# Patient Record
Sex: MALE | Race: WHITE | Employment: OTHER | URBAN - METROPOLITAN AREA
[De-identification: names, ages, dates, MRNs, and addresses within clinical notes are randomized per-mention and may not be internally consistent; named-entity substitution may affect disease eponyms.]

---

## 2020-10-04 ENCOUNTER — HOSPITAL ENCOUNTER (EMERGENCY)
Age: 75
Discharge: HOME | End: 2020-10-04
Attending: EMERGENCY MEDICINE
Payer: MEDICARE

## 2020-10-04 ENCOUNTER — APPOINTMENT (OUTPATIENT)
Dept: GENERAL RADIOLOGY | Age: 75
End: 2020-10-04
Attending: EMERGENCY MEDICINE
Payer: MEDICARE

## 2020-10-04 VITALS
WEIGHT: 238 LBS | RESPIRATION RATE: 16 BRPM | OXYGEN SATURATION: 98 % | HEART RATE: 115 BPM | HEIGHT: 68 IN | SYSTOLIC BLOOD PRESSURE: 102 MMHG | DIASTOLIC BLOOD PRESSURE: 56 MMHG | TEMPERATURE: 98.8 F | BODY MASS INDEX: 36.07 KG/M2

## 2020-10-04 DIAGNOSIS — I95.1 ORTHOSTATIC HYPOTENSION: ICD-10-CM

## 2020-10-04 DIAGNOSIS — J90 PLEURAL EFFUSION: Primary | ICD-10-CM

## 2020-10-04 PROBLEM — F10.11 ALCOHOL ABUSE, IN REMISSION: Status: ACTIVE | Noted: 2020-10-04

## 2020-10-04 PROBLEM — F17.201 TOBACCO USE DISORDER, MILD, IN SUSTAINED REMISSION: Status: ACTIVE | Noted: 2020-10-04

## 2020-10-04 PROBLEM — E86.0 DEHYDRATION: Status: ACTIVE | Noted: 2020-10-04

## 2020-10-04 PROBLEM — E10.29 DM (DIABETES MELLITUS) TYPE I CONTROLLED WITH RENAL MANIFESTATION (HCC): Status: ACTIVE | Noted: 2020-10-04

## 2020-10-04 LAB
ALBUMIN SERPL-MCNC: 3.1 G/DL (ref 3.2–4.6)
ALBUMIN/GLOB SERPL: 0.6 {RATIO} (ref 1.2–3.5)
ALP SERPL-CCNC: 158 U/L (ref 50–136)
ALT SERPL-CCNC: 18 U/L (ref 12–65)
ANION GAP SERPL CALC-SCNC: 7 MMOL/L (ref 7–16)
AST SERPL-CCNC: 14 U/L (ref 15–37)
ATRIAL RATE: 120 BPM
BASOPHILS # BLD: 0 K/UL (ref 0–0.2)
BASOPHILS NFR BLD: 1 % (ref 0–2)
BILIRUB SERPL-MCNC: 0.5 MG/DL (ref 0.2–1.1)
BNP SERPL-MCNC: 28 PG/ML (ref 5–125)
BUN SERPL-MCNC: 24 MG/DL (ref 8–23)
CALCIUM SERPL-MCNC: 8.9 MG/DL (ref 8.3–10.4)
CALCULATED P AXIS, ECG09: 62 DEGREES
CALCULATED R AXIS, ECG10: 78 DEGREES
CALCULATED T AXIS, ECG11: 82 DEGREES
CHLORIDE SERPL-SCNC: 101 MMOL/L (ref 98–107)
CO2 SERPL-SCNC: 29 MMOL/L (ref 21–32)
CREAT SERPL-MCNC: 1.97 MG/DL (ref 0.8–1.5)
DIAGNOSIS, 93000: NORMAL
DIFFERENTIAL METHOD BLD: ABNORMAL
EOSINOPHIL # BLD: 0.1 K/UL (ref 0–0.8)
EOSINOPHIL NFR BLD: 2 % (ref 0.5–7.8)
ERYTHROCYTE [DISTWIDTH] IN BLOOD BY AUTOMATED COUNT: 14.2 % (ref 11.9–14.6)
GLOBULIN SER CALC-MCNC: 5 G/DL (ref 2.3–3.5)
GLUCOSE SERPL-MCNC: 130 MG/DL (ref 65–100)
HCT VFR BLD AUTO: 37.6 % (ref 41.1–50.3)
HGB BLD-MCNC: 11.8 G/DL (ref 13.6–17.2)
IMM GRANULOCYTES # BLD AUTO: 0 K/UL (ref 0–0.5)
IMM GRANULOCYTES NFR BLD AUTO: 0 % (ref 0–5)
LYMPHOCYTES # BLD: 0.8 K/UL (ref 0.5–4.6)
LYMPHOCYTES NFR BLD: 12 % (ref 13–44)
MCH RBC QN AUTO: 26.9 PG (ref 26.1–32.9)
MCHC RBC AUTO-ENTMCNC: 31.4 G/DL (ref 31.4–35)
MCV RBC AUTO: 85.6 FL (ref 79.6–97.8)
MONOCYTES # BLD: 0.6 K/UL (ref 0.1–1.3)
MONOCYTES NFR BLD: 9 % (ref 4–12)
NEUTS SEG # BLD: 4.8 K/UL (ref 1.7–8.2)
NEUTS SEG NFR BLD: 77 % (ref 43–78)
NRBC # BLD: 0 K/UL (ref 0–0.2)
P-R INTERVAL, ECG05: 192 MS
PLATELET # BLD AUTO: 272 K/UL (ref 150–450)
PMV BLD AUTO: 10.2 FL (ref 9.4–12.3)
POTASSIUM SERPL-SCNC: 3 MMOL/L (ref 3.5–5.1)
PROT SERPL-MCNC: 8.1 G/DL (ref 6.3–8.2)
Q-T INTERVAL, ECG07: 296 MS
QRS DURATION, ECG06: 62 MS
QTC CALCULATION (BEZET), ECG08: 418 MS
RBC # BLD AUTO: 4.39 M/UL (ref 4.23–5.6)
SODIUM SERPL-SCNC: 137 MMOL/L (ref 136–145)
VENTRICULAR RATE, ECG03: 120 BPM
WBC # BLD AUTO: 6.2 K/UL (ref 4.3–11.1)

## 2020-10-04 PROCEDURE — 96368 THER/DIAG CONCURRENT INF: CPT

## 2020-10-04 PROCEDURE — 71045 X-RAY EXAM CHEST 1 VIEW: CPT

## 2020-10-04 PROCEDURE — 96365 THER/PROPH/DIAG IV INF INIT: CPT

## 2020-10-04 PROCEDURE — 74011250636 HC RX REV CODE- 250/636: Performed by: EMERGENCY MEDICINE

## 2020-10-04 PROCEDURE — 80053 COMPREHEN METABOLIC PANEL: CPT

## 2020-10-04 PROCEDURE — 83880 ASSAY OF NATRIURETIC PEPTIDE: CPT

## 2020-10-04 PROCEDURE — 87040 BLOOD CULTURE FOR BACTERIA: CPT

## 2020-10-04 PROCEDURE — 99285 EMERGENCY DEPT VISIT HI MDM: CPT

## 2020-10-04 PROCEDURE — 74011000258 HC RX REV CODE- 258: Performed by: EMERGENCY MEDICINE

## 2020-10-04 PROCEDURE — 93005 ELECTROCARDIOGRAM TRACING: CPT | Performed by: EMERGENCY MEDICINE

## 2020-10-04 PROCEDURE — 85025 COMPLETE CBC W/AUTO DIFF WBC: CPT

## 2020-10-04 PROCEDURE — 96366 THER/PROPH/DIAG IV INF ADDON: CPT

## 2020-10-04 RX ADMIN — AZITHROMYCIN MONOHYDRATE 500 MG: 500 INJECTION, POWDER, LYOPHILIZED, FOR SOLUTION INTRAVENOUS at 16:01

## 2020-10-04 RX ADMIN — CEFTRIAXONE SODIUM 1 G: 1 INJECTION, POWDER, FOR SOLUTION INTRAMUSCULAR; INTRAVENOUS at 14:44

## 2020-10-04 NOTE — H&P (VIEW-ONLY)
Called by hospitalist to try and set up time for thoracentesis for patient. Presented to ER with orthostatic hypotension. Found R sided effusion on CXR. Told that he is ok for discharge today and outpatient procedure tomorrow. Not on any blood thinners. Will set up for 10am thoracentesis and new patient evaluation tomorrow.   
 
Dinh Reyes MD

## 2020-10-04 NOTE — ED NOTES
I have reviewed discharge instructions with the patient and spouse. The patient and spouse verbalized understanding. Patient left ED via Discharge Method: ambulatory to Home with wife. Opportunity for questions and clarification provided. Patient given 0 scripts. No e-sign. To continue your aftercare when you leave the hospital, you may receive an automated call from our care team to check in on how you are doing. This is a free service and part of our promise to provide the best care and service to meet your aftercare needs.  If you have questions, or wish to unsubscribe from this service please call [phone]. Thank you for Choosing our [facility] Emergency Department.

## 2020-10-04 NOTE — ED PROVIDER NOTES
Patient is complaining of generalized weakness and fatigue. He states he has had a cough with congestion for the past 3 weeks. He saw his primary doctor on Friday, had a chest x-ray done showing a right-sided pleural effusion. His doctor placed him on Lasix for this. The patient states he felt fine so his doctor decided not to send him to the hospital at that time. He has been taking his Lasix. He states that he has been getting dizzy when he stands. Today he stood up to go to the restroom and fell because of his dizziness and weakness. When EMS arrived, they found him orthostatic. He was given some IV fluids and transported here. He denies any shortness of breath, states his cough has been a nonissue lately. He denies any vomiting or diarrhea, did get some nausea when he stood up and fell today. No past medical history on file. No past surgical history on file. No family history on file.     Social History     Socioeconomic History    Marital status:      Spouse name: Not on file    Number of children: Not on file    Years of education: Not on file    Highest education level: Not on file   Occupational History    Not on file   Social Needs    Financial resource strain: Not on file    Food insecurity     Worry: Not on file     Inability: Not on file    Transportation needs     Medical: Not on file     Non-medical: Not on file   Tobacco Use    Smoking status: Not on file   Substance and Sexual Activity    Alcohol use: Not on file    Drug use: Not on file    Sexual activity: Not on file   Lifestyle    Physical activity     Days per week: Not on file     Minutes per session: Not on file    Stress: Not on file   Relationships    Social connections     Talks on phone: Not on file     Gets together: Not on file     Attends Church service: Not on file     Active member of club or organization: Not on file     Attends meetings of clubs or organizations: Not on file Relationship status: Not on file    Intimate partner violence     Fear of current or ex partner: Not on file     Emotionally abused: Not on file     Physically abused: Not on file     Forced sexual activity: Not on file   Other Topics Concern    Not on file   Social History Narrative    Not on file         ALLERGIES: Patient has no known allergies. Review of Systems   Constitutional: Positive for fatigue. Negative for chills and fever. Gastrointestinal: Positive for nausea. Negative for vomiting. All other systems reviewed and are negative. Vitals:    10/04/20 1201   BP: 121/76   Resp: 18   Temp: 98.7 °F (37.1 °C)   SpO2: 96%   Weight: 108 kg (238 lb)   Height: 5' 8\" (1.727 m)            Physical Exam  Vitals signs and nursing note reviewed. Constitutional:       Appearance: Normal appearance. He is well-developed. HENT:      Head: Normocephalic and atraumatic. Eyes:      Conjunctiva/sclera: Conjunctivae normal.      Pupils: Pupils are equal, round, and reactive to light. Neck:      Musculoskeletal: Normal range of motion and neck supple. Cardiovascular:      Rate and Rhythm: Regular rhythm. Tachycardia present. Heart sounds: Normal heart sounds. Pulmonary:      Effort: Pulmonary effort is normal. No respiratory distress. Breath sounds: No wheezing, rhonchi or rales. Comments: Decreased breath sounds right lung, normal breath sounds left lung posteriorly  Abdominal:      General: Bowel sounds are normal.      Palpations: Abdomen is soft. Musculoskeletal: Normal range of motion. Right lower leg: No edema. Left lower leg: No edema. Skin:     General: Skin is warm and dry. Neurological:      Mental Status: He is alert and oriented to person, place, and time.           MDM  Number of Diagnoses or Management Options  Orthostatic hypotension: new and requires workup  Pleural effusion: new and requires workup  Diagnosis management comments: 3:05 PM discussed results with patient and wife, need for admission. He is agreeable. I spoke with the hospitalist, to see patient for admission.        Amount and/or Complexity of Data Reviewed  Clinical lab tests: reviewed and ordered  Tests in the radiology section of CPT®: ordered and reviewed  Tests in the medicine section of CPT®: reviewed and ordered  Discuss the patient with other providers: yes    Risk of Complications, Morbidity, and/or Mortality  Presenting problems: moderate  Diagnostic procedures: moderate  Management options: moderate    Patient Progress  Patient progress: stable         Procedures

## 2020-10-04 NOTE — PROGRESS NOTES
Called by hospitalist to try and set up time for thoracentesis for patient. Presented to ER with orthostatic hypotension. Found R sided effusion on CXR. Told that he is ok for discharge today and outpatient procedure tomorrow. Not on any blood thinners. Will set up for 10am thoracentesis and new patient evaluation tomorrow.      Marbella Melendez MD

## 2020-10-04 NOTE — CONSULTS
HOSPITALIST CONSULT  NAME:  Nico Aleman   Age:  70 y.o.  :   1945   MRN:   [id number]  PCP: Leatha Lloyd DO  Consulting MD:  Treatment Team: Attending Provider: Consuelo Vasquez MD; Primary Nurse: Ho Montero RN  HPI:   Patient is a 81yo M with hx DM1 on insulin pump, low back pain on morphine, and CKD who presents with weakness and near syncope. He was seen by PCP on Friday for weakness and shortness of breath, found to have pleural effusion. He was started on lasix with plan close follow-up and thoracentesis. The patient has a history of orthostatic hypotension which has been worse for the past two days since starting lasix. Feeling very weak and dizzy this morning so called EMS. Found very orthostatic. Received fluids in ER with improvement in dizziness and weakness. Cough and sob are unchanged from Friday. Nonproductive. No fevers. No chills. No unwell contacts. pBNP wnl. Has a remote history of tobacco and alcohol abuse, quit for 30 years. Complete ROS done and is as stated in HPI or otherwise negative  No past medical history on file. DM type 1, orthostatic hypotension, bph, hypothyroid  No past surgical history on file. back stimulator - no longer in use  None     No Known Allergies   Social History     Tobacco Use    Smoking status: Not on file   Substance Use Topics    Alcohol use: Not on file      No family history on file. noncontributory   Objective:     Visit Vitals  /76 (BP 1 Location: Left arm, BP Patient Position: At rest)   Temp 98.7 °F (37.1 °C)   Resp 18   Ht 5' 8\" (1.727 m)   Wt 108 kg (238 lb)   SpO2 96%   BMI 36.19 kg/m²      Temp (24hrs), Av.7 °F (37.1 °C), Min:98.7 °F (37.1 °C), Max:98.7 °F (37.1 °C)    Oxygen Therapy  O2 Sat (%): 96 % (10/04/20 1201)  Pulse via Oximetry: 123 beats per minute (10/04/20 1201)  O2 Device: Room air (10/04/20 1201)  Physical Exam:  General:    Alert, cooperative, no distress, appears stated age.      Head: Normocephalic, without obvious abnormality, atraumatic. Nose:  Nares normal. No drainage or sinus tenderness. Lungs:   Diminished right,  Clear left, no wheeze/crackle  Heart:   Regular rate and rhythm,  no murmur, rub or gallop. Abdomen:   Soft, non-tender. Not distended. Bowel sounds normal.   Extremities: No cyanosis. No edema. No clubbing  Skin:     Texture, turgor normal. No rashes or lesions. Not Jaundiced  Neurologic: Alert and oriented x 3, no focal deficits   Data Review:   Recent Results (from the past 24 hour(s))   CBC WITH AUTOMATED DIFF    Collection Time: 10/04/20 12:21 PM   Result Value Ref Range    WBC 6.2 4.3 - 11.1 K/uL    RBC 4.39 4.23 - 5.6 M/uL    HGB 11.8 (L) 13.6 - 17.2 g/dL    HCT 37.6 (L) 41.1 - 50.3 %    MCV 85.6 79.6 - 97.8 FL    MCH 26.9 26.1 - 32.9 PG    MCHC 31.4 31.4 - 35.0 g/dL    RDW 14.2 11.9 - 14.6 %    PLATELET 436 991 - 376 K/uL    MPV 10.2 9.4 - 12.3 FL    ABSOLUTE NRBC 0.00 0.0 - 0.2 K/uL    DF AUTOMATED      NEUTROPHILS 77 43 - 78 %    LYMPHOCYTES 12 (L) 13 - 44 %    MONOCYTES 9 4.0 - 12.0 %    EOSINOPHILS 2 0.5 - 7.8 %    BASOPHILS 1 0.0 - 2.0 %    IMMATURE GRANULOCYTES 0 0.0 - 5.0 %    ABS. NEUTROPHILS 4.8 1.7 - 8.2 K/UL    ABS. LYMPHOCYTES 0.8 0.5 - 4.6 K/UL    ABS. MONOCYTES 0.6 0.1 - 1.3 K/UL    ABS. EOSINOPHILS 0.1 0.0 - 0.8 K/UL    ABS. BASOPHILS 0.0 0.0 - 0.2 K/UL    ABS. IMM.  GRANS. 0.0 0.0 - 0.5 K/UL   METABOLIC PANEL, COMPREHENSIVE    Collection Time: 10/04/20 12:21 PM   Result Value Ref Range    Sodium 137 136 - 145 mmol/L    Potassium 3.0 (L) 3.5 - 5.1 mmol/L    Chloride 101 98 - 107 mmol/L    CO2 29 21 - 32 mmol/L    Anion gap 7 7 - 16 mmol/L    Glucose 130 (H) 65 - 100 mg/dL    BUN 24 (H) 8 - 23 MG/DL    Creatinine 1.97 (H) 0.8 - 1.5 MG/DL    GFR est AA 43 (L) >60 ml/min/1.73m2    GFR est non-AA 36 (L) >60 ml/min/1.73m2    Calcium 8.9 8.3 - 10.4 MG/DL    Bilirubin, total 0.5 0.2 - 1.1 MG/DL    ALT (SGPT) 18 12 - 65 U/L    AST (SGOT) 14 (L) 15 - 37 U/L Alk. phosphatase 158 (H) 50 - 136 U/L    Protein, total 8.1 6.3 - 8.2 g/dL    Albumin 3.1 (L) 3.2 - 4.6 g/dL    Globulin 5.0 (H) 2.3 - 3.5 g/dL    A-G Ratio 0.6 (L) 1.2 - 3.5     NT-PRO BNP    Collection Time: 10/04/20 12:21 PM   Result Value Ref Range    NT pro-BNP 28 5 - 125 PG/ML   EKG, 12 LEAD, INITIAL    Collection Time: 10/04/20 12:22 PM   Result Value Ref Range    Ventricular Rate 120 BPM    Atrial Rate 120 BPM    P-R Interval 192 ms    QRS Duration 62 ms    Q-T Interval 296 ms    QTC Calculation (Bezet) 418 ms    Calculated P Axis 62 degrees    Calculated R Axis 78 degrees    Calculated T Axis 82 degrees    Diagnosis       !! AGE AND GENDER SPECIFIC ECG ANALYSIS !! Sinus tachycardia  Nonspecific ST abnormality  Abnormal ECG  No previous ECGs available       Imaging /Procedures /Studies   XR CHEST PORT   Final Result   Impression: Moderate right pleural effusion and right basilar   atelectasis/infiltrate. Assessment and Plan: Active Hospital Problems    Diagnosis Date Noted    DM (diabetes mellitus) type I controlled with renal manifestation ([facility] [address] 10/04/2020    Pleural effusion 10/04/2020    Tobacco use disorder, mild, in sustained remission 10/04/2020    Alcohol abuse, in remission 10/04/2020    Orthostatic hypotension 10/04/2020    Dehydration 10/04/2020       PLAN:  Pleural effusion still needs theraputic and diagnostic tap. Received rocephin/azithro but no infectious signs or symptoms - will hold further abx at this time but blood cultures pending. [facility]. After eval of pleural fluid, may need further cardiac or malignancy workup per primary care. Since the patient is no longer dizzy or weak after hydration, he is asking to be discharged home to have tap as an outpatient. No hypoxia or clear infectious signs so there is no absolute contraindication.      Dispo: ER discharge with planned outpatient fawad in AM and follow-up with PCP    Signed By: Deonna Lorenzana MD     October 4, 2020

## 2020-10-04 NOTE — ED TRIAGE NOTES
Patient arrived via EMS from private home. EMS was called for generalized weakness. Has had a persistent cough with congestion x3 weeks. Patient seen PCP Friday 9/2/2020 and was told he had diminished LS. Xray completed at PCP which noted right pleural effusion per EMS report. PCP appt tomorrow for a follow up. Today patient stood up from toilet and became light headed and dizzy. Wife called [phone] for health check. EMS completed orthos. Laying /96, sitting /60, standing BP 80/40. EMS gave NS 400ml. EMS placed IV 20g left hand. HR 120bpm, , temp 99.2, o2 sat 98% on RA. A+O x4 at baseline.

## 2020-10-05 ENCOUNTER — HOSPITAL ENCOUNTER (OUTPATIENT)
Age: 75
Setting detail: OUTPATIENT SURGERY
Discharge: HOME | End: 2020-10-05
Attending: INTERNAL MEDICINE | Admitting: INTERNAL MEDICINE
Payer: MEDICARE

## 2020-10-05 VITALS
OXYGEN SATURATION: 98 % | BODY MASS INDEX: 34.86 KG/M2 | TEMPERATURE: 98.4 F | RESPIRATION RATE: 20 BRPM | HEIGHT: 68 IN | SYSTOLIC BLOOD PRESSURE: 100 MMHG | WEIGHT: 230 LBS | DIASTOLIC BLOOD PRESSURE: 50 MMHG | HEART RATE: 111 BPM

## 2020-10-05 DIAGNOSIS — J90 PLEURAL EFFUSION: ICD-10-CM

## 2020-10-05 PROBLEM — Z87.891 HISTORY OF TOBACCO ABUSE: Status: ACTIVE | Noted: 2020-10-04

## 2020-10-05 LAB
HCT VFR BLD AUTO: 35.8 % (ref 41.1–50.3)
HGB BLD-MCNC: 11.4 G/DL (ref 13.6–17.2)

## 2020-10-05 PROCEDURE — 88305 TISSUE EXAM BY PATHOLOGIST: CPT

## 2020-10-05 PROCEDURE — 83615 LACTATE (LD) (LDH) ENZYME: CPT

## 2020-10-05 PROCEDURE — 85013 SPUN MICROHEMATOCRIT: CPT

## 2020-10-05 PROCEDURE — 87116 MYCOBACTERIA CULTURE: CPT

## 2020-10-05 PROCEDURE — 36415 COLL VENOUS BLD VENIPUNCTURE: CPT

## 2020-10-05 PROCEDURE — 89050 BODY FLUID CELL COUNT: CPT

## 2020-10-05 PROCEDURE — 82945 GLUCOSE OTHER FLUID: CPT

## 2020-10-05 PROCEDURE — 85018 HEMOGLOBIN: CPT

## 2020-10-05 PROCEDURE — 77030014147 HC TY THORCENT PARA TELE -B: Performed by: INTERNAL MEDICINE

## 2020-10-05 PROCEDURE — 87205 SMEAR GRAM STAIN: CPT

## 2020-10-05 PROCEDURE — 76040000019: Performed by: INTERNAL MEDICINE

## 2020-10-05 PROCEDURE — 87102 FUNGUS ISOLATION CULTURE: CPT

## 2020-10-05 PROCEDURE — 2709999900 HC NON-CHARGEABLE SUPPLY: Performed by: INTERNAL MEDICINE

## 2020-10-05 PROCEDURE — 84157 ASSAY OF PROTEIN OTHER: CPT

## 2020-10-05 PROCEDURE — 88112 CYTOPATH CELL ENHANCE TECH: CPT

## 2020-10-05 PROCEDURE — 32555 ASPIRATE PLEURA W/ IMAGING: CPT | Performed by: INTERNAL MEDICINE

## 2020-10-05 RX ORDER — FINASTERIDE 5 MG/1
5 TABLET, FILM COATED ORAL DAILY
COMMUNITY

## 2020-10-05 RX ORDER — FLUTICASONE PROPIONATE 0.5 MG/G
1 CREAM TOPICAL 2 TIMES DAILY
COMMUNITY

## 2020-10-05 RX ORDER — AMITRIPTYLINE HYDROCHLORIDE 10 MG/1
25 TABLET, FILM COATED ORAL
COMMUNITY
End: 2021-08-22 | Stop reason: CLARIF

## 2020-10-05 RX ORDER — PREGABALIN 75 MG/1
75 CAPSULE ORAL 2 TIMES DAILY
COMMUNITY
End: 2021-07-08

## 2020-10-05 RX ORDER — GLUCOSAMINE SULFATE 1500 MG
1000 POWDER IN PACKET (EA) ORAL DAILY
COMMUNITY

## 2020-10-05 RX ORDER — MORPHINE SULFATE 15 MG/1
15 TABLET ORAL 2 TIMES DAILY
COMMUNITY
End: 2021-07-08

## 2020-10-05 RX ORDER — ALFUZOSIN HYDROCHLORIDE 10 MG/1
10 TABLET, EXTENDED RELEASE ORAL
COMMUNITY
End: 2021-11-30 | Stop reason: SINTOL

## 2020-10-05 RX ORDER — HYDROCORTISONE VALERATE 2 MG/G
OINTMENT TOPICAL
COMMUNITY
End: 2021-08-22 | Stop reason: CLARIF

## 2020-10-05 RX ORDER — ROSUVASTATIN CALCIUM 10 MG/1
10 TABLET, COATED ORAL
COMMUNITY
End: 2021-07-10

## 2020-10-05 RX ORDER — DONEPEZIL HYDROCHLORIDE 5 MG/1
5 TABLET, FILM COATED ORAL DAILY
COMMUNITY
End: 2022-04-14

## 2020-10-05 RX ORDER — RABEPRAZOLE SODIUM 20 MG/1
20 TABLET, DELAYED RELEASE ORAL 2 TIMES DAILY
COMMUNITY
End: 2021-08-22

## 2020-10-05 RX ORDER — ONDANSETRON 4 MG/1
4 TABLET, FILM COATED ORAL
COMMUNITY
End: 2021-11-10

## 2020-10-05 RX ORDER — GUAIFENESIN 100 MG/5ML
81 LIQUID (ML) ORAL
COMMUNITY
End: 2021-07-28

## 2020-10-05 RX ORDER — SODIUM BICARBONATE 650 MG/1
650 TABLET ORAL 2 TIMES DAILY
COMMUNITY

## 2020-10-05 RX ORDER — BUPROPION HYDROCHLORIDE 75 MG/1
75 TABLET ORAL 2 TIMES DAILY
COMMUNITY
End: 2021-11-17

## 2020-10-05 RX ORDER — LEVOTHYROXINE SODIUM 75 UG/1
75 TABLET ORAL
COMMUNITY
End: 2022-02-22 | Stop reason: SDUPTHER

## 2020-10-05 NOTE — CONSULTS
CONSULT NOTE    Toy Spikes    10/5/2020    Date of Admission:  10/5/2020    The patient's chart is reviewed and the patient is discussed with the staff. Subjective:     Patient is a 70 y.o.  male seen and evaluated at the request of Dr. Primo Segundo. Pt has a history of prior tobacco and ETOH abuse, orthostatic hypotension, DM2, and anxiety. Pt developed a cough a few weeks ago and was seen by PCP on 10/2/2020 and had a CXR with findings of R pleural effusion. He was given lasix 80mg BID and told to follow up this week. On 10/4, pt woke up feeling weak and dizzy. His wife called EMS and he was found to be orthostatic. He was given IVF and presented to the ER at [facility]. The hospitalist were consulted and he was not hypoxic and did not meet criteria for admission. We were called and pt was set up for outpatient thoracentesis. Pt presented to the [address] [place] [address] lab today for the procedure. Pt's wife at bedside and she contributes to his history. Pt reports that he was a smoker smoking 1-2 ppd x 36 years. He quit smoking in 1996. He is not on any inhalers or O2 at home. He is not aware of any asthma or COPD issues. He does not normally cough. He denies shortness of breath. He denies fever, chills, or sweats. He has stopped taking lasix secondary to dehydration and hypokalemia.        Review of Systems  Constitutional: positive for fatigue  Eyes: positive for contacts/glasses  Ears, nose, mouth, throat, and face: positive for hearing loss  Respiratory: positive for cough  Cardiovascular: negative  Gastrointestinal: negative  Genitourinary:positive for frequency and nocturia  Hematologic/lymphatic: negative  Musculoskeletal:positive for arthralgias and stiff joints  Neurological: positive for dizziness  Behavioral/Psych: positive for anxiety  Endocrine: negative  Allergic/Immunologic: negative    Patient Active Problem List   Diagnosis Code    DM (diabetes mellitus) type I controlled with renal manifestation (HCC) E10.29    Pleural effusion J90    History of tobacco abuse Z87.891    Alcohol abuse, in remission F10.11    Orthostatic hypotension I95.1    Dehydration E86.0       Home DME company none. Prior to Admission Medications   Prescriptions Last Dose Informant Patient Reported? Taking? IP [facility] INSULIN LISPRO, HUMALOG, FOR PUMP 10/5/2020 at Unknown time  Yes Yes   RABEprazole (Aciphex) 20 mg tablet 10/5/2020 at Unknown time  Yes Yes   Sig: Take 20 mg by mouth two (2) times a day. alfuzosin SR (UROXATRAL) 10 mg SR tablet 10/4/2020 at Unknown time  Yes Yes   Sig: Take 10 mg by mouth daily. amitriptyline (ELAVIL) 10 mg tablet 10/4/2020 at Unknown time  Yes Yes   Sig: Take 20 mg by mouth nightly. aspirin 81 mg chewable tablet 10/5/2020 at Unknown time  Yes Yes   Sig: Take 81 mg by mouth daily. buPROPion (WELLBUTRIN) 75 mg tablet 10/5/2020 at Unknown time  Yes Yes   Sig: Take 75 mg by mouth two (2) times a day. cholecalciferol (Vitamin D3) 25 mcg (1,000 unit) cap 10/5/2020 at Unknown time  Yes Yes   Sig: Take 1,000 Units by mouth daily. donepeziL (ARICEPT) 5 mg tablet 10/5/2020 at Unknown time  Yes Yes   Sig: Take 5 mg by mouth nightly. finasteride (PROSCAR) 5 mg tablet 10/5/2020 at Unknown time  Yes Yes   Sig: Take 5 mg by mouth daily. fluticasone propionate (CUTIVATE) 0.05 % topical cream Unknown at Unknown time  Yes No   Sig: Apply  to affected area two (2) times a day. hydrocortisone valerate (WEST-DORI) 0.2 % ointment 10/4/2020 at Unknown time  Yes Yes   Sig: Apply  to affected area two (2) times daily as needed for Skin Irritation. use thin layer   levothyroxine (SYNTHROID) 75 mcg tablet 10/5/2020 at Unknown time  Yes Yes   Sig: Take 75 mcg by mouth Daily (before breakfast). morphine IR (MS IR) 15 mg tablet 10/5/2020 at Unknown time  Yes Yes   Sig: Take 15 mg by mouth two (2) times a day.    ondansetron hcl (Zofran) 4 mg tablet Unknown at Unknown time  Yes No   Sig: Take 4 mg by mouth every eight (8) hours as needed for Nausea or Vomiting. pregabalin (Lyrica) 75 mg capsule 10/5/2020 at Unknown time  Yes Yes   Sig: Take 75 mg by mouth two (2) times a day. rosuvastatin (Crestor) 10 mg tablet 10/4/2020 at Unknown time  Yes Yes   Sig: Take 10 mg by mouth nightly.   sodium bicarbonate 650 mg tablet 10/5/2020 at Unknown time  Yes Yes   Sig: Take 650 mg by mouth two (2) times a day.       Facility-Administered Medications: None       Past Medical History:   Diagnosis Date    Acquired hypothyroidism     Aphonia     Back pain, chronic     Benign prostatic hyperplasia with incomplete bladder emptying     Broken bones     Candida laryngitis     Charcot foot due to diabetes mellitus ([facility] [address]     Chronic kidney disease     Chronic left-sided low back pain with left-sided sciatica     Diabetes ([facility] [facility]     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 ([facility] [address] [facility]     Diabetic nephropathy ([facility]     Diabetic peripheral neuropathy (HCC)     Diabetic retinopathy (HCC)     Dysphonia     GERD (gastroesophageal reflux disease)     HLD (hyperlipidemia)     Insulin pump in place     Left hip pain     Lumbar radiculopathy     MCI (mild cognitive impairment) with memory loss     Mild cognitive impairment     Moderate episode of recurrent major depressive disorder (HCC)     Orthostatic hypotension     LISA (obstructive sleep apnea)     Paraspinal muscle spasm     Polypharmacy     S/P lumbar fusion     Seborrheic keratosis     Spinal cord stimulator status     Spondylolisthesis     Tachycardia     Thyroid disease     Trochanteric bursitis of left hip     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)     Type 1 diabetes mellitus with hyperglycemia (HCC)     Type 1 diabetes mellitus with hypoglycemia (HCC)     Vitreous hemorrhage due to type 1 diabetes mellitus (HCC)     Vocal cord atrophy     Weakness      Past Surgical History:   Procedure Laterality Date    HX AMPUTATION TOE      distal portion of the 2nd toe of R, foot, distal part of 3rd toe of r foot    HX BUNIONECTOMY      HX LUMBAR FUSION      L4-5    HX OTHER SURGICAL      spinal neurostimulator    HX SHOULDER ARTHROSCOPY Left     HX TONSIL AND ADENOIDECTOMY      at age 8 per Pt     Social History     Socioeconomic History    Marital status:      Spouse name: Not on file    Number of children: Not on file    Years of education: Not on file    Highest education level: Not on file   Occupational History    Not on file   Social Needs    Financial resource strain: Not on file    Food insecurity     Worry: Not on file     Inability: Not on file    Transportation needs     Medical: Not on file     Non-medical: Not on file   Tobacco Use    Smoking status: Former Smoker     Last attempt to quit: 1996     Years since quittin.1    Smokeless tobacco: Former User   Substance and Sexual Activity    Alcohol use: Not Currently    Drug use: Never    Sexual activity: Not on file   Lifestyle    Physical activity     Days per week: Not on file     Minutes per session: Not on file    Stress: Not on file   Relationships    Social connections     Talks on phone: Not on file     Gets together: Not on file     Attends Hinduism service: Not on file     Active member of club or organization: Not on file     Attends meetings of clubs or organizations: Not on file     Relationship status: Not on file    Intimate partner violence     Fear of current or ex partner: Not on file     Emotionally abused: Not on file     Physically abused: Not on file     Forced sexual activity: Not on file   Other Topics Concern    Not on file   Social History Narrative    Not on file     History reviewed. No pertinent family history. Allergies   Allergen Reactions    Drixomed [Dexbrompheniramine-Pseudoephed] Palpitations       No current facility-administered medications for this encounter.           Objective: Vitals:    10/05/20 1002   BP: 119/72   Pulse: (!) 112   Resp: 20   Temp: 98.4 °F (36.9 °C)   SpO2: 97%   Weight: 230 lb (104.3 kg)   Height: 5' 8\" (1.727 m)       PHYSICAL EXAM     Constitutional:  the patient is well developed and in no acute distress, on RA  EENMT:  Sclera clear, pupils equal, oral mucosa moist  Respiratory: diminished R base   Cardiovascular:  RRR without M,G,R  Gastrointestinal: soft and non-tender; with positive bowel sounds. Musculoskeletal: warm without cyanosis. There is no lower extremity edema. Skin:  no jaundice or rashes   Neurologic: no gross neuro deficits     Psychiatric:  alert and oriented x 4    CXR:        Recent Labs     10/04/20  1221   WBC 6.2   HGB 11.8*   HCT 37.6*        Recent Labs     10/04/20  1221      K 3.0*      *   CO2 29   BUN 24*   CREA 1.97*   CA 8.9   ALB 3.1*   TBILI 0.5   ALT 18     No results for input(s): PH, PCO2, PO2, HCO3, PHI, PCO2I, PO2I, HCO3I in the last 72 hours. No results for input(s): LCAD, LAC in the last 72 hours. Assessment:  (Medical Decision Making)     Hospital Problems  Date Reviewed: 10/5/2020          Codes Class Noted POA    Pleural effusion ICD-10-CM: J90  ICD-9-CM: 511.9  10/4/2020 Yes    For R thoracentesis today     History of tobacco abuse ICD-10-CM: Z87.891  ICD-9-CM: V15.82  10/4/2020 Yes    Smoked x 36 years. Will need to follow up with spirometry and repeat imaging in our office           Plan:  (Medical Decision Making)     --for R thoracentesis today with cytology, cultures  --will need outpt follow up with spirometry and reimaging   --pt's wife advised to call our office later this week for results from thoracentesis    More than 50% of the time documented was spent in face-to-face contact with the patient and in the care of the patient on the floor/unit where the patient is located.     Thank you very much for this referral.  We appreciate the opportunity to participate in this patient's care.  Will follow along with above stated plan.     IVAN Lr

## 2020-10-05 NOTE — PROCEDURES
PROCEDURE:    DIAGNOSTIC/THERAPEUTIC. PRE-OP DIAGNOSIS:    R PLEURAL EFFUSION    POST-OP DIAGNOSIS:    R HEMOTHORAX    ASSISTANT:    None    ANESTHESIA:    LOCAL ANESTHESIA WITH 1% LIDOCAINE 10 CC TOTAL. CHEST ULTRASOUND FINDINGS:    A Turbo-M, Sonosite ultrasound with a 5-16 mHz probe was used to image the chest and localize the pleural effusion on the Right chest.      A moderatel, complex pleural space was identified on ultrasound, there were multiple loculations and septetions present, with the pleural fluid having a mixed echogenic character. DESCRIPTION OF PROCEDURE:    After obtaining informed consent and localizing the safest location for thoracentesis, the  10th intercostal space was marked with a blunt, plastic needle cap in the mid scapular line. An [employer] AK-0100 Pleral-Seal thoracentesis kit was used to perform the procedure. The skin was  cleansed with the supplied  chlorhexididne swab and then draped in the usual fasion. Using the previously marked location as a giude, a 22 G 1.5 inch needle was used to inject 10 cc of 1% lidocaine into the skin and subcutaneous tissue, as well as onto the underlying rib and inter-costal muscles, pleural fluid was aspirated to assure proper location, prior to removing the anesthesia needle. A 3mm  incision was then made, with the supplied scalpel in the usual fashion to facilitate the insertiopn of the thoracentesis needle. The needle with an 8French thoracentesis catheter was then introduced into the chest through the previously made incision in the usual fashion, the rib localized with the needle, and the catheter then marched over the rib into the pleural space. After aspirating fluid, the thoracentesis catheter was then placed into the chest using the needle itself as a trocar. The needle was then removed and the catheter was attached to the supplied tubing without complication.     12 cc of Kem Blood, was aspirated and sent for analysis. Following this a total of 200 cc was removed from the Right chest, without complication, with the patient experiencing no coughing during the procedure. Procedure was terminated once no further drainage was possible. Fluid was sent for the following tests:    Cell count with differential  LDH  Glucose  Total protein  Cytology  Hematocrit  ADA  AFB  Fungus  Routine culture and Gram stain    Post procedure US confirmed incomplete drainage of the effusion.     EBL:     1 drop      COMPLICATIONS:    none      Jose Enrique Flores MD.

## 2020-10-05 NOTE — PROGRESS NOTES
Pt sat up on side of bed for thoracentesis. Consent obtained. Time out performed. Pts vitals monitored throughout procedure. Rightt ultrasound done and pic taken of pleural fluid.  ~200 ml old bloody pleural fluid from right. Pt tolerated procedure well with no adverse rxn. Specimens sent to the lab x 3 and labeled appropriately. Site dressed appropriately. Lung sliding done and ultrasound findings reviewed by MD.  STAT H&H ordered and drawn before discharge. Discharge instructions reviewed with pt and spouse then pt discharged into wife's care ambulating out of department using his walker.

## 2020-10-05 NOTE — INTERVAL H&P NOTE
Update History & Physical    Date of Surgery Update:  Vanita Etienne was seen and examined. History and physical has been reviewed. The patient has been examined.  There have been no significant clinical changes since the completion of the originally dated History and Physical.    Signed By: Staci Fuller MD     October 5, 2020 1:46 PM               Electronically signed by Staci Fuller MD on 10/5/2020 at 1:46 PM

## 2020-10-05 NOTE — DISCHARGE INSTRUCTIONS
Patient Education        Thoracentesis: What to Expect at Home  Your Recovery  Thoracentesis (say \"[id number]-[id number]-CRUZITO-sis\") is a procedure to remove fluid from the space between the lungs and the chest wall (pleural space). This procedure may also be called a \"chest tap. \" It's normal to have a small amount of fluid in the pleural space. But too much fluid can build up because of problems such as infection, heart failure, or lung cancer. The procedure may have been done to help with shortness of breath and pain caused by the fluid buildup. Or you may have had this procedure so the doctor could test the fluid to find the cause of the buildup. Your chest may be sore where the doctor put the needle or catheter into your skin (the puncture site). This usually gets better after a day or two. You can go back to work or your normal activities as soon as you feel up to it. If a large amount of pleural fluid was removed during the procedure, you will probably be able to breathe more easily. If more pleural fluid collects and needs to be removed, another thoracentesis may be done later. If the doctor sent the fluid to a lab for testing, it may take several days to get the results. The doctor or nurse will discuss the results with you. This care sheet gives you a general idea about how long it will take for you to recover. But each person recovers at a different pace. Follow the steps below to feel better as quickly as possible. How can you care for yourself at home? Activity    · Rest when you feel tired. Getting enough sleep will help you recover.     · Avoid strenuous activities, such as bicycle riding, jogging, weight lifting, or aerobic exercise, until your doctor says it is okay.     · You may shower. Do not take a bath until the puncture site has healed, or until your doctor tells you it is okay.     · Ask your doctor when you can drive again.     · You may need to take 1 or 2 days off from work.  It depends on the type of work you do and how you feel. Diet    · You can eat your normal diet.     · Drink plenty of fluids (unless your doctor tells you not to). Medicines    · Your doctor will tell you if and when you can restart your medicines. He or she will also give you instructions about taking any new medicines.     · If you take aspirin or some other blood thinner, ask your doctor if and when to start taking it again. Make sure that you understand exactly what your doctor wants you to do.     · Be safe with medicines. Take pain medicines exactly as directed. ? If the doctor gave you a prescription medicine for pain, take it as prescribed. ? If you are not taking a prescription pain medicine, ask your doctor if you can take an over-the-counter medicine. ? Do not take two or more pain medicines at the same time unless the doctor told you to. Many pain medicines have acetaminophen, which is Tylenol. Too much acetaminophen (Tylenol) can be harmful.     · If you think your pain medicine is making you sick to your stomach:  ? Take your medicine after meals (unless your doctor has told you not to). ? Ask your doctor for a different pain medicine.     · If your doctor prescribed antibiotics, take them as directed. Do not stop taking them just because you feel better. You need to take the full course of antibiotics. Care of the puncture site    · Wash the area daily with warm, soapy water, and pat it dry. Don't use hydrogen peroxide or alcohol, which may delay healing. You may cover the area with a gauze bandage if it weeps or rubs against clothing. Change the bandage every day.     · Keep the area clean and dry. Follow-up care is a key part of your treatment and safety. Be sure to make and go to all appointments, and call your doctor if you are having problems. It's also a good idea to know your test results and keep a list of the medicines you take.     Please keep bandage dry for next 24 hours and keep all follow-up appointments. Please call  [facility] @ [phone] for any questions or concerns. When should you call for help? Call 911 anytime you think you may need emergency care. For example, call if:    · You passed out (lost consciousness).     · You have severe trouble breathing.     · You have sudden chest pain and shortness of breath, or you cough up blood. Call your doctor now or seek immediate medical care if:    · You have shortness of breath that is new or getting worse.     · You have new or worse pain in your chest, especially when you take a deep breath.     · You are sick to your stomach or cannot keep fluids down.     · You have a fever over 100°F.     · Bright red blood has soaked through the bandage over your puncture site.     · You have signs of infection, such as:  ? Increased pain, swelling, warmth, or redness. ? Red streaks leading from the puncture site. ? Pus draining from the puncture site. ? Swollen lymph nodes in your neck, armpits, or groin. ? A fever.     · You cough up a lot more mucus than normal, or your mucus changes color. Watch closely for changes in your health, and be sure to contact your doctor if you have any problems. Where can you learn more? Go to http://www.gray.com/  Enter Q755 in the search box to learn more about \"Thoracentesis: What to Expect at Home. \"  Current as of: February 24, 2020               Content Version: 12.6  © 0064-0880 [employer]. Care instructions adapted under license by [employer] (which disclaims liability or warranty for this information). If you have questions about a medical condition or this instruction, always ask your healthcare professional. [place] [address] any warranty or liability for your use of this information.

## 2020-10-05 NOTE — H&P (VIEW-ONLY)
CONSULT NOTE Carl Rutland 
 
10/5/2020 Date of Admission:  10/5/2020 The patient's chart is reviewed and the patient is discussed with the staff. Subjective:  
 
Patient is a 70 y.o.  male seen and evaluated at the request of Dr. Faith De. Pt has a history of prior tobacco and ETOH abuse, orthostatic hypotension, DM2, and anxiety. Pt developed a cough a few weeks ago and was seen by PCP on 10/2/2020 and had a CXR with findings of R pleural effusion. He was given lasix 80mg BID and told to follow up this week. On 10/4, pt woke up feeling weak and dizzy. His wife called EMS and he was found to be orthostatic. He was given IVF and presented to the ER at [facility]. The hospitalist were consulted and he was not hypoxic and did not meet criteria for admission. We were called and pt was set up for outpatient thoracentesis. Pt presented to the [address] [place] [address] lab today for the procedure. Pt's wife at bedside and she contributes to his history. Pt reports that he was a smoker smoking 1-2 ppd x 36 years. He quit smoking in 1996. He is not on any inhalers or O2 at home. He is not aware of any asthma or COPD issues. He does not normally cough. He denies shortness of breath. He denies fever, chills, or sweats. He has stopped taking lasix secondary to dehydration and hypokalemia. Review of Systems Constitutional: positive for fatigue Eyes: positive for contacts/glasses Ears, nose, mouth, throat, and face: positive for hearing loss Respiratory: positive for cough Cardiovascular: negative Gastrointestinal: negative Genitourinary:positive for frequency and nocturia Hematologic/lymphatic: negative Musculoskeletal:positive for arthralgias and stiff joints Neurological: positive for dizziness Behavioral/Psych: positive for anxiety Endocrine: negative Allergic/Immunologic: negative Patient Active Problem List  
Diagnosis Code  DM (diabetes mellitus) type I controlled with renal manifestation (HCC) E10.29  Pleural effusion J90  
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  
 Orthostatic hypotension I95.1  Dehydration E86.0 Home DME company none. Prior to Admission Medications Prescriptions Last Dose Informant Patient Reported? Taking? IP [facility] INSULIN LISPRO, HUMALOG, FOR PUMP 10/5/2020 at Unknown time  Yes Yes RABEprazole (Aciphex) 20 mg tablet 10/5/2020 at Unknown time  Yes Yes Sig: Take 20 mg by mouth two (2) times a day. alfuzosin SR (UROXATRAL) 10 mg SR tablet 10/4/2020 at Unknown time  Yes Yes Sig: Take 10 mg by mouth daily. amitriptyline (ELAVIL) 10 mg tablet 10/4/2020 at Unknown time  Yes Yes Sig: Take 20 mg by mouth nightly. aspirin 81 mg chewable tablet 10/5/2020 at Unknown time  Yes Yes Sig: Take 81 mg by mouth daily. buPROPion (WELLBUTRIN) 75 mg tablet 10/5/2020 at Unknown time  Yes Yes Sig: Take 75 mg by mouth two (2) times a day. cholecalciferol (Vitamin D3) 25 mcg (1,000 unit) cap 10/5/2020 at Unknown time  Yes Yes Sig: Take 1,000 Units by mouth daily. donepeziL (ARICEPT) 5 mg tablet 10/5/2020 at Unknown time  Yes Yes Sig: Take 5 mg by mouth nightly. finasteride (PROSCAR) 5 mg tablet 10/5/2020 at Unknown time  Yes Yes Sig: Take 5 mg by mouth daily. fluticasone propionate (CUTIVATE) 0.05 % topical cream Unknown at Unknown time  Yes No  
Sig: Apply  to affected area two (2) times a day. hydrocortisone valerate (WEST-DORI) 0.2 % ointment 10/4/2020 at Unknown time  Yes Yes Sig: Apply  to affected area two (2) times daily as needed for Skin Irritation. use thin layer  
levothyroxine (SYNTHROID) 75 mcg tablet 10/5/2020 at Unknown time  Yes Yes Sig: Take 75 mcg by mouth Daily (before breakfast). morphine IR (MS IR) 15 mg tablet 10/5/2020 at Unknown time  Yes Yes Sig: Take 15 mg by mouth two (2) times a day. ondansetron hcl (Zofran) 4 mg tablet Unknown at Unknown time  Yes No  
Sig: Take 4 mg by mouth every eight (8) hours as needed for Nausea or Vomiting. pregabalin (Lyrica) 75 mg capsule 10/5/2020 at Unknown time  Yes Yes Sig: Take 75 mg by mouth two (2) times a day. rosuvastatin (Crestor) 10 mg tablet 10/4/2020 at Unknown time  Yes Yes Sig: Take 10 mg by mouth nightly.  
sodium bicarbonate 650 mg tablet 10/5/2020 at Unknown time  Yes Yes Sig: Take 650 mg by mouth two (2) times a day. Facility-Administered Medications: None Past Medical History:  
Diagnosis Date  Acquired hypothyroidism  Aphonia  Back pain, chronic  Benign prostatic hyperplasia with incomplete bladder emptying  Broken bones  Candida laryngitis  Charcot foot due to diabetes mellitus ([address]  Chronic kidney disease  Chronic left-sided low back pain with left-sided sciatica  Diabetes ([address]  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 ([address]  Diabetic nephropathy ([address]  Diabetic peripheral neuropathy ([address]  Diabetic retinopathy ([address]  Dysphonia  GERD (gastroesophageal reflux disease)  HLD (hyperlipidemia)  Insulin pump in place  Left hip pain  Lumbar radiculopathy  MCI (mild cognitive impairment) with memory loss  Mild cognitive impairment  Moderate episode of recurrent major depressive disorder ([address]  Orthostatic hypotension  LISA (obstructive sleep apnea)  Paraspinal muscle spasm  Polypharmacy  S/P lumbar fusion  Seborrheic keratosis  Spinal cord stimulator status  Spondylolisthesis  Tachycardia  Thyroid disease  Trochanteric bursitis of left hip  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 ([address]  Type 1 diabetes mellitus with hyperglycemia ([address]  Type 1 diabetes mellitus with hypoglycemia ([address]  Vitreous hemorrhage due to type 1 diabetes mellitus ([address]  Vocal cord atrophy  Weakness Past Surgical History:  
Procedure Laterality Date  HX AMPUTATION TOE    
 distal portion of the 2nd toe of R, foot, distal part of 3rd toe of r foot  HX BUNIONECTOMY  HX LUMBAR FUSION    
 L4-5  
 HX OTHER SURGICAL    
 spinal neurostimulator  HX SHOULDER ARTHROSCOPY Left  HX TONSIL AND ADENOIDECTOMY    
 at age 8 per Pt Social History Socioeconomic History  Marital status:  Spouse name: Not on file  Number of children: Not on file  Years of education: Not on file  Highest education level: Not on file Occupational History  Not on file Social Needs  Financial resource strain: Not on file  Food insecurity Worry: Not on file Inability: Not on file  Transportation needs Medical: Not on file Non-medical: Not on file Tobacco Use  Smoking status: Former Smoker Last attempt to quit: 1996 Years since quittin.1  Smokeless tobacco: Former User Substance and Sexual Activity  Alcohol use: Not Currently  Drug use: Never  Sexual activity: Not on file Lifestyle  Physical activity Days per week: Not on file Minutes per session: Not on file  Stress: Not on file Relationships  Social connections Talks on phone: Not on file Gets together: Not on file Attends Jainism service: Not on file Active member of club or organization: Not on file Attends meetings of clubs or organizations: Not on file Relationship status: Not on file  Intimate partner violence Fear of current or ex partner: Not on file Emotionally abused: Not on file Physically abused: Not on file Forced sexual activity: Not on file Other Topics Concern  Not on file Social History Narrative  Not on file History reviewed. No pertinent family history. Allergies Allergen Reactions  Drixomed [Dexbrompheniramine-Pseudoephed] Palpitations No current facility-administered medications for this encounter. Objective:  
 
Vitals:  
 10/05/20 1002 BP: 119/72 Pulse: (!) 112 Resp: 20 Temp: 98.4 °F (36.9 °C) SpO2: 97% Weight: 230 lb (104.3 kg) Height: 5' 8\" (1.727 m) PHYSICAL EXAM  
 
Constitutional:  the patient is well developed and in no acute distress, on RA 
EENMT:  Sclera clear, pupils equal, oral mucosa moist 
Respiratory: diminished R base Cardiovascular:  RRR without M,G,R 
Gastrointestinal: soft and non-tender; with positive bowel sounds. Musculoskeletal: warm without cyanosis. There is no lower extremity edema. Skin:  no jaundice or rashes Neurologic: no gross neuro deficits Psychiatric:  alert and oriented x 4 CXR:   
 
 
Recent Labs 10/04/20 
1221 WBC 6.2 HGB 11.8* HCT 37.6*  
 Recent Labs 10/04/20 
1221   
K 3.0*  
 * CO2 29 BUN 24* CREA 1.97* CA 8.9 ALB 3.1* TBILI 0.5 ALT 18 No results for input(s): PH, PCO2, PO2, HCO3, PHI, PCO2I, PO2I, HCO3I in the last 72 hours. No results for input(s): LCAD, LAC in the last 72 hours. Assessment:  (Medical Decision Making) Hospital Problems  Date Reviewed: 10/5/2020 Codes Class Noted POA Pleural effusion ICD-10-CM: J90 ICD-9-CM: 511.9  10/4/2020 Yes For R thoracentesis today History of tobacco abuse ICD-10-CM: Z87.891 ICD-9-CM: V15.82  10/4/2020 Yes Smoked x 36 years. Will need to follow up with spirometry and repeat imaging in our office Plan:  (Medical Decision Making) --for R thoracentesis today with cytology, cultures 
--will need outpt follow up with spirometry and reimaging  
--pt's wife advised to call our office later this week for results from thoracentesis More than 50% of the time documented was spent in face-to-face contact with the patient and in the care of the patient on the floor/unit where the patient is located. Thank you very much for this referral.  We appreciate the opportunity to participate in this patient's care. Will follow along with above stated plan.  
 
IVAN Casey

## 2020-10-06 LAB
APPEARANCE FLD: NORMAL
BODY FLD TYPE: NORMAL
COLOR FLD: NORMAL
EOSINOPHIL NFR BRONCH MANUAL: 1 %
GLUCOSE FLD-MCNC: 64 MG/DL
HCT VFR FLD CALC: 16.7 %
LDH FLD L TO P-CCNC: 527 U/L
LYMPHOCYTES NFR BRONCH MANUAL: 83 %
MACROPHAGES NFR BRONCH MANUAL: 4 %
NEUTROPHILS NFR BRONCH MANUAL: 12 %
NUC CELL # FLD: 2574 /CU MM
PROT FLD-MCNC: 5.5 G/DL
RBC # FLD: NORMAL /CU MM
SPECIMEN SOURCE FLD: NORMAL

## 2020-10-08 LAB
BACTERIA SPEC CULT: NORMAL
GRAM STN SPEC: NORMAL
GRAM STN SPEC: NORMAL
SERVICE CMNT-IMP: NORMAL

## 2020-10-09 LAB
BACTERIA SPEC CULT: NORMAL
BACTERIA SPEC CULT: NORMAL
SERVICE CMNT-IMP: NORMAL
SERVICE CMNT-IMP: NORMAL

## 2020-10-14 ENCOUNTER — HOSPITAL ENCOUNTER (OUTPATIENT)
Age: 75
Setting detail: OUTPATIENT SURGERY
Discharge: HOME | End: 2020-10-14
Attending: INTERNAL MEDICINE | Admitting: INTERNAL MEDICINE
Payer: MEDICARE

## 2020-10-14 ENCOUNTER — HOSPITAL ENCOUNTER (OUTPATIENT)
Dept: CT IMAGING | Age: 75
Discharge: HOME | End: 2020-10-14
Attending: INTERNAL MEDICINE
Payer: MEDICARE

## 2020-10-14 VITALS
WEIGHT: 234 LBS | RESPIRATION RATE: 18 BRPM | DIASTOLIC BLOOD PRESSURE: 71 MMHG | HEART RATE: 109 BPM | TEMPERATURE: 98.3 F | HEIGHT: 68 IN | BODY MASS INDEX: 35.46 KG/M2 | OXYGEN SATURATION: 98 % | SYSTOLIC BLOOD PRESSURE: 124 MMHG

## 2020-10-14 DIAGNOSIS — J90 PLEURAL EFFUSION: ICD-10-CM

## 2020-10-14 DIAGNOSIS — J98.19 TRAPPED LUNG: ICD-10-CM

## 2020-10-14 DIAGNOSIS — J94.2 HEMOTHORAX ON RIGHT: ICD-10-CM

## 2020-10-14 PROCEDURE — 32551 INSERTION OF CHEST TUBE: CPT | Performed by: INTERNAL MEDICINE

## 2020-10-14 PROCEDURE — 32555 ASPIRATE PLEURA W/ IMAGING: CPT | Performed by: INTERNAL MEDICINE

## 2020-10-14 PROCEDURE — 77030014147 HC TY THORCENT PARA TELE -B: Performed by: INTERNAL MEDICINE

## 2020-10-14 PROCEDURE — 76040000027: Performed by: INTERNAL MEDICINE

## 2020-10-14 PROCEDURE — 2709999900 HC NON-CHARGEABLE SUPPLY: Performed by: INTERNAL MEDICINE

## 2020-10-14 PROCEDURE — 77030012390 HC DRN CHST BTL GTNG -B: Performed by: INTERNAL MEDICINE

## 2020-10-14 PROCEDURE — 71250 CT THORAX DX C-: CPT

## 2020-10-14 PROCEDURE — C1729 CATH, DRAINAGE: HCPCS | Performed by: INTERNAL MEDICINE

## 2020-10-14 NOTE — PROCEDURES
PROCEDURE:    DIAGNOSTIC/THERAPEUTIC THORACENTESIS/PLEURAL MANNOMETRY. PRE-OP DIAGNOSIS:    R PLEURAL EFFUSION    POST-OP DIAGNOSIS:    R HEMOTHORAX    ASSISTANT:    none    ANESTHESIA:    LOCAL ANESTHESIA WITH 1% LIDOCAINE 10 CC TOTAL. CHEST ULTRASOUND FINDINGS:    A Turbo-M, Sonosite ultrasound with a 5-16 mHz probe was used to image the chest and localize the pleural effusion on the Left/and/Right chest.        A large,  complex pleural space was identified on ultrasound, there were multiple loculations and septetions present, with the pleural fluid having a mixed echogenic character. DESCRIPTION OF PROCEDURE:    After obtaining informed consent and localizing the safest location for thoracentesis, the  9th intercostal space was marked with a blunt, plastic needle cap in the mid scapular line. An [employer] AK-0100 Pleral-Seal thoracentesis kit was used to perform the procedure. The skin was  cleansed with the supplied  chlorhexididne swab and then draped in the usual fasion. Using the previously marked location as a giude, a 22 G 1.5 inch needle was used to inject 10 cc of 1% lidocaine into the skin and subcutaneous tissue, as well as onto the underlying rib and inter-costal muscles, pleural fluid was aspirated to assure proper location, prior to removing the anesthesia needle. A 3mm  incision was then made, with the supplied scalpel in the usual fashion to facilitate the insertiopn of the thoracentesis needle. The needle with an 8French thoracentesis catheter was then introduced into the chest through the previously made incision in the usual fashion, the rib localized with the needle, and the catheter then marched over the rib into the pleural space. After aspirating fluid, the thoracentesis catheter was then placed into the chest using the needle itself as a trocar.   The needle was then removed and the catheter was attached to the supplied tubing without complication. Following this a total of 100 cc of lucia blood  was removed from the Right chest, without difficulty due to blood clots,       Fluid was not sent for  tests:    Previous thoracentesis on 10/5  c/w hemothorax PF hematocrit 16. .blood hematocrit 35  Post procedure US confirmed incomplete drainage of the effusion. Given difficulty in draining, the thoracentesis catheter was removed and a 14F cavity drainage catheter was inserted removing a total of 550 cc of lucia old blood and blood clots. Catheter was removed once no further drainage was possible. Chest CT with a loculated pneumo-hydrothorax and loculated effusion surrounding the entire R lung along with pleural thickening of the RLL(unexpandable lung). IMPRESSION:     Right-sided hydropneumothorax, complex, with probable blood products status post  thoracentesis. There is only a small amount of pleural air present. Superimposed  infection cannot be excluded. EBL:      1 drop      COMPLICATIONS:    None    Recommendations:     The patient will need a VATS decortication due to symptomatic lung entrapment and risk of future fibrothorax   Refral to Dr Bhavna Cui or Isi Cisneros will be made      Boris Fregoso MD.

## 2020-10-14 NOTE — PROGRESS NOTES
Pt sat up on side of bed for thoracentesis. Consent obtained. Time out performed. Pts vitals monitored throughout procedure. right ultrasound done and pic taken of pleural fluid. ~550 ml bloody pleural fluid from right. 14 Vietnamese chest tube placed and hooked up to pluera vac (see Sandra's note). Pt tolerated procedure well with no adverse rxn. No specimen sehe lab. .  Site dressed appropriately. Lung sliding done and ultrasound findings reviewed by MD. CT to follow. . Discharge instructions reviewed with pt and spouse. Pt taken to CT for Cat Scan per order via wheel chair.

## 2020-10-14 NOTE — DISCHARGE INSTRUCTIONS
Patient Education        Thoracentesis: What to Expect at Home  Your Recovery  Thoracentesis (say \"penw-sh-mtz-CRUZITO-sis\") is a procedure to remove fluid from the space between the lungs and the chest wall (pleural space). This procedure may also be called a \"chest tap. \" It's normal to have a small amount of fluid in the pleural space. But too much fluid can build up because of problems such as infection, heart failure, or lung cancer. The procedure may have been done to help with shortness of breath and pain caused by the fluid buildup. Or you may have had this procedure so the doctor could test the fluid to find the cause of the buildup. Your chest may be sore where the doctor put the needle or catheter into your skin (the puncture site). This usually gets better after a day or two. You can go back to work or your normal activities as soon as you feel up to it. If a large amount of pleural fluid was removed during the procedure, you will probably be able to breathe more easily. If more pleural fluid collects and needs to be removed, another thoracentesis may be done later. If the doctor sent the fluid to a lab for testing, it may take several days to get the results. The doctor or nurse will discuss the results with you. This care sheet gives you a general idea about how long it will take for you to recover. But each person recovers at a different pace. Follow the steps below to feel better as quickly as possible. How can you care for yourself at home? Activity    · Rest when you feel tired. Getting enough sleep will help you recover.     · Avoid strenuous activities, such as bicycle riding, jogging, weight lifting, or aerobic exercise, until your doctor says it is okay.     · You may shower. Do not take a bath until the puncture site has healed, or until your doctor tells you it is okay.     · Ask your doctor when you can drive again.     · You may need to take 1 or 2 days off from work.  It depends on the type of work you do and how you feel. Diet    · You can eat your normal diet.     · Drink plenty of fluids (unless your doctor tells you not to). Medicines    · Your doctor will tell you if and when you can restart your medicines. He or she will also give you instructions about taking any new medicines.     · If you take aspirin or some other blood thinner, ask your doctor if and when to start taking it again. Make sure that you understand exactly what your doctor wants you to do.     · Be safe with medicines. Take pain medicines exactly as directed. ? If the doctor gave you a prescription medicine for pain, take it as prescribed. ? If you are not taking a prescription pain medicine, ask your doctor if you can take an over-the-counter medicine. ? Do not take two or more pain medicines at the same time unless the doctor told you to. Many pain medicines have acetaminophen, which is Tylenol. Too much acetaminophen (Tylenol) can be harmful.     · If you think your pain medicine is making you sick to your stomach:  ? Take your medicine after meals (unless your doctor has told you not to). ? Ask your doctor for a different pain medicine.     · If your doctor prescribed antibiotics, take them as directed. Do not stop taking them just because you feel better. You need to take the full course of antibiotics. Care of the puncture site    · Wash the area daily with warm, soapy water, and pat it dry. Don't use hydrogen peroxide or alcohol, which may delay healing. You may cover the area with a gauze bandage if it weeps or rubs against clothing. Change the bandage every day.     · Keep the area clean and dry. Follow-up care is a key part of your treatment and safety. Be sure to make and go to all appointments, and call your doctor if you are having problems. It's also a good idea to know your test results and keep a list of the medicines you take. When should you call for help?    Call 911 anytime you think you may need emergency care. For example, call if:    · You passed out (lost consciousness).     · You have severe trouble breathing.     · You have sudden chest pain and shortness of breath, or you cough up blood. Call your doctor now or seek immediate medical care if:    · You have shortness of breath that is new or getting worse.     · You have new or worse pain in your chest, especially when you take a deep breath.     · You are sick to your stomach or cannot keep fluids down.     · You have a fever over 100°F.     · Bright red blood has soaked through the bandage over your puncture site.     · You have signs of infection, such as:  ? Increased pain, swelling, warmth, or redness. ? Red streaks leading from the puncture site. ? Pus draining from the puncture site. ? Swollen lymph nodes in your neck, armpits, or groin. ? A fever.     · You cough up a lot more mucus than normal, or your mucus changes color. Watch closely for changes in your health, and be sure to contact your doctor if you have any problems. Where can you learn more? Go to http://www.gray.com/  Enter Q755 in the search box to learn more about \"Thoracentesis: What to Expect at Home. \"  Current as of: February 24, 2020               Content Version: 12.6  © 4647-0788 [employer]. Care instructions adapted under license by [employer] (which disclaims liability or warranty for this information). If you have questions about a medical condition or this instruction, always ask your healthcare professional. [place] [address] any warranty or liability for your use of this information.

## 2020-10-14 NOTE — INTERVAL H&P NOTE
Update History & Physical 
 
Date of Surgery Update: Chelsea Love was seen and examined. History and physical has been reviewed. The patient has been examined.  There have been no significant clinical changes since the completion of the originally dated History and Physical. 
 
Signed By: Derl Bernheim, MD   
 October 14, 2020 1:41 PM   
  
 
 
 
Electronically signed by Derl Bernheim, MD on 10/14/2020 at 1:41 PM

## 2020-10-22 PROBLEM — E66.01 SEVERE OBESITY (HCC): Status: ACTIVE | Noted: 2020-10-22

## 2020-10-29 RX ORDER — SODIUM CHLORIDE 0.9 % (FLUSH) 0.9 %
5-40 SYRINGE (ML) INJECTION AS NEEDED
Status: CANCELLED | OUTPATIENT
Start: 2020-10-29

## 2020-10-29 RX ORDER — SODIUM CHLORIDE 0.9 % (FLUSH) 0.9 %
5-40 SYRINGE (ML) INJECTION EVERY 8 HOURS
Status: CANCELLED | OUTPATIENT
Start: 2020-10-29

## 2020-11-05 ENCOUNTER — HOSPITAL ENCOUNTER (OUTPATIENT)
Dept: GENERAL RADIOLOGY | Age: 75
Discharge: HOME | End: 2020-11-05
Attending: SURGERY
Payer: MEDICARE

## 2020-11-05 ENCOUNTER — HOSPITAL ENCOUNTER (OUTPATIENT)
Dept: SURGERY | Age: 75
Discharge: HOME | End: 2020-11-05
Payer: MEDICARE

## 2020-11-05 ENCOUNTER — ANESTHESIA EVENT (OUTPATIENT)
Dept: SURGERY | Age: 75
DRG: 164 | End: 2020-11-05
Payer: MEDICARE

## 2020-11-05 VITALS
SYSTOLIC BLOOD PRESSURE: 156 MMHG | BODY MASS INDEX: 35.87 KG/M2 | WEIGHT: 236.7 LBS | RESPIRATION RATE: 16 BRPM | OXYGEN SATURATION: 99 % | DIASTOLIC BLOOD PRESSURE: 84 MMHG | HEIGHT: 68 IN | TEMPERATURE: 97.7 F

## 2020-11-05 LAB
ALBUMIN SERPL-MCNC: 3.1 G/DL (ref 3.2–4.6)
ALBUMIN/GLOB SERPL: 0.7 {RATIO} (ref 1.2–3.5)
ALP SERPL-CCNC: 160 U/L (ref 50–136)
ALT SERPL-CCNC: 21 U/L (ref 12–65)
ANION GAP SERPL CALC-SCNC: 4 MMOL/L (ref 7–16)
AST SERPL-CCNC: 20 U/L (ref 15–37)
ATRIAL RATE: 102 BPM
BASOPHILS # BLD: 0 K/UL (ref 0–0.2)
BASOPHILS NFR BLD: 1 % (ref 0–2)
BILIRUB SERPL-MCNC: 0.5 MG/DL (ref 0.2–1.1)
BUN SERPL-MCNC: 26 MG/DL (ref 8–23)
CALCIUM SERPL-MCNC: 8.7 MG/DL (ref 8.3–10.4)
CALCULATED P AXIS, ECG09: 78 DEGREES
CALCULATED R AXIS, ECG10: 84 DEGREES
CALCULATED T AXIS, ECG11: 67 DEGREES
CHLORIDE SERPL-SCNC: 105 MMOL/L (ref 98–107)
CO2 SERPL-SCNC: 28 MMOL/L (ref 21–32)
CREAT SERPL-MCNC: 1.57 MG/DL (ref 0.8–1.5)
DIAGNOSIS, 93000: NORMAL
DIFFERENTIAL METHOD BLD: ABNORMAL
EOSINOPHIL # BLD: 0.1 K/UL (ref 0–0.8)
EOSINOPHIL NFR BLD: 2 % (ref 0.5–7.8)
ERYTHROCYTE [DISTWIDTH] IN BLOOD BY AUTOMATED COUNT: 14.6 % (ref 11.9–14.6)
GLOBULIN SER CALC-MCNC: 4.7 G/DL (ref 2.3–3.5)
GLUCOSE BLD STRIP.AUTO-MCNC: 168 MG/DL (ref 65–100)
GLUCOSE SERPL-MCNC: 169 MG/DL (ref 65–100)
HCT VFR BLD AUTO: 33.4 % (ref 41.1–50.3)
HGB BLD-MCNC: 10.4 G/DL (ref 13.6–17.2)
IMM GRANULOCYTES # BLD AUTO: 0 K/UL (ref 0–0.5)
IMM GRANULOCYTES NFR BLD AUTO: 0 % (ref 0–5)
LYMPHOCYTES # BLD: 1 K/UL (ref 0.5–4.6)
LYMPHOCYTES NFR BLD: 18 % (ref 13–44)
MCH RBC QN AUTO: 26.1 PG (ref 26.1–32.9)
MCHC RBC AUTO-ENTMCNC: 31.1 G/DL (ref 31.4–35)
MCV RBC AUTO: 83.9 FL (ref 79.6–97.8)
MONOCYTES # BLD: 0.5 K/UL (ref 0.1–1.3)
MONOCYTES NFR BLD: 10 % (ref 4–12)
NEUTS SEG # BLD: 3.8 K/UL (ref 1.7–8.2)
NEUTS SEG NFR BLD: 70 % (ref 43–78)
NRBC # BLD: 0 K/UL (ref 0–0.2)
P-R INTERVAL, ECG05: 206 MS
PLATELET # BLD AUTO: 231 K/UL (ref 150–450)
PMV BLD AUTO: 10.1 FL (ref 9.4–12.3)
POTASSIUM SERPL-SCNC: 3.6 MMOL/L (ref 3.5–5.1)
PROT SERPL-MCNC: 7.8 G/DL (ref 6.3–8.2)
Q-T INTERVAL, ECG07: 350 MS
QRS DURATION, ECG06: 76 MS
QTC CALCULATION (BEZET), ECG08: 456 MS
RBC # BLD AUTO: 3.98 M/UL (ref 4.23–5.6)
SODIUM SERPL-SCNC: 137 MMOL/L (ref 136–145)
VENTRICULAR RATE, ECG03: 102 BPM
WBC # BLD AUTO: 5.5 K/UL (ref 4.3–11.1)

## 2020-11-05 PROCEDURE — 80053 COMPREHEN METABOLIC PANEL: CPT

## 2020-11-05 PROCEDURE — 82962 GLUCOSE BLOOD TEST: CPT

## 2020-11-05 PROCEDURE — 85025 COMPLETE CBC W/AUTO DIFF WBC: CPT

## 2020-11-05 PROCEDURE — 93005 ELECTROCARDIOGRAM TRACING: CPT | Performed by: SURGERY

## 2020-11-05 PROCEDURE — 71046 X-RAY EXAM CHEST 2 VIEWS: CPT

## 2020-11-05 RX ORDER — FLUTICASONE PROPIONATE 50 MCG
2 SPRAY, SUSPENSION (ML) NASAL
COMMUNITY

## 2020-11-05 RX ORDER — INSULIN GLARGINE 100 [IU]/ML
43 INJECTION, SOLUTION SUBCUTANEOUS AS NEEDED
COMMUNITY
End: 2022-02-22

## 2020-11-05 NOTE — ANESTHESIA PREPROCEDURE EVALUATION
Relevant Problems   No relevant active problems       Anesthetic History   No history of anesthetic complications            Review of Systems / Medical History  Patient summary reviewed, nursing notes reviewed and pertinent labs reviewed    Pulmonary          Shortness of breath    Pertinent negatives: Sleep apnea: pt denies.      Neuro/Psych         Psychiatric history     Cardiovascular    Hypertension: well controlled          Hyperlipidemia         GI/Hepatic/Renal     GERD: well controlled    Renal disease: CRI       Endo/Other    Diabetes: well controlled, type 1, using insulin  Hypothyroidism: well controlled  Obesity and arthritis     Other Findings   Comments: Chronic pain and neuropathy- on MSIR and lyrica           Physical Exam    Airway  Mallampati: II      Mouth opening: Normal     Cardiovascular  Regular rate and rhythm,  S1 and S2 normal,  no murmur, click, rub, or gallop             Dental  No notable dental hx       Pulmonary  Breath sounds clear to auscultation               Abdominal         Other Findings            Anesthetic Plan    ASA: 3  Anesthesia type: general    Monitoring Plan: Arterial line      Induction: Intravenous  Anesthetic plan and risks discussed with: Patient and Spouse

## 2020-11-05 NOTE — PERIOP NOTES
PLEASE CONTINUE TAKING ALL PRESCRIPTION MEDICATIONS UP TO THE DAY OF SURGERY UNLESS OTHERWISE DIRECTED BELOW. DISCONTINUE all vitamins and supplements 7 days prior to surgery. DISCONTINUE Non-Steriodal Anti-Inflammatory (NSAIDS) such as Advil and Aleve 5 days prior to surgery. Home Medications to take  the day of surgery    Wellbutrin, Donepezil, Finasteride, Insulin pump - keep at basal rate only, Levothyroxine, Morphine, Lyrica and Rabeprazole           Home Medications   to Hold   Vitamins, Supplements, and Herbals. Non-Steriodal Anti-Inflammatory (NSAIDS) such as Advil and Aleve. Hold Aspirin for 5 days prior to surgery, last dose will be Friday 11/6/20        Comments          *Visitor policy of 1 visitor per patient discussed. Please do not bring home medications with you on the day of surgery unless otherwise directed by your nurse. If you are instructed to bring home medications, please give them to your nurse as they will be administered by the nursing staff. If you have any questions, please call [facility] [phone] or [facility] [phone]. A copy of this note was provided to the patient for reference.

## 2020-11-05 NOTE — PERIOP NOTES
Patient verified name and     Order for consent found in EHR and matches case posting; patient verified. Type 3 surgery, walk in assessment complete. Labs per surgeon: cbc, bmp and PT/INR  Labs per anesthesia protocol: Type & Screen signed and held DOS  Glucose 168  EK20     Dr. Hodge Book in to see patient    Patient aware that a negative Covid swab result is required to proceed with surgery;  appointments are made by the surgeon office and test should be collected 7 days prior to surgery. The testing center is located at the . [address], [address]      [facility] approved surgical skin cleanser and instructions given per hospital policy. Patient provided with and instructed on educational handouts including Guide to Surgery, Pain Management, Hand Hygiene, Blood Transfusion Education, and [place] [facility] Brochure. Patient answered medical/surgical history questions at their best of ability. All prior to admission medications documented in [facility]. Original medication prescription list visualized during patient appointment. Patient instructed to hold all vitamins 7 days prior to surgery and NSAIDS 5 days prior to surgery, patient verbalized understanding. Patient teach back successful and patient demonstrates knowledge of instructions.

## 2020-11-06 NOTE — PERIOP NOTES
Recent Results (from the past 24 hour(s))   GLUCOSE, POC    Collection Time: 11/05/20  3:44 PM   Result Value Ref Range    Glucose (POC) 168 (H) 65 - 100 mg/dL   CBC WITH AUTOMATED DIFF    Collection Time: 11/05/20  3:47 PM   Result Value Ref Range    WBC 5.5 4.3 - 11.1 K/uL    RBC 3.98 (L) 4.23 - 5.6 M/uL    HGB 10.4 (L) 13.6 - 17.2 g/dL    HCT 33.4 (L) 41.1 - 50.3 %    MCV 83.9 79.6 - 97.8 FL    MCH 26.1 26.1 - 32.9 PG    MCHC 31.1 (L) 31.4 - 35.0 g/dL    RDW 14.6 11.9 - 14.6 %    PLATELET 837 990 - 336 K/uL    MPV 10.1 9.4 - 12.3 FL    ABSOLUTE NRBC 0.00 0.0 - 0.2 K/uL    DF AUTOMATED      NEUTROPHILS 70 43 - 78 %    LYMPHOCYTES 18 13 - 44 %    MONOCYTES 10 4.0 - 12.0 %    EOSINOPHILS 2 0.5 - 7.8 %    BASOPHILS 1 0.0 - 2.0 %    IMMATURE GRANULOCYTES 0 0.0 - 5.0 %    ABS. NEUTROPHILS 3.8 1.7 - 8.2 K/UL    ABS. LYMPHOCYTES 1.0 0.5 - 4.6 K/UL    ABS. MONOCYTES 0.5 0.1 - 1.3 K/UL    ABS. EOSINOPHILS 0.1 0.0 - 0.8 K/UL    ABS. BASOPHILS 0.0 0.0 - 0.2 K/UL    ABS. IMM. GRANS. 0.0 0.0 - 0.5 K/UL   METABOLIC PANEL, COMPREHENSIVE    Collection Time: 11/05/20  3:47 PM   Result Value Ref Range    Sodium 137 136 - 145 mmol/L    Potassium 3.6 3.5 - 5.1 mmol/L    Chloride 105 98 - 107 mmol/L    CO2 28 21 - 32 mmol/L    Anion gap 4 (L) 7 - 16 mmol/L    Glucose 169 (H) 65 - 100 mg/dL    BUN 26 (H) 8 - 23 MG/DL    Creatinine 1.57 (H) 0.8 - 1.5 MG/DL    GFR est AA 56 (L) >60 ml/min/1.73m2    GFR est non-AA 46 (L) >60 ml/min/1.73m2    Calcium 8.7 8.3 - 10.4 MG/DL    Bilirubin, total 0.5 0.2 - 1.1 MG/DL    ALT (SGPT) 21 12 - 65 U/L    AST (SGOT) 20 15 - 37 U/L    Alk.  phosphatase 160 (H) 50 - 136 U/L    Protein, total 7.8 6.3 - 8.2 g/dL    Albumin 3.1 (L) 3.2 - 4.6 g/dL    Globulin 4.7 (H) 2.3 - 3.5 g/dL    A-G Ratio 0.7 (L) 1.2 - 3.5     EKG, 12 LEAD, INITIAL    Collection Time: 11/05/20  4:20 PM   Result Value Ref Range    Ventricular Rate 102 BPM    Atrial Rate 102 BPM    P-R Interval 206 ms    QRS Duration 76 ms    Q-T Interval 350 ms    QTC Calculation (Bezet) 456 ms    Calculated P Axis 78 degrees    Calculated R Axis 84 degrees    Calculated T Axis 67 degrees    Diagnosis       Sinus tachycardia  Otherwise normal ECG  When compared with ECG of 04-OCT-2020 12:22,  No significant change was found  Confirmed by Jackson Marquez MD (), CASS FERRARO ([id number]) on 11/5/2020 10:07:43 PM       Results reviewed and routed to surgeon. No further action required.

## 2020-11-07 LAB
FUNGUS CULTURE, RFCO2T: NEGATIVE
FUNGUS SMEAR, RFCO1T: NORMAL
FUNGUS SPEC CULT: NORMAL
FUNGUS STAIN, 188244: NORMAL
REFLEX TO ID, RFCO3T: NORMAL
SPECIMEN SOURCE: NORMAL
SPECIMEN SOURCE: NORMAL

## 2020-11-12 ENCOUNTER — APPOINTMENT (OUTPATIENT)
Dept: GENERAL RADIOLOGY | Age: 75
DRG: 164 | End: 2020-11-12
Attending: NURSE PRACTITIONER
Payer: MEDICARE

## 2020-11-12 ENCOUNTER — ANESTHESIA (OUTPATIENT)
Dept: SURGERY | Age: 75
DRG: 164 | End: 2020-11-12
Payer: MEDICARE

## 2020-11-12 ENCOUNTER — HOSPITAL ENCOUNTER (INPATIENT)
Age: 75
LOS: 4 days | Discharge: HOME | DRG: 164 | End: 2020-11-16
Attending: SURGERY | Admitting: SURGERY
Payer: MEDICARE

## 2020-11-12 DIAGNOSIS — J94.2 HEMOTHORAX ON RIGHT: ICD-10-CM

## 2020-11-12 DIAGNOSIS — J94.1 FIBROTHORAX: ICD-10-CM

## 2020-11-12 LAB
ARTERIAL PATENCY WRIST A: ABNORMAL
BASE EXCESS BLD CALC-SCNC: 0 MMOL/L
BASE EXCESS BLD CALC-SCNC: 1 MMOL/L
BDY SITE: ABNORMAL
CA-I BLD-MCNC: 1.17 MMOL/L (ref 1.12–1.32)
CA-I BLD-MCNC: 1.17 MMOL/L (ref 1.12–1.32)
CA-I BLD-MCNC: 1.18 MMOL/L (ref 1.12–1.32)
CA-I BLD-MCNC: 1.24 MMOL/L (ref 1.12–1.32)
COLLECT TIME,HTIME: 1315
FLOW RATE ISTAT,IFRATE: 9 L/MIN
GAS FLOW.O2 O2 DELIVERY SYS: ABNORMAL L/MIN
GLUCOSE BLD STRIP.AUTO-MCNC: 194 MG/DL (ref 65–100)
GLUCOSE BLD STRIP.AUTO-MCNC: 209 MG/DL (ref 65–100)
GLUCOSE BLD STRIP.AUTO-MCNC: 211 MG/DL (ref 65–100)
GLUCOSE BLD STRIP.AUTO-MCNC: 212 MG/DL (ref 65–100)
GLUCOSE BLD STRIP.AUTO-MCNC: 217 MG/DL (ref 65–100)
GLUCOSE BLD STRIP.AUTO-MCNC: 227 MG/DL (ref 65–100)
GLUCOSE BLD STRIP.AUTO-MCNC: 229 MG/DL (ref 65–100)
GLUCOSE BLD STRIP.AUTO-MCNC: 246 MG/DL (ref 65–100)
HCO3 BLD-SCNC: 25.5 MMOL/L (ref 22–26)
HCO3 BLD-SCNC: 25.6 MMOL/L (ref 22–26)
HCO3 BLD-SCNC: 26.2 MMOL/L (ref 22–26)
HCO3 BLD-SCNC: 26.5 MMOL/L (ref 22–26)
HISTORY CHECKED?,CKHIST: NORMAL
INR BLD: 1.2 (ref 0.9–1.2)
MAGNESIUM SERPL-MCNC: 2 MG/DL (ref 1.8–2.4)
PCO2 BLD: 44.3 MMHG (ref 35–45)
PCO2 BLD: 45.6 MMHG (ref 35–45)
PCO2 BLD: 46.3 MMHG (ref 35–45)
PCO2 BLD: 51.2 MMHG (ref 35–45)
PH BLD: 7.32 [PH] (ref 7.35–7.45)
PH BLD: 7.36 [PH] (ref 7.35–7.45)
PH BLD: 7.37 [PH] (ref 7.35–7.45)
PH BLD: 7.37 [PH] (ref 7.35–7.45)
PO2 BLD: 117 MMHG (ref 75–100)
PO2 BLD: 86 MMHG (ref 75–100)
PO2 BLD: 92 MMHG (ref 75–100)
PO2 BLD: 99 MMHG (ref 75–100)
POTASSIUM BLD-SCNC: 4.3 MMOL/L (ref 3.5–5.1)
POTASSIUM BLD-SCNC: 4.4 MMOL/L (ref 3.5–5.1)
POTASSIUM BLD-SCNC: 4.4 MMOL/L (ref 3.5–5.1)
POTASSIUM BLD-SCNC: 4.6 MMOL/L (ref 3.5–5.1)
PT BLD: 14.4 SECS (ref 9.6–11.6)
SAO2 % BLD: 95 % (ref 95–98)
SAO2 % BLD: 97 % (ref 95–98)
SAO2 % BLD: 97 % (ref 95–98)
SAO2 % BLD: 98 % (ref 95–98)
SERVICE CMNT-IMP: ABNORMAL
SERVICE CMNT-IMP: ABNORMAL
SODIUM BLD-SCNC: 136 MMOL/L (ref 136–145)
SODIUM BLD-SCNC: 137 MMOL/L (ref 136–145)
SODIUM BLD-SCNC: 137 MMOL/L (ref 136–145)
SODIUM BLD-SCNC: 138 MMOL/L (ref 136–145)
SPECIMEN TYPE: ABNORMAL

## 2020-11-12 PROCEDURE — 77030040922 HC BLNKT HYPOTHRM STRY -A: Performed by: ANESTHESIOLOGY

## 2020-11-12 PROCEDURE — 32220 RELEASE OF LUNG: CPT | Performed by: SURGERY

## 2020-11-12 PROCEDURE — 77030026438 HC STYL ET INTUB CARD -A: Performed by: ANESTHESIOLOGY

## 2020-11-12 PROCEDURE — 74011250636 HC RX REV CODE- 250/636: Performed by: NURSE ANESTHETIST, CERTIFIED REGISTERED

## 2020-11-12 PROCEDURE — 77030012390 HC DRN CHST BTL GTNG -B: Performed by: SURGERY

## 2020-11-12 PROCEDURE — 77030020751 HC FLTR TBNG TRNSFUS HAEM -A: Performed by: ANESTHESIOLOGY

## 2020-11-12 PROCEDURE — 74011250636 HC RX REV CODE- 250/636: Performed by: SURGERY

## 2020-11-12 PROCEDURE — 77030016151 HC PROTCTR LNS DFOG COVD -B: Performed by: SURGERY

## 2020-11-12 PROCEDURE — 77030013794 HC KT TRNSDUC BLD EDWD -B: Performed by: ANESTHESIOLOGY

## 2020-11-12 PROCEDURE — 74011000250 HC RX REV CODE- 250: Performed by: NURSE PRACTITIONER

## 2020-11-12 PROCEDURE — 77030037378 HC BRONCHOSCOPE DISP TRAN -D: Performed by: ANESTHESIOLOGY

## 2020-11-12 PROCEDURE — 82962 GLUCOSE BLOOD TEST: CPT

## 2020-11-12 PROCEDURE — 76210000016 HC OR PH I REC 1 TO 1.5 HR: Performed by: SURGERY

## 2020-11-12 PROCEDURE — 74011250636 HC RX REV CODE- 250/636: Performed by: NURSE PRACTITIONER

## 2020-11-12 PROCEDURE — 82947 ASSAY GLUCOSE BLOOD QUANT: CPT

## 2020-11-12 PROCEDURE — 74011636637 HC RX REV CODE- 636/637: Performed by: ANESTHESIOLOGY

## 2020-11-12 PROCEDURE — 74011250636 HC RX REV CODE- 250/636: Performed by: ANESTHESIOLOGY

## 2020-11-12 PROCEDURE — 86900 BLOOD TYPING SEROLOGIC ABO: CPT

## 2020-11-12 PROCEDURE — 2709999900 HC NON-CHARGEABLE SUPPLY: Performed by: SURGERY

## 2020-11-12 PROCEDURE — 0BBK0ZZ EXCISION OF RIGHT LUNG, OPEN APPROACH: ICD-10-PCS | Performed by: SURGERY

## 2020-11-12 PROCEDURE — 74011250637 HC RX REV CODE- 250/637: Performed by: ANESTHESIOLOGY

## 2020-11-12 PROCEDURE — C2618 PROBE/NEEDLE, CRYO: HCPCS | Performed by: SURGERY

## 2020-11-12 PROCEDURE — 32220 RELEASE OF LUNG: CPT | Performed by: NURSE PRACTITIONER

## 2020-11-12 PROCEDURE — 77030034850: Performed by: SURGERY

## 2020-11-12 PROCEDURE — P9016 RBC LEUKOCYTES REDUCED: HCPCS

## 2020-11-12 PROCEDURE — 77030020829: Performed by: SURGERY

## 2020-11-12 PROCEDURE — 77030011264 HC ELECTRD BLD EXT COVD -A: Performed by: SURGERY

## 2020-11-12 PROCEDURE — 88305 TISSUE EXAM BY PATHOLOGIST: CPT

## 2020-11-12 PROCEDURE — 76060000042 HC ANESTHESIA 5.5 TO 6 HR: Performed by: SURGERY

## 2020-11-12 PROCEDURE — P9045 ALBUMIN (HUMAN), 5%, 250 ML: HCPCS | Performed by: NURSE ANESTHETIST, CERTIFIED REGISTERED

## 2020-11-12 PROCEDURE — 74011000250 HC RX REV CODE- 250: Performed by: NURSE ANESTHETIST, CERTIFIED REGISTERED

## 2020-11-12 PROCEDURE — 74011250637 HC RX REV CODE- 250/637: Performed by: NURSE PRACTITIONER

## 2020-11-12 PROCEDURE — 77030008606 HC TRCR ENDOSC KII AMR -B: Performed by: SURGERY

## 2020-11-12 PROCEDURE — 0BNK0ZZ RELEASE RIGHT LUNG, OPEN APPROACH: ICD-10-PCS | Performed by: SURGERY

## 2020-11-12 PROCEDURE — 77030008518 HC TBNG INSUF ENDO STRY -B: Performed by: SURGERY

## 2020-11-12 PROCEDURE — 77030013292 HC BOWL MX PRSM J&J -A: Performed by: ANESTHESIOLOGY

## 2020-11-12 PROCEDURE — 30233N1 TRANSFUSION OF NONAUTOLOGOUS RED BLOOD CELLS INTO PERIPHERAL VEIN, PERCUTANEOUS APPROACH: ICD-10-PCS | Performed by: ANESTHESIOLOGY

## 2020-11-12 PROCEDURE — 94640 AIRWAY INHALATION TREATMENT: CPT

## 2020-11-12 PROCEDURE — 74011000250 HC RX REV CODE- 250: Performed by: SURGERY

## 2020-11-12 PROCEDURE — 77030019908 HC STETH ESOPH SIMS -A: Performed by: ANESTHESIOLOGY

## 2020-11-12 PROCEDURE — 77030008756 HC TU IRR SUC STRY -B: Performed by: SURGERY

## 2020-11-12 PROCEDURE — 85610 PROTHROMBIN TIME: CPT

## 2020-11-12 PROCEDURE — 65610000001 HC ROOM ICU GENERAL

## 2020-11-12 PROCEDURE — 77030008671 HC TU ENDO/BRNC CUF COVD -B: Performed by: ANESTHESIOLOGY

## 2020-11-12 PROCEDURE — C1729 CATH, DRAINAGE: HCPCS | Performed by: SURGERY

## 2020-11-12 PROCEDURE — 77030005401 HC CATH RAD ARRO -A: Performed by: ANESTHESIOLOGY

## 2020-11-12 PROCEDURE — 64620 INJECTION TREATMENT OF NERVE: CPT | Performed by: SURGERY

## 2020-11-12 PROCEDURE — 86923 COMPATIBILITY TEST ELECTRIC: CPT

## 2020-11-12 PROCEDURE — C2615 SEALANT, PULMONARY, LIQUID: HCPCS | Performed by: SURGERY

## 2020-11-12 PROCEDURE — 77030040361 HC SLV COMPR DVT MDII -B: Performed by: SURGERY

## 2020-11-12 PROCEDURE — 77030039425 HC BLD LARYNG TRULITE DISP TELE -A: Performed by: ANESTHESIOLOGY

## 2020-11-12 PROCEDURE — 76010000177 HC OR TIME 5 TO 5.5 HR INTENSV-TIER 1: Performed by: SURGERY

## 2020-11-12 PROCEDURE — 74011636637 HC RX REV CODE- 636/637: Performed by: NURSE PRACTITIONER

## 2020-11-12 PROCEDURE — 0WJQ4ZZ INSPECTION OF RESPIRATORY TRACT, PERCUTANEOUS ENDOSCOPIC APPROACH: ICD-10-PCS | Performed by: SURGERY

## 2020-11-12 PROCEDURE — 77030031139 HC SUT VCRL2 J&J -A: Performed by: SURGERY

## 2020-11-12 PROCEDURE — 82803 BLOOD GASES ANY COMBINATION: CPT

## 2020-11-12 PROCEDURE — 74011000258 HC RX REV CODE- 258: Performed by: NURSE PRACTITIONER

## 2020-11-12 PROCEDURE — 2709999900 HC NON-CHARGEABLE SUPPLY

## 2020-11-12 PROCEDURE — 77030018673: Performed by: SURGERY

## 2020-11-12 PROCEDURE — 83735 ASSAY OF MAGNESIUM: CPT

## 2020-11-12 PROCEDURE — 74011636637 HC RX REV CODE- 636/637: Performed by: NURSE ANESTHETIST, CERTIFIED REGISTERED

## 2020-11-12 PROCEDURE — 71045 X-RAY EXAM CHEST 1 VIEW: CPT

## 2020-11-12 PROCEDURE — 77030020407 HC IV BLD WRMR ST 3M -A: Performed by: ANESTHESIOLOGY

## 2020-11-12 RX ORDER — ROCURONIUM BROMIDE 10 MG/ML
INJECTION, SOLUTION INTRAVENOUS AS NEEDED
Status: DISCONTINUED | OUTPATIENT
Start: 2020-11-12 | End: 2020-11-12 | Stop reason: HOSPADM

## 2020-11-12 RX ORDER — ROSUVASTATIN CALCIUM 10 MG/1
10 TABLET, COATED ORAL
Status: DISCONTINUED | OUTPATIENT
Start: 2020-11-12 | End: 2020-11-16 | Stop reason: HOSPADM

## 2020-11-12 RX ORDER — CEFAZOLIN SODIUM/WATER 2 G/20 ML
2 SYRINGE (ML) INTRAVENOUS ONCE
Status: COMPLETED | OUTPATIENT
Start: 2020-11-12 | End: 2020-11-12

## 2020-11-12 RX ORDER — MIDAZOLAM HYDROCHLORIDE 1 MG/ML
2 INJECTION, SOLUTION INTRAMUSCULAR; INTRAVENOUS
Status: DISCONTINUED | OUTPATIENT
Start: 2020-11-12 | End: 2020-11-12 | Stop reason: HOSPADM

## 2020-11-12 RX ORDER — HYDROMORPHONE HYDROCHLORIDE 2 MG/ML
0.5 INJECTION, SOLUTION INTRAMUSCULAR; INTRAVENOUS; SUBCUTANEOUS
Status: DISCONTINUED | OUTPATIENT
Start: 2020-11-12 | End: 2020-11-12 | Stop reason: HOSPADM

## 2020-11-12 RX ORDER — OXYCODONE HYDROCHLORIDE 5 MG/1
5 TABLET ORAL
Status: COMPLETED | OUTPATIENT
Start: 2020-11-12 | End: 2020-11-12

## 2020-11-12 RX ORDER — KETAMINE HYDROCHLORIDE 50 MG/ML
INJECTION, SOLUTION INTRAMUSCULAR; INTRAVENOUS AS NEEDED
Status: DISCONTINUED | OUTPATIENT
Start: 2020-11-12 | End: 2020-11-12 | Stop reason: HOSPADM

## 2020-11-12 RX ORDER — FENTANYL CITRATE 50 UG/ML
INJECTION, SOLUTION INTRAMUSCULAR; INTRAVENOUS AS NEEDED
Status: DISCONTINUED | OUTPATIENT
Start: 2020-11-12 | End: 2020-11-12 | Stop reason: HOSPADM

## 2020-11-12 RX ORDER — PROPOFOL 10 MG/ML
INJECTION, EMULSION INTRAVENOUS AS NEEDED
Status: DISCONTINUED | OUTPATIENT
Start: 2020-11-12 | End: 2020-11-12 | Stop reason: HOSPADM

## 2020-11-12 RX ORDER — METOPROLOL TARTRATE 5 MG/5ML
5 INJECTION INTRAVENOUS EVERY 6 HOURS
Status: DISCONTINUED | OUTPATIENT
Start: 2020-11-12 | End: 2020-11-13

## 2020-11-12 RX ORDER — SODIUM CHLORIDE 0.9 % (FLUSH) 0.9 %
5-40 SYRINGE (ML) INJECTION AS NEEDED
Status: DISCONTINUED | OUTPATIENT
Start: 2020-11-12 | End: 2020-11-16 | Stop reason: HOSPADM

## 2020-11-12 RX ORDER — MORPHINE SULFATE 15 MG/1
15 TABLET ORAL 2 TIMES DAILY
Status: DISCONTINUED | OUTPATIENT
Start: 2020-11-12 | End: 2020-11-16 | Stop reason: HOSPADM

## 2020-11-12 RX ORDER — HEPARIN SODIUM 5000 [USP'U]/ML
5000 INJECTION, SOLUTION INTRAVENOUS; SUBCUTANEOUS EVERY 8 HOURS
Status: DISCONTINUED | OUTPATIENT
Start: 2020-11-12 | End: 2020-11-16 | Stop reason: HOSPADM

## 2020-11-12 RX ORDER — GLYCOPYRROLATE 0.2 MG/ML
INJECTION INTRAMUSCULAR; INTRAVENOUS AS NEEDED
Status: DISCONTINUED | OUTPATIENT
Start: 2020-11-12 | End: 2020-11-12 | Stop reason: HOSPADM

## 2020-11-12 RX ORDER — AMOXICILLIN 250 MG
2 CAPSULE ORAL 2 TIMES DAILY
Status: DISCONTINUED | OUTPATIENT
Start: 2020-11-12 | End: 2020-11-16 | Stop reason: HOSPADM

## 2020-11-12 RX ORDER — HYDROMORPHONE HYDROCHLORIDE 2 MG/ML
INJECTION, SOLUTION INTRAMUSCULAR; INTRAVENOUS; SUBCUTANEOUS AS NEEDED
Status: DISCONTINUED | OUTPATIENT
Start: 2020-11-12 | End: 2020-11-12 | Stop reason: HOSPADM

## 2020-11-12 RX ORDER — DONEPEZIL HYDROCHLORIDE 5 MG/1
5 TABLET, FILM COATED ORAL
Status: DISCONTINUED | OUTPATIENT
Start: 2020-11-13 | End: 2020-11-16 | Stop reason: HOSPADM

## 2020-11-12 RX ORDER — GUAIFENESIN 600 MG/1
1200 TABLET, EXTENDED RELEASE ORAL EVERY 12 HOURS
Status: DISCONTINUED | OUTPATIENT
Start: 2020-11-12 | End: 2020-11-16 | Stop reason: HOSPADM

## 2020-11-12 RX ORDER — DEXAMETHASONE SODIUM PHOSPHATE 4 MG/ML
INJECTION, SOLUTION INTRA-ARTICULAR; INTRALESIONAL; INTRAMUSCULAR; INTRAVENOUS; SOFT TISSUE AS NEEDED
Status: DISCONTINUED | OUTPATIENT
Start: 2020-11-12 | End: 2020-11-12 | Stop reason: HOSPADM

## 2020-11-12 RX ORDER — SODIUM CHLORIDE 0.9 % (FLUSH) 0.9 %
5-40 SYRINGE (ML) INJECTION EVERY 8 HOURS
Status: DISCONTINUED | OUTPATIENT
Start: 2020-11-12 | End: 2020-11-16 | Stop reason: HOSPADM

## 2020-11-12 RX ORDER — OXYCODONE HYDROCHLORIDE 5 MG/1
10 TABLET ORAL
Status: DISCONTINUED | OUTPATIENT
Start: 2020-11-12 | End: 2020-11-16 | Stop reason: HOSPADM

## 2020-11-12 RX ORDER — MIDAZOLAM HYDROCHLORIDE 1 MG/ML
2 INJECTION, SOLUTION INTRAMUSCULAR; INTRAVENOUS ONCE
Status: DISCONTINUED | OUTPATIENT
Start: 2020-11-12 | End: 2020-11-12 | Stop reason: HOSPADM

## 2020-11-12 RX ORDER — SODIUM CHLORIDE 9 MG/ML
250 INJECTION, SOLUTION INTRAVENOUS AS NEEDED
Status: DISCONTINUED | OUTPATIENT
Start: 2020-11-12 | End: 2020-11-12

## 2020-11-12 RX ORDER — SODIUM CHLORIDE 0.9 % (FLUSH) 0.9 %
5-40 SYRINGE (ML) INJECTION AS NEEDED
Status: DISCONTINUED | OUTPATIENT
Start: 2020-11-12 | End: 2020-11-12 | Stop reason: HOSPADM

## 2020-11-12 RX ORDER — BUPROPION HYDROCHLORIDE 75 MG/1
75 TABLET ORAL 2 TIMES DAILY
Status: DISCONTINUED | OUTPATIENT
Start: 2020-11-12 | End: 2020-11-16 | Stop reason: HOSPADM

## 2020-11-12 RX ORDER — HYDROMORPHONE HYDROCHLORIDE 1 MG/ML
1 INJECTION, SOLUTION INTRAMUSCULAR; INTRAVENOUS; SUBCUTANEOUS
Status: DISCONTINUED | OUTPATIENT
Start: 2020-11-12 | End: 2020-11-12

## 2020-11-12 RX ORDER — FINASTERIDE 5 MG/1
5 TABLET, FILM COATED ORAL DAILY
Status: DISCONTINUED | OUTPATIENT
Start: 2020-11-13 | End: 2020-11-16 | Stop reason: HOSPADM

## 2020-11-12 RX ORDER — HYDROMORPHONE HYDROCHLORIDE 1 MG/ML
1 INJECTION, SOLUTION INTRAMUSCULAR; INTRAVENOUS; SUBCUTANEOUS
Status: DISCONTINUED | OUTPATIENT
Start: 2020-11-12 | End: 2020-11-16 | Stop reason: HOSPADM

## 2020-11-12 RX ORDER — SODIUM CHLORIDE 0.9 % (FLUSH) 0.9 %
5-40 SYRINGE (ML) INJECTION EVERY 8 HOURS
Status: DISCONTINUED | OUTPATIENT
Start: 2020-11-12 | End: 2020-11-12 | Stop reason: HOSPADM

## 2020-11-12 RX ORDER — IPRATROPIUM BROMIDE AND ALBUTEROL SULFATE 2.5; .5 MG/3ML; MG/3ML
3 SOLUTION RESPIRATORY (INHALATION)
Status: DISCONTINUED | OUTPATIENT
Start: 2020-11-12 | End: 2020-11-13

## 2020-11-12 RX ORDER — SODIUM CHLORIDE, SODIUM LACTATE, POTASSIUM CHLORIDE, CALCIUM CHLORIDE 600; 310; 30; 20 MG/100ML; MG/100ML; MG/100ML; MG/100ML
INJECTION, SOLUTION INTRAVENOUS
Status: DISCONTINUED | OUTPATIENT
Start: 2020-11-12 | End: 2020-11-12 | Stop reason: HOSPADM

## 2020-11-12 RX ORDER — ALBUMIN HUMAN 50 G/1000ML
SOLUTION INTRAVENOUS AS NEEDED
Status: DISCONTINUED | OUTPATIENT
Start: 2020-11-12 | End: 2020-11-12 | Stop reason: HOSPADM

## 2020-11-12 RX ORDER — LIDOCAINE HYDROCHLORIDE 10 MG/ML
0.1 INJECTION INFILTRATION; PERINEURAL AS NEEDED
Status: DISCONTINUED | OUTPATIENT
Start: 2020-11-12 | End: 2020-11-12 | Stop reason: HOSPADM

## 2020-11-12 RX ORDER — ACETAMINOPHEN 500 MG
1000 TABLET ORAL ONCE
Status: COMPLETED | OUTPATIENT
Start: 2020-11-12 | End: 2020-11-12

## 2020-11-12 RX ORDER — AMITRIPTYLINE HYDROCHLORIDE 10 MG/1
30 TABLET, FILM COATED ORAL
Status: DISCONTINUED | OUTPATIENT
Start: 2020-11-12 | End: 2020-11-16 | Stop reason: HOSPADM

## 2020-11-12 RX ORDER — ALBUTEROL SULFATE 0.83 MG/ML
2.5 SOLUTION RESPIRATORY (INHALATION) AS NEEDED
Status: DISCONTINUED | OUTPATIENT
Start: 2020-11-12 | End: 2020-11-12 | Stop reason: HOSPADM

## 2020-11-12 RX ORDER — LIDOCAINE HYDROCHLORIDE 20 MG/ML
INJECTION, SOLUTION EPIDURAL; INFILTRATION; INTRACAUDAL; PERINEURAL AS NEEDED
Status: DISCONTINUED | OUTPATIENT
Start: 2020-11-12 | End: 2020-11-12 | Stop reason: HOSPADM

## 2020-11-12 RX ORDER — SODIUM CHLORIDE, SODIUM LACTATE, POTASSIUM CHLORIDE, CALCIUM CHLORIDE 600; 310; 30; 20 MG/100ML; MG/100ML; MG/100ML; MG/100ML
50 INJECTION, SOLUTION INTRAVENOUS CONTINUOUS
Status: DISCONTINUED | OUTPATIENT
Start: 2020-11-12 | End: 2020-11-13

## 2020-11-12 RX ORDER — LEVOTHYROXINE SODIUM 75 UG/1
75 TABLET ORAL
Status: DISCONTINUED | OUTPATIENT
Start: 2020-11-13 | End: 2020-11-16 | Stop reason: HOSPADM

## 2020-11-12 RX ORDER — ONDANSETRON 2 MG/ML
4 INJECTION INTRAMUSCULAR; INTRAVENOUS
Status: DISCONTINUED | OUTPATIENT
Start: 2020-11-12 | End: 2020-11-16 | Stop reason: HOSPADM

## 2020-11-12 RX ORDER — NALOXONE HYDROCHLORIDE 0.4 MG/ML
0.4 INJECTION, SOLUTION INTRAMUSCULAR; INTRAVENOUS; SUBCUTANEOUS AS NEEDED
Status: DISCONTINUED | OUTPATIENT
Start: 2020-11-12 | End: 2020-11-16 | Stop reason: HOSPADM

## 2020-11-12 RX ORDER — PANTOPRAZOLE SODIUM 40 MG/1
40 TABLET, DELAYED RELEASE ORAL
Status: DISCONTINUED | OUTPATIENT
Start: 2020-11-13 | End: 2020-11-16 | Stop reason: HOSPADM

## 2020-11-12 RX ORDER — OXYCODONE HYDROCHLORIDE 5 MG/1
5 TABLET ORAL
Status: DISCONTINUED | OUTPATIENT
Start: 2020-11-12 | End: 2020-11-12

## 2020-11-12 RX ORDER — TAMSULOSIN HYDROCHLORIDE 0.4 MG/1
0.4 CAPSULE ORAL DAILY
Status: DISCONTINUED | OUTPATIENT
Start: 2020-11-13 | End: 2020-11-16 | Stop reason: HOSPADM

## 2020-11-12 RX ORDER — PREGABALIN 75 MG/1
75 CAPSULE ORAL 2 TIMES DAILY
Status: DISCONTINUED | OUTPATIENT
Start: 2020-11-12 | End: 2020-11-16 | Stop reason: HOSPADM

## 2020-11-12 RX ORDER — ONDANSETRON 2 MG/ML
INJECTION INTRAMUSCULAR; INTRAVENOUS AS NEEDED
Status: DISCONTINUED | OUTPATIENT
Start: 2020-11-12 | End: 2020-11-12 | Stop reason: HOSPADM

## 2020-11-12 RX ORDER — SODIUM CHLORIDE, SODIUM LACTATE, POTASSIUM CHLORIDE, CALCIUM CHLORIDE 600; 310; 30; 20 MG/100ML; MG/100ML; MG/100ML; MG/100ML
75 INJECTION, SOLUTION INTRAVENOUS CONTINUOUS
Status: DISCONTINUED | OUTPATIENT
Start: 2020-11-12 | End: 2020-11-12 | Stop reason: HOSPADM

## 2020-11-12 RX ORDER — FENTANYL CITRATE 50 UG/ML
100 INJECTION, SOLUTION INTRAMUSCULAR; INTRAVENOUS ONCE
Status: DISCONTINUED | OUTPATIENT
Start: 2020-11-12 | End: 2020-11-12 | Stop reason: HOSPADM

## 2020-11-12 RX ORDER — SODIUM CHLORIDE, SODIUM LACTATE, POTASSIUM CHLORIDE, CALCIUM CHLORIDE 600; 310; 30; 20 MG/100ML; MG/100ML; MG/100ML; MG/100ML
50 INJECTION, SOLUTION INTRAVENOUS CONTINUOUS
Status: DISCONTINUED | OUTPATIENT
Start: 2020-11-12 | End: 2020-11-12 | Stop reason: HOSPADM

## 2020-11-12 RX ORDER — NEOSTIGMINE METHYLSULFATE 1 MG/ML
INJECTION, SOLUTION INTRAVENOUS AS NEEDED
Status: DISCONTINUED | OUTPATIENT
Start: 2020-11-12 | End: 2020-11-12 | Stop reason: HOSPADM

## 2020-11-12 RX ADMIN — CEFAZOLIN 1 G: 1 INJECTION, POWDER, FOR SOLUTION INTRAMUSCULAR; INTRAVENOUS at 19:54

## 2020-11-12 RX ADMIN — GLYCOPYRROLATE 0.8 MG: 0.2 INJECTION, SOLUTION INTRAMUSCULAR; INTRAVENOUS at 12:31

## 2020-11-12 RX ADMIN — ROCURONIUM BROMIDE 10 MG: 10 INJECTION, SOLUTION INTRAVENOUS at 11:03

## 2020-11-12 RX ADMIN — PHENYLEPHRINE HYDROCHLORIDE 100 MCG: 10 INJECTION INTRAVENOUS at 10:49

## 2020-11-12 RX ADMIN — HYDROMORPHONE HYDROCHLORIDE 1 MG: 1 INJECTION, SOLUTION INTRAMUSCULAR; INTRAVENOUS; SUBCUTANEOUS at 15:56

## 2020-11-12 RX ADMIN — Medication 5 MG: at 12:31

## 2020-11-12 RX ADMIN — ACETAMINOPHEN 1000 MG: 500 TABLET, FILM COATED ORAL at 06:17

## 2020-11-12 RX ADMIN — KETAMINE HYDROCHLORIDE 25 MG: 50 INJECTION INTRAMUSCULAR; INTRAVENOUS at 10:34

## 2020-11-12 RX ADMIN — SODIUM CHLORIDE, SODIUM LACTATE, POTASSIUM CHLORIDE, AND CALCIUM CHLORIDE 50 ML/HR: 600; 310; 30; 20 INJECTION, SOLUTION INTRAVENOUS at 14:58

## 2020-11-12 RX ADMIN — SODIUM CHLORIDE, SODIUM LACTATE, POTASSIUM CHLORIDE, AND CALCIUM CHLORIDE 75 ML/HR: 600; 310; 30; 20 INJECTION, SOLUTION INTRAVENOUS at 06:18

## 2020-11-12 RX ADMIN — HEPARIN SODIUM 5000 UNITS: 5000 INJECTION INTRAVENOUS; SUBCUTANEOUS at 22:07

## 2020-11-12 RX ADMIN — KETAMINE HYDROCHLORIDE 50 MG: 50 INJECTION INTRAMUSCULAR; INTRAVENOUS at 08:47

## 2020-11-12 RX ADMIN — Medication 2 G: at 11:51

## 2020-11-12 RX ADMIN — SODIUM CHLORIDE, SODIUM LACTATE, POTASSIUM CHLORIDE, AND CALCIUM CHLORIDE: 600; 310; 30; 20 INJECTION, SOLUTION INTRAVENOUS at 10:30

## 2020-11-12 RX ADMIN — INSULIN HUMAN 2 UNITS: 100 INJECTION, SOLUTION PARENTERAL at 13:31

## 2020-11-12 RX ADMIN — ROCURONIUM BROMIDE 10 MG: 10 INJECTION, SOLUTION INTRAVENOUS at 09:08

## 2020-11-12 RX ADMIN — ONDANSETRON 4 MG: 2 INJECTION INTRAMUSCULAR; INTRAVENOUS at 19:44

## 2020-11-12 RX ADMIN — HYDROMORPHONE HYDROCHLORIDE 0.5 MG: 2 INJECTION INTRAMUSCULAR; INTRAVENOUS; SUBCUTANEOUS at 13:32

## 2020-11-12 RX ADMIN — GUAIFENESIN 1200 MG: 600 TABLET ORAL at 21:01

## 2020-11-12 RX ADMIN — KETAMINE HYDROCHLORIDE 25 MG: 50 INJECTION INTRAMUSCULAR; INTRAVENOUS at 11:31

## 2020-11-12 RX ADMIN — HUMAN INSULIN 2 UNITS: 100 INJECTION, SOLUTION SUBCUTANEOUS at 10:19

## 2020-11-12 RX ADMIN — GUAIFENESIN 1200 MG: 600 TABLET ORAL at 15:56

## 2020-11-12 RX ADMIN — ROCURONIUM BROMIDE 10 MG: 10 INJECTION, SOLUTION INTRAVENOUS at 10:21

## 2020-11-12 RX ADMIN — OXYCODONE 5 MG: 5 TABLET ORAL at 19:44

## 2020-11-12 RX ADMIN — Medication 2 G: at 08:01

## 2020-11-12 RX ADMIN — OXYCODONE 5 MG: 5 TABLET ORAL at 13:54

## 2020-11-12 RX ADMIN — PHENYLEPHRINE HYDROCHLORIDE 100 MCG: 10 INJECTION INTRAVENOUS at 10:40

## 2020-11-12 RX ADMIN — BUPROPION HYDROCHLORIDE 75 MG: 75 TABLET, FILM COATED ORAL at 17:22

## 2020-11-12 RX ADMIN — AMITRIPTYLINE HYDROCHLORIDE 30 MG: 10 TABLET, FILM COATED ORAL at 21:17

## 2020-11-12 RX ADMIN — ALBUMIN HUMAN 12.5 G: 0.05 INJECTION, SOLUTION INTRAVENOUS at 11:25

## 2020-11-12 RX ADMIN — ROCURONIUM BROMIDE 10 MG: 10 INJECTION, SOLUTION INTRAVENOUS at 08:47

## 2020-11-12 RX ADMIN — MORPHINE SULFATE 15 MG: 15 TABLET ORAL at 17:21

## 2020-11-12 RX ADMIN — PROPOFOL 150 MG: 10 INJECTION, EMULSION INTRAVENOUS at 07:28

## 2020-11-12 RX ADMIN — IPRATROPIUM BROMIDE AND ALBUTEROL SULFATE 3 ML: .5; 3 SOLUTION RESPIRATORY (INHALATION) at 20:03

## 2020-11-12 RX ADMIN — ROCURONIUM BROMIDE 50 MG: 10 INJECTION, SOLUTION INTRAVENOUS at 07:29

## 2020-11-12 RX ADMIN — PHENYLEPHRINE HYDROCHLORIDE 200 MCG: 10 INJECTION INTRAVENOUS at 10:59

## 2020-11-12 RX ADMIN — HUMAN INSULIN 3 UNITS: 100 INJECTION, SOLUTION SUBCUTANEOUS at 17:21

## 2020-11-12 RX ADMIN — METOPROLOL TARTRATE 5 MG: 5 INJECTION INTRAVENOUS at 22:05

## 2020-11-12 RX ADMIN — FENTANYL CITRATE 100 MCG: 50 INJECTION INTRAMUSCULAR; INTRAVENOUS at 07:28

## 2020-11-12 RX ADMIN — PHENYLEPHRINE HYDROCHLORIDE 200 MCG: 10 INJECTION INTRAVENOUS at 11:07

## 2020-11-12 RX ADMIN — Medication 10 ML: at 14:52

## 2020-11-12 RX ADMIN — FENTANYL CITRATE 50 MCG: 50 INJECTION INTRAMUSCULAR; INTRAVENOUS at 09:13

## 2020-11-12 RX ADMIN — ROCURONIUM BROMIDE 20 MG: 10 INJECTION, SOLUTION INTRAVENOUS at 08:22

## 2020-11-12 RX ADMIN — HYDROMORPHONE HYDROCHLORIDE 1 MG: 1 INJECTION, SOLUTION INTRAMUSCULAR; INTRAVENOUS; SUBCUTANEOUS at 21:01

## 2020-11-12 RX ADMIN — SODIUM CHLORIDE, SODIUM LACTATE, POTASSIUM CHLORIDE, AND CALCIUM CHLORIDE: 600; 310; 30; 20 INJECTION, SOLUTION INTRAVENOUS at 07:45

## 2020-11-12 RX ADMIN — LIDOCAINE HYDROCHLORIDE 80 MG: 20 INJECTION, SOLUTION EPIDURAL; INFILTRATION; INTRACAUDAL; PERINEURAL at 07:28

## 2020-11-12 RX ADMIN — ROSUVASTATIN 10 MG: 10 TABLET, FILM COATED ORAL at 21:17

## 2020-11-12 RX ADMIN — Medication 10 ML: at 22:00

## 2020-11-12 RX ADMIN — ROCURONIUM BROMIDE 10 MG: 10 INJECTION, SOLUTION INTRAVENOUS at 09:55

## 2020-11-12 RX ADMIN — DEXAMETHASONE SODIUM PHOSPHATE 4 MG: 4 INJECTION, SOLUTION INTRAMUSCULAR; INTRAVENOUS at 07:40

## 2020-11-12 RX ADMIN — HYDROMORPHONE HYDROCHLORIDE 0.5 MG: 2 INJECTION INTRAMUSCULAR; INTRAVENOUS; SUBCUTANEOUS at 11:56

## 2020-11-12 RX ADMIN — SENNOSIDES AND DOCUSATE SODIUM 2 TABLET: 8.6; 5 TABLET ORAL at 17:22

## 2020-11-12 RX ADMIN — PHENYLEPHRINE HYDROCHLORIDE 100 MCG: 10 INJECTION INTRAVENOUS at 09:23

## 2020-11-12 RX ADMIN — ONDANSETRON 4 MG: 2 INJECTION INTRAMUSCULAR; INTRAVENOUS at 11:50

## 2020-11-12 RX ADMIN — HYDROMORPHONE HYDROCHLORIDE 0.5 MG: 2 INJECTION INTRAMUSCULAR; INTRAVENOUS; SUBCUTANEOUS at 13:41

## 2020-11-12 RX ADMIN — FENTANYL CITRATE 50 MCG: 50 INJECTION INTRAMUSCULAR; INTRAVENOUS at 08:38

## 2020-11-12 RX ADMIN — PREGABALIN 75 MG: 75 CAPSULE ORAL at 17:22

## 2020-11-12 RX ADMIN — HYDROMORPHONE HYDROCHLORIDE 0.5 MG: 2 INJECTION INTRAMUSCULAR; INTRAVENOUS; SUBCUTANEOUS at 13:48

## 2020-11-12 RX ADMIN — PHENYLEPHRINE HYDROCHLORIDE 100 MCG: 10 INJECTION INTRAVENOUS at 10:47

## 2020-11-12 RX ADMIN — IPRATROPIUM BROMIDE AND ALBUTEROL SULFATE 3 ML: .5; 3 SOLUTION RESPIRATORY (INHALATION) at 15:20

## 2020-11-12 RX ADMIN — HUMAN INSULIN 6 UNITS: 100 INJECTION, SOLUTION SUBCUTANEOUS at 19:52

## 2020-11-12 RX ADMIN — KETAMINE HYDROCHLORIDE 25 MG: 50 INJECTION INTRAMUSCULAR; INTRAVENOUS at 09:32

## 2020-11-12 RX ADMIN — PHENYLEPHRINE HYDROCHLORIDE 100 MCG: 10 INJECTION INTRAVENOUS at 07:58

## 2020-11-12 NOTE — PERIOP NOTES
TRANSFER - OUT REPORT:    Verbal report given to Yadira Kearns RN on [facility]  [facility] to [facility] for routine post - op       Report consisted of patients Situation, Background, Assessment and   Recommendations(SBAR). Information from the following report(s) SBAR, Kardex, OR Summary and Procedure Summary was reviewed with the receiving nurse. Lines:   Peripheral IV 11/12/20 Left Wrist (Active)   Site Assessment Clean, dry, & intact 11/12/20 1253   Phlebitis Assessment 0 11/12/20 1253   Infiltration Assessment 0 11/12/20 1253   Dressing Status Clean, dry, & intact 11/12/20 1253   Dressing Type Transparent;Tape 11/12/20 1253   Hub Color/Line Status Patent 11/12/20 1253   Action Taken Blood drawn 11/12/20 0614       Peripheral IV 11/12/20 Right Hand (Active)   Site Assessment Clean, dry, & intact 11/12/20 1253   Phlebitis Assessment 0 11/12/20 1253   Infiltration Assessment 0 11/12/20 1253   Dressing Status Clean, dry, & intact 11/12/20 1253   Dressing Type Transparent;Tape 11/12/20 1253   Hub Color/Line Status Patent 11/12/20 1253       Arterial Line 11/12/20 Left Radial artery (Active)   Site Assessment Clean, dry, & intact 11/12/20 1253   Dressing Status Clean, dry, & intact 11/12/20 1253   Dressing Type Transparent;Tape 11/12/20 1253   Line Status Intact and in place 11/12/20 1253   Treatment Arm board off 11/12/20 1253        Opportunity for questions and clarification was provided. Patient transported with:   Monitor  Tech    VTE prophylaxis orders have been written for [employer] .    Patient and family given floor number and nurses name. Family updated re: pt status after security code verified.

## 2020-11-12 NOTE — PROGRESS NOTES
LEAPFROG PROTOCOL NOTE    Adrianel Cooks.  11/12/2020    The patient is currently in the critical care setting managed by Dr. Derik Sparks with right VATS converted to a thoracotomy. The patient is on RA. The patient's chart is reviewed and the patient is discussed with the staff. Patient is currently hemodynamically stable. Patient has no needs identified for Intensivist management in the critical care setting at this time. Please notify us if can be of assistance. No charge billed to the patient. Thank you.     Angela Eduardo NP

## 2020-11-12 NOTE — OP NOTES
[address] [place] [address]  OPERATIVE REPORT    Name:  Peggy Ann  MR#:  [id number]  :  1945  ACCOUNT #:  [de-identified]  DATE OF SERVICE:  2020    PREOPERATIVE DIAGNOSIS:  Right hemothorax, persistent, with shortness of breath. 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 PROCEDURES PERFORMED:  1. Right thoracoscopy converted to thoracotomy with extensive decortication and pneumonolysis, over 3.5 hours. 2.  Intercostal nerve cryoablation. SURGEON:  Dean Kessler MD    ASSISTANT:  Tanya Tucker NP    ANESTHESIA:  general    COMPLICATIONS:  none    SPECIMENS REMOVED:  As above    IMPLANTS:  Two chest tubes    ESTIMATED BLOOD LOSS:  400 ml    INDICATION:  This is a 77-year-old male. He fell and developed a hemothorax on the right side. This was managed with aspiration several times. However, his fluid is becoming more loculated and he is becoming progressively short of breath. Eventually, the patient signed for surgical intervention surgery offered to him and thoracotomy is very well likely needed due to the time course of his injury and the potential loculation and fibrothorax. He fully understands and agreed to proceed. FINDINGS:  He does have completely trapped lung with very thick fibrotic rind tissues. He also has extensive intrapleural adhesions due to organized hemothorax and fibrothorax. It took more than 3 hours for lysis of adhesions. PROCEDURE:  After informed consent was obtained, the patient was brought into the operating room. General anesthesia was administered. A double-lumen tube was then placed per Anesthesia. The patient was positioned into the left decubitus position with the right side up. His right chest was prepped and draped in routine fashion. We first attempted thoracoscopy and the first trocar was placed in the anterior axillary line, seventh intercostal space.   We used the scope-over-trocar technique; however, the pleural cavity could not be entered. Then I placed the second trocar at the fourth intercostal space anterior axillary line. Again, scope-over-trocar technique was used. We were able to get into a space. It was obviously in the middle of organized hematoma with extensive adhesions and the decision was immediately made to switch this case to open. Then the standard posterolateral thoracotomy incision was made by extending the trocar site posteriorly and the latissimus dorsi muscle was divided, serratus anterior was retracted anteriorly and then I come to intercostal space and the #6 rib was dissected posteriorly and then divided. I attempted to get into the pleural space; however, there were extremely thick and scarred pleural tissues and also adhesions. The space was extremely hard to enter. I attempted to place a smaller rib retractor and so gradually, I started lysis of adhesions and then increased the separator at the same time and gradually, the exposure getting more. Then with sharp and blunt dissections, the large amount of loculated old blood and scar were removed. Initially, the lung was completely stuck onto the mediastinum and I used a sharp knife to cut right on the lung surface. After I got into a thick layer of rind tissues, then we were able to start to peel the lung. I initially got into a plane posteriorly and then the lung was gradually  from the esophagus and aorta. Then I continued to enlarge this plane inferiorly down to the diaphragm. There were extensive thick and fibrotic tissues that were removed both sharply and bluntly. Then the plane continued to extend anteriorly and then I was able to dissect the lung off the diaphragm superiorly. The lung was able to be dissected from the thoracic wall and medially, the lung was able to be dissected off the mediastinum. It was a very tedious long process and it took more than 3 hours to do.   Eventually, we were able to release the lung from the mediastinum and diaphragm. Also, the rind tissue was able to be peeled off the lateral surface. The lung was fully expandable at the end of the case. We used water to test air leak, no major air leak identified. Then the intercostal nerve was frozen with liquid nitrogen and a 5-level performed to cover the thoracotomy site and chest tube site. Then two large-bore chest tubes, both 36-Divehi, were placed to the apex and along the diaphragm. At the end, Progel was sprayed on the lung surface to minimize air leak. Then the thoracotomy incision was reapproximated with a #2 Vicryl in figure-of-eight fashion. The fascia was closed with 0 Vicryl in both the posterior and anterior fascia. The skin was closed with staples. The patient tolerated the procedure well, was transferred to the recovery room in stable condition. All the instrument count and lap count were correct. Estimated blood loss was about 400 mL. As noted, Dirk Boo was the first assistant in this case. She offered excellent exposure to make this challenging case possible.       Lupe Gomes MD      [id number]  D:  11/12/2020 13:07  T:  11/12/2020 16:19  JOB #:  [id number]

## 2020-11-12 NOTE — BRIEF OP NOTE
Brief Postoperative Note    Patient: Belinda Martinez. YOB: 1945  MRN: [id number]    Date of Procedure: 11/12/2020     Pre-Op Diagnosis: Pleural effusion [J90]    Post-Op Diagnosis: right organized hemothorax and fibrothorax     Procedure(s):  RIGHT VATS CONVERTED TO THORACOTOMY,   Decortication with extensive pneumonolysis over 3 hours  Intercostal nerve CRYOABLATION    Surgeon(s):   Inna Smith MD    Surgical Assistant: Nurse Practitioner: Saulo Evans NP    Anesthesia: General     Estimated Blood Loss (mL): 343    Complications: None    Specimens:   ID Type Source Tests Collected by Time Destination   1 : Fibrothorax Fresh Chest  Inna Smith MD 11/12/2020 11:42 AM Pathology        Implants: * No implants in log *    Drains: * No LDAs found *    Findings: completely trapped lung with thick fibrotic rind, extensive intrapleural adhesions due to organized hemothorax and fibrothorax    Electronically Signed by Marilyn Bonilla MD on 11/12/2020 at 12:53 PM

## 2020-11-12 NOTE — PROGRESS NOTES
TRANSFER - IN REPORT:    Verbal report received from Devon Baker RN(name) on [employer]  being received from [facility]) for routine post - op      Report consisted of patients Situation, Background, Assessment and   Recommendations(SBAR). Information from the following report(s) SBAR, Kardex, OR Summary, Procedure Summary, Intake/Output, MAR, Recent Results, Med Rec Status and Cardiac Rhythm NSR was reviewed with the receiving nurse. Opportunity for questions and clarification was provided. Assessment completed upon patients arrival to unit and care assumed.

## 2020-11-12 NOTE — ANESTHESIA PROCEDURE NOTES
Arterial Line Placement    Start time: 11/12/2020 7:29 AM  End time: 11/12/2020 7:33 AM  Performed by: Staci Rowan MD  Authorized by: Staci Rowan MD     Pre-Procedure  Indications:  Arterial pressure monitoring and blood sampling  Preanesthetic Checklist: patient identified, risks and benefits discussed, anesthesia consent, patient being monitored, timeout performed and patient being monitored    Timeout Time: 07:29        Procedure:   Prep:  ChloraPrep  Seldinger Technique?: Yes    Orientation:  Left  Location:  Radial artery  Catheter size:  20 G  Number of attempts:  1  Cont Cardiac Output Sensor: No      Assessment:   Post-procedure:  Line secured and sterile dressing applied  Patient Tolerance:  Patient tolerated the procedure well with no immediate complications

## 2020-11-12 NOTE — PROGRESS NOTES
's pre-procedure visit requested by patient. Conveyed care and concern for patient and family. Offered prayer as requested for patient, family, and staff.     Milly Francois MDiv, BS  Board Certified

## 2020-11-12 NOTE — ANESTHESIA POSTPROCEDURE EVALUATION
Procedure(s):  RIGHT VATS/ DECORTICATION CONVERTED TO THORACOTOMY, CRYOABLATION. general    Anesthesia Post Evaluation      Multimodal analgesia: multimodal analgesia used between 6 hours prior to anesthesia start to PACU discharge  Patient location during evaluation: PACU  Patient participation: complete - patient participated  Level of consciousness: responsive to verbal stimuli  Pain management: adequate  Airway patency: patent  Anesthetic complications: no  Cardiovascular status: acceptable  Respiratory status: acceptable, spontaneous ventilation and nonlabored ventilation  Hydration status: acceptable  Post anesthesia nausea and vomiting:  none      INITIAL Post-op Vital signs:   Vitals Value Taken Time   /77 11/12/2020  1:16 PM   Temp 36.5 °C (97.7 °F) 11/12/2020 12:53 PM   Pulse 98 11/12/2020  1:16 PM   Resp 15 11/12/2020  1:02 PM   SpO2 99 % 11/12/2020  1:16 PM   Vitals shown include unvalidated device data.

## 2020-11-13 ENCOUNTER — APPOINTMENT (OUTPATIENT)
Dept: GENERAL RADIOLOGY | Age: 75
DRG: 164 | End: 2020-11-13
Attending: NURSE PRACTITIONER
Payer: MEDICARE

## 2020-11-13 LAB
ABO + RH BLD: NORMAL
ANION GAP SERPL CALC-SCNC: 8 MMOL/L (ref 7–16)
BASOPHILS # BLD: 0 K/UL (ref 0–0.2)
BASOPHILS NFR BLD: 0 % (ref 0–2)
BLD PROD TYP BPU: NORMAL
BLOOD GROUP ANTIBODIES SERPL: NORMAL
BPU ID: NORMAL
BUN SERPL-MCNC: 21 MG/DL (ref 8–23)
CALCIUM SERPL-MCNC: 7.3 MG/DL (ref 8.3–10.4)
CHLORIDE SERPL-SCNC: 108 MMOL/L (ref 98–107)
CO2 SERPL-SCNC: 25 MMOL/L (ref 21–32)
CREAT SERPL-MCNC: 1.21 MG/DL (ref 0.8–1.5)
CROSSMATCH RESULT,%XM: NORMAL
DIFFERENTIAL METHOD BLD: ABNORMAL
EOSINOPHIL # BLD: 0 K/UL (ref 0–0.8)
EOSINOPHIL NFR BLD: 0 % (ref 0.5–7.8)
ERYTHROCYTE [DISTWIDTH] IN BLOOD BY AUTOMATED COUNT: 15.1 % (ref 11.9–14.6)
GLUCOSE BLD STRIP.AUTO-MCNC: 118 MG/DL (ref 65–100)
GLUCOSE BLD STRIP.AUTO-MCNC: 142 MG/DL (ref 65–100)
GLUCOSE BLD STRIP.AUTO-MCNC: 187 MG/DL (ref 65–100)
GLUCOSE BLD STRIP.AUTO-MCNC: 211 MG/DL (ref 65–100)
GLUCOSE BLD STRIP.AUTO-MCNC: 215 MG/DL (ref 65–100)
GLUCOSE BLD STRIP.AUTO-MCNC: 217 MG/DL (ref 65–100)
GLUCOSE SERPL-MCNC: 143 MG/DL (ref 65–100)
HCT VFR BLD AUTO: 31.7 % (ref 41.1–50.3)
HGB BLD-MCNC: 9.8 G/DL (ref 13.6–17.2)
IMM GRANULOCYTES # BLD AUTO: 0 K/UL (ref 0–0.5)
IMM GRANULOCYTES NFR BLD AUTO: 1 % (ref 0–5)
LYMPHOCYTES # BLD: 0.8 K/UL (ref 0.5–4.6)
LYMPHOCYTES NFR BLD: 10 % (ref 13–44)
MCH RBC QN AUTO: 25.5 PG (ref 26.1–32.9)
MCHC RBC AUTO-ENTMCNC: 30.9 G/DL (ref 31.4–35)
MCV RBC AUTO: 82.3 FL (ref 79.6–97.8)
MONOCYTES # BLD: 0.8 K/UL (ref 0.1–1.3)
MONOCYTES NFR BLD: 9 % (ref 4–12)
NEUTS SEG # BLD: 6.8 K/UL (ref 1.7–8.2)
NEUTS SEG NFR BLD: 81 % (ref 43–78)
NRBC # BLD: 0 K/UL (ref 0–0.2)
PLATELET # BLD AUTO: 202 K/UL (ref 150–450)
PMV BLD AUTO: 10.1 FL (ref 9.4–12.3)
POTASSIUM SERPL-SCNC: 4.2 MMOL/L (ref 3.5–5.1)
RBC # BLD AUTO: 3.85 M/UL (ref 4.23–5.6)
SODIUM SERPL-SCNC: 141 MMOL/L (ref 136–145)
SPECIMEN EXP DATE BLD: NORMAL
STATUS OF UNIT,%ST: NORMAL
UNIT DIVISION, %UDIV: 0
WBC # BLD AUTO: 8.5 K/UL (ref 4.3–11.1)

## 2020-11-13 PROCEDURE — 74011000258 HC RX REV CODE- 258: Performed by: NURSE PRACTITIONER

## 2020-11-13 PROCEDURE — 74011000250 HC RX REV CODE- 250: Performed by: SURGERY

## 2020-11-13 PROCEDURE — 65270000029 HC RM PRIVATE

## 2020-11-13 PROCEDURE — 82962 GLUCOSE BLOOD TEST: CPT

## 2020-11-13 PROCEDURE — 94760 N-INVAS EAR/PLS OXIMETRY 1: CPT

## 2020-11-13 PROCEDURE — 71045 X-RAY EXAM CHEST 1 VIEW: CPT

## 2020-11-13 PROCEDURE — 74011250636 HC RX REV CODE- 250/636: Performed by: SURGERY

## 2020-11-13 PROCEDURE — 80048 BASIC METABOLIC PNL TOTAL CA: CPT

## 2020-11-13 PROCEDURE — 74011000250 HC RX REV CODE- 250: Performed by: NURSE PRACTITIONER

## 2020-11-13 PROCEDURE — 74011636637 HC RX REV CODE- 636/637: Performed by: NURSE PRACTITIONER

## 2020-11-13 PROCEDURE — 2709999900 HC NON-CHARGEABLE SUPPLY

## 2020-11-13 PROCEDURE — 85025 COMPLETE CBC W/AUTO DIFF WBC: CPT

## 2020-11-13 PROCEDURE — 74011250637 HC RX REV CODE- 250/637: Performed by: NURSE PRACTITIONER

## 2020-11-13 PROCEDURE — 94640 AIRWAY INHALATION TREATMENT: CPT

## 2020-11-13 PROCEDURE — 74011250637 HC RX REV CODE- 250/637: Performed by: SURGERY

## 2020-11-13 PROCEDURE — 74011250636 HC RX REV CODE- 250/636: Performed by: NURSE PRACTITIONER

## 2020-11-13 RX ORDER — ACETAMINOPHEN 325 MG/1
650 TABLET ORAL
Status: DISCONTINUED | OUTPATIENT
Start: 2020-11-13 | End: 2020-11-16 | Stop reason: HOSPADM

## 2020-11-13 RX ORDER — IPRATROPIUM BROMIDE AND ALBUTEROL SULFATE 2.5; .5 MG/3ML; MG/3ML
3 SOLUTION RESPIRATORY (INHALATION)
Status: DISCONTINUED | OUTPATIENT
Start: 2020-11-13 | End: 2020-11-16 | Stop reason: HOSPADM

## 2020-11-13 RX ORDER — METOPROLOL TARTRATE 25 MG/1
25 TABLET, FILM COATED ORAL EVERY 12 HOURS
Status: DISCONTINUED | OUTPATIENT
Start: 2020-11-13 | End: 2020-11-16 | Stop reason: HOSPADM

## 2020-11-13 RX ORDER — METOPROLOL TARTRATE 5 MG/5ML
5 INJECTION INTRAVENOUS EVERY 6 HOURS
Status: COMPLETED | OUTPATIENT
Start: 2020-11-14 | End: 2020-11-14

## 2020-11-13 RX ADMIN — HYDROMORPHONE HYDROCHLORIDE 1 MG: 1 INJECTION, SOLUTION INTRAMUSCULAR; INTRAVENOUS; SUBCUTANEOUS at 06:04

## 2020-11-13 RX ADMIN — ROSUVASTATIN 10 MG: 10 TABLET, FILM COATED ORAL at 20:22

## 2020-11-13 RX ADMIN — DONEPEZIL HYDROCHLORIDE 5 MG: 5 TABLET, FILM COATED ORAL at 20:21

## 2020-11-13 RX ADMIN — HYDROMORPHONE HYDROCHLORIDE 1 MG: 1 INJECTION, SOLUTION INTRAMUSCULAR; INTRAVENOUS; SUBCUTANEOUS at 02:10

## 2020-11-13 RX ADMIN — METOPROLOL TARTRATE 5 MG: 5 INJECTION INTRAVENOUS at 17:24

## 2020-11-13 RX ADMIN — OXYCODONE 10 MG: 5 TABLET ORAL at 03:40

## 2020-11-13 RX ADMIN — OXYCODONE 10 MG: 5 TABLET ORAL at 20:35

## 2020-11-13 RX ADMIN — ACETAMINOPHEN 650 MG: 325 TABLET, FILM COATED ORAL at 20:41

## 2020-11-13 RX ADMIN — HUMAN INSULIN 6 UNITS: 100 INJECTION, SOLUTION SUBCUTANEOUS at 17:22

## 2020-11-13 RX ADMIN — HYDROMORPHONE HYDROCHLORIDE 1 MG: 1 INJECTION, SOLUTION INTRAMUSCULAR; INTRAVENOUS; SUBCUTANEOUS at 00:15

## 2020-11-13 RX ADMIN — HUMAN INSULIN 6 UNITS: 100 INJECTION, SOLUTION SUBCUTANEOUS at 00:15

## 2020-11-13 RX ADMIN — OXYCODONE 10 MG: 5 TABLET ORAL at 15:07

## 2020-11-13 RX ADMIN — SENNOSIDES AND DOCUSATE SODIUM 2 TABLET: 8.6; 5 TABLET ORAL at 09:29

## 2020-11-13 RX ADMIN — OXYCODONE 10 MG: 5 TABLET ORAL at 07:44

## 2020-11-13 RX ADMIN — PREGABALIN 75 MG: 75 CAPSULE ORAL at 09:30

## 2020-11-13 RX ADMIN — MORPHINE SULFATE 15 MG: 15 TABLET ORAL at 09:30

## 2020-11-13 RX ADMIN — LEVOTHYROXINE SODIUM 75 MCG: 75 TABLET ORAL at 06:35

## 2020-11-13 RX ADMIN — ONDANSETRON 4 MG: 2 INJECTION INTRAMUSCULAR; INTRAVENOUS at 12:14

## 2020-11-13 RX ADMIN — SENNOSIDES AND DOCUSATE SODIUM 2 TABLET: 8.6; 5 TABLET ORAL at 17:23

## 2020-11-13 RX ADMIN — TAMSULOSIN HYDROCHLORIDE 0.4 MG: 0.4 CAPSULE ORAL at 09:30

## 2020-11-13 RX ADMIN — CEFAZOLIN 1 G: 1 INJECTION, POWDER, FOR SOLUTION INTRAMUSCULAR; INTRAVENOUS at 04:02

## 2020-11-13 RX ADMIN — HEPARIN SODIUM 5000 UNITS: 5000 INJECTION INTRAVENOUS; SUBCUTANEOUS at 15:07

## 2020-11-13 RX ADMIN — HYDROMORPHONE HYDROCHLORIDE 1 MG: 1 INJECTION, SOLUTION INTRAMUSCULAR; INTRAVENOUS; SUBCUTANEOUS at 12:10

## 2020-11-13 RX ADMIN — GUAIFENESIN 1200 MG: 600 TABLET ORAL at 20:22

## 2020-11-13 RX ADMIN — BUPROPION HYDROCHLORIDE 75 MG: 75 TABLET, FILM COATED ORAL at 17:23

## 2020-11-13 RX ADMIN — BUPROPION HYDROCHLORIDE 75 MG: 75 TABLET, FILM COATED ORAL at 09:30

## 2020-11-13 RX ADMIN — MORPHINE SULFATE 15 MG: 15 TABLET ORAL at 17:23

## 2020-11-13 RX ADMIN — Medication 10 ML: at 22:00

## 2020-11-13 RX ADMIN — HUMAN INSULIN 3 UNITS: 100 INJECTION, SOLUTION SUBCUTANEOUS at 12:10

## 2020-11-13 RX ADMIN — IPRATROPIUM BROMIDE AND ALBUTEROL SULFATE 3 ML: .5; 3 SOLUTION RESPIRATORY (INHALATION) at 19:23

## 2020-11-13 RX ADMIN — METOPROLOL TARTRATE 5 MG: 5 INJECTION INTRAVENOUS at 12:03

## 2020-11-13 RX ADMIN — PREGABALIN 75 MG: 75 CAPSULE ORAL at 17:23

## 2020-11-13 RX ADMIN — FINASTERIDE 5 MG: 5 TABLET, FILM COATED ORAL at 09:30

## 2020-11-13 RX ADMIN — HEPARIN SODIUM 5000 UNITS: 5000 INJECTION INTRAVENOUS; SUBCUTANEOUS at 06:01

## 2020-11-13 RX ADMIN — GUAIFENESIN 1200 MG: 600 TABLET ORAL at 09:30

## 2020-11-13 RX ADMIN — Medication 10 ML: at 06:00

## 2020-11-13 RX ADMIN — PANTOPRAZOLE SODIUM 40 MG: 40 TABLET, DELAYED RELEASE ORAL at 06:35

## 2020-11-13 RX ADMIN — METOPROLOL TARTRATE 5 MG: 5 INJECTION INTRAVENOUS at 06:01

## 2020-11-13 RX ADMIN — HUMAN INSULIN 6 UNITS: 100 INJECTION, SOLUTION SUBCUTANEOUS at 20:39

## 2020-11-13 RX ADMIN — Medication 10 ML: at 14:19

## 2020-11-13 RX ADMIN — AMITRIPTYLINE HYDROCHLORIDE 30 MG: 10 TABLET, FILM COATED ORAL at 20:22

## 2020-11-13 NOTE — PROGRESS NOTES
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 No complaints. On room air. Right CT x 2. CXR this am showing: Right pleural effusion and right lung atelectasis or pneumonia unchanged. Figueroa patent - 1350mL out last 24 hours. Max temp 99.7. NAD. OBJECTIVE:  Constitutional: Alert oriented cooperative patient in no acute distress; appears stated age   Visit Vitals  BP (!) 152/83   Pulse (!) 114   Temp 99.7 °F (37.6 °C)   Resp 12   Ht 5' 8\" (1.727 m)   Wt 231 lb 14.8 oz (105.2 kg)   SpO2 97%   BMI 35.26 kg/m²     Eyes: Sclera are clear. ENMT: no external lesions gross hearing normal; no obvious neck masses, no ear or lip lesions  CV: Tachy. Normal perfusion. Chest tube x 2 #1 670mL out, #2 109mL out  Resp: No JVD. Breathing is  non-labored; no audible wheezing. GI: soft and non-distended     Musculoskeletal: unremarkable with normal function. No embolic signs or cyanosis.    Neuro:  Oriented; moves all 4; no focal deficits  Psychiatric: normal affect and mood, no memory impairment      Patient Vitals for the past 24 hrs:   BP Temp Pulse Resp SpO2 Height Weight   11/13/20 1203 (!) 152/83 -- (!) 114 -- -- -- --   11/13/20 1001 (!) 141/83 -- (!) 108 12 97 % -- --   11/13/20 0901 128/73 99.7 °F (37.6 °C) (!) 105 16 95 % -- --   11/13/20 0801 119/69 -- (!) 106 15 97 % -- --   11/13/20 0701 130/68 -- (!) 104 16 94 % -- --   11/13/20 0645 -- -- (!) 102 (!) 35 93 % -- --   11/13/20 0601 131/73 -- (!) 113 15 95 % -- --   11/13/20 0501 137/82 -- (!) 113 21 98 % -- --   11/13/20 0401 130/75 98.8 °F (37.1 °C) (!) 111 13 97 % -- --   11/13/20 0301 133/74 -- (!) 110 14 95 % -- --   11/13/20 0202 131/75 -- (!) 112 21 97 % -- --   11/13/20 0101 126/77 -- (!) 110 17 96 % -- --   11/13/20 0001 130/78 98.5 °F (36.9 °C) (!) 110 13 97 % -- --   11/12/20 2301 115/68 -- (!) 109 14 97 % -- --   11/12/20 2231 127/74 -- (!) 108 15 98 % -- --   11/12/20 2215 -- -- (!) 110 15 99 % -- --   11/12/20 2205 (!) 144/68 -- (!) 132 -- -- -- --   11/12/20 2201 116/63 -- (!) 126 23 97 % -- --   11/12/20 2131 130/71 -- (!) 130 11 97 % -- --   11/12/20 2101 (!) 150/83 -- (!) 133 20 97 % -- --   11/12/20 2031 (!) 147/83 -- (!) 132 9 95 % -- --   11/12/20 2004 -- -- -- -- 94 % -- --   11/12/20 1901 (!) 164/100 98.9 °F (37.2 °C) (!) 124 8 97 % -- --   11/12/20 1831 (!) 153/89 -- (!) 120 11 96 % -- --   11/12/20 1801 (!) 150/93 -- (!) 124 23 98 % -- --   11/12/20 1731 (!) 155/97 -- (!) 123 24 98 % -- --   11/12/20 1701 (!) 169/98 -- (!) 118 13 96 % -- --   11/12/20 1631 (!) 140/99 -- (!) 115 17 97 % -- --   11/12/20 1601 (!) 157/95 97.6 °F (36.4 °C) (!) 114 13 98 % -- --   11/12/20 1537 (!) 149/88 -- (!) 109 9 98 % -- --   11/12/20 1530 -- -- (!) 107 12 97 % -- --   11/12/20 1528 -- -- -- -- -- 5' 8\" (1.727 m) 231 lb 14.8 oz (105.2 kg)   11/12/20 1500 -- -- (!) 106 13 95 % -- --   11/12/20 1446 (!) 148/90 97.6 °F (36.4 °C) (!) 104 12 100 % -- --   11/12/20 1416 (!) 140/84 -- 100 -- -- -- --   11/12/20 1411 138/82 97.5 °F (36.4 °C) 99 14 99 % -- --   11/12/20 1407 135/78 -- 100 15 98 % -- --   11/12/20 1402 130/76 -- 98 15 100 % -- --   11/12/20 1357 131/76 -- 98 14 100 % -- --   11/12/20 1352 135/76 -- 98 15 100 % -- --   11/12/20 1347 135/81 -- 98 14 99 % -- --   11/12/20 1342 133/79 -- 99 14 99 % -- --   11/12/20 1337 133/79 -- 97 14 94 % -- --   11/12/20 1331 137/81 -- 98 15 98 % -- --   11/12/20 1326 138/81 -- 99 14 96 % -- --   11/12/20 1321 133/78 -- 98 15 100 % -- --   11/12/20 1316 128/77 -- 98 14 99 % -- --   11/12/20 1312 128/77 -- 98 15 99 % -- -- 11/12/20 1307 130/78 -- 99 15 99 % -- --   11/12/20 1302 130/74 -- 100 15 98 % -- --   11/12/20 1257 131/73 -- (!) 101 15 97 % -- --   11/12/20 1253 139/79 97.7 °F (36.5 °C) 99 14 97 % -- --   11/12/20 1251 136/79 -- (!) 103 -- 95 % -- --     Labs:    Recent Labs     11/13/20  0329 11/12/20  0613   WBC 8.5  --    HGB 9.8*  --      --      --    K 4.2  --    *  --    CO2 25  --    BUN 21  --    CREA 1.21  --    *  --    INR  --  1.2       Tre Bending, NP

## 2020-11-13 NOTE — PROGRESS NOTES
END OF SHIFT NOTE:    INTAKE/OUTPUT  11/12 0701 - 11/13 0700  In: 4362.5 [P.O.:400; I.V.:3652.5]  Out: 1146 [Urine:2350]  Voiding: NO  Catheter: YES  Drain:              Flatus: Patient does not have flatus present. Stool:  0 occurrences. Characteristics:       Emesis: 0 occurrences. Characteristics:        VITAL SIGNS  Patient Vitals for the past 12 hrs:   Temp Pulse Resp BP SpO2   11/13/20 1547 99.8 °F (37.7 °C) (!) 116 20 (!) 161/96 95 %   11/13/20 1401 -- (!) 110 18 (!) 158/91 100 %   11/13/20 1301 -- (!) 104 19 (!) 144/74 93 %   11/13/20 1203 -- (!) 114 -- (!) 152/83 --   11/13/20 1201 98.5 °F (36.9 °C) (!) 115 21 (!) 152/83 95 %   11/13/20 1101 -- (!) 109 25 (!) 157/89 95 %   11/13/20 1001 -- (!) 108 12 (!) 141/83 97 %   11/13/20 0901 99.7 °F (37.6 °C) (!) 105 16 128/73 95 %   11/13/20 0801 -- (!) 106 15 119/69 97 %   11/13/20 0701 -- (!) 104 16 130/68 94 %   11/13/20 0645 -- (!) 102 (!) 35 -- 93 %       Pain Assessment  Pain Intensity 1: 8 (11/13/20 1201)  Pain Location 1: Chest  Pain Intervention(s) 1: Medication (see MAR)  Patient Stated Pain Goal: 2    Ambulating  No    Shift report given to oncoming nurse at the bedside.     Karolina Oshea

## 2020-11-13 NOTE — ACP (ADVANCE CARE PLANNING)
ACP: LW on file under Patient-level documents. NO HCPOA. Legal NOK, spouse, Fredo Richardson -[phone] if pt can not make decisions for himself unless document presented stating otherwise.

## 2020-11-13 NOTE — PROGRESS NOTES
Chart reviewed after admission to ICU post RIGHT VATS CONVERTED TO THORACOTOMY,   Decortication with extensive pneumonolysis over 3 hours/  Intercostal nerve CRYOABLATION by Dr. Jakob Kern. Pt has PCP and insurance. CM will follow for any assist and d/c needs per MD.       Care Management Interventions  PCP Verified by CM: Yes([facility])  Mode of Transport at Discharge:  Other (see comment)  Transition of Care Consult (CM Consult): Discharge Planning  Current Support Network: Lives with Spouse  Confirm Follow Up Transport: Family  Freedom of Choice List was Provided with Basic Dialogue that Supports the Patient's Individualized Plan of Care/Goals, Treatment Preferences and Shares the Quality Data Associated with the Providers?: Yes  [place] Resource Information Provided?: (MCR/BCBS)  Discharge Location  Discharge Placement: Unable to determine at this time

## 2020-11-13 NOTE — PROGRESS NOTES
TRANSFER - OUT REPORT:    Verbal report given to LISANDRO Espinoza(name) on [employer]  being transferred to Room 222(unit) for routine post - op       Report consisted of patients Situation, Background, Assessment and   Recommendations(SBAR). Information from the following report(s) SBAR, OR Summary, Intake/Output, MAR, Recent Results and Cardiac Rhythm ST was reviewed with the receiving nurse. Lines:   Peripheral IV 11/12/20 Left Wrist (Active)   Site Assessment Clean, dry, & intact 11/13/20 0901   Phlebitis Assessment 0 11/13/20 0901   Infiltration Assessment 0 11/13/20 0901   Dressing Status Clean, dry, & intact 11/13/20 0901   Dressing Type Transparent 11/13/20 0901   Hub Color/Line Status Green; Infusing 11/13/20 0901   Action Taken Blood drawn 11/12/20 0614       Peripheral IV 11/12/20 Right Hand (Active)   Site Assessment Clean, dry, & intact 11/13/20 0901   Phlebitis Assessment 0 11/13/20 0901   Infiltration Assessment 0 11/13/20 0901   Dressing Status Clean, dry, & intact 11/13/20 0901   Dressing Type Transparent 11/13/20 0901   Hub Color/Line Status Green;Capped 11/13/20 0901        Opportunity for questions and clarification was provided.       Patient transported with:   Registered Nurse

## 2020-11-13 NOTE — PROGRESS NOTES
11/13/20 1547   Vital Signs   Temp 99.8 °F (37.7 °C)   Temp Source Oral   Pulse (Heart Rate) (!) 116   Heart Rate Source Monitor   Resp Rate 20   O2 Sat (%) 95 %   Level of Consciousness Alert   BP (!) 161/96  (rn notified)   MAP (Calculated) 118   MEWS Score 3     Pt's HR & BP are elevated resulting in increased MEWS score. Danae Jefferson NP notified in person. No orders at this time.

## 2020-11-13 NOTE — PROGRESS NOTES
11/13/20 1547   Dual Skin Pressure Injury Assessment   Dual Skin Pressure Injury Assessment WDL   Second Care Provider (Based on [address] [place] [address]) [address]   No redness or breakdown noted on sacrum or bilateral heels.

## 2020-11-13 NOTE — PROGRESS NOTES
TRANSFER - IN REPORT:    Verbal report received from Johana(pavithra) on [facility].  being received from ICU(unit) for routine progression of care      Report consisted of patients Situation, Background, Assessment and   Recommendations(SBAR). Information from the following report(s) SBAR was reviewed with the receiving nurse. Opportunity for questions and clarification was provided. Assessment completed upon patients arrival to unit and care assumed.

## 2020-11-14 ENCOUNTER — APPOINTMENT (OUTPATIENT)
Dept: GENERAL RADIOLOGY | Age: 75
DRG: 164 | End: 2020-11-14
Attending: NURSE PRACTITIONER
Payer: MEDICARE

## 2020-11-14 LAB
ANION GAP SERPL CALC-SCNC: 8 MMOL/L (ref 7–16)
BASOPHILS # BLD: 0 K/UL (ref 0–0.2)
BASOPHILS NFR BLD: 0 % (ref 0–2)
BUN SERPL-MCNC: 21 MG/DL (ref 8–23)
CALCIUM SERPL-MCNC: 8.5 MG/DL (ref 8.3–10.4)
CHLORIDE SERPL-SCNC: 100 MMOL/L (ref 98–107)
CO2 SERPL-SCNC: 26 MMOL/L (ref 21–32)
CREAT SERPL-MCNC: 1.34 MG/DL (ref 0.8–1.5)
DIFFERENTIAL METHOD BLD: ABNORMAL
EOSINOPHIL # BLD: 0 K/UL (ref 0–0.8)
EOSINOPHIL NFR BLD: 0 % (ref 0.5–7.8)
ERYTHROCYTE [DISTWIDTH] IN BLOOD BY AUTOMATED COUNT: 15 % (ref 11.9–14.6)
GLUCOSE BLD STRIP.AUTO-MCNC: 108 MG/DL (ref 65–100)
GLUCOSE BLD STRIP.AUTO-MCNC: 151 MG/DL (ref 65–100)
GLUCOSE BLD STRIP.AUTO-MCNC: 180 MG/DL (ref 65–100)
GLUCOSE BLD STRIP.AUTO-MCNC: 192 MG/DL (ref 65–100)
GLUCOSE BLD STRIP.AUTO-MCNC: 208 MG/DL (ref 65–100)
GLUCOSE BLD STRIP.AUTO-MCNC: 233 MG/DL (ref 65–100)
GLUCOSE BLD STRIP.AUTO-MCNC: 269 MG/DL (ref 65–100)
GLUCOSE SERPL-MCNC: 210 MG/DL (ref 65–100)
HCT VFR BLD AUTO: 33.9 % (ref 41.1–50.3)
HGB BLD-MCNC: 10.9 G/DL (ref 13.6–17.2)
IMM GRANULOCYTES # BLD AUTO: 0.1 K/UL (ref 0–0.5)
IMM GRANULOCYTES NFR BLD AUTO: 1 % (ref 0–5)
LYMPHOCYTES # BLD: 0.5 K/UL (ref 0.5–4.6)
LYMPHOCYTES NFR BLD: 5 % (ref 13–44)
MCH RBC QN AUTO: 26 PG (ref 26.1–32.9)
MCHC RBC AUTO-ENTMCNC: 32.2 G/DL (ref 31.4–35)
MCV RBC AUTO: 80.9 FL (ref 79.6–97.8)
MONOCYTES # BLD: 0.7 K/UL (ref 0.1–1.3)
MONOCYTES NFR BLD: 7 % (ref 4–12)
NEUTS SEG # BLD: 8.5 K/UL (ref 1.7–8.2)
NEUTS SEG NFR BLD: 87 % (ref 43–78)
NRBC # BLD: 0 K/UL (ref 0–0.2)
PLATELET # BLD AUTO: 215 K/UL (ref 150–450)
PMV BLD AUTO: 10.2 FL (ref 9.4–12.3)
POTASSIUM SERPL-SCNC: 3.5 MMOL/L (ref 3.5–5.1)
RBC # BLD AUTO: 4.19 M/UL (ref 4.23–5.6)
SODIUM SERPL-SCNC: 134 MMOL/L (ref 138–145)
WBC # BLD AUTO: 9.8 K/UL (ref 4.3–11.1)

## 2020-11-14 PROCEDURE — 74011636637 HC RX REV CODE- 636/637: Performed by: NURSE PRACTITIONER

## 2020-11-14 PROCEDURE — 36415 COLL VENOUS BLD VENIPUNCTURE: CPT

## 2020-11-14 PROCEDURE — 71045 X-RAY EXAM CHEST 1 VIEW: CPT

## 2020-11-14 PROCEDURE — 80048 BASIC METABOLIC PNL TOTAL CA: CPT

## 2020-11-14 PROCEDURE — 74011250637 HC RX REV CODE- 250/637: Performed by: SURGERY

## 2020-11-14 PROCEDURE — 2709999900 HC NON-CHARGEABLE SUPPLY

## 2020-11-14 PROCEDURE — 74011000250 HC RX REV CODE- 250: Performed by: SURGERY

## 2020-11-14 PROCEDURE — 74011000250 HC RX REV CODE- 250: Performed by: NURSE PRACTITIONER

## 2020-11-14 PROCEDURE — 65270000029 HC RM PRIVATE

## 2020-11-14 PROCEDURE — 94640 AIRWAY INHALATION TREATMENT: CPT

## 2020-11-14 PROCEDURE — 85025 COMPLETE CBC W/AUTO DIFF WBC: CPT

## 2020-11-14 PROCEDURE — 74011250636 HC RX REV CODE- 250/636: Performed by: NURSE PRACTITIONER

## 2020-11-14 PROCEDURE — 82962 GLUCOSE BLOOD TEST: CPT

## 2020-11-14 PROCEDURE — 94760 N-INVAS EAR/PLS OXIMETRY 1: CPT

## 2020-11-14 PROCEDURE — 74011250637 HC RX REV CODE- 250/637: Performed by: NURSE PRACTITIONER

## 2020-11-14 RX ORDER — CALCIUM CARBONATE 200(500)MG
200 TABLET,CHEWABLE ORAL
Status: DISCONTINUED | OUTPATIENT
Start: 2020-11-14 | End: 2020-11-16 | Stop reason: HOSPADM

## 2020-11-14 RX ADMIN — HUMAN INSULIN 6 UNITS: 100 INJECTION, SOLUTION SUBCUTANEOUS at 04:42

## 2020-11-14 RX ADMIN — Medication 10 ML: at 22:20

## 2020-11-14 RX ADMIN — SENNOSIDES AND DOCUSATE SODIUM 2 TABLET: 8.6; 5 TABLET ORAL at 17:16

## 2020-11-14 RX ADMIN — IPRATROPIUM BROMIDE AND ALBUTEROL SULFATE 3 ML: .5; 3 SOLUTION RESPIRATORY (INHALATION) at 19:12

## 2020-11-14 RX ADMIN — ROSUVASTATIN 10 MG: 10 TABLET, FILM COATED ORAL at 22:19

## 2020-11-14 RX ADMIN — AMITRIPTYLINE HYDROCHLORIDE 30 MG: 10 TABLET, FILM COATED ORAL at 22:19

## 2020-11-14 RX ADMIN — SENNOSIDES AND DOCUSATE SODIUM 2 TABLET: 8.6; 5 TABLET ORAL at 08:13

## 2020-11-14 RX ADMIN — HEPARIN SODIUM 5000 UNITS: 5000 INJECTION INTRAVENOUS; SUBCUTANEOUS at 00:27

## 2020-11-14 RX ADMIN — Medication 10 ML: at 14:00

## 2020-11-14 RX ADMIN — DONEPEZIL HYDROCHLORIDE 5 MG: 5 TABLET, FILM COATED ORAL at 22:19

## 2020-11-14 RX ADMIN — GUAIFENESIN 1200 MG: 600 TABLET ORAL at 22:18

## 2020-11-14 RX ADMIN — HUMAN INSULIN 6 UNITS: 100 INJECTION, SOLUTION SUBCUTANEOUS at 08:11

## 2020-11-14 RX ADMIN — Medication 10 ML: at 05:00

## 2020-11-14 RX ADMIN — CALCIUM CARBONATE (ANTACID) CHEW TAB 500 MG 200 MG: 500 CHEW TAB at 00:34

## 2020-11-14 RX ADMIN — GUAIFENESIN 1200 MG: 600 TABLET ORAL at 08:14

## 2020-11-14 RX ADMIN — METOPROLOL TARTRATE 25 MG: 25 TABLET, FILM COATED ORAL at 22:19

## 2020-11-14 RX ADMIN — FINASTERIDE 5 MG: 5 TABLET, FILM COATED ORAL at 08:15

## 2020-11-14 RX ADMIN — TAMSULOSIN HYDROCHLORIDE 0.4 MG: 0.4 CAPSULE ORAL at 08:15

## 2020-11-14 RX ADMIN — HUMAN INSULIN 3 UNITS: 100 INJECTION, SOLUTION SUBCUTANEOUS at 00:27

## 2020-11-14 RX ADMIN — BUPROPION HYDROCHLORIDE 75 MG: 75 TABLET, FILM COATED ORAL at 08:13

## 2020-11-14 RX ADMIN — LEVOTHYROXINE SODIUM 75 MCG: 75 TABLET ORAL at 04:42

## 2020-11-14 RX ADMIN — HEPARIN SODIUM 5000 UNITS: 5000 INJECTION INTRAVENOUS; SUBCUTANEOUS at 06:37

## 2020-11-14 RX ADMIN — PREGABALIN 75 MG: 75 CAPSULE ORAL at 17:16

## 2020-11-14 RX ADMIN — PREGABALIN 75 MG: 75 CAPSULE ORAL at 08:14

## 2020-11-14 RX ADMIN — PANTOPRAZOLE SODIUM 40 MG: 40 TABLET, DELAYED RELEASE ORAL at 04:42

## 2020-11-14 RX ADMIN — IPRATROPIUM BROMIDE AND ALBUTEROL SULFATE 3 ML: .5; 3 SOLUTION RESPIRATORY (INHALATION) at 07:43

## 2020-11-14 RX ADMIN — ONDANSETRON 4 MG: 2 INJECTION INTRAMUSCULAR; INTRAVENOUS at 04:42

## 2020-11-14 RX ADMIN — MORPHINE SULFATE 15 MG: 15 TABLET ORAL at 08:15

## 2020-11-14 RX ADMIN — OXYCODONE 10 MG: 5 TABLET ORAL at 12:15

## 2020-11-14 RX ADMIN — METOPROLOL TARTRATE 5 MG: 5 INJECTION INTRAVENOUS at 00:28

## 2020-11-14 RX ADMIN — HEPARIN SODIUM 5000 UNITS: 5000 INJECTION INTRAVENOUS; SUBCUTANEOUS at 15:50

## 2020-11-14 RX ADMIN — HUMAN INSULIN 3 UNITS: 100 INJECTION, SOLUTION SUBCUTANEOUS at 22:18

## 2020-11-14 RX ADMIN — HUMAN INSULIN 3 UNITS: 100 INJECTION, SOLUTION SUBCUTANEOUS at 16:08

## 2020-11-14 RX ADMIN — HEPARIN SODIUM 5000 UNITS: 5000 INJECTION INTRAVENOUS; SUBCUTANEOUS at 22:19

## 2020-11-14 RX ADMIN — MORPHINE SULFATE 15 MG: 15 TABLET ORAL at 17:16

## 2020-11-14 RX ADMIN — ONDANSETRON 4 MG: 2 INJECTION INTRAMUSCULAR; INTRAVENOUS at 10:23

## 2020-11-14 RX ADMIN — BUPROPION HYDROCHLORIDE 75 MG: 75 TABLET, FILM COATED ORAL at 17:16

## 2020-11-14 RX ADMIN — METOPROLOL TARTRATE 25 MG: 25 TABLET, FILM COATED ORAL at 08:15

## 2020-11-14 RX ADMIN — HUMAN INSULIN 9 UNITS: 100 INJECTION, SOLUTION SUBCUTANEOUS at 12:08

## 2020-11-14 NOTE — PROGRESS NOTES
Problem: Falls - Risk of  Goal: *Absence of Falls  Description: Document Veatrice Marker Fall Risk and appropriate interventions in the flowsheet.   Outcome: Progressing Towards Goal  Note: Fall Risk Interventions:  Mobility Interventions: Communicate number of staff needed for ambulation/transfer, Patient to call before getting OOB         Medication Interventions: Evaluate medications/consider consulting pharmacy, Patient to call before getting OOB, Teach patient to arise slowly    Elimination Interventions: Call light in reach, Elevated toilet seat, Patient to call for help with toileting needs, Stay With Me (per policy), Toilet paper/wipes in reach, Toileting schedule/hourly rounds, Urinal in reach    History of Falls Interventions: Door open when patient unattended, Evaluate medications/consider consulting pharmacy, Investigate reason for fall, Room close to nurse's station

## 2020-11-14 NOTE — PROGRESS NOTES
MEWS score elevated due to continued slightly elevated HR. Allen Callahan NP notified. No orders at this time.

## 2020-11-14 NOTE — PROGRESS NOTES
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 No complaints. On room air. Right CT x 2 to suction. CXR this am showing: Unchanged patchy right lung atelectasis or infiltrate and pleural  thickening or effusion along the outer right lung. Figueroa patent - 3725mL out last 24 hours. Max temp 102.4. NAD. OBJECTIVE:  Constitutional: Alert oriented cooperative patient in no acute distress; appears stated age   Visit Vitals  /85   Pulse (!) 102   Temp 98.7 °F (37.1 °C)   Resp 19   Ht 5' 8\" (1.727 m)   Wt 231 lb 14.8 oz (105.2 kg)   SpO2 96%   BMI 35.26 kg/m²     Eyes: Sclera are clear. ENMT: no external lesions gross hearing normal; no obvious neck masses, no ear or lip lesions  CV: Tachy. Normal perfusion. Chest tube x 2 #1 440mL out, #2 66mL out  Resp: No JVD. Breathing is  non-labored; no audible wheezing. GI: soft and non-distended     Musculoskeletal: unremarkable with normal function. No embolic signs or cyanosis.    Neuro:  Oriented; moves all 4; no focal deficits  Psychiatric: normal affect and mood, no memory impairment      Patient Vitals for the past 24 hrs:   BP Temp Pulse Resp SpO2   11/14/20 1145 134/85 98.7 °F (37.1 °C) (!) 102 19 96 %   11/14/20 0749 (!) 145/84 97.7 °F (36.5 °C) (!) 105 19 97 %   11/14/20 0744 -- -- -- -- 95 %   11/14/20 0354 (!) 157/82 98.3 °F (36.8 °C) (!) 106 19 95 %   11/13/20 2328 (!) 142/89 98.3 °F (36.8 °C) (!) 113 20 93 %   11/13/20 1923 -- -- -- -- 97 %   11/13/20 1922 (!) 142/84 (!) 102.4 °F (39.1 °C) (!) 109 20 100 %   11/13/20 1547 (!) 161/96 99.8 °F (37.7 °C) (!) 116 20 95 %   11/13/20 1401 (!) 158/91 -- (!) 110 18 100 %     Labs:    Recent Labs     11/14/20  0428  11/12/20  0613   WBC 9.8   < >  --    HGB 10.9*   < >  --       < >  --    *   < >  --    K 3.5   < >  --       < >  --    CO2 26   < >  --    BUN 21   < >  --    CREA 1.34   < >  --    *   < >  --    INR  --   --  1.2    < > = values in this interval not displayed.        Branden Hough, NP

## 2020-11-15 ENCOUNTER — APPOINTMENT (OUTPATIENT)
Dept: GENERAL RADIOLOGY | Age: 75
DRG: 164 | End: 2020-11-15
Attending: NURSE PRACTITIONER
Payer: MEDICARE

## 2020-11-15 LAB
ANION GAP SERPL CALC-SCNC: 7 MMOL/L (ref 7–16)
BASOPHILS # BLD: 0 K/UL (ref 0–0.2)
BASOPHILS NFR BLD: 0 % (ref 0–2)
BUN SERPL-MCNC: 35 MG/DL (ref 8–23)
CALCIUM SERPL-MCNC: 7.9 MG/DL (ref 8.3–10.4)
CHLORIDE SERPL-SCNC: 101 MMOL/L (ref 98–107)
CO2 SERPL-SCNC: 28 MMOL/L (ref 21–32)
CREAT SERPL-MCNC: 1.72 MG/DL (ref 0.8–1.5)
DIFFERENTIAL METHOD BLD: ABNORMAL
EOSINOPHIL # BLD: 0.1 K/UL (ref 0–0.8)
EOSINOPHIL NFR BLD: 1 % (ref 0.5–7.8)
ERYTHROCYTE [DISTWIDTH] IN BLOOD BY AUTOMATED COUNT: 15.1 % (ref 11.9–14.6)
GLUCOSE BLD STRIP.AUTO-MCNC: 101 MG/DL (ref 65–100)
GLUCOSE BLD STRIP.AUTO-MCNC: 178 MG/DL (ref 65–100)
GLUCOSE BLD STRIP.AUTO-MCNC: 264 MG/DL (ref 65–100)
GLUCOSE BLD STRIP.AUTO-MCNC: 265 MG/DL (ref 65–100)
GLUCOSE BLD STRIP.AUTO-MCNC: 85 MG/DL (ref 65–100)
GLUCOSE BLD STRIP.AUTO-MCNC: 97 MG/DL (ref 65–100)
GLUCOSE SERPL-MCNC: 94 MG/DL (ref 65–100)
HCT VFR BLD AUTO: 28.2 % (ref 41.1–50.3)
HGB BLD-MCNC: 8.6 G/DL (ref 13.6–17.2)
IMM GRANULOCYTES # BLD AUTO: 0 K/UL (ref 0–0.5)
IMM GRANULOCYTES NFR BLD AUTO: 0 % (ref 0–5)
LYMPHOCYTES # BLD: 1.1 K/UL (ref 0.5–4.6)
LYMPHOCYTES NFR BLD: 14 % (ref 13–44)
MCH RBC QN AUTO: 25.5 PG (ref 26.1–32.9)
MCHC RBC AUTO-ENTMCNC: 30.5 G/DL (ref 31.4–35)
MCV RBC AUTO: 83.7 FL (ref 79.6–97.8)
MONOCYTES # BLD: 0.6 K/UL (ref 0.1–1.3)
MONOCYTES NFR BLD: 8 % (ref 4–12)
NEUTS SEG # BLD: 6.1 K/UL (ref 1.7–8.2)
NEUTS SEG NFR BLD: 77 % (ref 43–78)
NRBC # BLD: 0 K/UL (ref 0–0.2)
PLATELET # BLD AUTO: 202 K/UL (ref 150–450)
PMV BLD AUTO: 10.2 FL (ref 9.4–12.3)
POTASSIUM SERPL-SCNC: 3.2 MMOL/L (ref 3.5–5.1)
RBC # BLD AUTO: 3.37 M/UL (ref 4.23–5.6)
SODIUM SERPL-SCNC: 136 MMOL/L (ref 138–145)
WBC # BLD AUTO: 7.9 K/UL (ref 4.3–11.1)

## 2020-11-15 PROCEDURE — 94760 N-INVAS EAR/PLS OXIMETRY 1: CPT

## 2020-11-15 PROCEDURE — 74011250636 HC RX REV CODE- 250/636: Performed by: NURSE PRACTITIONER

## 2020-11-15 PROCEDURE — 74011250636 HC RX REV CODE- 250/636: Performed by: SURGERY

## 2020-11-15 PROCEDURE — 80048 BASIC METABOLIC PNL TOTAL CA: CPT

## 2020-11-15 PROCEDURE — 77030040830 HC CATH URETH FOL MDII -A

## 2020-11-15 PROCEDURE — 65270000029 HC RM PRIVATE

## 2020-11-15 PROCEDURE — 94640 AIRWAY INHALATION TREATMENT: CPT

## 2020-11-15 PROCEDURE — 51798 US URINE CAPACITY MEASURE: CPT

## 2020-11-15 PROCEDURE — 71045 X-RAY EXAM CHEST 1 VIEW: CPT

## 2020-11-15 PROCEDURE — 36415 COLL VENOUS BLD VENIPUNCTURE: CPT

## 2020-11-15 PROCEDURE — 74011250637 HC RX REV CODE- 250/637: Performed by: SURGERY

## 2020-11-15 PROCEDURE — 85025 COMPLETE CBC W/AUTO DIFF WBC: CPT

## 2020-11-15 PROCEDURE — 74011636637 HC RX REV CODE- 636/637: Performed by: NURSE PRACTITIONER

## 2020-11-15 PROCEDURE — 74011250637 HC RX REV CODE- 250/637: Performed by: NURSE PRACTITIONER

## 2020-11-15 PROCEDURE — 82962 GLUCOSE BLOOD TEST: CPT

## 2020-11-15 PROCEDURE — 74011000250 HC RX REV CODE- 250: Performed by: NURSE PRACTITIONER

## 2020-11-15 PROCEDURE — 2709999900 HC NON-CHARGEABLE SUPPLY

## 2020-11-15 RX ORDER — POLYETHYLENE GLYCOL 3350 17 G/17G
17 POWDER, FOR SOLUTION ORAL DAILY PRN
Status: DISCONTINUED | OUTPATIENT
Start: 2020-11-15 | End: 2020-11-16 | Stop reason: HOSPADM

## 2020-11-15 RX ORDER — POTASSIUM CHLORIDE 14.9 MG/ML
20 INJECTION INTRAVENOUS ONCE
Status: COMPLETED | OUTPATIENT
Start: 2020-11-15 | End: 2020-11-15

## 2020-11-15 RX ADMIN — PANTOPRAZOLE SODIUM 40 MG: 40 TABLET, DELAYED RELEASE ORAL at 05:34

## 2020-11-15 RX ADMIN — SENNOSIDES AND DOCUSATE SODIUM 2 TABLET: 8.6; 5 TABLET ORAL at 17:51

## 2020-11-15 RX ADMIN — SENNOSIDES AND DOCUSATE SODIUM 2 TABLET: 8.6; 5 TABLET ORAL at 08:13

## 2020-11-15 RX ADMIN — POLYETHYLENE GLYCOL 3350 17 G: 17 POWDER, FOR SOLUTION ORAL at 12:37

## 2020-11-15 RX ADMIN — HUMAN INSULIN 9 UNITS: 100 INJECTION, SOLUTION SUBCUTANEOUS at 20:25

## 2020-11-15 RX ADMIN — PREGABALIN 75 MG: 75 CAPSULE ORAL at 17:53

## 2020-11-15 RX ADMIN — GUAIFENESIN 1200 MG: 600 TABLET ORAL at 21:17

## 2020-11-15 RX ADMIN — HEPARIN SODIUM 5000 UNITS: 5000 INJECTION INTRAVENOUS; SUBCUTANEOUS at 16:06

## 2020-11-15 RX ADMIN — DONEPEZIL HYDROCHLORIDE 5 MG: 5 TABLET, FILM COATED ORAL at 21:17

## 2020-11-15 RX ADMIN — BUPROPION HYDROCHLORIDE 75 MG: 75 TABLET, FILM COATED ORAL at 17:54

## 2020-11-15 RX ADMIN — BUPROPION HYDROCHLORIDE 75 MG: 75 TABLET, FILM COATED ORAL at 08:14

## 2020-11-15 RX ADMIN — Medication 10 ML: at 21:18

## 2020-11-15 RX ADMIN — LEVOTHYROXINE SODIUM 75 MCG: 75 TABLET ORAL at 05:33

## 2020-11-15 RX ADMIN — HEPARIN SODIUM 5000 UNITS: 5000 INJECTION INTRAVENOUS; SUBCUTANEOUS at 23:56

## 2020-11-15 RX ADMIN — HUMAN INSULIN 9 UNITS: 100 INJECTION, SOLUTION SUBCUTANEOUS at 23:57

## 2020-11-15 RX ADMIN — IPRATROPIUM BROMIDE AND ALBUTEROL SULFATE 3 ML: .5; 3 SOLUTION RESPIRATORY (INHALATION) at 08:55

## 2020-11-15 RX ADMIN — MORPHINE SULFATE 15 MG: 15 TABLET ORAL at 08:14

## 2020-11-15 RX ADMIN — TAMSULOSIN HYDROCHLORIDE 0.4 MG: 0.4 CAPSULE ORAL at 08:13

## 2020-11-15 RX ADMIN — ROSUVASTATIN 10 MG: 10 TABLET, FILM COATED ORAL at 21:17

## 2020-11-15 RX ADMIN — Medication 10 ML: at 05:35

## 2020-11-15 RX ADMIN — HEPARIN SODIUM 5000 UNITS: 5000 INJECTION INTRAVENOUS; SUBCUTANEOUS at 06:15

## 2020-11-15 RX ADMIN — FINASTERIDE 5 MG: 5 TABLET, FILM COATED ORAL at 08:13

## 2020-11-15 RX ADMIN — OXYCODONE 10 MG: 5 TABLET ORAL at 12:41

## 2020-11-15 RX ADMIN — MORPHINE SULFATE 15 MG: 15 TABLET ORAL at 17:54

## 2020-11-15 RX ADMIN — Medication 10 ML: at 14:00

## 2020-11-15 RX ADMIN — ACETAMINOPHEN 650 MG: 325 TABLET, FILM COATED ORAL at 12:47

## 2020-11-15 RX ADMIN — GUAIFENESIN 1200 MG: 600 TABLET ORAL at 08:13

## 2020-11-15 RX ADMIN — POTASSIUM CHLORIDE 20 MEQ: 14.9 INJECTION, SOLUTION INTRAVENOUS at 08:18

## 2020-11-15 RX ADMIN — PREGABALIN 75 MG: 75 CAPSULE ORAL at 08:14

## 2020-11-15 RX ADMIN — OXYCODONE 10 MG: 5 TABLET ORAL at 16:52

## 2020-11-15 RX ADMIN — SODIUM CHLORIDE 500 ML: 900 INJECTION, SOLUTION INTRAVENOUS at 00:57

## 2020-11-15 RX ADMIN — HUMAN INSULIN 3 UNITS: 100 INJECTION, SOLUTION SUBCUTANEOUS at 16:51

## 2020-11-15 NOTE — PROGRESS NOTES
Patient has not voided since fleming cath removed at 1600 on 11/14. VSS. Bladder scanned for 106. Dr Sue Dye notified. Orders received to give bolus of 500ml NS. Will follow up for results.

## 2020-11-15 NOTE — PROGRESS NOTES
PLAN:  Pain/ nausea control  Diabetic Diet  CXR q am  Follow labs  SCD, IS, Protonix,   OOB   CT x 1 to Water Seal  Anterior chest tube out 11/14/20  Figueroa out 11/14 and replaced 11/15 due to urinary retention. 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 Figueroa replaced this am due to urinary retention. On room air. Right CT x 1 to water seal. +small air leak. CXR this am showing: Unchanged right lung atelectasis or infiltrate and pleural thickening or effusion. Figueroa patent. AF. NAD. Creat 1.72, K+ 3.2. OBJECTIVE:  Constitutional: Alert oriented cooperative patient in no acute distress; appears stated age   Visit Vitals  BP (!) 99/54   Pulse 83   Temp 98.4 °F (36.9 °C)   Resp 16   Ht 5' 8\" (1.727 m)   Wt 231 lb 14.8 oz (105.2 kg)   SpO2 97%   BMI 35.26 kg/m²     Eyes: Sclera are clear. ENMT: no external lesions gross hearing normal; no obvious neck masses, no ear or lip lesions  CV: Tachy. Normal perfusion. Chest tube x 2 #1 440mL out, #2 66mL out  Resp: No JVD. Breathing is  non-labored; no audible wheezing. GI: soft and non-distended     Musculoskeletal: unremarkable with normal function. No embolic signs or cyanosis.    Neuro:  Oriented; moves all 4; no focal deficits  Psychiatric: normal affect and mood, no memory impairment      Patient Vitals for the past 24 hrs:   BP Temp Pulse Resp SpO2   11/15/20 0855 -- -- -- -- 97 %   11/15/20 0725 (!) 99/54 98.4 °F (36.9 °C) 83 16 92 %   11/15/20 0349 (!) 101/59 98.2 °F (36.8 °C) 81 17 93 %   11/15/20 0030 (!) 102/56 98 °F (36.7 °C) 94 17 95 %   11/14/20 6007 (!) 86/49 98.1 °F (36.7 °C) (!) 104 18 98 %   11/14/20 1913 -- -- -- -- 97 %   11/14/20 1905 117/60 -- -- -- --   11/14/20 1859 117/60 97.9 °F (36.6 °C) 96 19 98 %   11/14/20 1543 (!) 105/53 98.1 °F (36.7 °C) 90 19 92 %   11/14/20 1145 134/85 98.7 °F (37.1 °C) (!) 102 19 96 %     Labs:    Recent Labs     11/15/20  0414   WBC 7.9   HGB 8.6*      *   K 3.2*      CO2 28   BUN 35*   CREA 1.72*   GLU 94       Jun Magaña, MEREDITH

## 2020-11-15 NOTE — PROGRESS NOTES
END OF SHIFT NOTE:    INTAKE/OUTPUT   0701 -  07  In: -   Out: 6033 [JVFN]  Voiding: NO  Catheter: NO  Drain:              Flatus: Patient does have flatus present. Stool:  0 occurrences. Characteristics:       Emesis: 0 occurrences. Characteristics:        VITAL SIGNS  Patient Vitals for the past 12 hrs:   Temp Pulse Resp BP SpO2   20 1913 -- -- -- -- 97 %   20 1543 98.1 °F (36.7 °C) 90 19 (!) 105/53 92 %   20 1145 98.7 °F (37.1 °C) (!) 102 19 134/85 96 %   20 0749 97.7 °F (36.5 °C) (!) 105 19 (!) 145/84 97 %   20 0744 -- -- -- -- 95 %       Pain Assessment  Pain Intensity 1: 0 (20 1654)  Pain Location 1: Chest, Back  Pain Intervention(s) 1: Medication (see MAR)  Patient Stated Pain Goal: 2    Ambulating  No    Shift report given to oncoming nurse at the bedside.     Satnam Rasmussen

## 2020-11-15 NOTE — PROGRESS NOTES
END OF SHIFT NOTE:    INTAKE/OUTPUT  11/14 0701 - 11/15 0700  In: 80 [P.O.:410]  Out: 1983 [Urine:1850]  Voiding: NO  Catheter: YES, placed at 0615  Drain:              Flatus: Patient does have flatus present. Stool:  0 occurrences. Characteristics:       Emesis: 0 occurrences. Characteristics:        VITAL SIGNS  Patient Vitals for the past 12 hrs:   Temp Pulse Resp BP SpO2   11/15/20 0349 98.2 °F (36.8 °C) 81 17 (!) 101/59 93 %   11/15/20 0030 98 °F (36.7 °C) 94 17 (!) 102/56 95 %   11/14/20 2329 98.1 °F (36.7 °C) (!) 104 18 (!) 86/49 98 %   11/14/20 1913 -- -- -- -- 97 %   11/14/20 1905 -- -- -- 117/60 --   11/14/20 1859 97.9 °F (36.6 °C) 96 19 117/60 98 %       Pain Assessment  Pain Intensity 1: 2 (11/14/20 1905)  Pain Location 1: Chest, Back  Pain Intervention(s) 1: Medication (see MAR)  Patient Stated Pain Goal: 2    Ambulating  No, PT following    Shift report given to oncoming nurse at the bedside.     Tita Jaime RN

## 2020-11-15 NOTE — PROGRESS NOTES
Problem: Falls - Risk of  Goal: *Absence of Falls  Description: Document Sunday Kay Fall Risk and appropriate interventions in the flowsheet.   Outcome: Progressing Towards Goal  Note: Fall Risk Interventions:  Mobility Interventions: Communicate number of staff needed for ambulation/transfer, Patient to call before getting OOB, PT Consult for mobility concerns         Medication Interventions: Evaluate medications/consider consulting pharmacy, Patient to call before getting OOB, Teach patient to arise slowly    Elimination Interventions: Call light in reach    History of Falls Interventions: Consult care management for discharge planning, Door open when patient unattended, Investigate reason for fall

## 2020-11-15 NOTE — PROGRESS NOTES
Patient continues unable to void despite bolus given and increased in oral intake. Patient reports abdominal pressure but unable to void. Bladder scanned for 608ml. Dr Karli Odom notified and orders received to place fleming catheter.

## 2020-11-16 VITALS
WEIGHT: 231.92 LBS | OXYGEN SATURATION: 91 % | HEIGHT: 68 IN | TEMPERATURE: 98.4 F | HEART RATE: 94 BPM | BODY MASS INDEX: 35.15 KG/M2 | SYSTOLIC BLOOD PRESSURE: 114 MMHG | RESPIRATION RATE: 16 BRPM | DIASTOLIC BLOOD PRESSURE: 83 MMHG

## 2020-11-16 LAB
ANION GAP SERPL CALC-SCNC: 6 MMOL/L (ref 7–16)
BASOPHILS # BLD: 0 K/UL (ref 0–0.2)
BASOPHILS NFR BLD: 1 % (ref 0–2)
BUN SERPL-MCNC: 36 MG/DL (ref 8–23)
CALCIUM SERPL-MCNC: 8.1 MG/DL (ref 8.3–10.4)
CHLORIDE SERPL-SCNC: 105 MMOL/L (ref 98–107)
CO2 SERPL-SCNC: 26 MMOL/L (ref 21–32)
CREAT SERPL-MCNC: 1.84 MG/DL (ref 0.8–1.5)
DIFFERENTIAL METHOD BLD: ABNORMAL
EOSINOPHIL # BLD: 0.1 K/UL (ref 0–0.8)
EOSINOPHIL NFR BLD: 2 % (ref 0.5–7.8)
ERYTHROCYTE [DISTWIDTH] IN BLOOD BY AUTOMATED COUNT: 15.4 % (ref 11.9–14.6)
GLUCOSE BLD STRIP.AUTO-MCNC: 182 MG/DL (ref 65–100)
GLUCOSE BLD STRIP.AUTO-MCNC: 228 MG/DL (ref 65–100)
GLUCOSE BLD STRIP.AUTO-MCNC: 251 MG/DL (ref 65–100)
GLUCOSE SERPL-MCNC: 213 MG/DL (ref 65–100)
HCT VFR BLD AUTO: 29.5 % (ref 41.1–50.3)
HGB BLD-MCNC: 8.7 G/DL (ref 13.6–17.2)
IMM GRANULOCYTES # BLD AUTO: 0 K/UL (ref 0–0.5)
IMM GRANULOCYTES NFR BLD AUTO: 0 % (ref 0–5)
LYMPHOCYTES # BLD: 0.8 K/UL (ref 0.5–4.6)
LYMPHOCYTES NFR BLD: 12 % (ref 13–44)
MCH RBC QN AUTO: 25.5 PG (ref 26.1–32.9)
MCHC RBC AUTO-ENTMCNC: 29.5 G/DL (ref 31.4–35)
MCV RBC AUTO: 86.5 FL (ref 79.6–97.8)
MONOCYTES # BLD: 0.7 K/UL (ref 0.1–1.3)
MONOCYTES NFR BLD: 10 % (ref 4–12)
NEUTS SEG # BLD: 4.8 K/UL (ref 1.7–8.2)
NEUTS SEG NFR BLD: 75 % (ref 43–78)
NRBC # BLD: 0 K/UL (ref 0–0.2)
PLATELET # BLD AUTO: 217 K/UL (ref 150–450)
PMV BLD AUTO: 10.4 FL (ref 9.4–12.3)
POTASSIUM SERPL-SCNC: 3.5 MMOL/L (ref 3.5–5.1)
RBC # BLD AUTO: 3.41 M/UL (ref 4.23–5.6)
SODIUM SERPL-SCNC: 137 MMOL/L (ref 138–145)
WBC # BLD AUTO: 6.4 K/UL (ref 4.3–11.1)

## 2020-11-16 PROCEDURE — 80048 BASIC METABOLIC PNL TOTAL CA: CPT

## 2020-11-16 PROCEDURE — 94640 AIRWAY INHALATION TREATMENT: CPT

## 2020-11-16 PROCEDURE — 85025 COMPLETE CBC W/AUTO DIFF WBC: CPT

## 2020-11-16 PROCEDURE — 51798 US URINE CAPACITY MEASURE: CPT

## 2020-11-16 PROCEDURE — 36415 COLL VENOUS BLD VENIPUNCTURE: CPT

## 2020-11-16 PROCEDURE — 74011250637 HC RX REV CODE- 250/637: Performed by: SURGERY

## 2020-11-16 PROCEDURE — 74011000250 HC RX REV CODE- 250: Performed by: NURSE PRACTITIONER

## 2020-11-16 PROCEDURE — 2709999900 HC NON-CHARGEABLE SUPPLY

## 2020-11-16 PROCEDURE — 94760 N-INVAS EAR/PLS OXIMETRY 1: CPT

## 2020-11-16 PROCEDURE — 74011636637 HC RX REV CODE- 636/637: Performed by: NURSE PRACTITIONER

## 2020-11-16 PROCEDURE — 74011250637 HC RX REV CODE- 250/637: Performed by: NURSE PRACTITIONER

## 2020-11-16 PROCEDURE — 82962 GLUCOSE BLOOD TEST: CPT

## 2020-11-16 PROCEDURE — 74011250636 HC RX REV CODE- 250/636: Performed by: NURSE PRACTITIONER

## 2020-11-16 RX ORDER — OXYCODONE HYDROCHLORIDE 10 MG/1
10 TABLET ORAL
Qty: 20 TAB | Refills: 0 | Status: SHIPPED | OUTPATIENT
Start: 2020-11-16 | End: 2020-11-21

## 2020-11-16 RX ORDER — ONDANSETRON 4 MG/1
4 TABLET, ORALLY DISINTEGRATING ORAL
Qty: 30 TAB | Refills: 0 | Status: SHIPPED | OUTPATIENT
Start: 2020-11-16

## 2020-11-16 RX ADMIN — METOPROLOL TARTRATE 25 MG: 25 TABLET, FILM COATED ORAL at 08:24

## 2020-11-16 RX ADMIN — GUAIFENESIN 1200 MG: 600 TABLET ORAL at 08:24

## 2020-11-16 RX ADMIN — PANTOPRAZOLE SODIUM 40 MG: 40 TABLET, DELAYED RELEASE ORAL at 06:21

## 2020-11-16 RX ADMIN — SENNOSIDES AND DOCUSATE SODIUM 2 TABLET: 8.6; 5 TABLET ORAL at 08:24

## 2020-11-16 RX ADMIN — IPRATROPIUM BROMIDE AND ALBUTEROL SULFATE 3 ML: .5; 3 SOLUTION RESPIRATORY (INHALATION) at 08:05

## 2020-11-16 RX ADMIN — FINASTERIDE 5 MG: 5 TABLET, FILM COATED ORAL at 08:25

## 2020-11-16 RX ADMIN — HUMAN INSULIN 9 UNITS: 100 INJECTION, SOLUTION SUBCUTANEOUS at 03:46

## 2020-11-16 RX ADMIN — HUMAN INSULIN 6 UNITS: 100 INJECTION, SOLUTION SUBCUTANEOUS at 11:51

## 2020-11-16 RX ADMIN — HEPARIN SODIUM 5000 UNITS: 5000 INJECTION INTRAVENOUS; SUBCUTANEOUS at 06:21

## 2020-11-16 RX ADMIN — HUMAN INSULIN 3 UNITS: 100 INJECTION, SOLUTION SUBCUTANEOUS at 08:25

## 2020-11-16 RX ADMIN — Medication 10 ML: at 06:22

## 2020-11-16 RX ADMIN — LEVOTHYROXINE SODIUM 75 MCG: 75 TABLET ORAL at 06:21

## 2020-11-16 RX ADMIN — PREGABALIN 75 MG: 75 CAPSULE ORAL at 08:24

## 2020-11-16 RX ADMIN — TAMSULOSIN HYDROCHLORIDE 0.4 MG: 0.4 CAPSULE ORAL at 08:24

## 2020-11-16 RX ADMIN — MORPHINE SULFATE 15 MG: 15 TABLET ORAL at 08:25

## 2020-11-16 RX ADMIN — BUPROPION HYDROCHLORIDE 75 MG: 75 TABLET, FILM COATED ORAL at 08:25

## 2020-11-16 RX ADMIN — OXYCODONE 10 MG: 5 TABLET ORAL at 12:58

## 2020-11-16 NOTE — PROGRESS NOTES
Pt medically ready for dc home today with spouse, no anticipated dc needs noted. Care Management Interventions  PCP Verified by CM: Yes  Mode of Transport at Discharge:  Other (see comment)  Transition of Care Consult (CM Consult): Discharge Planning  Discharge Durable Medical Equipment: No  Physical Therapy Consult: No  Occupational Therapy Consult: No  Speech Therapy Consult: No  Current Support Network: Own Home, Lives with Spouse  Confirm Follow Up Transport: Family  Freedom of Choice List was Provided with Basic Dialogue that Supports the Patient's Individualized Plan of Care/Goals, Treatment Preferences and Shares the Quality Data Associated with the Providers?: Yes  [place] Resource Information Provided?: (MCR/BCBS)  Discharge Location  Discharge Placement: Home

## 2020-11-16 NOTE — DISCHARGE SUMMARY
Katy Swenson. MRN: [id number]     : 1945     Age: 70 y.o. Admit date: 2020     Discharge date: 2020   Attending Physician: Dr. Nancy Burt MD  Primary Discharge Diagnosis:   Principal Problem:    Pleural effusion (10/4/2020)    Active Problems:    Fibrothorax (2020)      Primary Operations or Procedures Performed :  Procedure(s):  RIGHT VATS/ DECORTICATION CONVERTED TO THORACOTOMY, CRYOABLATION     Brief History and Reason for Admission: Katy Soriano was admitted with the following history of present illness. Tosin Mckeon is a 70 y.o. male who was referred by  for evaluation of a hemothorax/pleural effusion after a fall that has not been able to be drained by thoracentesis. Patient states he fell about 1 month ago. He has underwent thoracentesis times 2. He is complaining of worsening SOB. Eats well, denies pain. Ambulates with a walker related to orthostatic hypotension. Denies chest pain. does have a history of smoking.- quit   He does have a history of drinking.- quit         Medical history - CKD, DM- insulin pump.and as below. He has not had previous chest surgery. does take blood thinner.- 81mg Aspirin               Hospital Course:  Uneventful for this operation. He was found to have hemathorax/fibrothorax- preoperative Hgb 10.4- received 1 unit of pRBC during surgery and hgb monitored during hospital stay with daily CBC. Hgb 8.4 on discharge- asymptomatic HR94, /83. The patient progressed satisfactorily meeting milestones necessary for successful discharge including tolerating a diet, adequate mobility, adequate pain control, and active bowel function, urinated prior to discharge. Patient was deemed a good candidate for discharge at the time of morning rounding. They are to follow up as indicated in their provided discharge paperwork.  The patient helped develop and voices understanding with the plan of care. They are amenable without reservations at this time to moving forward with discharge. Condition at Discharge: good    Discharge Medications:   Current Discharge Medication List      START taking these medications    Details   oxyCODONE IR (ROXICODONE) 10 mg tab immediate release tablet Take 1 Tab by mouth every six (6) hours as needed for Pain for up to 5 days. Max Daily Amount: 40 mg.  Qty: 20 Tab, Refills: 0    Associated Diagnoses: Fibrothorax      ondansetron (Zofran ODT) 4 mg disintegrating tablet Take 1 Tab by mouth every six (6) hours as needed for Nausea (and vomiting). Qty: 30 Tab, Refills: 0         CONTINUE these medications which have NOT CHANGED    Details   morphine IR (MS IR) 15 mg tablet Take 15 mg by mouth two (2) times a day. IP [facility] INSULIN LISPRO, HUMALOG, FOR PUMP Basal rate 7252-6403 - 2 units/H, 2335-4217 2.2 units/H and 3876-7983 2.5 units/H      sodium bicarbonate 650 mg tablet Take 650 mg by mouth two (2) times a day. RABEprazole (Aciphex) 20 mg tablet Take 20 mg by mouth two (2) times a day. pregabalin (Lyrica) 75 mg capsule Take 75 mg by mouth two (2) times a day. rosuvastatin (Crestor) 10 mg tablet Take 10 mg by mouth nightly. finasteride (PROSCAR) 5 mg tablet Take 5 mg by mouth daily. buPROPion (WELLBUTRIN) 75 mg tablet Take 75 mg by mouth two (2) times a day. levothyroxine (SYNTHROID) 75 mcg tablet Take 75 mcg by mouth Daily (before breakfast). amitriptyline (ELAVIL) 10 mg tablet Take 30 mg by mouth nightly. alfuzosin SR (UROXATRAL) 10 mg SR tablet Take 10 mg by mouth nightly. cholecalciferol (Vitamin D3) 25 mcg (1,000 unit) cap Take 1,000 Units by mouth daily. aspirin 81 mg chewable tablet Take 81 mg by mouth nightly. hydrocortisone valerate (WEST-DORI) 0.2 % ointment Apply  to affected area two (2) times daily as needed for Skin Irritation.  use thin layer      fluticasone propionate (CUTIVATE) 0.05 % topical cream Apply  to affected area two (2) times a day. donepeziL (ARICEPT) 5 mg tablet Take 5 mg by mouth daily. insulin glargine (Lantus U-100 Insulin) 100 unit/mL injection 43 Units by SubCUTAneous route as needed. Only uses when pump fails. Has NEVER had to use the insulin. fluticasone propionate (Flonase Allergy Relief) 50 mcg/actuation nasal spray 2 Sprays by Both Nostrils route as needed. ondansetron hcl (Zofran) 4 mg tablet Take 4 mg by mouth every eight (8) hours as needed for Nausea or Vomiting. Disposition/Discharge Instructions/Follow-up Care: Follow-up with Dr. Jonna Olsen.  May remove dressing in 72 hours  Keep incisions clean and dry, may remain uncovered after 72 hours  Do not apply lotions, creams or ointments to incisions.     Diet - as tolerated  Activity - ambulate - as tolerated - no heavy lifting >20lb. May shower - no tub baths or soaking/submerging.     No driving while taking narcotics. Do not drink alcohol while taking narcotics. Resume other home medications.   Take Rx as prescribed   Take stool softeners while on narcotics to avoid constipation     If problems or questions arise, please call our office at [phone].     Greater than 30 minutes were spent discharging the patient          Signed:  Anastasiia Fiore NP  11/16/2020  3:16 PM

## 2020-11-16 NOTE — PROGRESS NOTES
Pt unable to void after fleming removal.  Bladder zfrf=105. MEREDITH Schrader notified. Per Dr. Renner Guest give pt another 30 min to void then insert fleming cath to go home with.

## 2020-11-16 NOTE — DISCHARGE INSTRUCTIONS
Discharge Instructions/Follow-up Plans:   MD Instructions:     Follow-up with Dr. Lina Castillo.  May remove dressing in 72 hours  Keep incisions clean and dry, may remain uncovered after 72 hours  Do not apply lotions, creams or ointments to incisions.     Diet - as tolerated  Activity - ambulate - as tolerated - no heavy lifting >20lb. May shower - no tub baths or soaking/submerging.     No driving while taking narcotics. Do not drink alcohol while taking narcotics. Resume other home medications. Take Rx as prescribed   Take stool softeners while on narcotics to avoid constipation     If problems or questions arise, please call our office at [phone].        Thoracotomy: What to Expect at [address] [facility]  A thoracotomy (say \"kxjr-fs-NFY-[facility]\") is a cut (incision) that the doctor makes in the chest wall through your front, side, or back. The doctor is able to do surgery inside the chest through the incision. A thoracotomy may be used to do surgery on the lungs, esophagus, trachea, heart, aorta, or diaphragm. The exact place in the chest where the doctor makes the incision depends on the reason for the surgery. It is common to feel tired for 6 to 8 weeks after surgery. Your chest may hurt and be swollen for up to 6 weeks. It may ache or feel stiff for up to 3 months. You may also feel tightness, itching, numbness, or tingling around the incision for up to 3 months. Your doctor will give you medicine to help with pain. You will have stitches or staples in the incision. You may have one or more tubes coming out of your chest to drain fluid and air that can build up after surgery. The tubes are often removed before you leave the hospital. Your doctor will remove the stitches or staples at your follow-up visit. You may feel short of breath at first after the surgery.  Your doctor, nurse, or respiratory therapist will teach you deep-breathing and coughing exercises to help your body get as much oxygen as possible. You also may need to get extra oxygen through a mask or a plastic tube in your nostrils (nasal cannula). This is called oxygen therapy. The amount of time you will need to recover depends on the surgery you had. You probably will need to take at least 1 to 2 months off work. This care sheet gives you a general idea about how long it will take for you to recover. But each person recovers at a different pace. Follow the steps below to get better as quickly as possible. How can you care for yourself at home? Activity    · Rest when you feel tired. Getting enough sleep will help you recover.     · Try to walk each day. Start by walking a little more than you did the day before. Bit by bit, increase the amount you walk. Walking boosts blood flow and helps prevent pneumonia and constipation.     · Do not smoke or allow others to smoke around you. If you need help quitting, talk to your doctor about stop-smoking programs and medicines. These can increase your chances of quitting for good.     · Try to avoid being around people who you know have a cold, the flu, or other illness.     · Avoid strenuous activities, such as bicycle riding, jogging, weight lifting, or aerobic exercise, until your doctor says it is okay. Also avoid swimming, tennis, golf, or other activities that could strain your arm and shoulder muscles, until your doctor says it is okay.     · Until your doctor says it is okay, avoid lifting anything that would make you strain. This may include a child, heavy grocery bags and milk containers, a heavy briefcase or backpack, cat litter or dog food bags, or a vacuum .     · If your incision is in the front or the side of your chest, hold a pillow over the incision when you cough or take deep breaths. This will support your chest and decrease your pain.     · Ask your doctor when it is safe to you to drive or fly. You probably will not be able to drive for at least 4 weeks.  This is because your arm and shoulder muscles may be stiff after surgery and could make it difficult to steer.     · You may be able to take showers (unless you have a drain near your incision). If you have a drain near your incision, follow your doctor's instructions to empty and care for it. Do not take a bath for the first 2 weeks, or until your doctor tells you it is okay.     · Ask your doctor when it is okay for you to have sex.     · You will probably need to take at least 1 to 2 months off from work. It depends on the surgery you had and the type of work you do. Diet    · You can eat your normal diet. If your stomach is upset, try bland, low-fat foods like plain rice, broiled chicken, toast, and yogurt.     · Drink plenty of fluids (unless your doctor tells you not to).     · You may notice that your bowel movements are not regular right after your surgery. This is common. Try to avoid constipation and straining with bowel movements. You may want to take a fiber supplement every day. If you have not had a bowel movement after a couple of days, ask your doctor about taking a mild laxative. Medicines    · Your doctor will tell you if and when you can restart your medicines. He or she will also give you instructions about taking any new medicines.     · If you take aspirin or some other blood thinner, ask your doctor if and when to start taking it again. Make sure that you understand exactly what your doctor wants you to do.     · Be safe with medicines. Take pain medicines exactly as directed. ? If the doctor gave you a prescription medicine for pain, take it as prescribed. ? If you are not taking a prescription pain medicine, ask your doctor if you can take an over-the-counter medicine. ? Do not take two or more pain medicines at the same time unless the doctor told you to. Many pain medicines have acetaminophen, which is Tylenol.  Too much acetaminophen (Tylenol) can be harmful.     · If you think your pain medicine is making you sick to your stomach:  ? Take your medicine after meals (unless your doctor has told you not to). ? Ask your doctor for a different pain medicine.     · If your doctor prescribed antibiotics, take them as directed. Do not stop taking them just because you feel better. You need to take the full course of antibiotics. Incision care    · If you have strips of tape on the incision, leave the tape on for a week or until it falls off.     · Wash the area daily with warm, soapy water, and pat it dry. Don't use hydrogen peroxide or alcohol, which may delay healing. You may cover the area with a gauze bandage if it weeps or rubs against clothing. Change the bandage every day.     · Keep the area clean and dry. Exercise    · To help keep your lungs clear, cough and do deep breathing exercises as you are told by your doctor, nurse, or respiratory therapist.     · Your doctor may send you home with an incentive spirometer. This device helps you practice taking deep breaths, which can help keep your lungs healthy.     · Ask your doctor about exercises to keep your arm and shoulder muscles strong and flexible while you recover. Follow-up care is a key part of your treatment and safety. Be sure to make and go to all appointments, and call your doctor if you are having problems. It's also a good idea to know your test results and keep a list of the medicines you take. When should you call for help? Call 911 anytime you think you may need emergency care. For example, call if:    · You passed out (lost consciousness).     · You have severe trouble breathing.     · You have sudden chest pain and shortness of breath, or you cough up blood.    Call your doctor now or seek immediate medical care if:    · You are sick to your stomach or cannot keep fluids down.     · You have pain that does not get better after you take pain medicine.     · You have a fever over 100°F.     · You have loose stitches, or your incision comes open.     · Bright red blood has soaked through the bandage over your incision.     · You have signs of infection, such as:  ? Increased pain, swelling, warmth, or redness. ? Red streaks leading from the incision. ? Pus draining from the incision. ? Swollen lymph nodes in your neck, armpits, or groin. ? A fever.     · You cough up a lot more mucus than normal, or your mucus changes color. Watch closely for changes in your health, and be sure to contact your doctor if you have any problems. Where can you learn more? Go to http://www.gray.com/  Enter T374 in the search box to learn more about \"Thoracotomy: What to Expect at Home. \"  Current as of: February 24, 2020               Content Version: 12.6  © 8962-9298 [employer]. Care instructions adapted under license by [employer] (which disclaims liability or warranty for this information). If you have questions about a medical condition or this instruction, always ask your healthcare professional. [address] any warranty or liability for your use of this information.

## 2020-11-16 NOTE — PROGRESS NOTES
END OF SHIFT NOTE:    INTAKE/OUTPUT  11/15 0701 - 11/16 0700  In: -   Out: 1968 [Urine:1715]  Voiding: YES  Catheter: YES  Drain:              Flatus: Patient does have flatus present. Stool:  0 occurrences. Characteristics:       Emesis: 0 occurrences. Characteristics:        VITAL SIGNS  Patient Vitals for the past 12 hrs:   Temp Pulse Resp BP SpO2   11/16/20 0333 98 °F (36.7 °C) 86 14 102/68 94 %   11/15/20 2335 98.1 °F (36.7 °C) 98 15 105/67 94 %   11/15/20 1930 98.6 °F (37 °C) 91 15 (!) 93/49 94 %       Pain Assessment  Pain Intensity 1: 0 (11/16/20 0000)  Pain Location 1: Chest, Back  Pain Intervention(s) 1: Medication (see MAR)  Patient Stated Pain Goal: 0    Ambulating  Yes    Shift report given to oncoming nurse at the bedside.     [address]

## 2020-11-16 NOTE — PROGRESS NOTES
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 Pain controlled. Chest tube maintained without air leak. States breathing better then preoperatively. On room air      OBJECTIVE:  Constitutional: Alert oriented cooperative patient in no acute distress; appears stated age   Visit Vitals  /65 (BP 1 Location: Right arm)   Pulse 94   Temp 98.4 °F (36.9 °C)   Resp 16   Ht 5' 8\" (1.727 m)   Wt 231 lb 14.8 oz (105.2 kg)   SpO2 94%   BMI 35.26 kg/m²     Eyes: Sclera are clear. ENMT: no external lesions gross hearing normal; no obvious neck masses, no ear or lip lesions  CV: . Normal perfusion. Chest tube x 1 no air leak noted  Resp: No JVD. Breathing is  non-labored; no audible wheezing. GI: soft and non-distended     Musculoskeletal: unremarkable with normal function. No embolic signs or cyanosis.    Neuro:  Oriented; moves all 4; no focal deficits  Psychiatric: normal affect and mood, no memory impairment      Patient Vitals for the past 24 hrs:   BP Temp Pulse Resp SpO2   11/16/20 1123 115/65 98.4 °F (36.9 °C) 94 16 94 %   11/16/20 0805 -- -- -- -- 95 %   11/16/20 0729 134/78 98.4 °F (36.9 °C) 95 18 96 %   11/16/20 0333 102/68 98 °F (36.7 °C) 86 14 94 %   11/15/20 2335 105/67 98.1 °F (36.7 °C) 98 15 94 %   11/15/20 1930 (!) 93/49 98.6 °F (37 °C) 91 15 94 %   11/15/20 1515 111/74 99.7 °F (37.6 °C) 96 14 92 %     Labs:    Recent Labs     11/16/20  0317   WBC 6.4   HGB 8.7*      *   K 3.5      CO2 26   BUN 36*   CREA 1.84*   *       Crystal Elaina Payne, NP

## 2020-11-16 NOTE — PROGRESS NOTES
Chest tube removed at end inspiration. Dressing applied. Patient tolerated well.      Patience Graciela, NP

## 2020-11-17 ENCOUNTER — PATIENT OUTREACH (OUTPATIENT)
Dept: CASE MANAGEMENT | Age: 75
End: 2020-11-17

## 2020-11-17 NOTE — PROGRESS NOTES
CTN identified patient for [facility] call after recent IP admission. Patient does not have a working number listed in contacts. CTN to resolve ESTEBAN episode at this time.

## 2020-11-19 LAB
ACID FAST STN SPEC: NEGATIVE
MYCOBACTERIUM SPEC QL CULT: NEGATIVE
SPECIMEN PREPARATION: NORMAL
SPECIMEN SOURCE: NORMAL

## 2020-11-30 NOTE — PROGRESS NOTES
Physician Progress Note      PATIENT:               Dipesh Bean  [facility] #:                  [id number]  :                       1945  ADMIT DATE:       2020 5:40 AM  DISCH DATE:        2020 4:07 PM  RESPONDING  PROVIDER #:        Lynsey Klein NP          QUERY TEXT:    Pt admitted for thoracotomy, and had drop in hgb. If possible, please document in progress notes and discharge summary if you are treating the following: The medical record reflects the following:  Risk Factors: s/p thoracotomy  Clinical Indicators: 11-14 hgb 10.9 11-15 8.6,   ml  Treatment: serial labs. 1 unit PRBC's given  Options provided:  -- Acute blood loss anemia unrelated to procedure  -- Anemia due to postoperative blood loss  -- Drop in hemoglobin  -- Dilutional anemia  -- Other - I will add my own diagnosis  -- Disagree - Not applicable / Not valid  -- Disagree - Clinically unable to determine / Unknown  -- Refer to Clinical Documentation Reviewer    PROVIDER RESPONSE TEXT:    This patient had anemia due to postoperative blood loss.     Query created by: Keira Fuentes on 2020 8:56 AM      Electronically signed by:  Lynsey Klein NP 2020 1:36 PM

## 2021-01-11 ENCOUNTER — APPOINTMENT (OUTPATIENT)
Dept: CT IMAGING | Age: 76
End: 2021-01-11
Attending: EMERGENCY MEDICINE
Payer: MEDICARE

## 2021-01-11 ENCOUNTER — HOSPITAL ENCOUNTER (EMERGENCY)
Age: 76
Discharge: HOME | End: 2021-01-11
Attending: EMERGENCY MEDICINE
Payer: MEDICARE

## 2021-01-11 VITALS
HEIGHT: 68 IN | WEIGHT: 227 LBS | RESPIRATION RATE: 18 BRPM | OXYGEN SATURATION: 100 % | HEART RATE: 100 BPM | SYSTOLIC BLOOD PRESSURE: 105 MMHG | TEMPERATURE: 98.5 F | BODY MASS INDEX: 34.4 KG/M2 | DIASTOLIC BLOOD PRESSURE: 65 MMHG

## 2021-01-11 DIAGNOSIS — R41.0 CONFUSION: Primary | ICD-10-CM

## 2021-01-11 LAB
ALBUMIN SERPL-MCNC: 3.2 G/DL (ref 3.2–4.6)
ALBUMIN/GLOB SERPL: 0.6 {RATIO} (ref 1.2–3.5)
ALP SERPL-CCNC: 175 U/L (ref 50–136)
ALT SERPL-CCNC: 33 U/L (ref 12–65)
ANION GAP SERPL CALC-SCNC: 5 MMOL/L (ref 7–16)
AST SERPL-CCNC: 55 U/L (ref 15–37)
BASOPHILS # BLD: 0 K/UL (ref 0–0.2)
BASOPHILS NFR BLD: 1 % (ref 0–2)
BILIRUB SERPL-MCNC: 0.5 MG/DL (ref 0.2–1.1)
BUN SERPL-MCNC: 39 MG/DL (ref 8–23)
CALCIUM SERPL-MCNC: 9.2 MG/DL (ref 8.3–10.4)
CHLORIDE SERPL-SCNC: 104 MMOL/L (ref 98–107)
CO2 SERPL-SCNC: 26 MMOL/L (ref 21–32)
CREAT SERPL-MCNC: 1.69 MG/DL (ref 0.8–1.5)
DIFFERENTIAL METHOD BLD: ABNORMAL
EOSINOPHIL # BLD: 0 K/UL (ref 0–0.8)
EOSINOPHIL NFR BLD: 1 % (ref 0.5–7.8)
ERYTHROCYTE [DISTWIDTH] IN BLOOD BY AUTOMATED COUNT: 15.7 % (ref 11.9–14.6)
GLOBULIN SER CALC-MCNC: 5 G/DL (ref 2.3–3.5)
GLUCOSE SERPL-MCNC: 157 MG/DL (ref 65–100)
HCT VFR BLD AUTO: 39.8 % (ref 41.1–50.3)
HGB BLD-MCNC: 12.4 G/DL (ref 13.6–17.2)
IMM GRANULOCYTES # BLD AUTO: 0 K/UL (ref 0–0.5)
IMM GRANULOCYTES NFR BLD AUTO: 0 % (ref 0–5)
LYMPHOCYTES # BLD: 0.6 K/UL (ref 0.5–4.6)
LYMPHOCYTES NFR BLD: 10 % (ref 13–44)
MCH RBC QN AUTO: 25.7 PG (ref 26.1–32.9)
MCHC RBC AUTO-ENTMCNC: 31.2 G/DL (ref 31.4–35)
MCV RBC AUTO: 82.4 FL (ref 79.6–97.8)
MONOCYTES # BLD: 0.4 K/UL (ref 0.1–1.3)
MONOCYTES NFR BLD: 5 % (ref 4–12)
NEUTS SEG # BLD: 5.6 K/UL (ref 1.7–8.2)
NEUTS SEG NFR BLD: 84 % (ref 43–78)
NRBC # BLD: 0 K/UL (ref 0–0.2)
PLATELET # BLD AUTO: 188 K/UL (ref 150–450)
PMV BLD AUTO: 11.1 FL (ref 9.4–12.3)
POTASSIUM SERPL-SCNC: 5.3 MMOL/L (ref 3.5–5.1)
PROT SERPL-MCNC: 8.2 G/DL (ref 6.3–8.2)
RBC # BLD AUTO: 4.83 M/UL (ref 4.23–5.6)
SODIUM SERPL-SCNC: 135 MMOL/L (ref 136–145)
WBC # BLD AUTO: 6.6 K/UL (ref 4.3–11.1)

## 2021-01-11 PROCEDURE — 99283 EMERGENCY DEPT VISIT LOW MDM: CPT

## 2021-01-11 PROCEDURE — 81003 URINALYSIS AUTO W/O SCOPE: CPT

## 2021-01-11 PROCEDURE — 80053 COMPREHEN METABOLIC PANEL: CPT

## 2021-01-11 PROCEDURE — 85025 COMPLETE CBC W/AUTO DIFF WBC: CPT

## 2021-01-11 NOTE — ED NOTES
I have reviewed discharge instructions with the patient. The patient verbalized understanding. Patient left ED via Discharge Method: ambulatory to Home with wife at bedside. Opportunity for questions and clarification provided. Patient given 0 scripts. To continue your aftercare when you leave the hospital, you may receive an automated call from our care team to check in on how you are doing. This is a free service and part of our promise to provide the best care and service to meet your aftercare needs.  If you have questions, or wish to unsubscribe from this service please call [phone]. Thank you for Choosing our [address] [place] [address] Emergency Department.

## 2021-01-11 NOTE — ED PROVIDER NOTES
Mask was worn during the entire patient examination. Elkin Norris is a 70 y.o. male who presents to the ED with a chief complaint of fatigue and confusion. Patient does have a history of some memory issues this year and was started on Aricept. Today he cannot remember where his stent was and did not seem completely with it per family. They state that he feels better now. He has a history of diabetic peripheral neuropathy and uses a walker at baseline. He has had some falls but has not hit his head. He has had no focal weakness or trouble with his speech.            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 ([address]     Diabetic nephropathy ([address]     Diabetic peripheral neuropathy (HCC)     Diabetic retinopathy (HCC)     Dysphonia     GERD (gastroesophageal reflux disease)     managed with medications    HLD (hyperlipidemia)     Hypertension     after HTN meds had ORthostatic and all HTN meds discontinued    Insulin pump in place     Left hip pain     Lumbar radiculopathy     MCI (mild cognitive impairment) with memory loss     Mild cognitive impairment     Moderate episode of recurrent major depressive disorder (HCC)     managed with medications    Orthostatic hypotension     LISA (obstructive sleep apnea)     no CPAP    Paraspinal muscle spasm     Polypharmacy     S/P lumbar fusion     Seborrheic keratosis     Spinal cord stimulator status     Spondylolisthesis     Tachycardia     Thyroid disease     managed with medications  Trochanteric bursitis of left hip     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)     Type 1 diabetes mellitus with hyperglycemia (HCC)     Type 1 diabetes mellitus with hypoglycemia (HCC)     Vitreous hemorrhage due to type 1 diabetes mellitus [facility]     Vocal cord atrophy     Weakness        Past Surgical History:   Procedure Laterality Date    HX AMPUTATION TOE      distal portion of the 2nd toe of R, foot, distal part of 3rd toe of r foot    HX BUNIONECTOMY Right     HX CATARACT REMOVAL Bilateral     HX COLONOSCOPY  2016    HX HEENT Bilateral     laser treatment for diabetic complication    HX LUMBAR FUSION  2010    L4-5    HX LUMBAR LAMINECTOMY  2008    L5-S1    HX OTHER SURGICAL      spinal neurostimulator    HX SHOULDER ARTHROSCOPY Left     HX TONSIL AND ADENOIDECTOMY      at age 8 per Pt         Family History:   Problem Relation Age of Onset    Heart Disease Father        Social History     Socioeconomic History    Marital status:      Spouse name: Not on file    Number of children: Not on file    Years of education: Not on file    Highest education level: Not on file   Occupational History    Not on file   Social Needs    Financial resource strain: Not on file    Food insecurity     Worry: Not on file     Inability: Not on file    Transportation needs     Medical: Not on file     Non-medical: Not on file   Tobacco Use    Smoking status: Former Smoker     Quit date: 1996     Years since quittin.3    Smokeless tobacco: Former User   Substance and Sexual Activity    Alcohol use: Not Currently    Drug use: Never    Sexual activity: Not on file   Lifestyle    Physical activity     Days per week: Not on file     Minutes per session: Not on file    Stress: Not on file   Relationships    Social connections     Talks on phone: Not on file     Gets together: Not on file     Attends [employer] service: Not on file     Active member of club or organization: Not on file     Attends meetings of clubs or organizations: Not on file     Relationship status: Not on file    Intimate partner violence     Fear of current or ex partner: Not on file     Emotionally abused: Not on file     Physically abused: Not on file     Forced sexual activity: Not on file   Other Topics Concern    Not on file   Social History Narrative    Not on file         ALLERGIES: Drixomed [dexbrompheniramine-pseudoephed]    Review of Systems   Constitutional: Negative for appetite change, chills, diaphoresis, fatigue and fever. HENT: Negative for congestion and dental problem. Respiratory: Negative for cough, chest tightness, shortness of breath, wheezing and stridor. Cardiovascular: Negative for chest pain and palpitations. Gastrointestinal: Negative for abdominal pain, diarrhea, nausea and vomiting. Musculoskeletal: Negative for back pain. Skin: Negative for color change, pallor and wound. Neurological: Positive for weakness. Negative for dizziness and numbness. All other systems reviewed and are negative. Vitals:    01/11/21 1118   BP: (!) 105/59   Pulse: (!) 109   Resp: 18   Temp: 98.6 °F (37 °C)   SpO2: 99%   Weight: 103 kg (227 lb)   Height: 5' 8\" (1.727 m)            Physical Exam  Vitals signs and nursing note reviewed. Constitutional:       General: He is not in acute distress. Appearance: He is well-developed. He is not ill-appearing, toxic-appearing or diaphoretic. HENT:      Head: Normocephalic and atraumatic. Mouth/Throat:      Mouth: Mucous membranes are moist.      Pharynx: No oropharyngeal exudate or posterior oropharyngeal erythema. Eyes:      General: No scleral icterus. Conjunctiva/sclera: Conjunctivae normal.   Neck:      Musculoskeletal: Normal range of motion. No neck rigidity or muscular tenderness. Trachea: No tracheal deviation. Cardiovascular:      Rate and Rhythm: Normal rate and regular rhythm.    Pulmonary: Effort: Pulmonary effort is normal. No respiratory distress. Breath sounds: No stridor. No wheezing, rhonchi or rales. Chest:      Chest wall: No tenderness. Abdominal:      General: Abdomen is flat. There is no distension. Tenderness: There is no abdominal tenderness. There is no guarding or rebound. Hernia: No hernia is present. Musculoskeletal:         General: No swelling or tenderness. Skin:     General: Skin is warm. Capillary Refill: Capillary refill takes less than 2 seconds. Coloration: Skin is not jaundiced or pale. Findings: No bruising or erythema. Neurological:      General: No focal deficit present. Mental Status: He is alert and oriented to person, place, and time. Mental status is at baseline. Cranial Nerves: No cranial nerve deficit. Sensory: No sensory deficit. Psychiatric:         Mood and Affect: Mood normal.         Behavior: Behavior normal.          MDM  Number of Diagnoses or Management Options  Confusion  Diagnosis management comments: Patient has confusion but currently seems normal.  Lab work shows no acute abnormalities compared to past results. I did order CT scan of his head but patient and family did not think he needed a repeat one of them as they had already done this before urine dip looks normal.  He was given return precautions and will go home with family. Paradise Olson MD; 1/11/2021 @1:31 PM Voice dictation software was used during the making of this note. This software is not perfect and grammatical and other typographical errors may be present.   This note has not been proofread for errors.  ===================================================================          Amount and/or Complexity of Data Reviewed  Clinical lab tests: ordered and reviewed (Results for orders placed or performed during the hospital encounter of 01/11/21  -CBC WITH AUTOMATED DIFF       Result                      Value             Ref Range WBC                         6.6               4.3 - 11.1 K*       RBC                         4.83              4.23 - 5.6 M*       HGB                         12.4 (L)          13.6 - 17.2 *       HCT                         39.8 (L)          41.1 - 50.3 %       MCV                         82.4              79.6 - 97.8 *       MCH                         25.7 (L)          26.1 - 32.9 *       MCHC                        31.2 (L)          31.4 - 35.0 *       RDW                         15.7 (H)          11.9 - 14.6 %       PLATELET                    188               150 - 450 K/*       MPV                         11.1              9.4 - 12.3 FL       ABSOLUTE NRBC               0.00              0.0 - 0.2 K/*       DF                          AUTOMATED                             NEUTROPHILS                 84 (H)            43 - 78 %           LYMPHOCYTES                 10 (L)            13 - 44 %           MONOCYTES                   5                 4.0 - 12.0 %        EOSINOPHILS                 1                 0.5 - 7.8 %         BASOPHILS                   1                 0.0 - 2.0 %         IMMATURE GRANULOCYTES       0                 0.0 - 5.0 %         ABS. NEUTROPHILS            5.6               1.7 - 8.2 K/*       ABS. LYMPHOCYTES            0.6               0.5 - 4.6 K/*       ABS. MONOCYTES              0.4               0.1 - 1.3 K/*       ABS. EOSINOPHILS            0.0               0.0 - 0.8 K/*       ABS. BASOPHILS              0.0               0.0 - 0.2 K/*       ABS. IMM.  GRANS.            0.0               0.0 - 0.5 K/*  -METABOLIC PANEL, COMPREHENSIVE       Result                      Value             Ref Range           Sodium                      135 (L)           136 - 145 mm*       Potassium                   5.3 (H)           3.5 - 5.1 mm*       Chloride                    104               98 - 107 mmo*       CO2                         26                21 - 32 mmol*       Anion gap                   5 (L)             7 - 16 mmol/L       Glucose                     157 (H)           65 - 100 mg/*       BUN                         39 (H)            8 - 23 MG/DL        Creatinine                  1.69 (H)          0.8 - 1.5 MG*       GFR est AA                  51 (L)            >60 ml/min/1*       GFR est non-AA              42 (L)            >60 ml/min/1*       Calcium                     9.2               8.3 - 10.4 M*       Bilirubin, total            0.5               0.2 - 1.1 MG*       ALT (SGPT)                  33                12 - 65 U/L         AST (SGOT)                  55 (H)            15 - 37 U/L         Alk.  phosphatase            175 (H)           50 - 136 U/L        Protein, total              8.2               6.3 - 8.2 g/*       Albumin                     3.2               3.2 - 4.6 g/*       Globulin                    5.0 (H)           2.3 - 3.5 g/*       A-G Ratio                   0.6 (L)           1.2 - 3.5     )  Tests in the radiology section of CPT®: ordered and reviewed           Procedures

## 2021-07-08 ENCOUNTER — APPOINTMENT (OUTPATIENT)
Dept: CT IMAGING | Age: 76
End: 2021-07-08
Attending: EMERGENCY MEDICINE
Payer: MEDICARE

## 2021-07-08 ENCOUNTER — HOSPITAL ENCOUNTER (OUTPATIENT)
Age: 76
Setting detail: OBSERVATION
Discharge: HOME | End: 2021-07-10
Attending: EMERGENCY MEDICINE | Admitting: INTERNAL MEDICINE
Payer: MEDICARE

## 2021-07-08 DIAGNOSIS — E11.42 DIABETIC PERIPHERAL NEUROPATHY (HCC): ICD-10-CM

## 2021-07-08 DIAGNOSIS — R26.89 IMBALANCE: ICD-10-CM

## 2021-07-08 DIAGNOSIS — G45.9 TIA (TRANSIENT ISCHEMIC ATTACK): Primary | ICD-10-CM

## 2021-07-08 DIAGNOSIS — R29.90 STROKE-LIKE SYMPTOM: ICD-10-CM

## 2021-07-08 DIAGNOSIS — G62.9 NEUROPATHY: ICD-10-CM

## 2021-07-08 PROBLEM — G89.29 CHRONIC PAIN: Status: ACTIVE | Noted: 2021-07-08

## 2021-07-08 PROBLEM — N40.0 BPH (BENIGN PROSTATIC HYPERPLASIA): Status: ACTIVE | Noted: 2021-07-08

## 2021-07-08 PROBLEM — N18.30 CKD (CHRONIC KIDNEY DISEASE) STAGE 3, GFR 30-59 ML/MIN (HCC): Status: ACTIVE | Noted: 2021-07-08

## 2021-07-08 LAB
ANION GAP SERPL CALC-SCNC: 7 MMOL/L (ref 7–16)
BASOPHILS # BLD: 0 K/UL (ref 0–0.2)
BASOPHILS NFR BLD: 0 % (ref 0–2)
BUN SERPL-MCNC: 18 MG/DL (ref 8–23)
CALCIUM SERPL-MCNC: 8.7 MG/DL (ref 8.3–10.4)
CHLORIDE SERPL-SCNC: 106 MMOL/L (ref 98–107)
CO2 SERPL-SCNC: 27 MMOL/L (ref 21–32)
CREAT SERPL-MCNC: 1.38 MG/DL (ref 0.8–1.5)
DIFFERENTIAL METHOD BLD: ABNORMAL
EOSINOPHIL # BLD: 0 K/UL (ref 0–0.8)
EOSINOPHIL NFR BLD: 1 % (ref 0.5–7.8)
ERYTHROCYTE [DISTWIDTH] IN BLOOD BY AUTOMATED COUNT: 15.8 % (ref 11.9–14.6)
EST. AVERAGE GLUCOSE BLD GHB EST-MCNC: 192 MG/DL
GLUCOSE BLD STRIP.AUTO-MCNC: 176 MG/DL (ref 65–100)
GLUCOSE BLD STRIP.AUTO-MCNC: 196 MG/DL (ref 65–100)
GLUCOSE SERPL-MCNC: 207 MG/DL (ref 65–100)
HBA1C MFR BLD: 8.3 % (ref 4.2–6.3)
HCT VFR BLD AUTO: 41 % (ref 41.1–50.3)
HGB BLD-MCNC: 12.9 G/DL (ref 13.6–17.2)
IMM GRANULOCYTES # BLD AUTO: 0 K/UL (ref 0–0.5)
IMM GRANULOCYTES NFR BLD AUTO: 0 % (ref 0–5)
INR BLD: 1.1 (ref 0.9–1.2)
INR PPP: 1
LYMPHOCYTES # BLD: 1 K/UL (ref 0.5–4.6)
LYMPHOCYTES NFR BLD: 18 % (ref 13–44)
MCH RBC QN AUTO: 26.7 PG (ref 26.1–32.9)
MCHC RBC AUTO-ENTMCNC: 31.5 G/DL (ref 31.4–35)
MCV RBC AUTO: 84.7 FL (ref 79.6–97.8)
MONOCYTES # BLD: 0.3 K/UL (ref 0.1–1.3)
MONOCYTES NFR BLD: 6 % (ref 4–12)
NEUTS SEG # BLD: 4.2 K/UL (ref 1.7–8.2)
NEUTS SEG NFR BLD: 75 % (ref 43–78)
NRBC # BLD: 0 K/UL (ref 0–0.2)
PLATELET # BLD AUTO: 185 K/UL (ref 150–450)
PMV BLD AUTO: 10.3 FL (ref 9.4–12.3)
POTASSIUM SERPL-SCNC: 3.8 MMOL/L (ref 3.5–5.1)
PROTHROMBIN TIME: 13.4 SEC (ref 12.5–14.7)
PT BLD: 13 SECS (ref 9.6–11.6)
RBC # BLD AUTO: 4.84 M/UL (ref 4.23–5.6)
SERVICE CMNT-IMP: ABNORMAL
SERVICE CMNT-IMP: ABNORMAL
SODIUM SERPL-SCNC: 140 MMOL/L (ref 136–145)
T4 FREE SERPL-MCNC: 1.1 NG/DL (ref 0.9–1.8)
TROPONIN-HIGH SENSITIVITY: 8.8 PG/ML (ref 0–14)
TSH SERPL DL<=0.005 MIU/L-ACNC: 0.88 UIU/ML (ref 0.36–3.74)
VIT B12 SERPL-MCNC: 262 PG/ML (ref 193–986)
WBC # BLD AUTO: 5.6 K/UL (ref 4.3–11.1)

## 2021-07-08 PROCEDURE — 85610 PROTHROMBIN TIME: CPT

## 2021-07-08 PROCEDURE — 84484 ASSAY OF TROPONIN QUANT: CPT

## 2021-07-08 PROCEDURE — 99285 EMERGENCY DEPT VISIT HI MDM: CPT

## 2021-07-08 PROCEDURE — 83036 HEMOGLOBIN GLYCOSYLATED A1C: CPT

## 2021-07-08 PROCEDURE — 74011636637 HC RX REV CODE- 636/637: Performed by: INTERNAL MEDICINE

## 2021-07-08 PROCEDURE — 84439 ASSAY OF FREE THYROXINE: CPT

## 2021-07-08 PROCEDURE — 93005 ELECTROCARDIOGRAM TRACING: CPT | Performed by: EMERGENCY MEDICINE

## 2021-07-08 PROCEDURE — 74011250637 HC RX REV CODE- 250/637: Performed by: INTERNAL MEDICINE

## 2021-07-08 PROCEDURE — APPNB30 APP NON BILLABLE TIME 0-30 MINS: Performed by: NURSE PRACTITIONER

## 2021-07-08 PROCEDURE — 99218 HC RM OBSERVATION: CPT

## 2021-07-08 PROCEDURE — 36415 COLL VENOUS BLD VENIPUNCTURE: CPT

## 2021-07-08 PROCEDURE — 80048 BASIC METABOLIC PNL TOTAL CA: CPT

## 2021-07-08 PROCEDURE — 85025 COMPLETE CBC W/AUTO DIFF WBC: CPT

## 2021-07-08 PROCEDURE — 82607 VITAMIN B-12: CPT

## 2021-07-08 PROCEDURE — 84425 ASSAY OF VITAMIN B-1: CPT

## 2021-07-08 PROCEDURE — 70450 CT HEAD/BRAIN W/O DYE: CPT

## 2021-07-08 PROCEDURE — 84443 ASSAY THYROID STIM HORMONE: CPT

## 2021-07-08 PROCEDURE — 82962 GLUCOSE BLOOD TEST: CPT

## 2021-07-08 RX ORDER — ROSUVASTATIN CALCIUM 10 MG/1
10 TABLET, COATED ORAL
Status: DISCONTINUED | OUTPATIENT
Start: 2021-07-08 | End: 2021-07-09

## 2021-07-08 RX ORDER — SODIUM CHLORIDE 0.9 % (FLUSH) 0.9 %
5-40 SYRINGE (ML) INJECTION AS NEEDED
Status: DISCONTINUED | OUTPATIENT
Start: 2021-07-08 | End: 2021-07-10 | Stop reason: HOSPADM

## 2021-07-08 RX ORDER — GUAIFENESIN 100 MG/5ML
81 LIQUID (ML) ORAL
Status: DISCONTINUED | OUTPATIENT
Start: 2021-07-08 | End: 2021-07-10 | Stop reason: HOSPADM

## 2021-07-08 RX ORDER — INSULIN GLARGINE 100 [IU]/ML
20 INJECTION, SOLUTION SUBCUTANEOUS 2 TIMES DAILY
Status: DISCONTINUED | OUTPATIENT
Start: 2021-07-09 | End: 2021-07-08

## 2021-07-08 RX ORDER — AMITRIPTYLINE HYDROCHLORIDE 10 MG/1
30 TABLET, FILM COATED ORAL
Status: DISCONTINUED | OUTPATIENT
Start: 2021-07-08 | End: 2021-07-10 | Stop reason: HOSPADM

## 2021-07-08 RX ORDER — HEPARIN SODIUM 5000 [USP'U]/ML
5000 INJECTION, SOLUTION INTRAVENOUS; SUBCUTANEOUS EVERY 8 HOURS
Status: DISCONTINUED | OUTPATIENT
Start: 2021-07-08 | End: 2021-07-10 | Stop reason: HOSPADM

## 2021-07-08 RX ORDER — LANOLIN ALCOHOL/MO/W.PET/CERES
100 CREAM (GRAM) TOPICAL DAILY
Status: DISCONTINUED | OUTPATIENT
Start: 2021-07-09 | End: 2021-07-08

## 2021-07-08 RX ORDER — SODIUM BICARBONATE 650 MG/1
650 TABLET ORAL 2 TIMES DAILY
Status: DISCONTINUED | OUTPATIENT
Start: 2021-07-09 | End: 2021-07-10 | Stop reason: HOSPADM

## 2021-07-08 RX ORDER — INSULIN GLARGINE 100 [IU]/ML
20 INJECTION, SOLUTION SUBCUTANEOUS 2 TIMES DAILY
Status: DISCONTINUED | OUTPATIENT
Start: 2021-07-08 | End: 2021-07-10 | Stop reason: HOSPADM

## 2021-07-08 RX ORDER — FACIAL-BODY WIPES
10 EACH TOPICAL DAILY PRN
Status: DISCONTINUED | OUTPATIENT
Start: 2021-07-08 | End: 2021-07-10 | Stop reason: HOSPADM

## 2021-07-08 RX ORDER — INSULIN LISPRO 100 [IU]/ML
INJECTION, SOLUTION INTRAVENOUS; SUBCUTANEOUS
Status: DISCONTINUED | OUTPATIENT
Start: 2021-07-08 | End: 2021-07-10 | Stop reason: HOSPADM

## 2021-07-08 RX ORDER — LANOLIN ALCOHOL/MO/W.PET/CERES
100 CREAM (GRAM) TOPICAL DAILY
Status: DISCONTINUED | OUTPATIENT
Start: 2021-07-08 | End: 2021-07-10 | Stop reason: HOSPADM

## 2021-07-08 RX ORDER — BUPROPION HYDROCHLORIDE 75 MG/1
75 TABLET ORAL 2 TIMES DAILY
Status: DISCONTINUED | OUTPATIENT
Start: 2021-07-09 | End: 2021-07-10 | Stop reason: HOSPADM

## 2021-07-08 RX ORDER — FAMOTIDINE 20 MG/1
20 TABLET, FILM COATED ORAL
Status: DISCONTINUED | OUTPATIENT
Start: 2021-07-08 | End: 2021-07-10 | Stop reason: HOSPADM

## 2021-07-08 RX ORDER — LABETALOL HYDROCHLORIDE 5 MG/ML
5 INJECTION, SOLUTION INTRAVENOUS
Status: DISCONTINUED | OUTPATIENT
Start: 2021-07-08 | End: 2021-07-10 | Stop reason: HOSPADM

## 2021-07-08 RX ORDER — ACETAMINOPHEN 650 MG/1
650 SUPPOSITORY RECTAL
Status: DISCONTINUED | OUTPATIENT
Start: 2021-07-08 | End: 2021-07-10 | Stop reason: HOSPADM

## 2021-07-08 RX ORDER — DONEPEZIL HYDROCHLORIDE 5 MG/1
5 TABLET, FILM COATED ORAL DAILY
Status: DISCONTINUED | OUTPATIENT
Start: 2021-07-09 | End: 2021-07-10 | Stop reason: HOSPADM

## 2021-07-08 RX ORDER — ACETAMINOPHEN 325 MG/1
650 TABLET ORAL
Status: DISCONTINUED | OUTPATIENT
Start: 2021-07-08 | End: 2021-07-10 | Stop reason: HOSPADM

## 2021-07-08 RX ORDER — TAMSULOSIN HYDROCHLORIDE 0.4 MG/1
0.4 CAPSULE ORAL DAILY
Status: DISCONTINUED | OUTPATIENT
Start: 2021-07-09 | End: 2021-07-10 | Stop reason: HOSPADM

## 2021-07-08 RX ORDER — SODIUM CHLORIDE 0.9 % (FLUSH) 0.9 %
5-40 SYRINGE (ML) INJECTION EVERY 8 HOURS
Status: DISCONTINUED | OUTPATIENT
Start: 2021-07-08 | End: 2021-07-10 | Stop reason: HOSPADM

## 2021-07-08 RX ORDER — FINASTERIDE 5 MG/1
5 TABLET, FILM COATED ORAL DAILY
Status: DISCONTINUED | OUTPATIENT
Start: 2021-07-09 | End: 2021-07-10 | Stop reason: HOSPADM

## 2021-07-08 RX ORDER — MORPHINE SULFATE 15 MG/1
15 TABLET, FILM COATED, EXTENDED RELEASE ORAL EVERY 12 HOURS
Status: DISCONTINUED | OUTPATIENT
Start: 2021-07-08 | End: 2021-07-10 | Stop reason: HOSPADM

## 2021-07-08 RX ORDER — INSULIN GLARGINE 100 [IU]/ML
18 INJECTION, SOLUTION SUBCUTANEOUS 2 TIMES DAILY
Status: DISCONTINUED | OUTPATIENT
Start: 2021-07-08 | End: 2021-07-08

## 2021-07-08 RX ORDER — ONDANSETRON 2 MG/ML
4 INJECTION INTRAMUSCULAR; INTRAVENOUS
Status: DISCONTINUED | OUTPATIENT
Start: 2021-07-08 | End: 2021-07-10 | Stop reason: HOSPADM

## 2021-07-08 RX ORDER — LEVOTHYROXINE SODIUM 75 UG/1
75 TABLET ORAL
Status: DISCONTINUED | OUTPATIENT
Start: 2021-07-09 | End: 2021-07-10 | Stop reason: HOSPADM

## 2021-07-08 RX ADMIN — Medication 100 MG: at 22:12

## 2021-07-08 RX ADMIN — INSULIN GLARGINE 20 UNITS: 100 INJECTION, SOLUTION SUBCUTANEOUS at 20:50

## 2021-07-08 NOTE — CONSULTS
Consult    Patient: Millicent Duverney. MRN: [id number]  SSN: [id number]    YOB: 1945  Age: 78 y.o. Sex: male      Subjective:      Millicent Duverney. is a 78 y.o. male who is being seen for an acute Code S. The patient reported that he had had an acute worsening of ataxic difficulty today all over a period of several days he had had some difficulty with a tendency to fall off to the left he is seen by an outpatient neurologist for ongoing care and treatment of peripheral neuropathy and had reported these problems to him.     No history of prior stroke longstanding history of diabetes  Notable the patient has been told by nephrologist in the past to under no circumstances EVER to allow the use of contrast material that would put him into renal failure and necessitate the need for dialysis    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 ([address]     Diabetic nephropathy ([address]     Diabetic peripheral neuropathy ([address]     Diabetic retinopathy ([address]     Dysphonia     GERD (gastroesophageal reflux disease)     managed with medications    HLD (hyperlipidemia)     Hypertension     after HTN meds had ORthostatic and all HTN meds discontinued    Insulin pump in place     Left hip pain     Lumbar radiculopathy     MCI (mild cognitive impairment) with memory loss     Mild cognitive impairment     Moderate episode of recurrent major depressive disorder ([address]     managed with medications    Orthostatic hypotension     LISA (obstructive sleep apnea)     no CPAP    Paraspinal muscle spasm     Polypharmacy     S/P lumbar fusion     Seborrheic keratosis     Spinal cord stimulator status     Spondylolisthesis     Tachycardia     Thyroid disease     managed with medications    Trochanteric bursitis of left hip     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)     Type 1 diabetes mellitus with hyperglycemia (HCC)     Type 1 diabetes mellitus with hypoglycemia (HCC)     Vitreous hemorrhage due to type 1 diabetes mellitus ([address]     Vocal cord atrophy     Weakness      Past Surgical History:   Procedure Laterality Date    HX AMPUTATION TOE      distal portion of the 2nd toe of R, foot, distal part of 3rd toe of r foot    HX BUNIONECTOMY Right     HX CATARACT REMOVAL Bilateral     HX COLONOSCOPY  2016    HX HEENT Bilateral     laser treatment for diabetic complication    HX LUMBAR FUSION  2010    L4-5    HX LUMBAR LAMINECTOMY  2008    L5-S1    HX OTHER SURGICAL      spinal neurostimulator    HX SHOULDER ARTHROSCOPY Left     HX TONSIL AND ADENOIDECTOMY      at age 8 per Pt      Family History   Problem Relation Age of Onset    Heart Disease Father      Social History     Tobacco Use    Smoking status: Former Smoker     Quit date: 1996     Years since quittin.8    Smokeless tobacco: Former User   Substance Use Topics    Alcohol use: Not Currently      Current Outpatient Medications   Medication Sig Dispense Refill    ondansetron (Zofran ODT) 4 mg disintegrating tablet Take 1 Tab by mouth every six (6) hours as needed for Nausea (and vomiting). 30 Tab 0    insulin glargine (Lantus U-100 Insulin) 100 unit/mL injection 43 Units by SubCUTAneous route as needed. Only uses when pump fails. Has NEVER had to use the insulin.  fluticasone propionate (Flonase Allergy Relief) 50 mcg/actuation nasal spray 2 Sprays by Both Nostrils route as needed.       morphine IR (MS IR) 15 mg tablet Take 15 mg by mouth two (2) times a day.  IP [facility] INSULIN LISPRO, HUMALOG, FOR PUMP Basal rate 1522-1800 - 2 units/H, 3989-9420 2.2 units/H and 5080-7761 2.5 units/H      sodium bicarbonate 650 mg tablet Take 650 mg by mouth two (2) times a day.  RABEprazole (Aciphex) 20 mg tablet Take 20 mg by mouth two (2) times a day.  pregabalin (Lyrica) 75 mg capsule Take 75 mg by mouth two (2) times a day.  rosuvastatin (Crestor) 10 mg tablet Take 10 mg by mouth nightly.  finasteride (PROSCAR) 5 mg tablet Take 5 mg by mouth daily.  buPROPion (WELLBUTRIN) 75 mg tablet Take 75 mg by mouth two (2) times a day.  levothyroxine (SYNTHROID) 75 mcg tablet Take 75 mcg by mouth Daily (before breakfast).  amitriptyline (ELAVIL) 10 mg tablet Take 30 mg by mouth nightly.  alfuzosin SR (UROXATRAL) 10 mg SR tablet Take 10 mg by mouth nightly.  ondansetron hcl (Zofran) 4 mg tablet Take 4 mg by mouth every eight (8) hours as needed for Nausea or Vomiting.  cholecalciferol (Vitamin D3) 25 mcg (1,000 unit) cap Take 1,000 Units by mouth daily.  aspirin 81 mg chewable tablet Take 81 mg by mouth nightly.  hydrocortisone valerate (WEST-DORI) 0.2 % ointment Apply  to affected area two (2) times daily as needed for Skin Irritation. use thin layer      fluticasone propionate (CUTIVATE) 0.05 % topical cream Apply  to affected area two (2) times a day.  donepeziL (ARICEPT) 5 mg tablet Take 5 mg by mouth daily.           Allergies   Allergen Reactions    Drixomed [Dexbrompheniramine-Pseudoephed] Palpitations       Review of Systems:  Acute Code S not appropriate to obtain 14 point review of systems    Objective:   CT head scan reviewed which demonstrates the presence of advanced change in the white matter likely suggestive of ischemic microangiopathy there is also extensive calcification of the intracranial vasculature  In the light of the patient's aversion to IV contrast and it not providing's specific information which would acutely alter the patient's management this was not performed  Vitals:    21 1641   BP: (!) 148/81   Pulse: 100   Resp: 18   Temp: 98.5 °F (36.9 °C)   SpO2: 99%   Weight: 244 lb (110.7 kg)   Height: 5' 7\" (1.702 m)        Physical Exam:  Patient is alert cooperative pleasant and oriented no scanning speech disorder no aphasia or dysarthria  No cranial nerve paralysis  Motor examination reveals no localized drift  No extinction  No cerebellar ataxia  Poor peripheral muscle both lower extremities consistent with longstanding neuropathy    Assessment:     Hospital Problems  Date Reviewed: 2020    None      Nonspecific symptomatology which may reflect manifestations of his peripheral neuropathy although alternatively this may represent intracranial ischemia would therefore suggest MRI and MRA    In the event that the MRI demonstrated acute stroke would advocate dual antiplatelet therapy    Situation should be reviewed from the standpoint of statins echocardiogram etc. per standard protocol    Plan:     A/a    Signed By: Thurmond Apgar, MD     2021

## 2021-07-08 NOTE — ED PROVIDER NOTES
Patient is a 72-year-old male presenting to the emergency department today complaining of ataxia. The patient says that he has been having some trouble walking falling to the starboard side for the last 2 days but he went to his neurologist which she sees for peripheral neuropathy and could walk up and down the hallway without any difficulty and had a normal exam.  It was at about 3:00 when he was leaving the neurologist office all of a sudden he started falling towards the right again. He denies any slurred speech or difficulty with his cognition. He denies any weakness in the arms. He denies a history of previous CVA.            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 ([address]     Diabetic nephropathy ([address]     Diabetic peripheral neuropathy (HCC)     Diabetic retinopathy (HCC)     Dysphonia     GERD (gastroesophageal reflux disease)     managed with medications    HLD (hyperlipidemia)     Hypertension     after HTN meds had ORthostatic and all HTN meds discontinued    Insulin pump in place     Left hip pain     Lumbar radiculopathy     MCI (mild cognitive impairment) with memory loss     Mild cognitive impairment     Moderate episode of recurrent major depressive disorder (HCC)     managed with medications    Orthostatic hypotension     LISA (obstructive sleep apnea)     no CPAP    Paraspinal muscle spasm     Polypharmacy     S/P lumbar fusion     Seborrheic keratosis     Spinal cord stimulator status     Spondylolisthesis     Tachycardia     Thyroid disease     managed with medications    Trochanteric bursitis of left hip     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)     Type 1 diabetes mellitus with hyperglycemia (HCC)     Type 1 diabetes mellitus with hypoglycemia (HCC)     Vitreous hemorrhage due to type 1 diabetes mellitus ([facility]     Vocal cord atrophy     Weakness        Past Surgical History:   Procedure Laterality Date    HX AMPUTATION TOE      distal portion of the 2nd toe of R, foot, distal part of 3rd toe of r foot    HX BUNIONECTOMY Right     HX CATARACT REMOVAL Bilateral     HX COLONOSCOPY  2016    HX HEENT Bilateral     laser treatment for diabetic complication    HX LUMBAR FUSION  2010    L4-5    HX LUMBAR LAMINECTOMY  2008    L5-S1    HX OTHER SURGICAL      spinal neurostimulator    HX SHOULDER ARTHROSCOPY Left     HX TONSIL AND ADENOIDECTOMY      at age 8 per Pt         Family History:   Problem Relation Age of Onset    Heart Disease Father        Social History     Socioeconomic History    Marital status:      Spouse name: Not on file    Number of children: Not on file    Years of education: Not on file    Highest education level: Not on file   Occupational History    Not on file   Tobacco Use    Smoking status: Former Smoker     Quit date: 1996     Years since quittin.8    Smokeless tobacco: Former User   Substance and Sexual Activity    Alcohol use: Not Currently    Drug use: Never    Sexual activity: Not on file   Other Topics Concern    Not on file   Social History Narrative    Not on file     Social Determinants of Health     Financial Resource Strain:     Difficulty of Paying Living Expenses:    Food Insecurity:     Worried About Running Out of Food in the Last Year:     Ran Out of Food in the Last Year:    Transportation Needs:     Lack of Transportation (Medical):      Lack of Transportation (Non-Medical):    Physical Activity:     Days of Exercise per Week:     Minutes of Exercise per Session:    Stress:     Feeling of Stress :    Social Connections:     Frequency of Communication with Friends and Family:     Frequency of Social Gatherings with Friends and Family:     Attends Hoahaoism Services:     Active Member of Clubs or Organizations:     Attends Club or Organization Meetings:     Marital Status:    Intimate Partner Violence:     Fear of Current or Ex-Partner:     Emotionally Abused:     Physically Abused:     Sexually Abused: ALLERGIES: Drixomed [dexbrompheniramine-pseudoephed]    Review of Systems   Musculoskeletal: Positive for gait problem. Neurological: Positive for weakness. All other systems reviewed and are negative. Vitals:    07/08/21 1641   BP: (!) 148/81   Pulse: 100   Resp: 18   Temp: 98.5 °F (36.9 °C)   SpO2: 99%   Weight: 110.7 kg (244 lb)   Height: 5' 7\" (1.702 m)            Physical Exam     GENERAL:The patient has Body mass index is 38.22 kg/m². Well-hydrated. VITAL SIGNS: Heart rate, blood pressure, respiratory rate reviewed as recorded in  nurse's notes  EYES: Pupils reactive. Extraocular motion intact. No conjunctival redness or drainage. EARS: No external masses or lesions. NOSE: No nasal drainage or epistaxis. MOUTH/THROAT: Pharynx clear; airway patent. NECK: Supple, no meningeal signs. Trachea midline. No masses or thyromegaly. LUNGS: Breath sounds clear and equal bilaterally no accessory muscle use  CHEST: No deformity  CARDIOVASCULAR: Regular rate and rhythm  ABDOMEN: Soft without tenderness. No palpable masses or organomegaly. No  peritoneal signs. No rigidity. EXTREMITIES: No clubbing or cyanosis. No joint swelling. Normal muscle tone. No  restricted range of motion appreciated. NEUROLOGIC: Sensation is grossly intact. Cranial nerve exam reveals face is  symmetrical, tongue is midline speech is clear. Negative pronator drift in the arms and legs bilaterally.   Patient is able to finger-to-nose without difficulty. SKIN: No rash or petechiae. Good skin turgor palpated. PSYCHIATRIC: Alert and oriented. Appropriate behavior and judgment. MDM  Number of Diagnoses or Management Options  Diagnosis management comments: TIA, CVA, intracranial hemorrhage, ischemic stroke, brain tumor, Bell's palsy,    tension headache, migraine headache,    peripheral neuropathy, metabolic disorder, Guillian [place] syndrome, multiple sclerosis,    electrolyte abnormality, diabetes, peripheral neuropathy,         Amount and/or Complexity of Data Reviewed  Clinical lab tests: ordered and reviewed  Tests in the radiology section of CPT®: ordered and reviewed  Tests in the medicine section of CPT®: ordered and reviewed  Review and summarize past medical records: yes  Independent visualization of images, tracings, or specimens: yes      ED Course as of Jul 08 1852   Thu Jul 08, 2021   1716 Neurology-Dr. Abby Cota saw the patient in the emergency department. He says that the patient is not able to have contrast according to the patient's nephrologist.  He does not believe the patient is a good TPA candidate secondary to diffuse vascular disease and a low NIH. [EF]   9909 I talked to the patient about the findings in the emergency department the need for admission to the hospital. He is in happy about this but willing to be admitted for further work-up.     [TR]   9827 Hospitalist was consulted and they will come and see the patient planning on admitting him to the hospital.    [facility]      ED Course User Index  [KH] Ar Ramos, DO       Procedures

## 2021-07-08 NOTE — ED TRIAGE NOTES
Pt arrives via [facility] from home. Pt complains of difficulty walking. Reports this occurred x2 days ago but today when he woke up he was able to walk without a problem. Reports went to his neurologist today and at the [address] [place] [address] office he was able to walk without issue. Reports when leaving the doctor's office around 1500 he began having difficulty walking. Describes a \"weaving\" gait. Dr. Alexandra Smith in at triage to evaluate.

## 2021-07-09 ENCOUNTER — APPOINTMENT (OUTPATIENT)
Dept: NON INVASIVE DIAGNOSTICS | Age: 76
End: 2021-07-09
Attending: INTERNAL MEDICINE
Payer: MEDICARE

## 2021-07-09 LAB
ANION GAP SERPL CALC-SCNC: 7 MMOL/L (ref 7–16)
ATRIAL RATE: 95 BPM
BUN SERPL-MCNC: 18 MG/DL (ref 8–23)
CALCIUM SERPL-MCNC: 9 MG/DL (ref 8.3–10.4)
CALCULATED P AXIS, ECG09: 78 DEGREES
CALCULATED R AXIS, ECG10: 62 DEGREES
CALCULATED T AXIS, ECG11: 61 DEGREES
CHLORIDE SERPL-SCNC: 108 MMOL/L (ref 98–107)
CHOLEST SERPL-MCNC: 129 MG/DL
CO2 SERPL-SCNC: 25 MMOL/L (ref 21–32)
CREAT SERPL-MCNC: 1.2 MG/DL (ref 0.8–1.5)
DIAGNOSIS, 93000: NORMAL
ERYTHROCYTE [DISTWIDTH] IN BLOOD BY AUTOMATED COUNT: 15.7 % (ref 11.9–14.6)
GLUCOSE BLD STRIP.AUTO-MCNC: 187 MG/DL (ref 65–100)
GLUCOSE BLD STRIP.AUTO-MCNC: 220 MG/DL (ref 65–100)
GLUCOSE BLD STRIP.AUTO-MCNC: 262 MG/DL (ref 65–100)
GLUCOSE BLD STRIP.AUTO-MCNC: 265 MG/DL (ref 65–100)
GLUCOSE SERPL-MCNC: 189 MG/DL (ref 65–100)
HCT VFR BLD AUTO: 41.2 % (ref 41.1–50.3)
HDLC SERPL-MCNC: 50 MG/DL (ref 40–60)
HDLC SERPL: 2.6 {RATIO}
HGB BLD-MCNC: 12.7 G/DL (ref 13.6–17.2)
LDLC SERPL CALC-MCNC: 53.2 MG/DL
MCH RBC QN AUTO: 26.2 PG (ref 26.1–32.9)
MCHC RBC AUTO-ENTMCNC: 30.8 G/DL (ref 31.4–35)
MCV RBC AUTO: 84.9 FL (ref 79.6–97.8)
NRBC # BLD: 0 K/UL (ref 0–0.2)
P-R INTERVAL, ECG05: 180 MS
PLATELET # BLD AUTO: 165 K/UL (ref 150–450)
PMV BLD AUTO: 10.9 FL (ref 9.4–12.3)
POTASSIUM SERPL-SCNC: 3.6 MMOL/L (ref 3.5–5.1)
Q-T INTERVAL, ECG07: 340 MS
QRS DURATION, ECG06: 76 MS
QTC CALCULATION (BEZET), ECG08: 427 MS
RBC # BLD AUTO: 4.85 M/UL (ref 4.23–5.6)
SERVICE CMNT-IMP: ABNORMAL
SODIUM SERPL-SCNC: 140 MMOL/L (ref 136–145)
TRIGL SERPL-MCNC: 129 MG/DL (ref 35–150)
VENTRICULAR RATE, ECG03: 95 BPM
VLDLC SERPL CALC-MCNC: 25.8 MG/DL (ref 6–23)
WBC # BLD AUTO: 4.7 K/UL (ref 4.3–11.1)

## 2021-07-09 PROCEDURE — 85027 COMPLETE CBC AUTOMATED: CPT

## 2021-07-09 PROCEDURE — 99232 SBSQ HOSP IP/OBS MODERATE 35: CPT | Performed by: NURSE PRACTITIONER

## 2021-07-09 PROCEDURE — 74011000250 HC RX REV CODE- 250: Performed by: INTERNAL MEDICINE

## 2021-07-09 PROCEDURE — 36415 COLL VENOUS BLD VENIPUNCTURE: CPT

## 2021-07-09 PROCEDURE — 86580 TB INTRADERMAL TEST: CPT | Performed by: INTERNAL MEDICINE

## 2021-07-09 PROCEDURE — 80048 BASIC METABOLIC PNL TOTAL CA: CPT

## 2021-07-09 PROCEDURE — 74011250637 HC RX REV CODE- 250/637: Performed by: INTERNAL MEDICINE

## 2021-07-09 PROCEDURE — 80061 LIPID PANEL: CPT

## 2021-07-09 PROCEDURE — 2709999900 HC NON-CHARGEABLE SUPPLY

## 2021-07-09 PROCEDURE — 97161 PT EVAL LOW COMPLEX 20 MIN: CPT

## 2021-07-09 PROCEDURE — 97535 SELF CARE MNGMENT TRAINING: CPT

## 2021-07-09 PROCEDURE — 97530 THERAPEUTIC ACTIVITIES: CPT

## 2021-07-09 PROCEDURE — 96372 THER/PROPH/DIAG INJ SC/IM: CPT

## 2021-07-09 PROCEDURE — 74011250636 HC RX REV CODE- 250/636: Performed by: INTERNAL MEDICINE

## 2021-07-09 PROCEDURE — 74011636637 HC RX REV CODE- 636/637: Performed by: INTERNAL MEDICINE

## 2021-07-09 PROCEDURE — 82962 GLUCOSE BLOOD TEST: CPT

## 2021-07-09 PROCEDURE — 99232 SBSQ HOSP IP/OBS MODERATE 35: CPT | Performed by: PSYCHIATRY & NEUROLOGY

## 2021-07-09 PROCEDURE — 94760 N-INVAS EAR/PLS OXIMETRY 1: CPT

## 2021-07-09 PROCEDURE — C8929 TTE W OR WO FOL WCON,DOPPLER: HCPCS

## 2021-07-09 PROCEDURE — 99218 HC RM OBSERVATION: CPT

## 2021-07-09 PROCEDURE — 99221 1ST HOSP IP/OBS SF/LOW 40: CPT | Performed by: PHYSICAL MEDICINE & REHABILITATION

## 2021-07-09 PROCEDURE — 92610 EVALUATE SWALLOWING FUNCTION: CPT

## 2021-07-09 PROCEDURE — 97166 OT EVAL MOD COMPLEX 45 MIN: CPT

## 2021-07-09 PROCEDURE — 74011000302 HC RX REV CODE- 302: Performed by: INTERNAL MEDICINE

## 2021-07-09 RX ORDER — ROSUVASTATIN CALCIUM 20 MG/1
20 TABLET, COATED ORAL
Status: DISCONTINUED | OUTPATIENT
Start: 2021-07-09 | End: 2021-07-10 | Stop reason: HOSPADM

## 2021-07-09 RX ORDER — OXYCODONE HYDROCHLORIDE 5 MG/1
5 TABLET ORAL
Status: DISCONTINUED | OUTPATIENT
Start: 2021-07-09 | End: 2021-07-10 | Stop reason: HOSPADM

## 2021-07-09 RX ADMIN — SODIUM BICARBONATE 650 MG TABLET 650 MG: at 08:40

## 2021-07-09 RX ADMIN — DONEPEZIL HYDROCHLORIDE 5 MG: 5 TABLET, FILM COATED ORAL at 08:40

## 2021-07-09 RX ADMIN — BUPROPION HYDROCHLORIDE 75 MG: 75 TABLET, FILM COATED ORAL at 08:40

## 2021-07-09 RX ADMIN — PERFLUTREN 1 ML: 6.52 INJECTION, SUSPENSION INTRAVENOUS at 11:25

## 2021-07-09 RX ADMIN — Medication 10 ML: at 00:02

## 2021-07-09 RX ADMIN — INSULIN GLARGINE 20 UNITS: 100 INJECTION, SOLUTION SUBCUTANEOUS at 21:43

## 2021-07-09 RX ADMIN — BUPROPION HYDROCHLORIDE 75 MG: 75 TABLET, FILM COATED ORAL at 16:39

## 2021-07-09 RX ADMIN — LEVOTHYROXINE SODIUM 75 MCG: 0.07 TABLET ORAL at 05:59

## 2021-07-09 RX ADMIN — INSULIN LISPRO 2 UNITS: 100 INJECTION, SOLUTION INTRAVENOUS; SUBCUTANEOUS at 08:46

## 2021-07-09 RX ADMIN — ROSUVASTATIN CALCIUM 10 MG: 10 TABLET, COATED ORAL at 00:02

## 2021-07-09 RX ADMIN — TUBERCULIN PURIFIED PROTEIN DERIVATIVE 5 UNITS: 5 INJECTION, SOLUTION INTRADERMAL at 00:02

## 2021-07-09 RX ADMIN — HEPARIN SODIUM 5000 UNITS: 5000 INJECTION INTRAVENOUS; SUBCUTANEOUS at 16:39

## 2021-07-09 RX ADMIN — INSULIN LISPRO 4 UNITS: 100 INJECTION, SOLUTION INTRAVENOUS; SUBCUTANEOUS at 16:38

## 2021-07-09 RX ADMIN — HEPARIN SODIUM 5000 UNITS: 5000 INJECTION INTRAVENOUS; SUBCUTANEOUS at 00:01

## 2021-07-09 RX ADMIN — MORPHINE SULFATE 15 MG: 15 TABLET, FILM COATED, EXTENDED RELEASE ORAL at 00:02

## 2021-07-09 RX ADMIN — SODIUM BICARBONATE 650 MG TABLET 650 MG: at 16:39

## 2021-07-09 RX ADMIN — AMITRIPTYLINE HYDROCHLORIDE 30 MG: 10 TABLET, FILM COATED ORAL at 00:02

## 2021-07-09 RX ADMIN — INSULIN GLARGINE 20 UNITS: 100 INJECTION, SOLUTION SUBCUTANEOUS at 08:50

## 2021-07-09 RX ADMIN — INSULIN LISPRO 2 UNITS: 100 INJECTION, SOLUTION INTRAVENOUS; SUBCUTANEOUS at 00:01

## 2021-07-09 RX ADMIN — HEPARIN SODIUM 5000 UNITS: 5000 INJECTION INTRAVENOUS; SUBCUTANEOUS at 08:39

## 2021-07-09 RX ADMIN — ACETAMINOPHEN 650 MG: 325 TABLET ORAL at 13:22

## 2021-07-09 RX ADMIN — HEPARIN SODIUM 5000 UNITS: 5000 INJECTION INTRAVENOUS; SUBCUTANEOUS at 23:32

## 2021-07-09 RX ADMIN — MORPHINE SULFATE 15 MG: 15 TABLET, FILM COATED, EXTENDED RELEASE ORAL at 21:43

## 2021-07-09 RX ADMIN — Medication 10 ML: at 05:59

## 2021-07-09 RX ADMIN — TAMSULOSIN HYDROCHLORIDE 0.4 MG: 0.4 CAPSULE ORAL at 08:40

## 2021-07-09 RX ADMIN — INSULIN LISPRO 6 UNITS: 100 INJECTION, SOLUTION INTRAVENOUS; SUBCUTANEOUS at 11:56

## 2021-07-09 RX ADMIN — Medication 10 ML: at 15:12

## 2021-07-09 RX ADMIN — Medication 5 ML: at 21:43

## 2021-07-09 RX ADMIN — OXYCODONE HYDROCHLORIDE 5 MG: 5 TABLET ORAL at 15:11

## 2021-07-09 RX ADMIN — MORPHINE SULFATE 15 MG: 15 TABLET, FILM COATED, EXTENDED RELEASE ORAL at 08:40

## 2021-07-09 RX ADMIN — INSULIN LISPRO 6 UNITS: 100 INJECTION, SOLUTION INTRAVENOUS; SUBCUTANEOUS at 21:43

## 2021-07-09 RX ADMIN — AMITRIPTYLINE HYDROCHLORIDE 30 MG: 10 TABLET, FILM COATED ORAL at 21:43

## 2021-07-09 RX ADMIN — ASPIRIN 81 MG: 81 TABLET, CHEWABLE ORAL at 00:02

## 2021-07-09 RX ADMIN — ASPIRIN 81 MG: 81 TABLET, CHEWABLE ORAL at 21:43

## 2021-07-09 RX ADMIN — FINASTERIDE 5 MG: 5 TABLET, FILM COATED ORAL at 08:40

## 2021-07-09 RX ADMIN — ROSUVASTATIN CALCIUM 20 MG: 20 TABLET, COATED ORAL at 21:43

## 2021-07-09 NOTE — PROGRESS NOTES
07/09/21 0703   NIH Stroke Scale   Interval Handoff/Transfer  ([facility] with Danni Suazo RN )   Level of Conciousness (1a) 0   LOC Questions (1b) 0   LOC Commands (1c) 0   Best Gaze (2) 0   Visual (3) 0   Facial Palsy (4) 0   Motor Arm, Left (5a) 0   Motor Arm, Right (5b) 0   Motor Leg, Left (6a) 0   Motor Leg, Right (6b) 0   Limb Ataxia (7) 0   Sensory (8) 0   Best Language (9) 0   Dysarthria (10) 0   Extinction and Inattention (11) 0   Total 0

## 2021-07-09 NOTE — PROGRESS NOTES
Confirmed with the wife that he does have a spinal cord stimulator but no longer uses it. Wife says it was placed at [facility] in [facility] many years ago. Dr. Manfred Armstrong (a female dr placed the temporary one) but does not remember who placed the permanent one.  Will notify MRI

## 2021-07-09 NOTE — PROGRESS NOTES
07/08/21 2300   NIH Stroke Scale   Interval Handoff/Transfer  ([facility] with Sushantsurinder Patterson, ED RN )   Level of Conciousness (1a) 0   LOC Questions (1b) 0   LOC Commands (1c) 0   Best Gaze (2) 0   Visual (3) 0   Facial Palsy (4) 0   Motor Arm, Left (5a) 0   Motor Arm, Right (5b) 0   Motor Leg, Left (6a) 0   Motor Leg, Right (6b) 0   Limb Ataxia (7) 0   Sensory (8) 0   Best Language (9) 0   Dysarthria (10) 0   Extinction and Inattention (11) 0   Total 0

## 2021-07-09 NOTE — PROGRESS NOTES
Pt is a 79 yo male admitted for stroke-like symptoms. Pt has been evaluated by all therapies with the recommendation for continued PT/Vestibular Rehab. No OT or ST needs. SW met with pt to complete CM assessment and discuss the recommendations. Demographics, insurance and PCP confirmed. Pt confirmed that he has a cane and rollator in the home. His spouse provides transportation. Pt is in agreement with outpatient PT. Order received and referral faxed to 1101 . They will contact the pt to schedule the initial appointment. No other dc needs or concerns identified or reported at present. SW will continue to follow to assist if other needs arise. Care Management Interventions  PCP Verified by CM: Yes  Last Visit to PCP: 06/23/21  Mode of Transport at Discharge: Other (see comment) (spouse)  Transition of Care Consult (CM Consult): Discharge Planning  Discharge Durable Medical Equipment: No  Physical Therapy Consult: Yes  Occupational Therapy Consult: Yes  Speech Therapy Consult: Yes  Current Support Network: Own Home, Lives with Spouse  Confirm Follow Up Transport: Family  The Plan for Transition of Care is Related to the Following Treatment Goals :  Outpatient PT for vestibular rehab  The Patient and/or Patient Representative was Provided with a Choice of Provider and Agrees with the Discharge Plan?: Yes  Freedom of Choice List was Provided with Basic Dialogue that Supports the Patient's Individualized Plan of Care/Goals, Treatment Preferences and Shares the Quality Data Associated with the Providers?: Yes  Discharge Location  Discharge Placement: Home with outpatient services [facility] services)

## 2021-07-09 NOTE — PROGRESS NOTES
ACUTE PHYSICAL THERAPY GOALS:  (Developed with and agreed upon by patient and/or caregiver. )  LTG:  (1.)Mr. Rusty Lopez will move from supine to sit and sit to supine , scoot up and down and roll side to side in bed with INDEPENDENT within 7 treatment day(s). (2.)Mr. Rusty Lopez will transfer from bed to chair and chair to bed with INDEPENDENT using the least restrictive device within 7 treatment day(s). (3.)Mr. Rusty Lopez will ambulate with INDEPENDENT for 400+ feet with the least restrictive device within 7 treatment day(s). ________________________________________________________________________________________________        PHYSICAL THERAPY ASSESSMENT: Initial Assessment and AM PT Treatment Day # 1      Arnulfo Wynn is a 78 y.o. male   PRIMARY DIAGNOSIS: <principal problem not specified>  Stroke-like symptom [R29.90]       Reason for Referral:    ICD-10: Treatment Diagnosis: Generalized Muscle Weakness (M62.81)  Difficulty in walking, Not elsewhere classified (R26.2)  History of falling (Z91.81)  Dizziness and Giddiness (R42)  Unspecified Lack of Coordination (R27.9)  OBSERV[place]: [place] / [place] / Product Type: Medicare /     ASSESSMENT:     REHAB RECOMMENDATIONS:   Recommendation to date pending progress:  Setting:   Outpatient Therapy   vestibular  Equipment:    None     PRIOR LEVEL OF FUNCTION:  (Prior to Hospitalization) INITIAL/CURRENT LEVEL OF FUNCTION:  (Most Recently Demonstrated)   Bed Mobility:   Independent  Sit to Stand:   Independent  Transfers:   Independent  Gait/Mobility:   Modified Independent Bed Mobility:   Not tested  Sit to Stand:  [employer] Assistance  Transfers:  [employer] Guard Assistance  Gait/Mobility:   Contact Guard Assistance     ASSESSMENT:  Mr. Rusty Lopez was sitting in chair at arrival, agrees to participate. He stood, ambulated with SBA.  Employed head turns with ambulation and pt either LOB or had to stop walking to turn head then proceed. Discussed with pt vestibular therapy and how it could possibly asst him. He was onboard. Presently, Pt will benefit from skilled and sophisticated PT to help improve impairments. SUBJECTIVE:   Mr. Victor File states, \"I fell . \"    SOCIAL HISTORY/LIVING ENVIRONMENT: lives in 1 story, 3 steps to get in, with spouse, uses rollator, has ortho shoes. Does not drive.    Home Environment: Private residence  # Steps to Enter: 4  One/Two Story Residence: Two story, live on 1st floor  Living Alone: No  Support Systems: Spouse/Significant Other/Partner, Family member(s)  OBJECTIVE:     PAIN: VITAL SIGNS: LINES/DRAINS:   Pre Treatment: Pain Screen  Pain Scale 1: Numeric (0 - 10)  Pain Intensity 1: 0  Post Treatment: 0   none  O2 Device: None (Room air)     GROSS EVALUATION:   Within Functional Limits Abnormal/ Functional Abnormal/ Non-Functional (see comments) Not Tested Comments:   AROM [x] [] [] []    PROM [] [] [] []    Strength [x] [] [] []    Balance [] [x] [] []    Posture [x] [] [] []    Sensation [x] [] [] []    Coordination [x] [] [] []    Tone [] [] [] []    Edema [] [] [] []    Activity Tolerance [x] [] [] []     [] [] [] []      COGNITION/  PERCEPTION: Intact Impaired   (see comments) Comments:   Orientation [x] []    Vision [x] []    Hearing [x] []    Command Following [x] []    Safety Awareness [x] []     [] []      MOBILITY: I Mod I S SBA CGA Min Mod Max Total  NT x2 Comments:   Bed Mobility    Rolling [] [] [] [] [] [] [] [] [] [x] []    Supine to Sit [] [] [] [] [] [] [] [] [] [x] []    Scooting [] [] [] [] [] [] [] [] [] [x] []    Sit to Supine [] [] [] [] [] [] [] [] [] [x] []    Transfers    Sit to Stand [] [] [] [] [x] [] [] [] [] [] []    Bed to Chair [] [] [] [] [] [] [] [] [] [x] []    Stand to Sit [] [] [] [] [] [] [] [] [] [] []    I=Independent, Mod I=Modified Independent, S=Supervision, SBA=Standby Assistance, CGA=Contact Guard Assistance,   Min=Minimal Assistance, Mod=Moderate Assistance, Max=Maximal Assistance, Total=Total Assistance, NT=Not Tested  GAIT: I Mod I S SBA CGA Min Mod Max Total  NT x2 Comments:   Level of Assistance [] [] [] [] [x] [x] [] [] [] [] []    Distance 150    DME Rolling Walker    Gait Quality Strong  on RW, cautious    Weightbearing Status N/A     I=Independent, Mod I=Modified Independent, S=Supervision, SBA=Standby Assistance, CGA=Contact Guard Assistance,   Min=Minimal Assistance, Mod=Moderate Assistance, Max=Maximal Assistance, Total=Total Assistance, NT=Not Tested    [place] Form       How much difficulty does the patient currently have. .. Unable A Lot A Little None   1. Turning over in bed (including adjusting bedclothes, sheets and blankets)? [] 1   [] 2   [] 3   [x] 4   2. Sitting down on and standing up from a chair with arms ( e.g., wheelchair, bedside commode, etc.)   [] 1   [] 2   [] 3   [x] 4   3. Moving from lying on back to sitting on the side of the bed? [] 1   [] 2   [] 3   [x] 4   How much help from another person does the patient currently need. .. Total A Lot A Little None   4. Moving to and from a bed to a chair (including a wheelchair)? [] 1   [] 2   [] 3   [x] 4   5. Need to walk in hospital room? [] 1   [] 2   [] 3   [x] 4   6. Climbing 3-5 steps with a railing? [] 1   [] 2   [] 3   [x] 4   © 2007, Trustees of [facility], under license to [employer]. All rights reserved     Score:  Initial: 24 Most Recent: X (Date: -- )    Interpretation of Tool:  Represents activities that are increasingly more difficult (i.e. Bed mobility, Transfers, Gait). PLAN:   FREQUENCY/DURATION: PT Plan of Care: 3 times/week for duration of hospital stay or until stated goals are met, whichever comes first.    PROBLEM LIST:   (Skilled intervention is medically necessary to address:)  1. Decreased ADL/Functional Activities  2. Decreased Activity Tolerance  3.  Decreased AROM/PROM  4. Decreased Balance  5. Decreased Coordination  6. Decreased Gait Ability  7. Decreased Strength  8. Decreased Transfer Abilities   INTERVENTIONS PLANNED:   (Benefits and precautions of physical therapy have been discussed with the patient.)  1. Therapeutic Activity  2. Therapeutic Exercise/HEP  3. Neuromuscular Re-education  4. Gait Training  5. Education     TREATMENT:     EVALUATION: Low Complexity : (Untimed Charge)    TREATMENT:   (     )  Therapeutic Activity (23 Minutes): Therapeutic activity included Rolling, Supine to Sit, Scooting, Lateral Scooting, Transfer Training, Ambulation on level ground, Sitting balance  and Standing balance to improve functional Mobility, Strength and Activity tolerance.     TREATMENT GRID:  N/A    AFTER TREATMENT POSITION/PRECAUTIONS:  Chair, Needs within reach and RN notified    INTERDISCIPLINARY COLLABORATION:  RN/PCT and PT/PTA    TOTAL TREATMENT DURATION:  PT Patient Time In/Time Out  Time In: 0940  Time Out: 1105 Mario Ty DPT

## 2021-07-09 NOTE — PROGRESS NOTES
Problem: Diabetes Self-Management  Goal: *Disease process and treatment process  Description: Define diabetes and identify own type of diabetes; list 3 options for treating diabetes. Outcome: Progressing Towards Goal  Goal: *Incorporating nutritional management into lifestyle  Description: Describe effect of type, amount and timing of food on blood glucose; list 3 methods for planning meals. Outcome: Progressing Towards Goal  Goal: *Incorporating physical activity into lifestyle  Description: State effect of exercise on blood glucose levels. Outcome: Progressing Towards Goal  Goal: *Developing strategies to promote health/change behavior  Description: Define the ABC's of diabetes; identify appropriate screenings, schedule and personal plan for screenings. Outcome: Progressing Towards Goal  Goal: *Using medications safely  Description: State effect of diabetes medications on diabetes; name diabetes medication taking, action and side effects. Outcome: Progressing Towards Goal  Goal: *Monitoring blood glucose, interpreting and using results  Description: Identify recommended blood glucose targets  and personal targets. Outcome: Progressing Towards Goal  Goal: *Prevention, detection, treatment of acute complications  Description: List symptoms of hyper- and hypoglycemia; describe how to treat low blood sugar and actions for lowering  high blood glucose level. Outcome: Progressing Towards Goal  Goal: *Prevention, detection and treatment of chronic complications  Description: Define the natural course of diabetes and describe the relationship of blood glucose levels to long term complications of diabetes.   Outcome: Progressing Towards Goal  Goal: *Developing strategies to address psychosocial issues  Description: Describe feelings about living with diabetes; identify support needed and support network  Outcome: Progressing Towards Goal  Goal: *Insulin pump training  Outcome: Progressing Towards Goal  Goal: *Sick day guidelines  Outcome: Progressing Towards Goal  Goal: *Patient Specific Goal (EDIT GOAL, INSERT TEXT)  Outcome: Progressing Towards Goal     Problem: Patient Education: Go to Patient Education Activity  Goal: Patient/Family Education  Outcome: Progressing Towards Goal     Problem: Falls - Risk of  Goal: *Absence of Falls  Description: Document Mu Riley Fall Risk and appropriate interventions in the flowsheet.   Outcome: Progressing Towards Goal  Note: Fall Risk Interventions:  Mobility Interventions: Bed/chair exit alarm, Patient to call before getting OOB, PT Consult for mobility concerns         Medication Interventions: Bed/chair exit alarm, Patient to call before getting OOB, Teach patient to arise slowly    Elimination Interventions: Bed/chair exit alarm, Call light in reach, Patient to call for help with toileting needs, Toileting schedule/hourly rounds    History of Falls Interventions: Bed/chair exit alarm, Consult care management for discharge planning, Door open when patient unattended         Problem: Patient Education: Go to Patient Education Activity  Goal: Patient/Family Education  Outcome: Progressing Towards Goal     Problem: Patient Education: Go to Patient Education Activity  Goal: Patient/Family Education  Outcome: Progressing Towards Goal     Problem: TIA/CVA Stroke: 0-24 hours  Goal: Off Pathway (Use only if patient is Off Pathway)  Outcome: Progressing Towards Goal  Goal: Activity/Safety  Outcome: Progressing Towards Goal  Goal: Consults, if ordered  Outcome: Progressing Towards Goal  Goal: Diagnostic Test/Procedures  Outcome: Progressing Towards Goal  Goal: Nutrition/Diet  Outcome: Progressing Towards Goal  Goal: Discharge Planning  Outcome: Progressing Towards Goal  Goal: Medications  Outcome: Progressing Towards Goal  Goal: Respiratory  Outcome: Progressing Towards Goal  Goal: Treatments/Interventions/Procedures  Outcome: Progressing Towards Goal  Goal: Minimize risk of bleeding post-thrombolytic infusion  Outcome: Progressing Towards Goal  Goal: Monitor for complications post-thrombolytic infusion  Outcome: Progressing Towards Goal  Goal: Psychosocial  Outcome: Progressing Towards Goal  Goal: *Hemodynamically stable  Outcome: Progressing Towards Goal  Goal: *Neurologically stable  Description: Absence of additional neurological deficits    Outcome: Progressing Towards Goal  Goal: *Verbalizes anxiety and depression are reduced or absent  Outcome: Progressing Towards Goal  Goal: *Absence of Signs of Aspiration on Current Diet  Outcome: Progressing Towards Goal  Goal: *Absence of deep venous thrombosis signs and symptoms(Stroke Metric)  Outcome: Progressing Towards Goal  Goal: *Ability to perform ADLs and demonstrates progressive mobility and function  Outcome: Progressing Towards Goal  Goal: *Stroke education started(Stroke Metric)  Outcome: Progressing Towards Goal  Goal: *Dysphagia screen performed(Stroke Metric)  Outcome: Progressing Towards Goal  Goal: *Rehab consulted(Stroke Metric)  Outcome: Progressing Towards Goal     Problem: TIA/CVA Stroke: Day 2 Until Discharge  Goal: Off Pathway (Use only if patient is Off Pathway)  Outcome: Progressing Towards Goal  Goal: Activity/Safety  Outcome: Progressing Towards Goal  Goal: Diagnostic Test/Procedures  Outcome: Progressing Towards Goal  Goal: Nutrition/Diet  Outcome: Progressing Towards Goal  Goal: Discharge Planning  Outcome: Progressing Towards Goal  Goal: Medications  Outcome: Progressing Towards Goal  Goal: Respiratory  Outcome: Progressing Towards Goal  Goal: Treatments/Interventions/Procedures  Outcome: Progressing Towards Goal  Goal: Psychosocial  Outcome: Progressing Towards Goal  Goal: *Verbalizes anxiety and depression are reduced or absent  Outcome: Progressing Towards Goal  Goal: *Absence of aspiration  Outcome: Progressing Towards Goal  Goal: *Absence of deep venous thrombosis signs and symptoms(Stroke Metric)  Outcome: Progressing Towards Goal  Goal: *Optimal pain control at patient's stated goal  Outcome: Progressing Towards Goal  Goal: *Tolerating diet  Outcome: Progressing Towards Goal  Goal: *Ability to perform ADLs and demonstrates progressive mobility and function  Outcome: Progressing Towards Goal  Goal: *Stroke education continued(Stroke Metric)  Outcome: Progressing Towards Goal     Problem: Ischemic Stroke: Discharge Outcomes  Goal: *Verbalizes anxiety and depression are reduced or absent  Outcome: Progressing Towards Goal  Goal: *Verbalize understanding of risk factor modification(Stroke Metric)  Outcome: Progressing Towards Goal  Goal: *Hemodynamically stable  Outcome: Progressing Towards Goal  Goal: *Absence of aspiration pneumonia  Outcome: Progressing Towards Goal  Goal: *Aware of needed dietary changes  Outcome: Progressing Towards Goal  Goal: *Verbalize understanding of prescribed medications including anti-coagulants, anti-lipid, and/or anti-platelets(Stroke Metric)  Outcome: Progressing Towards Goal  Goal: *Tolerating diet  Outcome: Progressing Towards Goal  Goal: *Aware of follow-up diagnostics related to anticoagulants  Outcome: Progressing Towards Goal  Goal: *Ability to perform ADLs and demonstrates progressive mobility and function  Outcome: Progressing Towards Goal  Goal: *Absence of DVT(Stroke Metric)  Outcome: Progressing Towards Goal  Goal: *Absence of aspiration  Outcome: Progressing Towards Goal  Goal: *Optimal pain control at patient's stated goal  Outcome: Progressing Towards Goal  Goal: *Home safety concerns addressed  Outcome: Progressing Towards Goal  Goal: *Describes available resources and support systems  Outcome: Progressing Towards Goal  Goal: *Verbalizes understanding of activation of EMS(911) for stroke symptoms(Stroke Metric)  Outcome: Progressing Towards Goal  Goal: *Understands and describes signs and symptoms to report to providers(Stroke Metric)  Outcome: Progressing Towards Goal  Goal: *Neurolgocially stable (absence of additional neurological deficits)  Outcome: Progressing Towards Goal  Goal: *Verbalizes importance of follow-up with primary care physician(Stroke Metric)  Outcome: Progressing Towards Goal  Goal: *Smoking cessation discussed,if applicable(Stroke Metric)  Outcome: Progressing Towards Goal  Goal: *Depression screening completed(Stroke Metric)  Outcome: Progressing Towards Goal

## 2021-07-09 NOTE — PROGRESS NOTES
Patient MRI on hold awaiting family member to locate remote in charged/working condition for spinal cord stimulator that is implanted in patient.

## 2021-07-09 NOTE — PROGRESS NOTES
MRI informed the nurse that the patient needs to find the paperwork and the remote needs to be fully charged. Will inform the wife.

## 2021-07-09 NOTE — PROGRESS NOTES
ACUTE OT GOALS:  (Developed with and agreed upon by patient and/or caregiver.)  1. Patient will complete lower body bathing and dressing with modified independence and adaptive equipment as needed. 2. Patient will complete toileting with supervision. GOAL MET   3. Patient will tolerate 20 minutes of OT treatment with as needed rest breaks to increase activity tolerance for ADLs. 4. Patient will complete functional transfers with modified independence and adaptive equipment as needed. Timeframe: 7 visits       OCCUPATIONAL THERAPY ASSESSMENT: Initial Assessment and Daily Note OT Treatment Day # 1    Shanna Cortes is a 78 y.o. male   PRIMARY DIAGNOSIS: <principal problem not specified>  Stroke-like symptom [R29.90]       Reason for Referral:  CVA workup  ICD-10: Treatment Diagnosis: Other lack of cordination (R27.8)  OBSERVATION: [place] / [place] / Product Type: Medicare /   ASSESSMENT:     REHAB RECOMMENDATIONS:   Recommendation to date pending progress:  Setting:   No further skilled therapy   Equipment:    Rolling Walker (patient states he already has a RW, but would recommend he use that instead of the rollator he has been using)      PRIOR LEVEL OF FUNCTION:  (Prior to Hospitalization)  INITIAL/CURRENT LEVEL OF FUNCTION:  (Based on today's evaluation)   Bathing:   Independent  Dressing:   Modified Independent  Feeding/Grooming:   Independent  Toileting:   Independent  Functional Mobility:   Modified Independent (with rollator)  Bathing:   Supervision  Dressing:   Minimal Assistance  Feeding/Grooming:   Set Up  Toileting:   Supervision  Functional Mobility:   Contact Guard Assistance     ASSESSMENT:  Mr. Samara Burnham is a 78year old male admitted with difficulty walking, currently undergoing workup for possible CVA. At baseline patient lives with wife, is independent with ADLs. He has been ambulating with a cane or a rollator.  Today, BUE appear WFL and equal bilaterally. Demonstrates need for min assist with lower body dressing, but supervision for all other ADLs. Requires CGA and RW for mobility due to balance. He is functioning slightly below his baseline for ADLs and would benefit from continued OT while hospitalized. Will follow. SUBJECTIVE:   Mr. Roxanne Boswell states, \"We've been  for 53 years. \"    SOCIAL HISTORY/LIVING ENVIRONMENT: Lives with wife. Independent with ADLs. Wife assists with all IADLs such as cooking, med management, driving. Patient no longer drives due to BLE neuropathy. R hand dominant. Uses a cane normally, but has been using a rollator since symptoms started. Admits to 3 falls in past 6 months.    Home Environment: Private residence  # Steps to Enter: 4  One/Two Story Residence: Two story, live on 1st floor  Living Alone: No  Support Systems: Spouse/Significant Other/Partner, Family member(s)    OBJECTIVE:     PAIN: VITAL SIGNS: LINES/DRAINS:   Pre Treatment: Pain Screen  Pain Scale 1: Numeric (0 - 10)  Pain Intensity 1: 0  Post Treatment: 0   IV  O2 Device: None (Room air)     GROSS EVALUATION:  BUE Within Functional Limits Abnormal/ Functional Abnormal/ Non-Functional (see comments) Not Tested Comments:   AROM [] [x] [] [] Decreased ROM to B shoulders, otherwise WFL    PROM [] [] [] [x]    Strength [x] [] [] [] 5/5 bilaterally    Balance [] [x] [] [] Intact in sitting  Fair in standing    Posture [] [] [] [x]    Sensation [x] [] [] [] Light touch BUE    Coordination [x] [] [] []    Tone [] [] [] [x]    Edema [] [] [] [x]    Activity Tolerance [] [] [] []     [] [] [] []      COGNITION/  PERCEPTION: Intact Impaired   (see comments) Comments:   Orientation [x] []    Vision [x] []    Hearing [] [x] [place] hearing aids at home    Judgment/ Insight [x] []    Attention [x] []    Memory [] [x] Repeats self several times during session    Command Following [x] []    Emotional Regulation [x] []     [] []      ACTIVITIES OF DAILY LIVING: I Mod I S SBA CGA Min Mod Max Total NT Comments   BASIC ADLs:              Bathing/ Showering [] [] [] [] [] [] [] [] [] [x]    Toileting [] [] [x] [] [] [] [] [] [] []    Dressing [] [] [x] [] [] [x] [] [] [] [] Min A- underwear  S- shoes    Feeding [] [] [] [] [] [] [] [] [] [x]    Grooming [] [] [] [] [] [] [] [] [] [x]    Personal Device Care [x] [] [] [] [] [] [] [] [] []    Functional Mobility [] [] [] [] [x] [] [] [] [] []    I=Independent, Mod I=Modified Independent, S=Supervision, SBA=Standby Assistance, CGA=Contact Guard Assistance,   Min=Minimal Assistance, Mod=Moderate Assistance, Max=Maximal Assistance, Total=Total Assistance, NT=Not Tested    MOBILITY: I Mod I S SBA CGA Min Mod Max Total  NT x2 Comments:   Supine to sit [] [] [] [x] [] [] [] [] [] [] []    Sit to supine [] [] [] [] [] [] [] [] [] [x] []    Sit to stand [] [] [] [] [x] [] [] [] [] [] [] RW   Bed to chair [] [] [] [] [x] [] [] [] [] [] [] RW   I=Independent, Mod I=Modified Independent, S=Supervision, SBA=Standby Assistance, CGA=Contact Guard Assistance,   Min=Minimal Assistance, Mod=Moderate Assistance, Max=Maximal Assistance, Total=Total Assistance, NT=Not Tested    [address]\"" [address] [address] AM-PAC 6 Clicks   Daily Activity Inpatient Short Form        How much help from another person does the patient currently need. .. Total A Lot A Little None   1. Putting on and taking off regular lower body clothing? [] 1   [] 2   [x] 3   [] 4   2. Bathing (including washing, rinsing, drying)? [] 1   [] 2   [x] 3   [] 4   3. Toileting, which includes using toilet, bedpan or urinal?   [] 1   [] 2   [x] 3   [] 4   4. Putting on and taking off regular upper body clothing? [] 1   [] 2   [] 3   [x] 4   5. Taking care of personal grooming such as brushing teeth? [] 1   [] 2   [] 3   [x] 4   6. Eating meals? [] 1   [] 2   [] 3   [x] 4   © 2007, Trustees of [address] [place] [address] [address] [id number], under license to [facility].  All rights reserved     Score:  Initial: 21 Most Recent: X (Date: -- )   Interpretation of Tool:  Represents activities that are increasingly more difficult (i.e. Bed mobility, Transfers, Gait). PLAN:   FREQUENCY/DURATION: OT Plan of Care: 3 times/week for duration of hospital stay or until stated goals are met, whichever comes first.    PROBLEM LIST:   (Skilled intervention is medically necessary to address:)  1. Decreased ADL/Functional Activities  2. Decreased Balance  3. Decreased Gait Ability  4. Decreased Transfer Abilities   INTERVENTIONS PLANNED:   (Benefits and precautions of occupational therapy have been discussed with the patient.)  1. Self Care Training  2. Therapeutic Activity  3. Therapeutic Exercise/HEP  4. Neuromuscular Re-education  5. Education     TREATMENT:     EVALUATION: Moderate Complexity : (Untimed Charge)    TREATMENT:   (     )  Self Care (9 Minutes): Self care including Toileting, Lower Body Dressing and toilet transfers/ bathroom mobility to increase independence and safety.     TREATMENT GRID:  N/A    AFTER TREATMENT POSITION/PRECAUTIONS:  Chair, Needs within reach and RN notified    INTERDISCIPLINARY COLLABORATION:  MD/PA/NP, RN/PCT, OT/DO and RN Case Manager/     TOTAL TREATMENT DURATION:  OT Patient Time In/Time Out  Time In: 0908  Time Out: [address] [place], OTR/L

## 2021-07-09 NOTE — PROGRESS NOTES
Neurology Daily Progress Note     Assessment:     78year old male seen as Code S for ataxia and falls. Hx of DM type 1, CKD. Gait has resolved. Unable to have IV contrast with CT or MRI. MRI/MRA and TTE pending. Continue DAPT and high intensity statin therapy. Plan:     · Continue ASA 81 mg daily   · Continue Plavix 75 mg po daily for 21 days. · Initiate high intensity statin   · Neurochecks Q4H  · MRI of brain pending   · MRA of head and neck  pending  · Telemetry and echocardiogram with bubble study  · PT/OT/ST  · DVT prophylaxis   · BP management - normotensive, with long-term goal <130/80  · Smoking cessation if applicable   · Diabetes education if applicable   · Depression Screening prior to discharge  · Keep follow up with [facility]. He has standing appointment for 9/3/2021. Subjective: Interval history:    Gait has improved. MRI/MRA and TTE pending. History:    Donnie Escobar is a 78 y.o. male who is being seen for ataxia. Review of systems negative with exception of pertinent positives and negatives noted above.        Objective:     Vitals:    07/08/21 2207 07/08/21 2326 07/09/21 0400 07/09/21 0800   BP: (!) 141/80 (!) 150/88 (!) 151/89 (!) 155/85   Pulse: 100 91 89 93   Resp: 18 18 18 18   Temp:  98.2 °F (36.8 °C) 97.7 °F (36.5 °C) 97.9 °F (36.6 °C)   SpO2: 97% 96% 98% 98%   Weight:       Height:              Current Facility-Administered Medications:     insulin lispro (HUMALOG) injection, , SubCUTAneous, AC&HS, Travis Woods MD, 2 Units at 07/09/21 0846    insulin glargine (LANTUS) injection 20 Units, 20 Units, SubCUTAneous, BID, Travis Woods MD, 20 Units at 07/09/21 0850    tamsulosin (FLOMAX) capsule 0.4 mg, 0.4 mg, Oral, DAILY, Travis Rodas MD, 0.4 mg at 07/09/21 0840    amitriptyline (ELAVIL) tablet 30 mg, 30 mg, Oral, QHS, Travis Rodas MD, 30 mg at 07/09/21 0002    aspirin chewable tablet 81 mg, 81 mg, Oral, QHS, Travis Rodas MD, 81 mg at 07/09/21 0002    buPROPion (WELLBUTRIN) tablet 75 mg, 75 mg, Oral, BID, Travis Crowe MD, 75 mg at 07/09/21 0840    donepeziL (ARICEPT) tablet 5 mg, 5 mg, Oral, DAILY, Travis Crowe MD, 5 mg at 07/09/21 0840    finasteride (PROSCAR) tablet 5 mg, 5 mg, Oral, DAILY, Travis Crowe MD, 5 mg at 07/09/21 0840    levothyroxine (SYNTHROID) tablet 75 mcg, 75 mcg, Oral, ACB, Travis Rodas MD, 75 mcg at 07/09/21 0559    morphine CR (MS CONTIN) tablet 15 mg, 15 mg, Oral, Q12H, Travis Rodas MD, 15 mg at 07/09/21 0840    rosuvastatin (CRESTOR) tablet 10 mg, 10 mg, Oral, QHS, Travis Rodas MD, 10 mg at 07/09/21 0002    sodium bicarbonate tablet 650 mg, 650 mg, Oral, BID, Travis Crowe MD, 650 mg at 07/09/21 0840    tuberculin injection 5 Units, 5 Units, IntraDERMal, ONCE, Travis Rodas MD, 5 Units at 07/09/21 0002    sodium chloride (NS) flush 5-40 mL, 5-40 mL, IntraVENous, Q8H, Travis Rodas MD, 10 mL at 07/09/21 0559    sodium chloride (NS) flush 5-40 mL, 5-40 mL, IntraVENous, PRN, Travis Crowe MD    ondansetron (ZOFRAN) injection 4 mg, 4 mg, IntraVENous, Q4H PRN, Travis Crowe MD    acetaminophen (TYLENOL) tablet 650 mg, 650 mg, Oral, Q4H PRN, Travis Crowe MD    acetaminophen (TYLENOL) suppository 650 mg, 650 mg, Rectal, Q4H PRN, Travis Crowe MD    labetaloL (NORMODYNE;TRANDATE) injection 5 mg, 5 mg, IntraVENous, Q10MIN PRN, Travis Crowe MD    bisacodyL (DULCOLAX) suppository 10 mg, 10 mg, Rectal, DAILY PRN, Travis Crowe MD    famotidine (PEPCID) tablet 20 mg, 20 mg, Oral, BID PRN, Travis Crowe MD    heparin (porcine) injection 5,000 Units, 5,000 Units, SubCUTAneous, Q8H, Travis Rodas MD, 5,000 Units at 07/09/21 0839    thiamine HCL (B-1) tablet 100 mg, 100 mg, Oral, DAILY, Travis Rodas MD, 100 mg at 07/08/21 2212    Recent Results (from the past 12 hour(s))   GLUCOSE, POC    Collection Time: 07/08/21 11:24 PM   Result Value Ref Range    Glucose (POC) 176 (H) 65 - 100 mg/dL    Performed by [facility] CBC W/O DIFF    Collection Time: 07/09/21  4:22 AM   Result Value Ref Range    WBC 4.7 4.3 - 11.1 K/uL    RBC 4.85 4.23 - 5.6 M/uL    HGB 12.7 (L) 13.6 - 17.2 g/dL    HCT 41.2 41.1 - 50.3 %    MCV 84.9 79.6 - 97.8 FL    MCH 26.2 26.1 - 32.9 PG    MCHC 30.8 (L) 31.4 - 35.0 g/dL    RDW 15.7 (H) 11.9 - 14.6 %    PLATELET 337 378 - 404 K/uL    MPV 10.9 9.4 - 12.3 FL    ABSOLUTE NRBC 0.00 0.0 - 0.2 K/uL   LIPID PANEL    Collection Time: 07/09/21  4:22 AM   Result Value Ref Range    Cholesterol, total 129 <200 MG/DL    Triglyceride 129 35 - 150 MG/DL    HDL Cholesterol 50 40 - 60 MG/DL    LDL, calculated 53.2 <100 MG/DL    VLDL, calculated 25.8 (H) 6.0 - 23.0 MG/DL    CHOL/HDL Ratio 2.6     METABOLIC PANEL, BASIC    Collection Time: 07/09/21  4:22 AM   Result Value Ref Range    Sodium 140 136 - 145 mmol/L    Potassium 3.6 3.5 - 5.1 mmol/L    Chloride 108 (H) 98 - 107 mmol/L    CO2 25 21 - 32 mmol/L    Anion gap 7 7 - 16 mmol/L    Glucose 189 (H) 65 - 100 mg/dL    BUN 18 8 - 23 MG/DL    Creatinine 1.20 0.8 - 1.5 MG/DL    GFR est AA >60 >60 ml/min/1.73m2    GFR est non-AA >60 >60 ml/min/1.73m2    Calcium 9.0 8.3 - 10.4 MG/DL   GLUCOSE, POC    Collection Time: 07/09/21  6:35 AM   Result Value Ref Range    Glucose (POC) 187 (H) 65 - 100 mg/dL    Performed by Zuleyka Baum      CT Results (most recent):  Results from Hospital Encounter encounter on 07/08/21    CT CODE NEURO HEAD WO CONTRAST    Narrative  CT HEAD WITHOUT CONTRAST, 7/8/2021    History: Difficulty walking. Comparison: None    Technique:   5 mm axial scans from the skull base to the vertex. All CT scans  performed at this facility use one or all of the following: Automated exposure  control, adjustment of the mA and/or kVp according to patient's size, iterative  reconstruction. Findings:  No evidence of intracranial hemorrhage is seen. Mild bilateral basal  ganglia densities are seen which appear to represent calcification.  No abnormal  extra-axial fluid collections are seen. Moderate age-appropriate chronic  cortical volume loss is seen. No evidence for acute hydrocephalus is seen. No  evidence of midline shift or herniation is seen. No abnormal edema pattern is  seen in a vascular distribution to suggest large artery infarction. Only  ill-defined periventricular white matter hypoattenuation is seen favored to be  chronic most likely representing moderate chronic micro and hepatic changes. The  basilar artery does appear hyperdense. Evaluation with bone windows shows no acute osseous changes. The visualized  sinuses, middle ears, and mastoid air cells are well aerated. Impression  1. No acute intracranial process evident by noncontrast CT study of the head. Only hyperdensity of the basilar artery is seen. This can be an indicator of  occlusion although could easily result from attenuation artifact. Recommend  correlation with clinical findings. This can be further assessed with CTA if  clinically indicated. This report was made using voice transcription. Despite my best efforts to avoid  any, transcription errors may persist. If there is any question about the  accuracy of the report or need for clarification, then please call [phone], or text me through [employer] for clarification or correction. Physical Exam:  General - Well developed, well nourished, in no apparent distress. Pleasant and conversent. HEENT - Normocephalic, atraumatic. Conjunctiva are clear. Neck - Supple without masses  Cardiovascular - Regular rate and rhythm. Normal S1, S2 without murmurs, rubs, or gallops. Lungs - Clear to auscultation. Abdomen - Soft, nontender with normal bowel sounds. Extremities - Peripheral pulses intact. No edema and no rashes. Neurological examination - Comprehension, attention, memory and reasoning are intact.  Language and speech are normal.   On cranial nerve examination, (II, III, IV, VI) pupils are equal, round, and reactive to light. Visual acuity is adequate. Visual fields are full to finger confrontation. Extraocular motility is normal. (V, VII) Face is symmetric and sensation is intact to light touch. (VIII) Hearing is intact. (IX, X) Palate and uvula elevate symmetrically. Voice is normal. (XI) Shoulder shrug is strong and equal bilaterally. (XII)Tongue is midline. Motor examination - There is normal muscle tone and bulk. Power is 5/5 BUE, 4/5 BLE. Muscle stretch reflexes are 1+ BUE, areflexic patellar and ankle jerks. Plantar response is equivocal bilaterally. Decreased sensation is intact to light touch, pinprick, vibration and proprioception in all extremities. Cerebellar examination is normal finger/nose and heel/shin. Gait and stance unsteady, antalgic. Signed By: SALLIE Cohen     July 9, 2021      Neuro attending    Patient seen and examined. Concur with the comments above that were ability to conduct any additional investigations are somewhat limited.   Based upon his NIH score however and based upon the distribution of his symptoms the lack of vascular imaging does not specifically alter his management which is medical.  I considered whether TCD would be helpful but am unclear that it would guide us specifically from the standpoint of treatment as the problem is clearly not intracranial vascular spasm    Continue with maximum medical management

## 2021-07-09 NOTE — PROGRESS NOTES
Son was able to locate remote but needs to be charged. Will drop it off to the hospital later today.

## 2021-07-09 NOTE — PROGRESS NOTES
07/08/21 2300   Dual Skin Pressure Injury Assessment   Dual Skin Pressure Injury Assessment WDL   Second Care Provider (Based on [address] [place] [address]) Ashvin Scott RN    Skin Integumentary   Skin Integumentary (WDL) X    Pressure  Injury Documentation No Pressure Injury Noted-Pressure Ulcer Prevention Initiated   Skin Color Appropriate for ethnicity; Ecchymosis (comment)  (BLE)   Skin Condition/Temp Warm;Dry   Skin Integrity Scars (comment)  (mid, lower, & upper back, R Flank, R knee )   Nails X   Exceptions to WDL Other (comment); Absent  (L broken big toe nail; R 2nd & 3rd)   Wound Prevention and Protection Methods   Orientation of Wound Prevention Posterior   Location of Wound Prevention Sacrum/Coccyx   Dressing Present  No   Wound Offloading (Prevention Methods) Bed, pressure reduction mattress;Repositioning;Pillows     Pt with insulin pump to right lower abdomen, states he turned it off in the ED. Glucometer to left lower abdomen.

## 2021-07-09 NOTE — PROGRESS NOTES
Observation  SPEECH LANGUAGE PATHOLOGY: DYSPHAGIA- Initial Assessment    NAME/AGE/GENDER: Meg Fisher is a 78 y.o. male  DATE: 7/9/2021  PRIMARY DIAGNOSIS: Stroke-like symptom [R29.90]      ICD-10: Treatment Diagnosis: R13.11 Dysphagia, Oral Phase    RECOMMENDATIONS   DIET:    Regular Consistency   Thin Liquids    MEDICATIONS: With liquid     ASPIRATION PRECAUTIONS  · Slow rate of intake  · Small bites/sips  · Upright at 90 degrees during meal     COMPENSATORY STRATEGIES/MODIFICATIONS  · None     RECOMMENDATIONS for CONTINUED SPEECH THERAPY:   YES: Anticipate need for ongoing speech therapy pending MRI results. ASSESSMENT   Patient presents with oropharyngeal swallow function that is within normal limits. No s/sx of dysphagia identified. Speech clear. Will follow pending MRI results    Recommend regular/thin with medication as tolerated      EDUCATION:  · Recommendations discussed with Patient  CONTINUATION OF SKILLED SERVICES/MEDICAL NECESSITY:   Patient continues to require skilled intervention due to MRI results pending.    REHABILITATION POTENTIAL FOR STATED GOALS: Excellent    PLAN    FREQUENCY/DURATION: Next treatment session will address assessment of cognitive-linguistic abilities pending MRI results    - Recommendations for next treatment session: Next treatment session will address assessment of cognitive-linguistic abilities pending MRI results    SUBJECTIVE   alert    Oxygen Device: RA  Pain: Pain Scale 1: Numeric (0 - 10)  Pain Intensity 1: 0    History of Present Injury/Illness: Mr. Mario Sheffield  has a past medical history of Acquired hypothyroidism, Aphonia, Back pain, chronic, Benign prostatic hyperplasia with incomplete bladder emptying, Broken bones, Candida laryngitis, Charcot foot due to diabetes mellitus (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 ([address] Diabetic nephropathy ([address] Diabetic peripheral neuropathy ([address] Diabetic retinopathy ([address] Dysphonia, GERD (gastroesophageal reflux disease), HLD (hyperlipidemia), Hypertension, Insulin pump in place, Left hip pain, Lumbar radiculopathy, MCI (mild cognitive impairment) with memory loss, Mild cognitive impairment, Moderate episode of recurrent major depressive disorder (HCC), Orthostatic hypotension, LISA (obstructive sleep apnea), Paraspinal muscle spasm, Polypharmacy, S/P lumbar fusion, Seborrheic keratosis, Spinal cord stimulator status, Spondylolisthesis, Tachycardia, Thyroid disease, Trochanteric bursitis of left hip, 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 ([address] Type 1 diabetes mellitus with hyperglycemia ([address] Type 1 diabetes mellitus with hypoglycemia ([address] Vitreous hemorrhage due to type 1 diabetes mellitus ([address] Vocal cord atrophy, and Weakness. Kingston Serrato He also  has a past surgical history that includes hx tonsil and adenoidectomy; hx shoulder arthroscopy (Left); hx amputation toe; hx other surgical; hx bunionectomy (Right); hx lumbar fusion (2010); hx lumbar laminectomy (2008); hx colonoscopy (2016); hx cataract removal (Bilateral); and hx heent (Bilateral). PRECAUTIONS/ALLERGIES: Drixomed [dexbrompheniramine-pseudoephed]     Problem List:  (Impairments causing functional limitations):  dysphagia  Previous Dysphagia: NONE REPORTED  Diet Prior to Evaluation: regular/thin    Orientation:  Person  Place  Time  Situation    Cognitive-Linguistic Screening:   Alertness  o Alert   Speech Production:   o Fully intelligible   Expressive Language:  o Fluent speech   Receptive Language:  o Answers yes/no questions appropriately and Follows commands   Cognition:   o Appears baseline  Prior Level of Function: home  Recommendations: Given results of screening, patient appears to be functioning at baseline. However imaging results are still pending.  Will follow up for further assessment as indicated by findings. OBJECTIVE   Oral Motor:   · Labial: No impairment  · Dentition: Upper Dentures and Lower Dentures  · Oral Hygiene: Adequate  · Lingual: No impairment    Dysphagia evaluation:   Patient consumed trials of thin via cup and straw, pureed, mixed and solid. Adequate oral prep. Timely swallow. No overt signs. sx of aspiration. No oral residue. Recommend continued regular/thin. Speech clear. Cognition appears baseline. MRI pending. ST evaluation pending findings. Tool Used: Dysphagia Outcome and Severity Scale (ROC)    Score Comments   Normal Diet  [x] 7 With no strategies or extra time needed   Functional Swallow  [] 6 May have mild oral or pharyngeal delay   Mild Dysphagia  [] 5 Which may require one diet consistency restricted    Mild-Moderate Dysphagia  [] 4 With 1-2 diet consistencies restricted   Moderate Dysphagia  [] 3 With 2 or more diet consistencies restricted   Moderate-Severe Dysphagia  [] 2 With partial PO strategies (trials with ST only)   Severe Dysphagia  [] 1 With inability to tolerate any PO safely      Score:  Initial:7 Most Recent:  (Date 07/09/21 )   Interpretation of Tool: The Dysphagia Outcome and Severity Scale (ROC) is a simple, easy-to-use, 7-point scale developed to systematically rate the functional severity of dysphagia based on objective assessment and make recommendations for diet level, independence level, and type of nutrition. Current Medications:   No current facility-administered medications on file prior to encounter. Current Outpatient Medications on File Prior to Encounter   Medication Sig Dispense Refill    morphine 15 mg TR12 Take 15 mg by mouth two (2) times a day.  ondansetron (Zofran ODT) 4 mg disintegrating tablet Take 1 Tab by mouth every six (6) hours as needed for Nausea (and vomiting). 30 Tab 0    insulin glargine (Lantus U-100 Insulin) 100 unit/mL injection 43 Units by SubCUTAneous route as needed. Only uses when pump fails.  Has NEVER had to use the insulin.  fluticasone propionate (Flonase Allergy Relief) 50 mcg/actuation nasal spray 2 Sprays by Both Nostrils route as needed.  IP [facility] INSULIN LISPRO, HUMALOG, FOR PUMP Basal rate 3593-7459 - 2 units/H, 9181-6739 2.2 units/H and 4688-5920 2.5 units/H      sodium bicarbonate 650 mg tablet Take 650 mg by mouth two (2) times a day.  rosuvastatin (Crestor) 10 mg tablet Take 10 mg by mouth nightly.  finasteride (PROSCAR) 5 mg tablet Take 5 mg by mouth daily.  levothyroxine (SYNTHROID) 75 mcg tablet Take 75 mcg by mouth Daily (before breakfast).  amitriptyline (ELAVIL) 10 mg tablet Take 30 mg by mouth nightly.  alfuzosin SR (UROXATRAL) 10 mg SR tablet Take 10 mg by mouth nightly.  cholecalciferol (Vitamin D3) 25 mcg (1,000 unit) cap Take 1,000 Units by mouth daily.  aspirin 81 mg chewable tablet Take 81 mg by mouth nightly.  hydrocortisone valerate (WEST-DORI) 0.2 % ointment Apply  to affected area two (2) times daily as needed for Skin Irritation. use thin layer      donepeziL (ARICEPT) 5 mg tablet Take 5 mg by mouth daily.  RABEprazole (Aciphex) 20 mg tablet Take 20 mg by mouth two (2) times a day. (Patient not taking: Reported on 7/8/2021)      buPROPion (WELLBUTRIN) 75 mg tablet Take 75 mg by mouth two (2) times a day.  ondansetron hcl (Zofran) 4 mg tablet Take 4 mg by mouth every eight (8) hours as needed for Nausea or Vomiting.  fluticasone propionate (CUTIVATE) 0.05 % topical cream Apply  to affected area two (2) times a day.           INTERDISCIPLINARY COLLABORATION: RN    After treatment position/precautions:  · Upright in bed  · Call light within reach  · RN notified    Total Treatment Duration:   Time In: 0900  Time Out: [address] MA/CCC/SLP

## 2021-07-09 NOTE — DISCHARGE INSTRUCTIONS
Stroke: After Your Visit     Your Care Instructions  Risk factors for stroke include being overweight, smoking, and sedentary lifestyle. This means that the blood flow to a part of your brain was blocked for some time, which damages the nerve cells in that part of the brain. The part of your body controlled by that part of your brain may not function properly now. The brain is an amazing organ that can heal itself to some degree. The stroke you had damaged part of your brain, but other parts of your brain may take over in some way for the damaged areas. You have already started this process. Going home may be hard for you and your family. The more you can try to do for yourself, the better. Remember to take each day one at a time. Follow-up care is a key part of your treatment and safety. Be sure to make and go to all appointments, and call your doctor if you are having problems. Its also a good idea to know your test results and keep a list of the medicines you take. How can you care for yourself at home? Enter a stroke rehabilitation (rehab) program, if your doctor recommends it. Physical, speech, and occupational therapies can help you manage bathing, dressing, eating, and other basics of daily living. Eat a heart-healthy diet that is low in cholesterol, saturated fat, and salt. Eat lots of fresh fruits and vegetables and foods high in fiber. Increase your activities slowly. Take short rest breaks when you get tired. Gradually increase the amount you walk. Start out by walking a little more than you did the day before. Do not drive until your doctor says it is okay. It is normal to feel sad or depressed after a stroke. If the blues last, talk to your doctor. If you are having problems with urine leakage, go to the bathroom at regular times, including when you first wake up and at bedtime. Also, limit fluids after dinner.   If you are constipated, drink plenty of fluids, enough so that your urine is light yellow or clear like water. If you have kidney, heart, or liver disease and have to limit fluids, talk with your doctor before you increase the amount of fluids you drink. Set up a regular time for using the toilet. If you continue to have constipation, your doctor may suggest using a bulking agent, such as Metamucil, or a stool softener, laxative, or enema. Medicines  Take your medicines exactly as prescribed. Call your doctor if you think you are having a problem with your medicine. You may be taking several medicines. ACE (angiotensin-converting enzyme) inhibitors, angiotensin II receptor blockers (ARBs), beta-blockers, diuretics (water pills), and calcium channel blockers control your blood pressure. Statins help lower cholesterol. Your doctor may also prescribe medicines for depression, pain, sleep problems, anxiety, or agitation. If your doctor has given you medicine that prevents blood clots, such as warfarin (Coumadin), aspirin combined with extended-release dipyridamole (Aggrenox), clopidogrel (Plavix), or aspirin to prevent another stroke, you should:  Tell your dentist, pharmacist, and other health professionals that you take these medicines. Watch for unusual bruising or bleeding, such as blood in your urine, red or black stools, or bleeding from your nose or gums. Get regular blood tests to check your clotting time if you are taking Coumadin. Wear medical alert jewelry that says you take blood thinners. You can buy this at most drugstores. Do not take any over-the-counter medicines or herbal products without talking to your doctor first.  If you take birth control pills or hormone replacement therapy, talk to your doctor about whether they are right for you. For family members and caregivers  Make the home safe. Set up a room so that your loved one does not have to climb stairs. Be sure the bathroom is on the same floor.  Move throw rugs and furniture that could cause falls, and make sure that the lighting is good. Put grab bars and seats in tubs and showers. Find out what your loved one can do and what he or she needs help with. Try not to do things for your loved one that your loved one can do on his or her own. Help him or her learn and practice new skills. Visit and talk with your loved one often. Try doing activities together that you both enjoy, such as playing cards or board games. Keep in touch with your loved one's friends as much as you can, and encourage them to visit. Take care of yourself. Do not try to do everything yourself. Ask other family members to help. Eat well, get enough rest, and take time to do things that you enjoy. Keep up with your own doctor visits, and make sure to take your medicines regularly. Get out of the house as much as you can. Join a local support group. Find out if you qualify for home health care visits to help with rehab or for adult day care. When should you call for help? Call 911 anytime you think you may need emergency care. For example, call if:  You have signs of another stroke. These may include:  Sudden numbness, paralysis, or weakness in your face, arm, or leg, especially on only one side of your body. New problems with walking or balance. Sudden vision changes. Drooling or slurred speech. New problems speaking or understanding simple statements, or you feel confused. A sudden, severe headache that is different from past headaches. Call 911 even if these symptoms go away in a few minutes. You cough up blood. You vomit blood or what looks like coffee grounds. You pass maroon or very bloody stools. Call your doctor now or seek immediate medical care if:  You have new bruises or blood spots under your skin. You have a nosebleed. Your gums bleed when you brush your teeth. You have blood in your urine. Your stools are black and tarlike or have streaks of blood.   You have vaginal bleeding when you are not having your period, or heavy period bleeding. You have new symptoms that may be related to your stroke, such as falls or trouble swallowing. Watch closely for changes in your health, and be sure to contact your doctor if you have any problems. Where can you learn more? Go to [employer]    Enter C294  in the search box to learn more about \"Stroke: After Your Visit\". © 7463-5464 [employer], [employer]orporated. Care instructions adapted under license by [facility] (which disclaims liability or warranty for this information). This care instruction is for use with your licensed healthcare professional. If you have questions about a medical condition or this instruction, always ask your healthcare professional. Joy Richey any warranty or liability for your use of this information.

## 2021-07-09 NOTE — PROGRESS NOTES
Hospitalist Progress Note     Name:  Jesus Anton. Age:76 y.o. Sex:male   :  1945    MRN:  [id number]   Admit Date:  2021  Presenting Complaint: Gait Problem    Initial Admission Diagnosis: Stroke-like symptom [R29.90]     Hospital Course/Interval history:     Patient with past medical history of    DM type 1 on insulin pump   Peripheral neuropathy   Chronic pain   CKD stage III     Patient came to ER due to unsteady gait. He is being worked up for possible stroke. Subjective (21):    21   Patient has not been ambulating much in room. Still being afraid of unsteady gait. Review of Systems: 14 point review of systems is otherwise negative with the exception of the elements mentioned above. Assessment & Plan     Stroke like symptoms   Ataxia   Pending MRI and MRA   TTE no clear evidence of intra-cardiac shunts. On aspirin   On Crestor. Will increase the dose to 20 mg po q day. PT and OT   Monitor BP and will control better beyond permissive hypertension period. Check result of other lab results. Diabetes mellitus type 2  Monitor blood sugar. Cover with insulin sliding scale accordingly. On Lantus     CKD stage 3  Monitor renal function and intake and output. Avoid nephrotoxic agents. Renal function is at baseline. Chronic pain . Avoid NSAIDs. Pain control. Avoid over medication. I have discussed the plan of care with patient. Dispo/Discharge Plannin-3 days     Diet:  ADULT DIET Regular; 3 carb choices (45 gm/meal);  Low Fat/Low Chol/High Fiber/2 gm Na  DVT PPx: heparin SC   Code status: DNR    Objective:     Patient Vitals for the past 24 hrs:   Temp Pulse Resp BP SpO2   21 1200 98 °F (36.7 °C) 94 18 (!) 148/84 98 %   21 1133    (!) 151/89    21 0800 97.9 °F (36.6 °C) 93 18 (!) 155/85 98 %   21 0400 97.7 °F (36.5 °C) 89 18 (!) 151/89 98 %   21 2326 98.2 °F (36.8 °C) 91 18 (!) 150/88 96 %   07/08/21 2207  100 18 (!) 141/80 97 %   07/08/21 1641 98.5 °F (36.9 °C) 100 18 (!) 148/81 99 %     Oxygen Therapy  O2 Sat (%): 98 % (07/09/21 1200)  O2 Device: None (Room air) (07/08/21 1641)    Body mass index is 38.22 kg/m². Physical Exam:   General:  No acute distress, speaking in full sentences, no use of accessory muscles. BMI of 38  Lungs:  Clear to auscultation bilaterally   CV:  Regular rate and rhythm with normal S1 and S2   Abdomen:  Soft, nontender, distended due to truncal obesity , normoactive bowel sounds   Extremities:  No cyanosis clubbing or edema   Neuro:  Nonfocal, A&O x3   Psych:  Normal affect     Data Review:  I have reviewed all labs, meds, and studies from the last 24 hours:    Labs:    Recent Results (from the past 24 hour(s))   CBC WITH AUTOMATED DIFF    Collection Time: 07/08/21  4:45 PM   Result Value Ref Range    WBC 5.6 4.3 - 11.1 K/uL    RBC 4.84 4.23 - 5.6 M/uL    HGB 12.9 (L) 13.6 - 17.2 g/dL    HCT 41.0 (L) 41.1 - 50.3 %    MCV 84.7 79.6 - 97.8 FL    MCH 26.7 26.1 - 32.9 PG    MCHC 31.5 31.4 - 35.0 g/dL    RDW 15.8 (H) 11.9 - 14.6 %    PLATELET 155 384 - 573 K/uL    MPV 10.3 9.4 - 12.3 FL    ABSOLUTE NRBC 0.00 0.0 - 0.2 K/uL    DF AUTOMATED      NEUTROPHILS 75 43 - 78 %    LYMPHOCYTES 18 13 - 44 %    MONOCYTES 6 4.0 - 12.0 %    EOSINOPHILS 1 0.5 - 7.8 %    BASOPHILS 0 0.0 - 2.0 %    IMMATURE GRANULOCYTES 0 0.0 - 5.0 %    ABS. NEUTROPHILS 4.2 1.7 - 8.2 K/UL    ABS. LYMPHOCYTES 1.0 0.5 - 4.6 K/UL    ABS. MONOCYTES 0.3 0.1 - 1.3 K/UL    ABS. EOSINOPHILS 0.0 0.0 - 0.8 K/UL    ABS. BASOPHILS 0.0 0.0 - 0.2 K/UL    ABS. IMM.  GRANS. 0.0 0.0 - 0.5 K/UL   METABOLIC PANEL, BASIC    Collection Time: 07/08/21  4:45 PM   Result Value Ref Range    Sodium 140 136 - 145 mmol/L    Potassium 3.8 3.5 - 5.1 mmol/L    Chloride 106 98 - 107 mmol/L    CO2 27 21 - 32 mmol/L    Anion gap 7 7 - 16 mmol/L    Glucose 207 (H) 65 - 100 mg/dL    BUN 18 8 - 23 MG/DL    Creatinine 1.38 0.8 - 1.5 MG/DL    GFR est AA >60 >60 ml/min/1.73m2    GFR est non-AA 54 (L) >60 ml/min/1.73m2    Calcium 8.7 8.3 - 10.4 MG/DL   PROTHROMBIN TIME + INR    Collection Time: 07/08/21  4:45 PM   Result Value Ref Range    Prothrombin time 13.4 12.5 - 14.7 sec    INR 1.0     TROPONIN-HIGH SENSITIVITY    Collection Time: 07/08/21  4:45 PM   Result Value Ref Range    Troponin-High Sensitivity 8.8 0 - 14 pg/mL   HEMOGLOBIN A1C WITH EAG    Collection Time: 07/08/21  4:45 PM   Result Value Ref Range    Hemoglobin A1c 8.3 (H) 4.2 - 6.3 %    Est. average glucose 192 mg/dL   TSH 3RD GENERATION    Collection Time: 07/08/21  4:45 PM   Result Value Ref Range    TSH 0.882 0.358 - 3.740 uIU/mL   T4, FREE    Collection Time: 07/08/21  4:45 PM   Result Value Ref Range    T4, Free 1.1 0.9 - 1.8 NG/DL   VITAMIN B12    Collection Time: 07/08/21  4:45 PM   Result Value Ref Range    Vitamin B12 262 193 - 986 pg/mL   GLUCOSE, POC    Collection Time: 07/08/21  4:46 PM   Result Value Ref Range    Glucose (POC) 196 (H) 65 - 100 mg/dL    Performed by [facility]    POC PT/INR    Collection Time: 07/08/21  4:50 PM   Result Value Ref Range    Prothrombin time (POC) 13.0 (H) 9.6 - 11.6 SECS    INR (POC) 1.1 0.9 - 1.2     EKG, 12 LEAD, INITIAL    Collection Time: 07/08/21  5:44 PM   Result Value Ref Range    Ventricular Rate 95 BPM    Atrial Rate 95 BPM    P-R Interval 180 ms    QRS Duration 76 ms    Q-T Interval 340 ms    QTC Calculation (Bezet) 427 ms    Calculated P Axis 78 degrees    Calculated R Axis 62 degrees    Calculated T Axis 61 degrees    Diagnosis       Normal sinus rhythm  Normal ECG  When compared with ECG of 05-NOV-2020 16:20,  No significant change was found  Confirmed by [facility] ([id number]) on 7/9/2021 7:06:05 AM     GLUCOSE, POC    Collection Time: 07/08/21 11:24 PM   Result Value Ref Range    Glucose (POC) 176 (H) 65 - 100 mg/dL    Performed by [facility]    CBC W/O DIFF    Collection Time: 07/09/21  4:22 AM   Result Value Ref Range WBC 4.7 4.3 - 11.1 K/uL    RBC 4.85 4.23 - 5.6 M/uL    HGB 12.7 (L) 13.6 - 17.2 g/dL    HCT 41.2 41.1 - 50.3 %    MCV 84.9 79.6 - 97.8 FL    MCH 26.2 26.1 - 32.9 PG    MCHC 30.8 (L) 31.4 - 35.0 g/dL    RDW 15.7 (H) 11.9 - 14.6 %    PLATELET 966 779 - 837 K/uL    MPV 10.9 9.4 - 12.3 FL    ABSOLUTE NRBC 0.00 0.0 - 0.2 K/uL   LIPID PANEL    Collection Time: 07/09/21  4:22 AM   Result Value Ref Range    Cholesterol, total 129 <200 MG/DL    Triglyceride 129 35 - 150 MG/DL    HDL Cholesterol 50 40 - 60 MG/DL    LDL, calculated 53.2 <100 MG/DL    VLDL, calculated 25.8 (H) 6.0 - 23.0 MG/DL    CHOL/HDL Ratio 2.6     METABOLIC PANEL, BASIC    Collection Time: 07/09/21  4:22 AM   Result Value Ref Range    Sodium 140 136 - 145 mmol/L    Potassium 3.6 3.5 - 5.1 mmol/L    Chloride 108 (H) 98 - 107 mmol/L    CO2 25 21 - 32 mmol/L    Anion gap 7 7 - 16 mmol/L    Glucose 189 (H) 65 - 100 mg/dL    BUN 18 8 - 23 MG/DL    Creatinine 1.20 0.8 - 1.5 MG/DL    GFR est AA >60 >60 ml/min/1.73m2    GFR est non-AA >60 >60 ml/min/1.73m2    Calcium 9.0 8.3 - 10.4 MG/DL   GLUCOSE, POC    Collection Time: 07/09/21  6:35 AM   Result Value Ref Range    Glucose (POC) 187 (H) 65 - 100 mg/dL    Performed by [address] [place] [address], POC    Collection Time: 07/09/21 11:21 AM   Result Value Ref Range    Glucose (POC) 262 (H) 65 - 100 mg/dL    Performed by [facility]        All Micro Results     None          EKG Results     Procedure 720 Value Units Date/Time    Initial ECG [[id number]] Collected: 07/08/21 6764    Order Status: Completed Updated: 07/09/21 0706     Ventricular Rate 95 BPM      Atrial Rate 95 BPM      P-R Interval 180 ms      QRS Duration 76 ms      Q-T Interval 340 ms      QTC Calculation (Bezet) 427 ms      Calculated P Axis 78 degrees      Calculated R Axis 62 degrees      Calculated T Axis 61 degrees      Diagnosis --     Normal sinus rhythm  Normal ECG  When compared with ECG of 05-NOV-2020 16:20,  No significant change was found  Confirmed by William Cota ([id number]) on 7/9/2021 7:06:05 AM            Other Studies:  CT CODE NEURO HEAD WO CONTRAST    Result Date: 7/8/2021  CT HEAD WITHOUT CONTRAST, 7/8/2021 History: Difficulty walking. Comparison: None Technique:   5 mm axial scans from the skull base to the vertex. All CT scans performed at this facility use one or all of the following: Automated exposure control, adjustment of the mA and/or kVp according to patient's size, iterative reconstruction. Findings:  No evidence of intracranial hemorrhage is seen. Mild bilateral basal ganglia densities are seen which appear to represent calcification. No abnormal extra-axial fluid collections are seen. Moderate age-appropriate chronic cortical volume loss is seen. No evidence for acute hydrocephalus is seen. No evidence of midline shift or herniation is seen. No abnormal edema pattern is seen in a vascular distribution to suggest large artery infarction. Only ill-defined periventricular white matter hypoattenuation is seen favored to be chronic most likely representing moderate chronic micro and hepatic changes. The basilar artery does appear hyperdense. Evaluation with bone windows shows no acute osseous changes. The visualized sinuses, middle ears, and mastoid air cells are well aerated. 1.  No acute intracranial process evident by noncontrast CT study of the head. Only hyperdensity of the basilar artery is seen. This can be an indicator of occlusion although could easily result from attenuation artifact. Recommend correlation with clinical findings. This can be further assessed with CTA if clinically indicated. This report was made using voice transcription. Despite my best efforts to avoid any, transcription errors may persist. If there is any question about the accuracy of the report or need for clarification, then please call [phone], or text me through [email or web address] for clarification or correction.      ECHO ADULT COMPLETE    Result Date: 7/9/2021  · LV: Estimated LVEF is 55 - 60%. Normal cavity size, wall thickness and systolic function (ejection fraction normal). Left ventricular diastolic dysfunction. · Contrast used: DEFINITY. · Saline contrast was given to evaluate for intracardiac shunt. · AV: With no significant stenosis.         Current Meds:   Current Facility-Administered Medications   Medication Dose Route Frequency    insulin lispro (HUMALOG) injection   SubCUTAneous AC&HS    insulin glargine (LANTUS) injection 20 Units  20 Units SubCUTAneous BID    tamsulosin (FLOMAX) capsule 0.4 mg  0.4 mg Oral DAILY    amitriptyline (ELAVIL) tablet 30 mg  30 mg Oral QHS    aspirin chewable tablet 81 mg  81 mg Oral QHS    buPROPion (WELLBUTRIN) tablet 75 mg  75 mg Oral BID    donepeziL (ARICEPT) tablet 5 mg  5 mg Oral DAILY    finasteride (PROSCAR) tablet 5 mg  5 mg Oral DAILY    levothyroxine (SYNTHROID) tablet 75 mcg  75 mcg Oral ACB    morphine CR (MS CONTIN) tablet 15 mg  15 mg Oral Q12H    rosuvastatin (CRESTOR) tablet 10 mg  10 mg Oral QHS    sodium bicarbonate tablet 650 mg  650 mg Oral BID    tuberculin injection 5 Units  5 Units IntraDERMal ONCE    sodium chloride (NS) flush 5-40 mL  5-40 mL IntraVENous Q8H    sodium chloride (NS) flush 5-40 mL  5-40 mL IntraVENous PRN    ondansetron (ZOFRAN) injection 4 mg  4 mg IntraVENous Q4H PRN    acetaminophen (TYLENOL) tablet 650 mg  650 mg Oral Q4H PRN    acetaminophen (TYLENOL) suppository 650 mg  650 mg Rectal Q4H PRN    labetaloL (NORMODYNE;TRANDATE) injection 5 mg  5 mg IntraVENous Q10MIN PRN    bisacodyL (DULCOLAX) suppository 10 mg  10 mg Rectal DAILY PRN    famotidine (PEPCID) tablet 20 mg  20 mg Oral BID PRN    heparin (porcine) injection 5,000 Units  5,000 Units SubCUTAneous Q8H    thiamine HCL (B-1) tablet 100 mg  100 mg Oral DAILY       Problem List:  Hospital Problems as of 7/9/2021 Date Reviewed: 12/29/2020        Codes Class Noted - Resolved POA    * (Principal) Stroke-like symptom ICD-10-CM: R29.90  ICD-9-CM: 781.99  7/8/2021 - Present Unknown        BPH (benign prostatic hyperplasia) ICD-10-CM: N40.0  ICD-9-CM: 600.00  7/8/2021 - Present Unknown        Chronic pain ICD-10-CM: G89.29  ICD-9-CM: 338.29  7/8/2021 - Present Unknown        Neuropathy ICD-10-CM: G62.9  ICD-9-CM: 355.9  7/8/2021 - Present Unknown        CKD (chronic kidney disease) stage 3, GFR 30-59 ml/min (formerly [facility]) ICD-10-CM: N18.30  ICD-9-CM: 585.3  7/8/2021 - Present Unknown        BMI 38.0-38.9,adult ICD-10-CM: H35.87  ICD-9-CM: V85.38  7/8/2021 - Present Unknown        DM (diabetes mellitus) type I controlled with renal manifestation [facility]) ICD-10-CM: E10.29  ICD-9-CM: 250.41  10/4/2020 - Present Yes                   Part of this note was written by using a voice dictation software and the note has been proof read but may still contain some grammatical/other typographical errors.     Signed By: Zheng Schroeder MD   [employer] [facility]    July 9, 2021  5:15 PM

## 2021-07-09 NOTE — ED NOTES
TRANSFER - OUT REPORT:    Verbal report given to Mckinley flores RN on [employer]  being transferred to [phone] for routine progression of care       Report consisted of patients Situation, Background, Assessment and   Recommendations(SBAR). Information from the following report(s) SBAR was reviewed with the receiving nurse. Lines:   Peripheral IV 07/08/21 Left Antecubital (Active)   Site Assessment Clean, dry, & intact 07/08/21 2209   Phlebitis Assessment 0 07/08/21 2209   Infiltration Assessment 0 07/08/21 2209   Dressing Status Clean, dry, & intact 07/08/21 2209        Opportunity for questions and clarification was provided.       Patient transported with:   Registered Nurse

## 2021-07-09 NOTE — PROGRESS NOTES
07/09/21 1841   NIH Stroke Scale   Interval Handoff/Transfer  (gaudencio ledesma)   Level of Conciousness (1a) 0   LOC Questions (1b) 0   LOC Commands (1c) 0   Best Gaze (2) 0   Visual (3) 0   Facial Palsy (4) 0   Motor Arm, Left (5a) 0   Motor Arm, Right (5b) 0   Motor Leg, Left (6a) 0   Motor Leg, Right (6b) 0   Limb Ataxia (7) 0   Sensory (8) 0   Best Language (9) 0   Dysarthria (10) 0   Extinction and Inattention (11) 0   Total 0

## 2021-07-09 NOTE — H&P
[facility] History and Physical       Name:  Lashon Penn. Age:76 y.o. Sex:male   :  1945    MRN:  [id number]   PCP:  Leyla Lemon DO      Admit Date:  2021  4:46 PM   Chief Complaint: Unsteady gait and walking towards the right. Reason for Admission:   Stroke-like symptom [R29.90]    Assessment & Plan:     #Strokelike symptom: Ataxia. Neurology recommended stroke protocol. MRI and MRA without contrast ordered. I have placed order not to give contrast to the patient. Echo, TSH, HbA1c, lipid profile ordered. As per patient he has already called his private neurologist and they want him to take aspirin now. Aspirin 81 resumed. Continue Crestor. May need to go up on Crestor based on clinical response. PT OT and other subspecialty consult ordered as per hospital stroke protocol. Permissive hypertension as per stroke protocol. Remote telemetry monitoring. Neuro check as per hospital protocol. Every shift orthostatic vitals. #Diabetes mellitus: Discuss about patient insulin pump. Is about to run out of his insulin solution. Discussed about Lantus and lispro during hospitalization and he would like to transition to Lantus and lispro. Is dealing with low blood sugar at home. He was supposed to take 43 units of Lantus whenever his pump is not working. Family would like to have lower dose of insulin. 20 units of Lantus twice daily ordered. Order placed and discussed with the nurse to take insulin pump off after 2 hours of first-line discharge. Sliding scale insulin. May need to adjust insulin based on blood sugar. #CKD stage III: At baseline. Will monitor. #Chronic pain: Morphine ordered. #Peripheral neuropathy: Amitriptyline ordered. #Ataxia: Checking vitamin B12, thiamine level. Started patient on thiamine level. Diet: DIET NPO  DIET ADULT  Code status: Prior    Heparin for DVT prophylaxis.   Adult diet with 45 g of carbohydrate and cardiac diet. Patient is AOx3. His wife was present at bedside. Patient wants to be DNR and DNI. He does not want  Intubation even in case of only respiratory failure. His wife respected his wishes. Both verbalized understanding that patient condition meditated deteriorate in spite of treatment. Benefit and side effect of permissive hypertension discussed with the family and they would like to pursue permissive hypertension. History of Presenting Illness:     Nadeen Salgado is a 78 y.o. male with medical history of type 1 diabetes mellitus on insulin pump, peripheral neuropathy, chronic pain, and CKD stage III presented to emergency room with unsteady gait. Patient is AOx3 and his wife was present at bedside who is power of . Both provided history for the patient. Patient has longstanding history of type 1 diabetes mellitus and uses insulin pump. He has also neuropathy at one point he was on Lyrica but now off Lyrica. As per patient he has previous history of unsteady gait but at that time he used to have orthostatic hypotension but not anymore. From last 2 days he has noticed that he is walking towards the right in spite of using walker which is unusual symptoms. It is not getting better  so his wife made him to come to the emergency room. When he arrived to the emergency room, code stroke was called. Patient was evaluated by neurology who recommended stroke work-up. Diabetes mellitus: Patient is using insulin pump. He could not tell me the setting and he does not have his glasses so unable to verify anything. He is managing insulin pump for long time. As per patient he is dealing with hypoglycemia at night and is working with his endocrinologist on the pump setting. His blood sugar currently is 198. Chronic pain: As per patient his chronic back pain for that he is taking morphine 15 mg extended release twice daily for long time.     CKD stage III: Seems like patient has CKD stage III based on review of records at care everywhere and in our system. As per patient his nephrologist has told him not to get any contrast whether it is CT scan or MRI and he does not want us to give him any contrast with imaging. He denies any active cigarette/alcohol or illicit drug use. There is a past diagnosis of alcohol use in patient's chart although he denies any alcohol use. Family history positive for diabetes in the family. Past medical history, social history, surgical history, family history and emergency room course noted. I have reviewed care everywhere and last endocrinology note and noted his insulin pump setting. Denies any nausea, vomiting, chest pain, shortness of breath or palpitation. Review of Systems:  A 14 point review of systems was taken and pertinent positive as per HPI.         Past Medical History:   Diagnosis Date    Acquired hypothyroidism     Aphonia     Back pain, chronic     Benign prostatic hyperplasia with incomplete bladder emptying     Broken bones     Candida laryngitis     Charcot foot due to diabetes mellitus ([address]     Chronic kidney disease     CKD     Chronic left-sided low back pain with left-sided sciatica     Diabetes (HCC)     TYPE I, IDDM, uses Pump; last A1c 10.20/20 was 7.7; ss of hypo 70    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)     Diabetic nephropathy ([address]     Diabetic peripheral neuropathy (HCC)     Diabetic retinopathy (HCC)     Dysphonia     GERD (gastroesophageal reflux disease)     managed with medications    HLD (hyperlipidemia)     Hypertension     after HTN meds had ORthostatic and all HTN meds discontinued    Insulin pump in place     Left hip pain     Lumbar radiculopathy     MCI (mild cognitive impairment) with memory loss     Mild cognitive impairment     Moderate episode of recurrent major depressive disorder ([address]     managed with medications    Orthostatic hypotension     LISA (obstructive sleep apnea)     no CPAP    Paraspinal muscle spasm     Polypharmacy     S/P lumbar fusion     Seborrheic keratosis     Spinal cord stimulator status     Spondylolisthesis     Tachycardia     Thyroid disease     managed with medications    Trochanteric bursitis of left hip     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)     Type 1 diabetes mellitus with hyperglycemia (HCC)     Type 1 diabetes mellitus with hypoglycemia ([address]     Vitreous hemorrhage due to type 1 diabetes mellitus ([address]     Vocal cord atrophy     Weakness        Past Surgical History:   Procedure Laterality Date    HX AMPUTATION TOE      distal portion of the 2nd toe of R, foot, distal part of 3rd toe of r foot    HX BUNIONECTOMY Right     HX CATARACT REMOVAL Bilateral     HX COLONOSCOPY  2016    HX HEENT Bilateral     laser treatment for diabetic complication    HX LUMBAR FUSION      L4-5    HX LUMBAR LAMINECTOMY      L5-S1    HX OTHER SURGICAL      spinal neurostimulator    HX SHOULDER ARTHROSCOPY Left     HX TONSIL AND ADENOIDECTOMY      at age 8 per Pt       Family History : reviewed  Family History   Problem Relation Age of Onset    Heart Disease Father         Social History     Tobacco Use    Smoking status: Former Smoker     Quit date: 1996     Years since quittin.8    Smokeless tobacco: Former User   Substance Use Topics    Alcohol use: Not Currently       Allergies   Allergen Reactions    Drixomed [Dexbrompheniramine-Pseudoephed] Palpitations       Immunization History   Administered Date(s) Administered    Covid-19, MODERNA, Mrna, Lnp-s, Pf, 100mcg/0.5mL 2021    Influenza High Dose Vaccine PF 10/15/2018, 10/10/2019, 10/08/2020    Pneumococcal Conjugate (PCV-13) 2016    Pneumococcal Polysaccharide (PPSV-23) 2016    Tetanus Toxoid, Adsorbed 2012    Zoster Vaccine, Live 2011         PTA Medications:  Current Outpatient Medications   Medication Instructions    Aciphex 20 mg, 2 TIMES DAILY    alfuzosin SR (UROXATRAL) 10 mg, Oral, EVERY BEDTIME    amitriptyline (ELAVIL) 30 mg, Oral, EVERY BEDTIME    aspirin 81 mg, EVERY BEDTIME    buPROPion (WELLBUTRIN) 75 mg, Oral, 2 TIMES DAILY    cholecalciferol (VITAMIN D3) 1,000 Units, Oral, DAILY    donepeziL (ARICEPT) 5 mg, Oral, DAILY    finasteride (PROSCAR) 5 mg, Oral, DAILY    fluticasone propionate (CUTIVATE) 0.05 % topical cream Topical, 2 TIMES DAILY    fluticasone propionate (Flonase Allergy Relief) 50 mcg/actuation nasal spray 2 Sprays, Both Nostrils, AS NEEDED    hydrocortisone valerate (WEST-DORI) 0.2 % ointment Topical, 2 TIMES DAILY AS NEEDED, use thin layer     insulin glargine (LANTUS U-100 INSULIN) 43 Units, SubCUTAneous, AS NEEDED, Only uses when pump fails. Has NEVER had to use the insulin.  IP [facility] INSULIN LISPRO, HUMALOG, FOR PUMP Basal rate 5190-4900 - 2 units/H, 5213-4915 2.2 units/H and 6875-3636 2.5 units/H    levothyroxine (SYNTHROID) 75 mcg, Oral, DAILY BEFORE BREAKFAST    morphine 15 mg, Oral, 2 TIMES DAILY    ondansetron (ZOFRAN ODT) 4 mg, Oral, EVERY 6 HOURS AS NEEDED    ondansetron hcl (ZOFRAN) 4 mg, Oral, EVERY 8 HOURS AS NEEDED    rosuvastatin (CRESTOR) 10 mg, Oral, EVERY BEDTIME    sodium bicarbonate 650 mg, Oral, 2 TIMES DAILY       Objective:     Patient Vitals for the past 24 hrs:   Temp Pulse Resp BP SpO2   07/08/21 1641 98.5 °F (36.9 °C) 100 18 (!) 148/81 99 %       Oxygen Therapy  O2 Sat (%): 99 % (07/08/21 1641)  O2 Device: None (Room air) (07/08/21 1641)    Body mass index is 38.22 kg/m². Physical Exam:    General:   No acute distress, speaking in full sentences. BMI 38.2. Examination limited secondary to BMI.   HEENT:  Pupils equal and reactive to light, extraocular muscle seems intact, no facial droop, mucous membrane not dry  Neck:   Supple  no JVD   Lungs:  Decreased air entry on the right side with history of trapped lung otherwise clear to auscultation bilaterally   CV:   Regular rate and rhythm with normal S1 and S2   Abdomen:  Soft, nontender, nondistended, normoactive bowel sounds   Extremities:  No cyanosis clubbing or edema   Neuro:  AOx3, moving all 4 extremities, motor 5 out of 5, finger-to-nose upper extremity intact, no pronator drift. No facial droop. Speech normal.  Unable to check patient gait on patient request as he is attached to a cardiac monitor. Patient is already evaluated by neurologist.  Psych:  Normal mood and affect       Data Reviewed: I have reviewed all labs, meds, and studies. Recent Results (from the past 24 hour(s))   CBC WITH AUTOMATED DIFF    Collection Time: 07/08/21  4:45 PM   Result Value Ref Range    WBC 5.6 4.3 - 11.1 K/uL    RBC 4.84 4.23 - 5.6 M/uL    HGB 12.9 (L) 13.6 - 17.2 g/dL    HCT 41.0 (L) 41.1 - 50.3 %    MCV 84.7 79.6 - 97.8 FL    MCH 26.7 26.1 - 32.9 PG    MCHC 31.5 31.4 - 35.0 g/dL    RDW 15.8 (H) 11.9 - 14.6 %    PLATELET 524 080 - 237 K/uL    MPV 10.3 9.4 - 12.3 FL    ABSOLUTE NRBC 0.00 0.0 - 0.2 K/uL    DF AUTOMATED      NEUTROPHILS 75 43 - 78 %    LYMPHOCYTES 18 13 - 44 %    MONOCYTES 6 4.0 - 12.0 %    EOSINOPHILS 1 0.5 - 7.8 %    BASOPHILS 0 0.0 - 2.0 %    IMMATURE GRANULOCYTES 0 0.0 - 5.0 %    ABS. NEUTROPHILS 4.2 1.7 - 8.2 K/UL    ABS. LYMPHOCYTES 1.0 0.5 - 4.6 K/UL    ABS. MONOCYTES 0.3 0.1 - 1.3 K/UL    ABS. EOSINOPHILS 0.0 0.0 - 0.8 K/UL    ABS. BASOPHILS 0.0 0.0 - 0.2 K/UL    ABS. IMM.  GRANS. 0.0 0.0 - 0.5 K/UL   METABOLIC PANEL, BASIC    Collection Time: 07/08/21  4:45 PM   Result Value Ref Range    Sodium 140 136 - 145 mmol/L    Potassium 3.8 3.5 - 5.1 mmol/L    Chloride 106 98 - 107 mmol/L    CO2 27 21 - 32 mmol/L    Anion gap 7 7 - 16 mmol/L    Glucose 207 (H) 65 - 100 mg/dL    BUN 18 8 - 23 MG/DL    Creatinine 1.38 0.8 - 1.5 MG/DL    GFR est AA >60 >60 ml/min/1.73m2    GFR est non-AA 54 (L) >60 ml/min/1.73m2    Calcium 8.7 8.3 - 10.4 MG/DL PROTHROMBIN TIME + INR    Collection Time: 07/08/21  4:45 PM   Result Value Ref Range    Prothrombin time 13.4 12.5 - 14.7 sec    INR 1.0     TROPONIN-HIGH SENSITIVITY    Collection Time: 07/08/21  4:45 PM   Result Value Ref Range    Troponin-High Sensitivity 8.8 0 - 14 pg/mL   GLUCOSE, POC    Collection Time: 07/08/21  4:46 PM   Result Value Ref Range    Glucose (POC) 196 (H) 65 - 100 mg/dL    Performed by Cleopatra    POC PT/INR    Collection Time: 07/08/21  4:50 PM   Result Value Ref Range    Prothrombin time (POC) 13.0 (H) 9.6 - 11.6 SECS    INR (POC) 1.1 0.9 - 1.2     EKG, 12 LEAD, INITIAL    Collection Time: 07/08/21  5:44 PM   Result Value Ref Range    Ventricular Rate 95 BPM    Atrial Rate 95 BPM    P-R Interval 180 ms    QRS Duration 76 ms    Q-T Interval 340 ms    QTC Calculation (Bezet) 427 ms    Calculated P Axis 78 degrees    Calculated R Axis 62 degrees    Calculated T Axis 61 degrees    Diagnosis       !! AGE AND GENDER SPECIFIC ECG ANALYSIS !! Normal sinus rhythm  Normal ECG  When compared with ECG of 05-NOV-2020 16:20,  No significant change was found         EKG Results     Procedure 720 Value Units Date/Time    Initial ECG [[id number]] Collected: 07/08/21 1744    Order Status: Completed Updated: 07/08/21 1846     Ventricular Rate 95 BPM      Atrial Rate 95 BPM      P-R Interval 180 ms      QRS Duration 76 ms      Q-T Interval 340 ms      QTC Calculation (Bezet) 427 ms      Calculated P Axis 78 degrees      Calculated R Axis 62 degrees      Calculated T Axis 61 degrees      Diagnosis --     !! AGE AND GENDER SPECIFIC ECG ANALYSIS !! Normal sinus rhythm  Normal ECG  When compared with ECG of 05-NOV-2020 16:20,  No significant change was found            All Micro Results     None          Other Studies:  CT CODE NEURO HEAD WO CONTRAST    Result Date: 7/8/2021  CT HEAD WITHOUT CONTRAST, 7/8/2021 History: Difficulty walking.  Comparison: None Technique:   5 mm axial scans from the skull base to the vertex. All CT scans performed at this facility use one or all of the following: Automated exposure control, adjustment of the mA and/or kVp according to patient's size, iterative reconstruction. Findings:  No evidence of intracranial hemorrhage is seen. Mild bilateral basal ganglia densities are seen which appear to represent calcification. No abnormal extra-axial fluid collections are seen. Moderate age-appropriate chronic cortical volume loss is seen. No evidence for acute hydrocephalus is seen. No evidence of midline shift or herniation is seen. No abnormal edema pattern is seen in a vascular distribution to suggest large artery infarction. Only ill-defined periventricular white matter hypoattenuation is seen favored to be chronic most likely representing moderate chronic micro and hepatic changes. The basilar artery does appear hyperdense. Evaluation with bone windows shows no acute osseous changes. The visualized sinuses, middle ears, and mastoid air cells are well aerated. 1.  No acute intracranial process evident by noncontrast CT study of the head. Only hyperdensity of the basilar artery is seen. This can be an indicator of occlusion although could easily result from attenuation artifact. Recommend correlation with clinical findings. This can be further assessed with CTA if clinically indicated. This report was made using voice transcription. Despite my best efforts to avoid any, transcription errors may persist. If there is any question about the accuracy of the report or need for clarification, then please call [phone], or text me through perfectserv for clarification or correction.          Medications:  Medications Administered      Medications Administered     insulin glargine (LANTUS) injection 20 Units     Admin Date  07/08/2021 Action  Given Dose  20 Units Route  SubCUTAneous Administered By  Jose Miguel Valdez RN                      Problem List:     Hospital Problems as of 7/8/2021 Date Reviewed: 12/29/2020        Codes Class Noted - Resolved POA    Stroke-like symptom ICD-10-CM: R29.90  ICD-9-CM: 781.99  7/8/2021 - Present Unknown        BPH (benign prostatic hyperplasia) ICD-10-CM: N40.0  ICD-9-CM: 600.00  7/8/2021 - Present Unknown        Chronic pain ICD-10-CM: G89.29  ICD-9-CM: 338.29  7/8/2021 - Present Unknown        Neuropathy ICD-10-CM: G62.9  ICD-9-CM: 355.9  7/8/2021 - Present Unknown        DM (diabetes mellitus) type I controlled with renal manifestation ([facility] ICD-10-CM: E10.29  ICD-9-CM: 250.41  10/4/2020 - Present Yes                  Signed By: Kimberlee Gudino MD   [employer] [facility]    July 8, 2021

## 2021-07-09 NOTE — PROGRESS NOTES
TRANSFER - IN REPORT:    Verbal report received from Saurav Sim, [facility] (name) on [facility].  being received from ED (unit) for routine progression of care      Report consisted of patients Situation, Background, Assessment and   Recommendations(SBAR). Information from the following report(s) SBAR, Kardex, Intake/Output, MAR and Recent Results was reviewed with the receiving nurse. Opportunity for questions and clarification was provided. Assessment to be completed upon patients arrival to unit and care assumed.

## 2021-07-09 NOTE — CONSULTS
Damaris Wells MD  Medical Director  [address] [place] [address], [address] [facility]  Tel: [phone]       Physical Medicine & Rehabilitation Consult    Subjective:     Date of Consultation:  July 9, 2021  Referring Provider: Dr Harpal Cervantes. is a 78 y.o. male who is being evaluated at the request of the Hospitalist service for rehabilitation recommendations s/p following presentation of ataxia ; Code S    HPI: The patient is a r-hand dominant, previously functionally independent 70yo male with a PMH of DM type 1, CKD, chronic pain, diabetic neuropathy, orthostatic hypotension and hypothyroidism who prsented to the ED on 7/8 with complaints of gait difficulties, ataxia and falls. Head CT neg x for a hyperdensity of the basilar artery . MRI/MRA pending. Cannot have contrast due to renal dz. He has no swallowing difficulties. He reports that he walked down the chin today and felt pretty steady and that PT told him he needed \"some outpt therapy program.\" I am assuming Vestibular. He is on ASA and Plavix, DAPT x 21 d. He has been placed on hi intensity statin as well.        Active Problems:    DM (diabetes mellitus) type I controlled with renal manifestation ([facility]) (10/4/2020)      Stroke-like symptom (7/8/2021)      BPH (benign prostatic hyperplasia) (7/8/2021)      Chronic pain (7/8/2021)      Neuropathy (7/8/2021)      CKD (chronic kidney disease) stage 3, GFR 30-59 ml/min ([facility]) (7/8/2021)      BMI 38.0-38.9,adult (7/8/2021)      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)     Diabetic nephropathy ([address]     Diabetic peripheral neuropathy (HCC)     Diabetic retinopathy ([address]     Dysphonia     GERD (gastroesophageal reflux disease)     managed with medications    HLD (hyperlipidemia)     Hypertension     after HTN meds had ORthostatic and all HTN meds discontinued    Insulin pump in place     Left hip pain     Lumbar radiculopathy     MCI (mild cognitive impairment) with memory loss     Mild cognitive impairment     Moderate episode of recurrent major depressive disorder (HCC)     managed with medications    Orthostatic hypotension     LISA (obstructive sleep apnea)     no CPAP    Paraspinal muscle spasm     Polypharmacy     S/P lumbar fusion     Seborrheic keratosis     Spinal cord stimulator status     Spondylolisthesis     Tachycardia     Thyroid disease     managed with medications    Trochanteric bursitis of left hip     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)     Type 1 diabetes mellitus with hyperglycemia (HCC)     Type 1 diabetes mellitus with hypoglycemia ([address]     Vitreous hemorrhage due to type 1 diabetes mellitus ([address]     Vocal cord atrophy     Weakness       Past Surgical History:   Procedure Laterality Date    HX AMPUTATION TOE      distal portion of the 2nd toe of R, foot, distal part of 3rd toe of r foot    HX BUNIONECTOMY Right     HX CATARACT REMOVAL Bilateral     HX COLONOSCOPY  2016    HX HEENT Bilateral     laser treatment for diabetic complication    HX LUMBAR FUSION  2010    L4-5    HX LUMBAR LAMINECTOMY  2008    L5-S1    HX OTHER SURGICAL      spinal neurostimulator    HX SHOULDER ARTHROSCOPY Left     HX TONSIL AND ADENOIDECTOMY      at age 8 per Pt      Family History   Problem Relation Age of Onset    Heart Disease Father       Social History     Tobacco Use    Smoking status: Former Smoker     Quit date: 9/1/1996 Years since quittin.8    Smokeless tobacco: Former User   Substance Use Topics    Alcohol use: Not Currently     Prior to Admission medications    Medication Sig Start Date End Date Taking? Authorizing Provider   morphine 15 mg TR12 Take 15 mg by mouth two (2) times a day. Yes Provider, Historical   ondansetron (Zofran ODT) 4 mg disintegrating tablet Take 1 Tab by mouth every six (6) hours as needed for Nausea (and vomiting). 20  Yes Macrina Spicer, NP   insulin glargine (Lantus U-100 Insulin) 100 unit/mL injection 43 Units by SubCUTAneous route as needed. Only uses when pump fails. Has NEVER had to use the insulin. Yes Provider, Historical   fluticasone propionate (Flonase Allergy Relief) 50 mcg/actuation nasal spray 2 Sprays by Both Nostrils route as needed. Yes Provider, Historical   IP [facility] INSULIN LISPRO, HUMALOG, FOR PUMP Basal rate 1657-9657 - 2 units/H, 6557-7117 2.2 units/H and 4914-2507 2.5 units/H   Yes Provider, Historical   sodium bicarbonate 650 mg tablet Take 650 mg by mouth two (2) times a day. Yes Provider, Historical   rosuvastatin (Crestor) 10 mg tablet Take 10 mg by mouth nightly. Yes Provider, Historical   finasteride (PROSCAR) 5 mg tablet Take 5 mg by mouth daily. Yes Provider, Historical   levothyroxine (SYNTHROID) 75 mcg tablet Take 75 mcg by mouth Daily (before breakfast). Yes Provider, Historical   amitriptyline (ELAVIL) 10 mg tablet Take 30 mg by mouth nightly. Yes Provider, Historical   alfuzosin SR (UROXATRAL) 10 mg SR tablet Take 10 mg by mouth nightly. Yes Provider, Historical   cholecalciferol (Vitamin D3) 25 mcg (1,000 unit) cap Take 1,000 Units by mouth daily. Yes Provider, Historical   aspirin 81 mg chewable tablet Take 81 mg by mouth nightly. Yes Provider, Historical   hydrocortisone valerate (WEST-DORI) 0.2 % ointment Apply  to affected area two (2) times daily as needed for Skin Irritation.  use thin layer   Yes Provider, Historical donepeziL (ARICEPT) 5 mg tablet Take 5 mg by mouth daily. Yes Provider, Historical   RABEprazole (Aciphex) 20 mg tablet Take 20 mg by mouth two (2) times a day. Patient not taking: Reported on 2021    Provider, Historical   buPROPion [facility]) 75 mg tablet Take 75 mg by mouth two (2) times a day. Provider, Historical   ondansetron hcl (Zofran) 4 mg tablet Take 4 mg by mouth every eight (8) hours as needed for Nausea or Vomiting. Provider, Historical   fluticasone propionate (CUTIVATE) 0.05 % topical cream Apply  to affected area two (2) times a day. Provider, Historical     Allergies   Allergen Reactions    Drixomed [Dexbrompheniramine-Pseudoephed] Palpitations        Review of Systems:  A comprehensive review of systems was negative except for that written in the HPI. Objective:     Vitals:  Blood pressure (!) 155/85, pulse 93, temperature 97.9 °F (36.6 °C), resp. rate 18, height 5' 7\" (1.702 m), weight 244 lb (110.7 kg), SpO2 98 %. Temp (24hrs), Av.1 °F (36.7 °C), Min:97.7 °F (36.5 °C), Max:98.5 °F (36.9 °C)      Intake and Output:   1901 -  0700  In: -   Out: 9574 [DAESX:[id number]]    Physical Exam:  General:  Alert, oriented and mood affect appropriate; pt is [place]   Lungs:   Clear to auscultation bilaterally. Heart:  Regular rate and rhythm, S1, S2 stable, no murmur, click, rub or gallop. Abdomen:   Soft, non-tender. Bowel sounds present. No masses,  No organomegaly. Genitourinary: defere   Neuro Muscular: PERRLA EOMI,no nystagmus  UE Strength 5/5 throughout  LEs 4+  Nl muscle bulk and tone  Mild dysmetria bilaterally  No appreciable drift. Areflexic at the patella and achilles, 1+ in UEs  Sensory deficits all 4 ext   Skin:  No rashes, lesions, or signs/symptoms or infection.        Labs/Studies:  Recent Results (from the past 72 hour(s))   CBC WITH AUTOMATED DIFF    Collection Time: 21  4:45 PM   Result Value Ref Range    WBC 5.6 4.3 - 11.1 K/uL    RBC 4.84 4.23 - 5.6 M/uL    HGB 12.9 (L) 13.6 - 17.2 g/dL    HCT 41.0 (L) 41.1 - 50.3 %    MCV 84.7 79.6 - 97.8 FL    MCH 26.7 26.1 - 32.9 PG    MCHC 31.5 31.4 - 35.0 g/dL    RDW 15.8 (H) 11.9 - 14.6 %    PLATELET 232 301 - 281 K/uL    MPV 10.3 9.4 - 12.3 FL    ABSOLUTE NRBC 0.00 0.0 - 0.2 K/uL    DF AUTOMATED      NEUTROPHILS 75 43 - 78 %    LYMPHOCYTES 18 13 - 44 %    MONOCYTES 6 4.0 - 12.0 %    EOSINOPHILS 1 0.5 - 7.8 %    BASOPHILS 0 0.0 - 2.0 %    IMMATURE GRANULOCYTES 0 0.0 - 5.0 %    ABS. NEUTROPHILS 4.2 1.7 - 8.2 K/UL    ABS. LYMPHOCYTES 1.0 0.5 - 4.6 K/UL    ABS. MONOCYTES 0.3 0.1 - 1.3 K/UL    ABS. EOSINOPHILS 0.0 0.0 - 0.8 K/UL    ABS. BASOPHILS 0.0 0.0 - 0.2 K/UL    ABS. IMM.  GRANS. 0.0 0.0 - 0.5 K/UL   METABOLIC PANEL, BASIC    Collection Time: 07/08/21  4:45 PM   Result Value Ref Range    Sodium 140 136 - 145 mmol/L    Potassium 3.8 3.5 - 5.1 mmol/L    Chloride 106 98 - 107 mmol/L    CO2 27 21 - 32 mmol/L    Anion gap 7 7 - 16 mmol/L    Glucose 207 (H) 65 - 100 mg/dL    BUN 18 8 - 23 MG/DL    Creatinine 1.38 0.8 - 1.5 MG/DL    GFR est AA >60 >60 ml/min/1.73m2    GFR est non-AA 54 (L) >60 ml/min/1.73m2    Calcium 8.7 8.3 - 10.4 MG/DL   PROTHROMBIN TIME + INR    Collection Time: 07/08/21  4:45 PM   Result Value Ref Range    Prothrombin time 13.4 12.5 - 14.7 sec    INR 1.0     TROPONIN-HIGH SENSITIVITY    Collection Time: 07/08/21  4:45 PM   Result Value Ref Range    Troponin-High Sensitivity 8.8 0 - 14 pg/mL   HEMOGLOBIN A1C WITH EAG    Collection Time: 07/08/21  4:45 PM   Result Value Ref Range    Hemoglobin A1c 8.3 (H) 4.2 - 6.3 %    Est. average glucose 192 mg/dL   TSH 3RD GENERATION    Collection Time: 07/08/21  4:45 PM   Result Value Ref Range    TSH 0.882 0.358 - 3.740 uIU/mL   T4, FREE    Collection Time: 07/08/21  4:45 PM   Result Value Ref Range    T4, Free 1.1 0.9 - 1.8 NG/DL   VITAMIN B12    Collection Time: 07/08/21  4:45 PM   Result Value Ref Range    Vitamin B12 262 193 - 986 pg/mL   GLUCOSE, POC Collection Time: 07/08/21  4:46 PM   Result Value Ref Range    Glucose (POC) 196 (H) 65 - 100 mg/dL    Performed by [facility]opatra    POC PT/INR    Collection Time: 07/08/21  4:50 PM   Result Value Ref Range    Prothrombin time (POC) 13.0 (H) 9.6 - 11.6 SECS    INR (POC) 1.1 0.9 - 1.2     EKG, 12 LEAD, INITIAL    Collection Time: 07/08/21  5:44 PM   Result Value Ref Range    Ventricular Rate 95 BPM    Atrial Rate 95 BPM    P-R Interval 180 ms    QRS Duration 76 ms    Q-T Interval 340 ms    QTC Calculation (Bezet) 427 ms    Calculated P Axis 78 degrees    Calculated R Axis 62 degrees    Calculated T Axis 61 degrees    Diagnosis       Normal sinus rhythm  Normal ECG  When compared with ECG of 05-NOV-2020 16:20,  No significant change was found  Confirmed by Merissa Leyva ([id number]) on 7/9/2021 7:06:05 AM     GLUCOSE, POC    Collection Time: 07/08/21 11:24 PM   Result Value Ref Range    Glucose (POC) 176 (H) 65 - 100 mg/dL    Performed by [facility]    CBC W/O DIFF    Collection Time: 07/09/21  4:22 AM   Result Value Ref Range    WBC 4.7 4.3 - 11.1 K/uL    RBC 4.85 4.23 - 5.6 M/uL    HGB 12.7 (L) 13.6 - 17.2 g/dL    HCT 41.2 41.1 - 50.3 %    MCV 84.9 79.6 - 97.8 FL    MCH 26.2 26.1 - 32.9 PG    MCHC 30.8 (L) 31.4 - 35.0 g/dL    RDW 15.7 (H) 11.9 - 14.6 %    PLATELET 144 213 - 371 K/uL    MPV 10.9 9.4 - 12.3 FL    ABSOLUTE NRBC 0.00 0.0 - 0.2 K/uL   LIPID PANEL    Collection Time: 07/09/21  4:22 AM   Result Value Ref Range    Cholesterol, total 129 <200 MG/DL    Triglyceride 129 35 - 150 MG/DL    HDL Cholesterol 50 40 - 60 MG/DL    LDL, calculated 53.2 <100 MG/DL    VLDL, calculated 25.8 (H) 6.0 - 23.0 MG/DL    CHOL/HDL Ratio 2.6     METABOLIC PANEL, BASIC    Collection Time: 07/09/21  4:22 AM   Result Value Ref Range    Sodium 140 136 - 145 mmol/L    Potassium 3.6 3.5 - 5.1 mmol/L    Chloride 108 (H) 98 - 107 mmol/L    CO2 25 21 - 32 mmol/L    Anion gap 7 7 - 16 mmol/L    Glucose 189 (H) 65 - 100 mg/dL    BUN 18 8 - 23 MG/DL    Creatinine 1.20 0.8 - 1.5 MG/DL    GFR est AA >60 >60 ml/min/1.73m2    GFR est non-AA >60 >60 ml/min/1.73m2    Calcium 9.0 8.3 - 10.4 MG/DL   GLUCOSE, POC    Collection Time: 07/09/21  6:35 AM   Result Value Ref Range    Glucose (POC) 187 (H) 65 - 100 mg/dL    Performed by Vandana Persaud          Functional Assessment:  Functional Assessment  Baseline Functional Status: Ambulated with assistive devices  Fall in Past 12 Months: Yes  Decline in Gait/Transfer/Balance: Yes (comment)  Decline in Capacity to Feed/Dress/Bathe: No  Developmental Delay: No  Chewing/Swallowing Problems: No  Difficulty with Secretions: No  Speech Slurred/Thick/Garbled: No     Rosenthal Score:        Speech Assessment:  Dysphagia Screening  Vocal Quality/Secretions: Normal  History of Dysphagia: No  O2 Saturation: Normal  Alertness: Normal  Pre-Swallow Assessment Score: 0  Purees: No difficulty noted  Water by Cup: No difficulty noted  Water by Straw: No difficulty noted                Ambulation:  Activity and Safety  Activity Level:  Up with Assistance  Activity: In bed, Staff present  Activity Assistance: Partial (one person)  Assistive Device: Fall prevention device  Safety Measures: Bed/Chair alarm on, Bed/Chair-Wheels locked, Bed in low position, Call light within reach, Gripper socks, Side rails X2     Impression / Assessment:     Active Problems:    DM (diabetes mellitus) type I controlled with renal manifestation ([facility]) (10/4/2020)      Stroke-like symptom (7/8/2021)      BPH (benign prostatic hyperplasia) (7/8/2021)      Chronic pain (7/8/2021)      Neuropathy (7/8/2021)      CKD (chronic kidney disease) stage 3, GFR 30-59 ml/min ([facility]) (7/8/2021)      BMI 38.0-38.9,adult (7/8/2021)      Ataxia     Plan / Recommendations / Medical Decision Making:     Recommendations:   Continue Acute Rehab Program  Coordination of rehab / medical care  Counseling of PM&R care issues management  Monitoring and management of medical conditions per plan of care / orders  -await MRI/MRA   -await PT/OT evaluations and recommendations  -long term BP goal is <130/80  -doubt IRC is needed. Likely be able to dc home with outpt PT regardless of MRI findings. MRA to assess the basilar artery with ? Of occlusion on CT  -strict diabetic management; I suspect that his neuropathy further contributes to his gait difficulties with decreased sensation and proprioception in his fee. Discussion with pt / Staff  Reviewed Therapies / Mar Golden / Jael Radha / Records    Thank you very much for this referral. We appreciate the opportunity to participate in this patient's care.

## 2021-07-09 NOTE — PROGRESS NOTES
Problem: Diabetes Self-Management  Goal: *Disease process and treatment process  Description: Define diabetes and identify own type of diabetes; list 3 options for treating diabetes. Outcome: Progressing Towards Goal  Goal: *Incorporating nutritional management into lifestyle  Description: Describe effect of type, amount and timing of food on blood glucose; list 3 methods for planning meals. Outcome: Progressing Towards Goal  Goal: *Incorporating physical activity into lifestyle  Description: State effect of exercise on blood glucose levels. Outcome: Progressing Towards Goal  Goal: *Developing strategies to promote health/change behavior  Description: Define the ABC's of diabetes; identify appropriate screenings, schedule and personal plan for screenings. Outcome: Progressing Towards Goal  Goal: *Using medications safely  Description: State effect of diabetes medications on diabetes; name diabetes medication taking, action and side effects. Outcome: Progressing Towards Goal  Goal: *Monitoring blood glucose, interpreting and using results  Description: Identify recommended blood glucose targets  and personal targets. Outcome: Progressing Towards Goal  Goal: *Prevention, detection, treatment of acute complications  Description: List symptoms of hyper- and hypoglycemia; describe how to treat low blood sugar and actions for lowering  high blood glucose level. Outcome: Progressing Towards Goal  Goal: *Prevention, detection and treatment of chronic complications  Description: Define the natural course of diabetes and describe the relationship of blood glucose levels to long term complications of diabetes.   Outcome: Progressing Towards Goal  Goal: *Developing strategies to address psychosocial issues  Description: Describe feelings about living with diabetes; identify support needed and support network  Outcome: Progressing Towards Goal     Problem: Patient Education: Go to Patient Education Activity  Goal: Patient/Family Education  Outcome: Progressing Towards Goal     Problem: Falls - Risk of  Goal: *Absence of Falls  Description: Document Sophia Kearns Fall Risk and appropriate interventions in the flowsheet.   Outcome: Progressing Towards Goal  Note: Fall Risk Interventions:  Mobility Interventions: Bed/chair exit alarm, OT consult for ADLs, Patient to call before getting OOB, PT Consult for mobility concerns         Medication Interventions: Bed/chair exit alarm, Evaluate medications/consider consulting pharmacy, Patient to call before getting OOB, Teach patient to arise slowly    Elimination Interventions: Call light in reach, Bed/chair exit alarm, Patient to call for help with toileting needs, Stay With Me (per policy), Urinal in reach    History of Falls Interventions: Bed/chair exit alarm, Door open when patient unattended, Investigate reason for fall         Problem: Patient Education: Go to Patient Education Activity  Goal: Patient/Family Education  Outcome: Progressing Towards Goal     Problem: Patient Education: Go to Patient Education Activity  Goal: Patient/Family Education  Outcome: Progressing Towards Goal     Problem: TIA/CVA Stroke: 0-24 hours  Goal: Activity/Safety  Outcome: Progressing Towards Goal  Goal: Consults, if ordered  Outcome: Progressing Towards Goal  Goal: Diagnostic Test/Procedures  Outcome: Progressing Towards Goal  Goal: Nutrition/Diet  Outcome: Progressing Towards Goal  Goal: Discharge Planning  Outcome: Progressing Towards Goal  Goal: Medications  Outcome: Progressing Towards Goal  Goal: Respiratory  Outcome: Progressing Towards Goal  Goal: Treatments/Interventions/Procedures  Outcome: Progressing Towards Goal  Goal: Minimize risk of bleeding post-thrombolytic infusion  Outcome: Progressing Towards Goal  Goal: Monitor for complications post-thrombolytic infusion  Outcome: Progressing Towards Goal  Goal: Psychosocial  Outcome: Progressing Towards Goal  Goal: *Hemodynamically stable  Outcome: Progressing Towards Goal  Goal: *Neurologically stable  Description: Absence of additional neurological deficits    Outcome: Progressing Towards Goal  Goal: *Verbalizes anxiety and depression are reduced or absent  Outcome: Progressing Towards Goal  Goal: *Absence of Signs of Aspiration on Current Diet  Outcome: Progressing Towards Goal  Goal: *Absence of deep venous thrombosis signs and symptoms(Stroke Metric)  Outcome: Progressing Towards Goal  Goal: *Ability to perform ADLs and demonstrates progressive mobility and function  Outcome: Progressing Towards Goal  Goal: *Stroke education started(Stroke Metric)  Outcome: Progressing Towards Goal  Goal: *Dysphagia screen performed(Stroke Metric)  Outcome: Progressing Towards Goal  Goal: *Rehab consulted(Stroke Metric)  Outcome: Progressing Towards Goal

## 2021-07-10 ENCOUNTER — APPOINTMENT (OUTPATIENT)
Dept: MRI IMAGING | Age: 76
End: 2021-07-10
Attending: INTERNAL MEDICINE
Payer: MEDICARE

## 2021-07-10 ENCOUNTER — APPOINTMENT (OUTPATIENT)
Dept: MRI IMAGING | Age: 76
End: 2021-07-10
Attending: NURSE PRACTITIONER
Payer: MEDICARE

## 2021-07-10 VITALS
TEMPERATURE: 97.2 F | BODY MASS INDEX: 38.3 KG/M2 | HEART RATE: 103 BPM | WEIGHT: 244 LBS | OXYGEN SATURATION: 100 % | RESPIRATION RATE: 17 BRPM | SYSTOLIC BLOOD PRESSURE: 117 MMHG | HEIGHT: 67 IN | DIASTOLIC BLOOD PRESSURE: 72 MMHG

## 2021-07-10 LAB
GLUCOSE BLD STRIP.AUTO-MCNC: 115 MG/DL (ref 65–100)
GLUCOSE BLD STRIP.AUTO-MCNC: 259 MG/DL (ref 65–100)
MM INDURATION POC: 0 MM (ref 0–5)
PPD POC: NEGATIVE NEGATIVE
SERVICE CMNT-IMP: ABNORMAL
SERVICE CMNT-IMP: ABNORMAL

## 2021-07-10 PROCEDURE — 74011250636 HC RX REV CODE- 250/636: Performed by: INTERNAL MEDICINE

## 2021-07-10 PROCEDURE — 74011250637 HC RX REV CODE- 250/637: Performed by: INTERNAL MEDICINE

## 2021-07-10 PROCEDURE — 99232 SBSQ HOSP IP/OBS MODERATE 35: CPT | Performed by: NURSE PRACTITIONER

## 2021-07-10 PROCEDURE — 96372 THER/PROPH/DIAG INJ SC/IM: CPT

## 2021-07-10 PROCEDURE — 74011636637 HC RX REV CODE- 636/637: Performed by: INTERNAL MEDICINE

## 2021-07-10 PROCEDURE — 82962 GLUCOSE BLOOD TEST: CPT

## 2021-07-10 PROCEDURE — 99218 HC RM OBSERVATION: CPT

## 2021-07-10 RX ORDER — LANOLIN ALCOHOL/MO/W.PET/CERES
100 CREAM (GRAM) TOPICAL DAILY
Qty: 30 TABLET | Refills: 0 | Status: SHIPPED | OUTPATIENT
Start: 2021-07-11 | End: 2021-08-22

## 2021-07-10 RX ORDER — ROSUVASTATIN CALCIUM 20 MG/1
20 TABLET, COATED ORAL
Qty: 30 TABLET | Refills: 0 | Status: SHIPPED | OUTPATIENT
Start: 2021-07-10 | End: 2021-08-09

## 2021-07-10 RX ORDER — CLOPIDOGREL BISULFATE 75 MG/1
75 TABLET ORAL DAILY
Status: DISCONTINUED | OUTPATIENT
Start: 2021-07-10 | End: 2021-07-10 | Stop reason: HOSPADM

## 2021-07-10 RX ORDER — CLOPIDOGREL BISULFATE 75 MG/1
75 TABLET ORAL DAILY
Qty: 21 TABLET | Refills: 0 | Status: SHIPPED | OUTPATIENT
Start: 2021-07-10 | End: 2021-07-28

## 2021-07-10 RX ADMIN — CLOPIDOGREL BISULFATE 75 MG: 75 TABLET ORAL at 11:36

## 2021-07-10 RX ADMIN — TAMSULOSIN HYDROCHLORIDE 0.4 MG: 0.4 CAPSULE ORAL at 08:39

## 2021-07-10 RX ADMIN — HEPARIN SODIUM 5000 UNITS: 5000 INJECTION INTRAVENOUS; SUBCUTANEOUS at 08:39

## 2021-07-10 RX ADMIN — Medication 5 ML: at 05:32

## 2021-07-10 RX ADMIN — LEVOTHYROXINE SODIUM 75 MCG: 0.07 TABLET ORAL at 05:32

## 2021-07-10 RX ADMIN — BUPROPION HYDROCHLORIDE 75 MG: 75 TABLET, FILM COATED ORAL at 08:39

## 2021-07-10 RX ADMIN — INSULIN LISPRO 6 UNITS: 100 INJECTION, SOLUTION INTRAVENOUS; SUBCUTANEOUS at 11:40

## 2021-07-10 RX ADMIN — FINASTERIDE 5 MG: 5 TABLET, FILM COATED ORAL at 08:40

## 2021-07-10 RX ADMIN — SODIUM BICARBONATE 650 MG TABLET 650 MG: at 08:39

## 2021-07-10 RX ADMIN — MORPHINE SULFATE 15 MG: 15 TABLET, FILM COATED, EXTENDED RELEASE ORAL at 08:40

## 2021-07-10 RX ADMIN — INSULIN GLARGINE 20 UNITS: 100 INJECTION, SOLUTION SUBCUTANEOUS at 08:39

## 2021-07-10 RX ADMIN — DONEPEZIL HYDROCHLORIDE 5 MG: 5 TABLET, FILM COATED ORAL at 08:40

## 2021-07-10 RX ADMIN — Medication 100 MG: at 08:40

## 2021-07-10 NOTE — PROGRESS NOTES
Neurology Daily Progress Note     Assessment:     Probable TIA. Seen as Code S for ataxia and falls. Gait resolved, back to baseline. Hx of DM type 1, CKD. Unable to have IV contrast with CT or MRI. Unable to have MRI/MRA due to spinal stimulator. TTE limited, negative atrial dilation. Continue DAPT and high intensity statin therapy. No additional workup is needed at this time. He can follow up with his neurologist at [facility]. He has scheduled appointment for 9/3/2021. Plan:     · Continue ASA 81 mg daily   · Continue Plavix 75 mg po daily for 21 days. · Initiate high intensity statin   · Neurochecks Q4H  · Telemetry PT/OT/ST  · DVT prophylaxis   · BP management - normotensive, with long-term goal <130/80  · Smoking cessation if applicable   · Diabetes education if applicable   · Depression Screening prior to discharge  · Keep follow up with [facility]. He has standing appointment for 9/3/2021. Subjective: Interval history:    No complaints. Stated he is back to his baseline. Unable to do MRI/MRA due to spinal stimulator. TTE limited, negative for LA dilation. History:    Meg Fisher is a 78 y.o. male who is being seen for ataxia. Review of systems negative with exception of pertinent positives and negatives noted above.        Objective:     Vitals:    07/09/21 2000 07/10/21 0000 07/10/21 0400 07/10/21 0818   BP: (!) 144/82 132/71 (!) 142/78 117/72   Pulse: 88 83 88 (!) 103   Resp: 18 18 18 17   Temp: 98.4 °F (36.9 °C) 97.8 °F (36.6 °C) 97.3 °F (36.3 °C) 97.2 °F (36.2 °C)   SpO2: 100% 99% 100% 100%   Weight:       Height:              Current Facility-Administered Medications:     rosuvastatin (CRESTOR) tablet 20 mg, 20 mg, Oral, QHS, Perla Aguayo MD, 20 mg at 07/09/21 2143    oxyCODONE IR (ROXICODONE) tablet 5 mg, 5 mg, Oral, Q6H PRN, Perla Aguayo MD, 5 mg at 07/09/21 1511    insulin lispro (HUMALOG) injection, , SubCUTAneous, AC&HS, Travis Rodas, MD, 6 Units at 07/09/21 2143    insulin glargine (LANTUS) injection 20 Units, 20 Units, SubCUTAneous, BID, Travis Saavedra MD, 20 Units at 07/10/21 0839    tamsulosin (FLOMAX) capsule 0.4 mg, 0.4 mg, Oral, DAILY, Travis Saavedra MD, 0.4 mg at 07/10/21 0839    amitriptyline (ELAVIL) tablet 30 mg, 30 mg, Oral, QHS, Travis Rodas MD, 30 mg at 07/09/21 2143    aspirin chewable tablet 81 mg, 81 mg, Oral, QHS, Travis Rodas MD, 81 mg at 07/09/21 2143    buPROPion (WELLBUTRIN) tablet 75 mg, 75 mg, Oral, BID, Travis Saavedra MD, 75 mg at 07/10/21 0839    donepeziL (ARICEPT) tablet 5 mg, 5 mg, Oral, DAILY, Travis Saavedra MD, 5 mg at 07/10/21 0840    finasteride (PROSCAR) tablet 5 mg, 5 mg, Oral, DAILY, Luigi Paul MD, 5 mg at 07/10/21 0840    levothyroxine (SYNTHROID) tablet 75 mcg, 75 mcg, Oral, ACB, Travis Rodas MD, 75 mcg at 07/10/21 0532    morphine CR (MS CONTIN) tablet 15 mg, 15 mg, Oral, Q12H, Travis Rodas MD, 15 mg at 07/10/21 0840    sodium bicarbonate tablet 650 mg, 650 mg, Oral, BID, Travis Saavedra MD, 650 mg at 07/10/21 0839    sodium chloride (NS) flush 5-40 mL, 5-40 mL, IntraVENous, Q8H, Travis Rodas MD, 5 mL at 07/10/21 0532    sodium chloride (NS) flush 5-40 mL, 5-40 mL, IntraVENous, PRN, Travis Saavedra MD    ondansetron (ZOFRAN) injection 4 mg, 4 mg, IntraVENous, Q4H PRN, Travis Saavedra MD    acetaminophen (TYLENOL) tablet 650 mg, 650 mg, Oral, Q4H PRN, Travis Saavedra MD, 650 mg at 07/09/21 1322    acetaminophen (TYLENOL) suppository 650 mg, 650 mg, Rectal, Q4H PRN, Travis Saavedra MD    labetaloL (NORMODYNE;TRANDATE) injection 5 mg, 5 mg, IntraVENous, Q10MIN PRN, Travis Saavedra MD    bisacodyL (DULCOLAX) suppository 10 mg, 10 mg, Rectal, DAILY PRN, Travis Saavedra MD    famotidine (PEPCID) tablet 20 mg, 20 mg, Oral, BID PRN, Travis Saavedra MD    heparin (porcine) injection 5,000 Units, 5,000 Units, SubCUTAneous, Q8H, Travis Rodas MD, 5,000 Units at 07/10/21 0839    thiamine HCL (B-1) tablet 100 mg, 100 mg, Oral, DAILY, Travis Rodas MD, 100 mg at 07/10/21 0840    Recent Results (from the past 12 hour(s))   PLEASE READ & DOCUMENT PPD TEST IN 24 HRS    Collection Time: 07/10/21 12:02 AM   Result Value Ref Range    PPD Negative Negative    mm Induration 0 0 - 5 mm   GLUCOSE, POC    Collection Time: 07/10/21  6:27 AM   Result Value Ref Range    Glucose (POC) 115 (H) 65 - 100 mg/dL    Performed by Linda(AS)      CT Results (most recent):  Results from [facility] encounter on 07/08/21    CT CODE NEURO HEAD WO CONTRAST    Narrative  CT HEAD WITHOUT CONTRAST, 7/8/2021    History: Difficulty walking. Comparison: None    Technique:   5 mm axial scans from the skull base to the vertex. All CT scans  performed at this facility use one or all of the following: Automated exposure  control, adjustment of the mA and/or kVp according to patient's size, iterative  reconstruction. Findings:  No evidence of intracranial hemorrhage is seen. Mild bilateral basal  ganglia densities are seen which appear to represent calcification. No abnormal  extra-axial fluid collections are seen. Moderate age-appropriate chronic  cortical volume loss is seen. No evidence for acute hydrocephalus is seen. No  evidence of midline shift or herniation is seen. No abnormal edema pattern is  seen in a vascular distribution to suggest large artery infarction. Only  ill-defined periventricular white matter hypoattenuation is seen favored to be  chronic most likely representing moderate chronic micro and hepatic changes. The  basilar artery does appear hyperdense. Evaluation with bone windows shows no acute osseous changes. The visualized  sinuses, middle ears, and mastoid air cells are well aerated. Impression  1. No acute intracranial process evident by noncontrast CT study of the head. Only hyperdensity of the basilar artery is seen.  This can be an indicator of  occlusion although could easily result from attenuation artifact. Recommend  correlation with clinical findings. This can be further assessed with CTA if  clinically indicated. This report was made using voice transcription. Despite my best efforts to avoid  any, transcription errors may persist. If there is any question about the  accuracy of the report or need for clarification, then please call [phone], or text me through perfectserv for clarification or correction. Physical Exam:  General - Well developed, well nourished, in no apparent distress. Pleasant and conversent. HEENT - Normocephalic, atraumatic. Conjunctiva are clear. Neck - Supple without masses  Cardiovascular - Regular rate and rhythm. Normal S1, S2 without murmurs, rubs, or gallops. Lungs - Clear to auscultation. Abdomen - Soft, nontender with normal bowel sounds. Extremities - Peripheral pulses intact. No edema and no rashes. Neurological examination - Comprehension, attention, memory and reasoning are intact. Language and speech are normal.   On cranial nerve examination, (II, III, IV, VI) pupils are equal, round, and reactive to light. Visual acuity is adequate. Visual fields are full to finger confrontation. Extraocular motility is normal. (V, VII) Face is symmetric and sensation is intact to light touch. (VIII) Hearing is intact. (IX, X) Palate and uvula elevate symmetrically. Voice is normal. (XI) Shoulder shrug is strong and equal bilaterally. (XII)Tongue is midline. Motor examination - There is normal muscle tone and bulk. Power is 5/5 BUE, 4/5 BLE. Muscle stretch reflexes are 1+ BUE, areflexic patellar and ankle jerks. Plantar response is equivocal bilaterally. Decreased sensation is intact to light touch, pinprick, vibration and proprioception in all extremities. Cerebellar examination is normal finger/nose and heel/shin. Gait and stance unsteady, antalgic.       Signed By: Christina Crowley, SALLIE     July 10, 2021

## 2021-07-10 NOTE — PROGRESS NOTES
Problem: Diabetes Self-Management  Goal: *Disease process and treatment process  Description: Define diabetes and identify own type of diabetes; list 3 options for treating diabetes. Outcome: Progressing Towards Goal  Goal: *Incorporating nutritional management into lifestyle  Description: Describe effect of type, amount and timing of food on blood glucose; list 3 methods for planning meals. Outcome: Progressing Towards Goal  Goal: *Incorporating physical activity into lifestyle  Description: State effect of exercise on blood glucose levels. Outcome: Progressing Towards Goal  Goal: *Developing strategies to promote health/change behavior  Description: Define the ABC's of diabetes; identify appropriate screenings, schedule and personal plan for screenings. Outcome: Progressing Towards Goal  Goal: *Using medications safely  Description: State effect of diabetes medications on diabetes; name diabetes medication taking, action and side effects. Outcome: Progressing Towards Goal  Goal: *Monitoring blood glucose, interpreting and using results  Description: Identify recommended blood glucose targets  and personal targets. Outcome: Progressing Towards Goal  Goal: *Prevention, detection, treatment of acute complications  Description: List symptoms of hyper- and hypoglycemia; describe how to treat low blood sugar and actions for lowering  high blood glucose level. Outcome: Progressing Towards Goal  Goal: *Prevention, detection and treatment of chronic complications  Description: Define the natural course of diabetes and describe the relationship of blood glucose levels to long term complications of diabetes.   Outcome: Progressing Towards Goal  Goal: *Developing strategies to address psychosocial issues  Description: Describe feelings about living with diabetes; identify support needed and support network  Outcome: Progressing Towards Goal     Problem: Patient Education: Go to Patient Education Activity  Goal: Patient/Family Education  Outcome: Progressing Towards Goal     Problem: Falls - Risk of  Goal: *Absence of Falls  Description: Document Glendo Garrison Fall Risk and appropriate interventions in the flowsheet.   Outcome: Progressing Towards Goal  Note: Fall Risk Interventions:  Mobility Interventions: Bed/chair exit alarm, OT consult for ADLs, Patient to call before getting OOB, PT Consult for mobility concerns         Medication Interventions: Bed/chair exit alarm, Evaluate medications/consider consulting pharmacy, Patient to call before getting OOB, Teach patient to arise slowly    Elimination Interventions: Call light in reach, Bed/chair exit alarm, Patient to call for help with toileting needs, Stay With Me (per policy)    History of Falls Interventions: Bed/chair exit alarm, Door open when patient unattended, Investigate reason for fall         Problem: Patient Education: Go to Patient Education Activity  Goal: Patient/Family Education  Outcome: Progressing Towards Goal     Problem: Patient Education: Go to Patient Education Activity  Goal: Patient/Family Education  Outcome: Progressing Towards Goal     Problem: TIA/CVA Stroke: 0-24 hours  Goal: Activity/Safety  Outcome: Progressing Towards Goal  Goal: Consults, if ordered  Outcome: Progressing Towards Goal  Goal: Diagnostic Test/Procedures  Outcome: Progressing Towards Goal  Goal: Nutrition/Diet  Outcome: Progressing Towards Goal  Goal: Discharge Planning  Outcome: Progressing Towards Goal  Goal: Medications  Outcome: Progressing Towards Goal  Goal: Respiratory  Outcome: Progressing Towards Goal  Goal: Treatments/Interventions/Procedures  Outcome: Progressing Towards Goal  Goal: Minimize risk of bleeding post-thrombolytic infusion  Outcome: Progressing Towards Goal  Goal: Monitor for complications post-thrombolytic infusion  Outcome: Progressing Towards Goal  Goal: Psychosocial  Outcome: Progressing Towards Goal  Goal: *Hemodynamically stable  Outcome: Progressing Towards Goal  Goal: *Neurologically stable  Description: Absence of additional neurological deficits    Outcome: Progressing Towards Goal  Goal: *Verbalizes anxiety and depression are reduced or absent  Outcome: Progressing Towards Goal  Goal: *Absence of Signs of Aspiration on Current Diet  Outcome: Progressing Towards Goal  Goal: *Absence of deep venous thrombosis signs and symptoms(Stroke Metric)  Outcome: Progressing Towards Goal  Goal: *Ability to perform ADLs and demonstrates progressive mobility and function  Outcome: Progressing Towards Goal  Goal: *Stroke education started(Stroke Metric)  Outcome: Progressing Towards Goal  Goal: *Dysphagia screen performed(Stroke Metric)  Outcome: Progressing Towards Goal  Goal: *Rehab consulted(Stroke Metric)  Outcome: Progressing Towards Goal

## 2021-07-10 NOTE — PROGRESS NOTES
Pt is discharging home in stable condition. Referral faxed to [phone] for PT/Vestibular Rehab. No other d/c needs identified. Tx goals met. Care Management Interventions  PCP Verified by CM: Yes Patience Clark  Last Visit to PCP: 06/23/21  Mode of Transport at Discharge:  Other (see comment) (Family)  Transition of Care Consult (CM Consult): Discharge Planning  Discharge Durable Medical Equipment: No  Physical Therapy Consult: Yes  Occupational Therapy Consult: Yes  Speech Therapy Consult: No  Current Support Network: Family [facility] [place], Own Home  Confirm Follow Up Transport: Family  The Plan for Transition of Care is Related to the Following Treatment Goals : Return home and back to his baseline  The Patient and/or Patient Representative was Provided with a Choice of Provider and Agrees with the Discharge Plan?: Yes  Name of the Patient Representative Who was Provided with a Choice of Provider and Agrees with the Discharge Plan: Patient  Freedom of Choice List was Provided with Basic Dialogue that Supports the Patient's Individualized Plan of Care/Goals, Treatment Preferences and Shares the Quality Data Associated with the Providers?: Yes  [place] Resource Information Provided?: No  Discharge Location  Discharge Placement: Home with outpatient services

## 2021-07-10 NOTE — PROGRESS NOTES
Problem: Diabetes Self-Management  Goal: *Disease process and treatment process  Description: Define diabetes and identify own type of diabetes; list 3 options for treating diabetes. Outcome: Progressing Towards Goal  Goal: *Incorporating nutritional management into lifestyle  Description: Describe effect of type, amount and timing of food on blood glucose; list 3 methods for planning meals. Outcome: Progressing Towards Goal  Goal: *Incorporating physical activity into lifestyle  Description: State effect of exercise on blood glucose levels. Outcome: Progressing Towards Goal  Goal: *Developing strategies to promote health/change behavior  Description: Define the ABC's of diabetes; identify appropriate screenings, schedule and personal plan for screenings. Outcome: Progressing Towards Goal  Goal: *Using medications safely  Description: State effect of diabetes medications on diabetes; name diabetes medication taking, action and side effects. Outcome: Progressing Towards Goal  Goal: *Monitoring blood glucose, interpreting and using results  Description: Identify recommended blood glucose targets  and personal targets. Outcome: Progressing Towards Goal  Goal: *Prevention, detection, treatment of acute complications  Description: List symptoms of hyper- and hypoglycemia; describe how to treat low blood sugar and actions for lowering  high blood glucose level. Outcome: Progressing Towards Goal  Goal: *Prevention, detection and treatment of chronic complications  Description: Define the natural course of diabetes and describe the relationship of blood glucose levels to long term complications of diabetes.   Outcome: Progressing Towards Goal  Goal: *Developing strategies to address psychosocial issues  Description: Describe feelings about living with diabetes; identify support needed and support network  Outcome: Progressing Towards Goal  Goal: *Insulin pump training  Outcome: Progressing Towards Goal  Goal: *Sick day guidelines  Outcome: Progressing Towards Goal  Goal: *Patient Specific Goal (EDIT GOAL, INSERT TEXT)  Outcome: Progressing Towards Goal     Problem: Patient Education: Go to Patient Education Activity  Goal: Patient/Family Education  Outcome: Progressing Towards Goal     Problem: Falls - Risk of  Goal: *Absence of Falls  Description: Document Trav Everett Fall Risk and appropriate interventions in the flowsheet.   Outcome: Progressing Towards Goal  Note: Fall Risk Interventions:  Mobility Interventions: Bed/chair exit alarm, OT consult for ADLs, Patient to call before getting OOB, PT Consult for mobility concerns         Medication Interventions: Patient to call before getting OOB, Teach patient to arise slowly    Elimination Interventions: Bed/chair exit alarm, Call light in reach, Patient to call for help with toileting needs, Toileting schedule/hourly rounds    History of Falls Interventions: Bed/chair exit alarm, Consult care management for discharge planning, Door open when patient unattended         Problem: Patient Education: Go to Patient Education Activity  Goal: Patient/Family Education  Outcome: Progressing Towards Goal     Problem: Patient Education: Go to Patient Education Activity  Goal: Patient/Family Education  Outcome: Progressing Towards Goal     Problem: TIA/CVA Stroke: 0-24 hours  Goal: Off Pathway (Use only if patient is Off Pathway)  Outcome: Progressing Towards Goal  Goal: Activity/Safety  Outcome: Progressing Towards Goal  Goal: Consults, if ordered  Outcome: Progressing Towards Goal  Goal: Diagnostic Test/Procedures  Outcome: Progressing Towards Goal  Goal: Nutrition/Diet  Outcome: Progressing Towards Goal  Goal: Discharge Planning  Outcome: Progressing Towards Goal  Goal: Medications  Outcome: Progressing Towards Goal  Goal: Respiratory  Outcome: Progressing Towards Goal  Goal: Treatments/Interventions/Procedures  Outcome: Progressing Towards Goal  Goal: Minimize risk of bleeding post-thrombolytic infusion  Outcome: Progressing Towards Goal  Goal: Monitor for complications post-thrombolytic infusion  Outcome: Progressing Towards Goal  Goal: Psychosocial  Outcome: Progressing Towards Goal  Goal: *Hemodynamically stable  Outcome: Progressing Towards Goal  Goal: *Neurologically stable  Description: Absence of additional neurological deficits    Outcome: Progressing Towards Goal  Goal: *Verbalizes anxiety and depression are reduced or absent  Outcome: Progressing Towards Goal  Goal: *Absence of Signs of Aspiration on Current Diet  Outcome: Progressing Towards Goal  Goal: *Absence of deep venous thrombosis signs and symptoms(Stroke Metric)  Outcome: Progressing Towards Goal  Goal: *Ability to perform ADLs and demonstrates progressive mobility and function  Outcome: Progressing Towards Goal  Goal: *Stroke education started(Stroke Metric)  Outcome: Progressing Towards Goal  Goal: *Dysphagia screen performed(Stroke Metric)  Outcome: Progressing Towards Goal  Goal: *Rehab consulted(Stroke Metric)  Outcome: Progressing Towards Goal     Problem: TIA/CVA Stroke: Day 2 Until Discharge  Goal: Off Pathway (Use only if patient is Off Pathway)  Outcome: Progressing Towards Goal  Goal: Activity/Safety  Outcome: Progressing Towards Goal  Goal: Diagnostic Test/Procedures  Outcome: Progressing Towards Goal  Goal: Nutrition/Diet  Outcome: Progressing Towards Goal  Goal: Discharge Planning  Outcome: Progressing Towards Goal  Goal: Medications  Outcome: Progressing Towards Goal  Goal: Respiratory  Outcome: Progressing Towards Goal  Goal: Treatments/Interventions/Procedures  Outcome: Progressing Towards Goal  Goal: Psychosocial  Outcome: Progressing Towards Goal  Goal: *Verbalizes anxiety and depression are reduced or absent  Outcome: Progressing Towards Goal  Goal: *Absence of aspiration  Outcome: Progressing Towards Goal  Goal: *Absence of deep venous thrombosis signs and symptoms(Stroke Metric)  Outcome: Progressing Towards Goal  Goal: *Optimal pain control at patient's stated goal  Outcome: Progressing Towards Goal  Goal: *Tolerating diet  Outcome: Progressing Towards Goal  Goal: *Ability to perform ADLs and demonstrates progressive mobility and function  Outcome: Progressing Towards Goal  Goal: *Stroke education continued(Stroke Metric)  Outcome: Progressing Towards Goal     Problem: Ischemic Stroke: Discharge Outcomes  Goal: *Verbalizes anxiety and depression are reduced or absent  Outcome: Progressing Towards Goal  Goal: *Verbalize understanding of risk factor modification(Stroke Metric)  Outcome: Progressing Towards Goal  Goal: *Hemodynamically stable  Outcome: Progressing Towards Goal  Goal: *Absence of aspiration pneumonia  Outcome: Progressing Towards Goal  Goal: *Aware of needed dietary changes  Outcome: Progressing Towards Goal  Goal: *Verbalize understanding of prescribed medications including anti-coagulants, anti-lipid, and/or anti-platelets(Stroke Metric)  Outcome: Progressing Towards Goal  Goal: *Tolerating diet  Outcome: Progressing Towards Goal  Goal: *Aware of follow-up diagnostics related to anticoagulants  Outcome: Progressing Towards Goal  Goal: *Ability to perform ADLs and demonstrates progressive mobility and function  Outcome: Progressing Towards Goal  Goal: *Absence of DVT(Stroke Metric)  Outcome: Progressing Towards Goal  Goal: *Absence of aspiration  Outcome: Progressing Towards Goal  Goal: *Optimal pain control at patient's stated goal  Outcome: Progressing Towards Goal  Goal: *Home safety concerns addressed  Outcome: Progressing Towards Goal  Goal: *Describes available resources and support systems  Outcome: Progressing Towards Goal  Goal: *Verbalizes understanding of activation of EMS(911) for stroke symptoms(Stroke Metric)  Outcome: Progressing Towards Goal  Goal: *Understands and describes signs and symptoms to report to providers(Stroke Metric)  Outcome: Progressing Towards Goal  Goal: *Neurolgocially stable (absence of additional neurological deficits)  Outcome: Progressing Towards Goal  Goal: *Verbalizes importance of follow-up with primary care physician(Stroke Metric)  Outcome: Progressing Towards Goal  Goal: *Smoking cessation discussed,if applicable(Stroke Metric)  Outcome: Progressing Towards Goal  Goal: *Depression screening completed(Stroke Metric)  Outcome: Progressing Towards Goal

## 2021-07-10 NOTE — PROGRESS NOTES
Patient has stimulator in back that is not functioning correctly. Can not check for MRI safety. Exams cancelled due to safety concern.  Eddi Toribio

## 2021-07-10 NOTE — PROGRESS NOTES
07/10/21 0645   NIH Stroke Scale   Interval Handoff/Transfer  (Dual NIH with Dory Herrera RN )   Level of Conciousness (1a) 0   LOC Questions (1b) 0   LOC Commands (1c) 0   Best Gaze (2) 0   Visual (3) 0   Facial Palsy (4) 0   Motor Arm, Left (5a) 0   Motor Arm, Right (5b) 0   Motor Leg, Left (6a) 0   Motor Leg, Right (6b) 0   Limb Ataxia (7) 0   Sensory (8) 0   Best Language (9) 0   Dysarthria (10) 0   Extinction and Inattention (11) 0   Total 0

## 2021-07-10 NOTE — DISCHARGE SUMMARY
[facility]     Name:  Maia Abreu. Age:76 y.o. Sex:male  :  1945       MRN:  [id number]       Admitting Physician: Burt Taylor MD Admit Date: 2021  4:46 PM   Attending Physician: Geovanny Garces MD  Primary Care Physician: Jesse Menjivar DO       Discharge Physician: Haile Miguel MD  Discharge date: 07/10/21   Discharged Condition: Stable    Indication for Admission:   Chief Complaint   Patient presents with    Gait Problem        Reasons for hospitalization:  Hospital Problems as of 7/10/2021 Date Reviewed: 2020        Codes Class Noted - Resolved POA    * (Principal) Stroke-like symptom ICD-10-CM: R29.90  ICD-9-CM: 781.99  2021 - Present Unknown        BPH (benign prostatic hyperplasia) ICD-10-CM: N40.0  ICD-9-CM: 600.00  2021 - Present Unknown        Chronic pain ICD-10-CM: G89.29  ICD-9-CM: 338.29  2021 - Present Unknown        Neuropathy ICD-10-CM: G62.9  ICD-9-CM: 355.9  2021 - Present Unknown        CKD (chronic kidney disease) stage 3, GFR 30-59 ml/min ([facility]) ICD-10-CM: N18.30  ICD-9-CM: 585.3  2021 - Present Unknown        BMI 38.0-38.9,adult ICD-10-CM: Q04.86  ICD-9-CM: V85.38  2021 - Present Unknown        DM (diabetes mellitus) type I controlled with renal manifestation ([facility] ICD-10-CM: E10.29  ICD-9-CM: 250.41  10/4/2020 - Present Yes                 Discharge Diagnosis: stroke   Did Patient have Sepsis (YES OR NO): no      Hospital Course/Interval history:  Patient with past medical history of    DM type 1 on insulin pump   Peripheral neuropathy   Chronic pain   CKD stage III      Patient came to ER due to unsteady gait.      He was worked up for possible stroke. CT brain shows   \" 1. No acute intracranial process evident by noncontrast CT study of the head. Only hyperdensity of the basilar artery is seen.  This can be an indicator of  occlusion although could easily result from attenuation artifact. Recommend  correlation with clinical findings. This can be further assessed with CTA if  clinically indicated. \"     Patient could not have MRI due to presence of spinal cord stimulator. Unable to have IV contrast with CT or MRI. Gait resolved back to normal.     Patient would like to go home. Neurology service concurred with the plan. Patient has follow up visit with neurologist at [facility].            Assessment/Plan:  Follow up with his neurologist.     Consults:   IP CONSULT TO NEUROLOGY  IP CONSULT TO PHYSIATRIST(REHAB MEDICINE)     Disposition: Home or Self Care  Diet:   DIET ADULT  Code Status: DNR      Follow up labs/imaging: -   Follow up with primary doctor  in 3-5 days   Pending labs/studies: -     Current Discharge Medication List      START taking these medications    Details   clopidogreL (PLAVIX) 75 mg tab Take 1 Tablet by mouth daily for 21 days. Qty: 21 Tablet, Refills: 0  Start date: 7/10/2021, End date: 7/31/2021      thiamine HCL (B-1) 100 mg tablet Take 1 Tablet by mouth daily for 30 days. Qty: 30 Tablet, Refills: 0  Start date: 7/11/2021, End date: 8/10/2021         CONTINUE these medications which have CHANGED    Details   rosuvastatin (CRESTOR) 20 mg tablet Take 1 Tablet by mouth nightly for 30 days. Qty: 30 Tablet, Refills: 0  Start date: 7/10/2021, End date: 8/9/2021         CONTINUE these medications which have NOT CHANGED    Details   morphine 15 mg TR12 Take 15 mg by mouth two (2) times a day. ondansetron (Zofran ODT) 4 mg disintegrating tablet Take 1 Tab by mouth every six (6) hours as needed for Nausea (and vomiting). Qty: 30 Tab, Refills: 0      insulin glargine (Lantus U-100 Insulin) 100 unit/mL injection 43 Units by SubCUTAneous route as needed. Only uses when pump fails. Has NEVER had to use the insulin. fluticasone propionate (Flonase Allergy Relief) 50 mcg/actuation nasal spray 2 Sprays by Both Nostrils route as needed.       IP [facility] INSULIN LISPRO, HUMALOG, FOR PUMP Basal rate 0509-4086 - 2 units/H, 7643-7250 2.2 units/H and 4379-6855 2.5 units/H      sodium bicarbonate 650 mg tablet Take 650 mg by mouth two (2) times a day. finasteride (PROSCAR) 5 mg tablet Take 5 mg by mouth daily. levothyroxine (SYNTHROID) 75 mcg tablet Take 75 mcg by mouth Daily (before breakfast). amitriptyline (ELAVIL) 10 mg tablet Take 30 mg by mouth nightly. alfuzosin SR (UROXATRAL) 10 mg SR tablet Take 10 mg by mouth nightly. cholecalciferol (Vitamin D3) 25 mcg (1,000 unit) cap Take 1,000 Units by mouth daily. aspirin 81 mg chewable tablet Take 81 mg by mouth nightly. hydrocortisone valerate (WEST-DORI) 0.2 % ointment Apply  to affected area two (2) times daily as needed for Skin Irritation. use thin layer      donepeziL (ARICEPT) 5 mg tablet Take 5 mg by mouth daily. RABEprazole (Aciphex) 20 mg tablet Take 20 mg by mouth two (2) times a day. buPROPion (WELLBUTRIN) 75 mg tablet Take 75 mg by mouth two (2) times a day. ondansetron hcl (Zofran) 4 mg tablet Take 4 mg by mouth every eight (8) hours as needed for Nausea or Vomiting. fluticasone propionate (CUTIVATE) 0.05 % topical cream Apply  to affected area two (2) times a day. Medications Discontinued During This Encounter   Medication Reason    insulin glargine (LANTUS) injection 18 Units     insulin glargine (LANTUS) injection 20 Units     insulin glargine (LANTUS) injection 20 Units     morphine IR (MS IR) 15 mg tablet Not A Current Medication    pregabalin (Lyrica) 75 mg capsule Not A Current Medication    thiamine HCL (B-1) tablet 100 mg     rosuvastatin (CRESTOR) tablet 10 mg          Follow Up Orders: Follow-up Appointments   Procedures    FOLLOW UP VISIT Appointment in: One Month Follow up with [facility]. He has standing appointment for 9/3/2021. Follow up with [facility]. He has standing appointment for 9/3/2021.      Standing Status: Standing     Number of Occurrences:   1     Order Specific Question:   Appointment in     Answer:   One Month         Follow-up Information     Follow up With Specialties Details Why Contact Info Additional [facility] Neurology  On 9/3/2021 This appt was already scheduled for you      [address]rano @ [facility]  Outpatient physical therapy. Someone from this office will contact you to schedule the initial appointment at a clinic convenient to your home. [address] [place] [address]  [phone] 1 [facility],[address], [facility], [facility]    [address] [facility], [facility] [address]   [address] [facility]  [address]onna [address]  [phone]               Discharge Exam:    Patient Vitals for the past 24 hrs:   Temp Pulse Resp BP SpO2   07/10/21 0818 97.2 °F (36.2 °C) (!) 103 17 117/72 100 %   07/10/21 0400 97.3 °F (36.3 °C) 88 18 (!) 142/78 100 %   07/10/21 0000 97.8 °F (36.6 °C) 83 18 132/71 99 %   07/09/21 2000 98.4 °F (36.9 °C) 88 18 (!) 144/82 100 %   07/09/21 1600 98 °F (36.7 °C) (!) 101 18 (!) 143/85 99 %   07/09/21 1200 98 °F (36.7 °C) 94 18 (!) 148/84 98 %   07/09/21 1133    (!) 151/89      Oxygen Therapy  O2 Sat (%): 100 % (07/10/21 0818)  O2 Device: None (Room air) (07/09/21 2000)    Estimated body mass index is 38.22 kg/m² as calculated from the following:    Height as of this encounter: 5' 7\" (1.702 m). Weight as of this encounter: 110.7 kg (244 lb). Intake/Output Summary (Last 24 hours) at 7/10/2021 1054  Last data filed at 7/10/2021 0818  Gross per 24 hour   Intake 120 ml   Output    Net 120 ml       *Note that automatically entered I/Os may not be accurate; dependent on patient compliance with collection and accurate  by assistants. Physical Exam:     General:          No acute distress, speaking in full sentences, no use of accessory muscles.  BMI of 38  Lungs:             Clear to auscultation bilaterally   CV:                  Regular rate and rhythm with normal S1 and S2   Abdomen:        Soft, nontender, distended due to truncal obesity , normoactive bowel sounds   Extremities:     No cyanosis clubbing or edema   Neuro:             Nonfocal, A&O x3   Psych:             Normal affect       All Labs from Last 24 Hrs:  Recent Results (from the past 24 hour(s))   GLUCOSE, POC    Collection Time: 07/09/21 11:21 AM   Result Value Ref Range    Glucose (POC) 262 (H) 65 - 100 mg/dL    Performed by Trooval    GLUCOSE, POC    Collection Time: 07/09/21  3:39 PM   Result Value Ref Range    Glucose (POC) 220 (H) 65 - 100 mg/dL    Performed by Trooval    GLUCOSE, POC    Collection Time: 07/09/21  9:09 PM   Result Value Ref Range    Glucose (POC) 265 (H) 65 - 100 mg/dL    Performed by [email or web address]    PLEASE READ & DOCUMENT PPD TEST IN 24 HRS    Collection Time: 07/10/21 12:02 AM   Result Value Ref Range    PPD Negative Negative    mm Induration 0 0 - 5 mm   GLUCOSE, POC    Collection Time: 07/10/21  6:27 AM   Result Value Ref Range    Glucose (POC) 115 (H) 65 - 100 mg/dL    Performed by Linda(AS)        All Micro Results     None          SARS-CoV-2 Lab Results  \"Novel Coronavirus\" Test: No results found for: COV2NT   \"Emergent Disease\" Test: No results found for: EDPR  \"SARS-COV-2\" Test: No results found for: XGCOVT  Rapid Test:   No results found for: COVR         Diagnostic Imaging/Tests:   CT CODE NEURO HEAD WO CONTRAST    Result Date: 7/8/2021  1. No acute intracranial process evident by noncontrast CT study of the head. Only hyperdensity of the basilar artery is seen. This can be an indicator of occlusion although could easily result from attenuation artifact. Recommend correlation with clinical findings. This can be further assessed with CTA if clinically indicated. This report was made using voice transcription.  Despite my best efforts to avoid any, transcription errors may persist. If there is any question about the accuracy of the report or need for clarification, then please call [phone], or text me through perfectserv for clarification or correction. Echocardiogram/EKG results:  No results found for this visit on 07/08/21. EKG Results     Procedure 720 Value Units Date/Time    Initial ECG [[id number]] Collected: 07/08/21 1744    Order Status: Completed Updated: 07/09/21 0706     Ventricular Rate 95 BPM      Atrial Rate 95 BPM      P-R Interval 180 ms      QRS Duration 76 ms      Q-T Interval 340 ms      QTC Calculation (Bezet) 427 ms      Calculated P Axis 78 degrees      Calculated R Axis 62 degrees      Calculated T Axis 61 degrees      Diagnosis --     Normal sinus rhythm  Normal ECG  When compared with ECG of 05-NOV-2020 16:20,  No significant change was found  Confirmed by Vivi Whittington ([id number]) on 7/9/2021 7:06:05 AM            Results for orders placed or performed during the hospital encounter of 07/08/21   EKG, 12 LEAD, INITIAL   Result Value Ref Range    Ventricular Rate 95 BPM    Atrial Rate 95 BPM    P-R Interval 180 ms    QRS Duration 76 ms    Q-T Interval 340 ms    QTC Calculation (Bezet) 427 ms    Calculated P Axis 78 degrees    Calculated R Axis 62 degrees    Calculated T Axis 61 degrees    Diagnosis       Normal sinus rhythm  Normal ECG  When compared with ECG of 05-NOV-2020 16:20,  No significant change was found  Confirmed by Vivi Whittington ([id number]) on 7/9/2021 7:06:05 AM         Procedures done this admission:  * No surgery found *        Time spent in patient discharge planning and coordination 34 minutes.       Signed By: Jess Hyde MD   [facility]    July 10, 2021  10:54 AM

## 2021-07-13 LAB — VIT B1 BLD-SCNC: 129.6 NMOL/L (ref 66.5–200)

## 2021-07-14 ENCOUNTER — HOME HEALTH ADMISSION (OUTPATIENT)
Dept: HOME HEALTH SERVICES | Facility: HOME HEALTH | Age: 76
End: 2021-07-14
Payer: MEDICARE

## 2021-07-14 NOTE — PROGRESS NOTES
SW returned call to pt's spouse/son. They informed SW that the pt had not yet been contacted or scheduled for outpatient PT. They contacted the outpatient rehab office and was told the [facility] location was 2-3 weeks out in scheduling. They were offered the opportunity to go to another location but they instead prefer [facility] services. They are agreeable to a referral to [facility]. Order received and referral submitted to [facility]. SW alerted the [facility] liaison to the referral and requested the pt be seen as soon as possible.

## 2021-07-15 ENCOUNTER — HOME CARE VISIT (OUTPATIENT)
Dept: HOME HEALTH SERVICES | Facility: HOME HEALTH | Age: 76
End: 2021-07-15
Payer: MEDICARE

## 2021-07-15 VITALS
DIASTOLIC BLOOD PRESSURE: 80 MMHG | HEART RATE: 70 BPM | TEMPERATURE: 97.4 F | SYSTOLIC BLOOD PRESSURE: 138 MMHG | RESPIRATION RATE: 18 BRPM

## 2021-07-15 PROCEDURE — 400018 HH-NO PAY CLAIM PROCEDURE

## 2021-07-15 PROCEDURE — 400013 HH SOC

## 2021-07-15 PROCEDURE — G0151 HHCP-SERV OF PT,EA 15 MIN: HCPCS

## 2021-07-15 PROCEDURE — 3331090002 HH PPS REVENUE DEBIT

## 2021-07-15 PROCEDURE — 3331090001 HH PPS REVENUE CREDIT

## 2021-07-16 PROCEDURE — 3331090001 HH PPS REVENUE CREDIT

## 2021-07-16 PROCEDURE — 3331090002 HH PPS REVENUE DEBIT

## 2021-07-17 PROCEDURE — 3331090001 HH PPS REVENUE CREDIT

## 2021-07-17 PROCEDURE — 3331090002 HH PPS REVENUE DEBIT

## 2021-07-18 PROCEDURE — 3331090002 HH PPS REVENUE DEBIT

## 2021-07-18 PROCEDURE — 3331090001 HH PPS REVENUE CREDIT

## 2021-07-19 PROCEDURE — 3331090002 HH PPS REVENUE DEBIT

## 2021-07-19 PROCEDURE — 3331090001 HH PPS REVENUE CREDIT

## 2021-07-20 ENCOUNTER — HOME CARE VISIT (OUTPATIENT)
Dept: SCHEDULING | Facility: HOME HEALTH | Age: 76
End: 2021-07-20
Payer: MEDICARE

## 2021-07-20 VITALS
SYSTOLIC BLOOD PRESSURE: 118 MMHG | RESPIRATION RATE: 18 BRPM | HEART RATE: 90 BPM | TEMPERATURE: 97 F | OXYGEN SATURATION: 96 % | DIASTOLIC BLOOD PRESSURE: 66 MMHG

## 2021-07-20 PROCEDURE — 3331090001 HH PPS REVENUE CREDIT

## 2021-07-20 PROCEDURE — 3331090002 HH PPS REVENUE DEBIT

## 2021-07-20 PROCEDURE — G0157 HHC PT ASSISTANT EA 15: HCPCS

## 2021-07-21 PROCEDURE — 3331090001 HH PPS REVENUE CREDIT

## 2021-07-21 PROCEDURE — 3331090002 HH PPS REVENUE DEBIT

## 2021-07-22 ENCOUNTER — HOME CARE VISIT (OUTPATIENT)
Dept: SCHEDULING | Facility: HOME HEALTH | Age: 76
End: 2021-07-22
Payer: MEDICARE

## 2021-07-22 VITALS
OXYGEN SATURATION: 99 % | RESPIRATION RATE: 16 BRPM | SYSTOLIC BLOOD PRESSURE: 130 MMHG | TEMPERATURE: 97.7 F | DIASTOLIC BLOOD PRESSURE: 70 MMHG | HEART RATE: 98 BPM

## 2021-07-22 PROCEDURE — 3331090002 HH PPS REVENUE DEBIT

## 2021-07-22 PROCEDURE — 3331090001 HH PPS REVENUE CREDIT

## 2021-07-22 PROCEDURE — G0157 HHC PT ASSISTANT EA 15: HCPCS

## 2021-07-23 PROCEDURE — 3331090002 HH PPS REVENUE DEBIT

## 2021-07-23 PROCEDURE — 3331090001 HH PPS REVENUE CREDIT

## 2021-07-24 PROCEDURE — 3331090002 HH PPS REVENUE DEBIT

## 2021-07-24 PROCEDURE — 3331090001 HH PPS REVENUE CREDIT

## 2021-07-25 PROCEDURE — 3331090001 HH PPS REVENUE CREDIT

## 2021-07-25 PROCEDURE — 3331090002 HH PPS REVENUE DEBIT

## 2021-07-26 ENCOUNTER — APPOINTMENT (OUTPATIENT)
Dept: GENERAL RADIOLOGY | Age: 76
DRG: 378 | End: 2021-07-26
Payer: MEDICARE

## 2021-07-26 LAB
BASOPHILS # BLD: 0 K/UL (ref 0–0.2)
BASOPHILS NFR BLD: 1 % (ref 0–2)
DIFFERENTIAL METHOD BLD: ABNORMAL
EOSINOPHIL # BLD: 0.1 K/UL (ref 0–0.8)
EOSINOPHIL NFR BLD: 1 % (ref 0.5–7.8)
ERYTHROCYTE [DISTWIDTH] IN BLOOD BY AUTOMATED COUNT: 15.2 % (ref 11.9–14.6)
HCT VFR BLD AUTO: 39.7 % (ref 41.1–50.3)
HGB BLD-MCNC: 12.2 G/DL (ref 13.6–17.2)
IMM GRANULOCYTES # BLD AUTO: 0 K/UL (ref 0–0.5)
IMM GRANULOCYTES NFR BLD AUTO: 0 % (ref 0–5)
LYMPHOCYTES # BLD: 1.2 K/UL (ref 0.5–4.6)
LYMPHOCYTES NFR BLD: 17 % (ref 13–44)
MCH RBC QN AUTO: 26.5 PG (ref 26.1–32.9)
MCHC RBC AUTO-ENTMCNC: 30.7 G/DL (ref 31.4–35)
MCV RBC AUTO: 86.3 FL (ref 79.6–97.8)
MONOCYTES # BLD: 0.5 K/UL (ref 0.1–1.3)
MONOCYTES NFR BLD: 7 % (ref 4–12)
NEUTS SEG # BLD: 5.3 K/UL (ref 1.7–8.2)
NEUTS SEG NFR BLD: 74 % (ref 43–78)
NRBC # BLD: 0 K/UL (ref 0–0.2)
PLATELET # BLD AUTO: 235 K/UL (ref 150–450)
PMV BLD AUTO: 10.2 FL (ref 9.4–12.3)
RBC # BLD AUTO: 4.6 M/UL (ref 4.23–5.6)
WBC # BLD AUTO: 7.2 K/UL (ref 4.3–11.1)

## 2021-07-26 PROCEDURE — 3331090002 HH PPS REVENUE DEBIT

## 2021-07-26 PROCEDURE — 80053 COMPREHEN METABOLIC PANEL: CPT

## 2021-07-26 PROCEDURE — 3331090001 HH PPS REVENUE CREDIT

## 2021-07-26 PROCEDURE — 74011000250 HC RX REV CODE- 250

## 2021-07-26 PROCEDURE — C9113 INJ PANTOPRAZOLE SODIUM, VIA: HCPCS

## 2021-07-26 PROCEDURE — 86901 BLOOD TYPING SEROLOGIC RH(D): CPT

## 2021-07-26 PROCEDURE — 85025 COMPLETE CBC W/AUTO DIFF WBC: CPT

## 2021-07-26 PROCEDURE — 0DJD8ZZ INSPECTION OF LOWER INTESTINAL TRACT, VIA NATURAL OR ARTIFICIAL OPENING ENDOSCOPIC: ICD-10-PCS | Performed by: INTERNAL MEDICINE

## 2021-07-26 PROCEDURE — 96374 THER/PROPH/DIAG INJ IV PUSH: CPT

## 2021-07-26 PROCEDURE — 74011250636 HC RX REV CODE- 250/636

## 2021-07-26 PROCEDURE — 83690 ASSAY OF LIPASE: CPT

## 2021-07-26 PROCEDURE — 99284 EMERGENCY DEPT VISIT MOD MDM: CPT

## 2021-07-26 RX ADMIN — PANTOPRAZOLE SODIUM 40 MG: 40 INJECTION, POWDER, FOR SOLUTION INTRAVENOUS at 23:32

## 2021-07-27 ENCOUNTER — APPOINTMENT (OUTPATIENT)
Dept: GENERAL RADIOLOGY | Age: 76
DRG: 378 | End: 2021-07-27
Payer: MEDICARE

## 2021-07-27 ENCOUNTER — ANESTHESIA EVENT (OUTPATIENT)
Dept: ENDOSCOPY | Age: 76
DRG: 378 | End: 2021-07-27
Payer: MEDICARE

## 2021-07-27 ENCOUNTER — HOME CARE VISIT (OUTPATIENT)
Dept: SCHEDULING | Facility: HOME HEALTH | Age: 76
End: 2021-07-27
Payer: MEDICARE

## 2021-07-27 ENCOUNTER — HOSPITAL ENCOUNTER (INPATIENT)
Age: 76
LOS: 1 days | Discharge: HOME HEALTH | DRG: 378 | End: 2021-07-28
Admitting: INTERNAL MEDICINE
Payer: MEDICARE

## 2021-07-27 DIAGNOSIS — K92.2 LOWER GI BLEED: Primary | ICD-10-CM

## 2021-07-27 PROBLEM — I10 HTN (HYPERTENSION): Status: ACTIVE | Noted: 2021-07-27

## 2021-07-27 PROBLEM — K92.1 HEMATOCHEZIA: Status: ACTIVE | Noted: 2021-07-27

## 2021-07-27 PROBLEM — N17.9 ACUTE RENAL FAILURE SUPERIMPOSED ON STAGE 3 CHRONIC KIDNEY DISEASE (HCC): Status: ACTIVE | Noted: 2021-07-27

## 2021-07-27 PROBLEM — E78.5 HLD (HYPERLIPIDEMIA): Status: ACTIVE | Noted: 2021-07-27

## 2021-07-27 PROBLEM — N18.30 ACUTE RENAL FAILURE SUPERIMPOSED ON STAGE 3 CHRONIC KIDNEY DISEASE (HCC): Status: ACTIVE | Noted: 2021-07-27

## 2021-07-27 LAB
ABO + RH BLD: NORMAL
ALBUMIN SERPL-MCNC: 3.1 G/DL (ref 3.2–4.6)
ALBUMIN/GLOB SERPL: 0.8 {RATIO} (ref 1.2–3.5)
ALP SERPL-CCNC: 115 U/L (ref 50–136)
ALT SERPL-CCNC: 21 U/L (ref 12–65)
ANION GAP SERPL CALC-SCNC: 7 MMOL/L (ref 7–16)
AST SERPL-CCNC: 15 U/L (ref 15–37)
BACTERIA URNS QL MICRO: 0 /HPF
BILIRUB SERPL-MCNC: 0.4 MG/DL (ref 0.2–1.1)
BLOOD GROUP ANTIBODIES SERPL: NORMAL
BUN SERPL-MCNC: 24 MG/DL (ref 8–23)
CALCIUM SERPL-MCNC: 9.2 MG/DL (ref 8.3–10.4)
CASTS URNS QL MICRO: NORMAL /LPF
CHLORIDE SERPL-SCNC: 104 MMOL/L (ref 98–107)
CO2 SERPL-SCNC: 26 MMOL/L (ref 21–32)
CREAT SERPL-MCNC: 1.79 MG/DL (ref 0.8–1.5)
EPI CELLS #/AREA URNS HPF: NORMAL /HPF
GLOBULIN SER CALC-MCNC: 3.8 G/DL (ref 2.3–3.5)
GLUCOSE BLD STRIP.AUTO-MCNC: 203 MG/DL (ref 65–100)
GLUCOSE BLD STRIP.AUTO-MCNC: 212 MG/DL (ref 65–100)
GLUCOSE BLD STRIP.AUTO-MCNC: 239 MG/DL (ref 65–100)
GLUCOSE SERPL-MCNC: 228 MG/DL (ref 65–100)
LIPASE SERPL-CCNC: 57 U/L (ref 73–393)
POTASSIUM SERPL-SCNC: 4.5 MMOL/L (ref 3.5–5.1)
PROT SERPL-MCNC: 6.9 G/DL (ref 6.3–8.2)
RBC #/AREA URNS HPF: NORMAL /HPF
SERVICE CMNT-IMP: ABNORMAL
SODIUM SERPL-SCNC: 137 MMOL/L (ref 136–145)
SPECIMEN EXP DATE BLD: NORMAL
WBC URNS QL MICRO: NORMAL /HPF

## 2021-07-27 PROCEDURE — 74011636637 HC RX REV CODE- 636/637: Performed by: INTERNAL MEDICINE

## 2021-07-27 PROCEDURE — 65270000029 HC RM PRIVATE

## 2021-07-27 PROCEDURE — 74011250637 HC RX REV CODE- 250/637: Performed by: PHYSICIAN ASSISTANT

## 2021-07-27 PROCEDURE — 74011250636 HC RX REV CODE- 250/636: Performed by: INTERNAL MEDICINE

## 2021-07-27 PROCEDURE — 3331090002 HH PPS REVENUE DEBIT

## 2021-07-27 PROCEDURE — 74022 RADEX COMPL AQT ABD SERIES: CPT

## 2021-07-27 PROCEDURE — 81015 MICROSCOPIC EXAM OF URINE: CPT

## 2021-07-27 PROCEDURE — 74011250637 HC RX REV CODE- 250/637: Performed by: INTERNAL MEDICINE

## 2021-07-27 PROCEDURE — 82962 GLUCOSE BLOOD TEST: CPT

## 2021-07-27 PROCEDURE — 3331090001 HH PPS REVENUE CREDIT

## 2021-07-27 RX ORDER — SODIUM CHLORIDE, SODIUM LACTATE, POTASSIUM CHLORIDE, CALCIUM CHLORIDE 600; 310; 30; 20 MG/100ML; MG/100ML; MG/100ML; MG/100ML
100 INJECTION, SOLUTION INTRAVENOUS CONTINUOUS
Status: DISCONTINUED | OUTPATIENT
Start: 2021-07-27 | End: 2021-07-28 | Stop reason: HOSPADM

## 2021-07-27 RX ORDER — SODIUM CHLORIDE 0.9 % (FLUSH) 0.9 %
5-40 SYRINGE (ML) INJECTION EVERY 8 HOURS
Status: DISCONTINUED | OUTPATIENT
Start: 2021-07-27 | End: 2021-07-28 | Stop reason: HOSPADM

## 2021-07-27 RX ORDER — AMITRIPTYLINE HYDROCHLORIDE 10 MG/1
30 TABLET, FILM COATED ORAL
Status: DISCONTINUED | OUTPATIENT
Start: 2021-07-27 | End: 2021-07-28 | Stop reason: HOSPADM

## 2021-07-27 RX ORDER — LIDOCAINE 50 MG/G
1 PATCH TOPICAL EVERY 24 HOURS
COMMUNITY

## 2021-07-27 RX ORDER — INSULIN LISPRO 100 [IU]/ML
INJECTION, SOLUTION INTRAVENOUS; SUBCUTANEOUS EVERY 6 HOURS
Status: DISCONTINUED | OUTPATIENT
Start: 2021-07-27 | End: 2021-07-27

## 2021-07-27 RX ORDER — POLYETHYLENE GLYCOL 3350 17 G/17G
17 POWDER, FOR SOLUTION ORAL DAILY PRN
Status: DISCONTINUED | OUTPATIENT
Start: 2021-07-27 | End: 2021-07-28 | Stop reason: HOSPADM

## 2021-07-27 RX ORDER — GUAIFENESIN 100 MG/5ML
81 LIQUID (ML) ORAL
Status: DISCONTINUED | OUTPATIENT
Start: 2021-07-27 | End: 2021-07-28 | Stop reason: HOSPADM

## 2021-07-27 RX ORDER — INSULIN LISPRO 100 [IU]/ML
INJECTION, SOLUTION INTRAVENOUS; SUBCUTANEOUS EVERY 6 HOURS
Status: DISCONTINUED | OUTPATIENT
Start: 2021-07-28 | End: 2021-07-28 | Stop reason: HOSPADM

## 2021-07-27 RX ORDER — ACETAMINOPHEN 325 MG/1
650 TABLET ORAL
Status: DISCONTINUED | OUTPATIENT
Start: 2021-07-27 | End: 2021-07-28 | Stop reason: HOSPADM

## 2021-07-27 RX ORDER — LEVOTHYROXINE SODIUM 75 UG/1
75 TABLET ORAL
Status: DISCONTINUED | OUTPATIENT
Start: 2021-07-27 | End: 2021-07-28 | Stop reason: HOSPADM

## 2021-07-27 RX ORDER — DEXTROSE 50 % IN WATER (D50W) INTRAVENOUS SYRINGE
25-50 AS NEEDED
Status: DISCONTINUED | OUTPATIENT
Start: 2021-07-27 | End: 2021-07-28 | Stop reason: HOSPADM

## 2021-07-27 RX ORDER — SODIUM CHLORIDE 0.9 % (FLUSH) 0.9 %
5-40 SYRINGE (ML) INJECTION AS NEEDED
Status: DISCONTINUED | OUTPATIENT
Start: 2021-07-27 | End: 2021-07-28 | Stop reason: HOSPADM

## 2021-07-27 RX ORDER — ONDANSETRON 4 MG/1
4 TABLET, ORALLY DISINTEGRATING ORAL
Status: DISCONTINUED | OUTPATIENT
Start: 2021-07-27 | End: 2021-07-28 | Stop reason: HOSPADM

## 2021-07-27 RX ORDER — CLOPIDOGREL BISULFATE 75 MG/1
75 TABLET ORAL DAILY
Status: DISCONTINUED | OUTPATIENT
Start: 2021-07-27 | End: 2021-07-28 | Stop reason: HOSPADM

## 2021-07-27 RX ORDER — BUPROPION HYDROCHLORIDE 75 MG/1
75 TABLET ORAL 2 TIMES DAILY
Status: DISCONTINUED | OUTPATIENT
Start: 2021-07-27 | End: 2021-07-28 | Stop reason: HOSPADM

## 2021-07-27 RX ORDER — FINASTERIDE 5 MG/1
5 TABLET, FILM COATED ORAL DAILY
Status: DISCONTINUED | OUTPATIENT
Start: 2021-07-27 | End: 2021-07-28 | Stop reason: HOSPADM

## 2021-07-27 RX ORDER — DEXTROSE 50 % IN WATER (D50W) INTRAVENOUS SYRINGE
25-50 AS NEEDED
Status: DISCONTINUED | OUTPATIENT
Start: 2021-07-27 | End: 2021-07-27 | Stop reason: SDUPTHER

## 2021-07-27 RX ORDER — POLYETHYLENE GLYCOL 3350 17 G/17G
238 POWDER, FOR SOLUTION ORAL ONCE
Status: COMPLETED | OUTPATIENT
Start: 2021-07-27 | End: 2021-07-27

## 2021-07-27 RX ORDER — ONDANSETRON 2 MG/ML
4 INJECTION INTRAMUSCULAR; INTRAVENOUS
Status: DISCONTINUED | OUTPATIENT
Start: 2021-07-27 | End: 2021-07-28 | Stop reason: HOSPADM

## 2021-07-27 RX ORDER — ROSUVASTATIN CALCIUM 20 MG/1
20 TABLET, COATED ORAL
Status: DISCONTINUED | OUTPATIENT
Start: 2021-07-27 | End: 2021-07-28 | Stop reason: HOSPADM

## 2021-07-27 RX ORDER — ACETAMINOPHEN 650 MG/1
650 SUPPOSITORY RECTAL
Status: DISCONTINUED | OUTPATIENT
Start: 2021-07-27 | End: 2021-07-28 | Stop reason: HOSPADM

## 2021-07-27 RX ORDER — DEXTROSE 40 %
15 GEL (GRAM) ORAL AS NEEDED
Status: DISCONTINUED | OUTPATIENT
Start: 2021-07-27 | End: 2021-07-27 | Stop reason: SDUPTHER

## 2021-07-27 RX ORDER — DONEPEZIL HYDROCHLORIDE 5 MG/1
5 TABLET, FILM COATED ORAL DAILY
Status: DISCONTINUED | OUTPATIENT
Start: 2021-07-27 | End: 2021-07-28 | Stop reason: HOSPADM

## 2021-07-27 RX ORDER — BISACODYL 5 MG
10 TABLET, DELAYED RELEASE (ENTERIC COATED) ORAL
Status: COMPLETED | OUTPATIENT
Start: 2021-07-27 | End: 2021-07-27

## 2021-07-27 RX ORDER — DEXTROSE 40 %
15 GEL (GRAM) ORAL AS NEEDED
Status: DISCONTINUED | OUTPATIENT
Start: 2021-07-27 | End: 2021-07-28 | Stop reason: HOSPADM

## 2021-07-27 RX ADMIN — BUPROPION HYDROCHLORIDE 75 MG: 75 TABLET, FILM COATED ORAL at 09:19

## 2021-07-27 RX ADMIN — DONEPEZIL HYDROCHLORIDE 5 MG: 5 TABLET, FILM COATED ORAL at 09:19

## 2021-07-27 RX ADMIN — BUPROPION HYDROCHLORIDE 75 MG: 75 TABLET, FILM COATED ORAL at 17:30

## 2021-07-27 RX ADMIN — ACETAMINOPHEN 650 MG: 325 TABLET ORAL at 09:24

## 2021-07-27 RX ADMIN — Medication 10 ML: at 21:36

## 2021-07-27 RX ADMIN — LEVOTHYROXINE SODIUM 75 MCG: 0.07 TABLET ORAL at 09:19

## 2021-07-27 RX ADMIN — Medication 10 ML: at 13:01

## 2021-07-27 RX ADMIN — AMITRIPTYLINE HYDROCHLORIDE 30 MG: 10 TABLET, FILM COATED ORAL at 21:34

## 2021-07-27 RX ADMIN — FINASTERIDE 5 MG: 5 TABLET, FILM COATED ORAL at 09:18

## 2021-07-27 RX ADMIN — POLYETHYLENE GLYCOL 3350 238 G: 17 POWDER, FOR SOLUTION ORAL at 17:31

## 2021-07-27 RX ADMIN — ROSUVASTATIN CALCIUM 20 MG: 20 TABLET, COATED ORAL at 21:34

## 2021-07-27 RX ADMIN — INSULIN LISPRO 4 UNITS: 100 INJECTION, SOLUTION INTRAVENOUS; SUBCUTANEOUS at 23:25

## 2021-07-27 RX ADMIN — BISACODYL 10 MG: 5 TABLET, COATED ORAL at 15:15

## 2021-07-27 RX ADMIN — SODIUM CHLORIDE, SODIUM LACTATE, POTASSIUM CHLORIDE, AND CALCIUM CHLORIDE 50 ML/HR: 600; 310; 30; 20 INJECTION, SOLUTION INTRAVENOUS at 06:50

## 2021-07-27 RX ADMIN — SODIUM CHLORIDE, SODIUM LACTATE, POTASSIUM CHLORIDE, AND CALCIUM CHLORIDE 100 ML/HR: 600; 310; 30; 20 INJECTION, SOLUTION INTRAVENOUS at 21:33

## 2021-07-27 NOTE — PROGRESS NOTES
Hourly rounds performed through shift, pt denies needs at this time. Bed in low position, locked and call light/personal items within reach. Bowel prep initiated. Pt to be NPO at midnight. Will report to night shift nurse.

## 2021-07-27 NOTE — ED PROVIDER NOTES
82-year-old male brought in by EMS with bright red blood per rectum. Patient stated he felt something wet in his pajamas when he got to the bathroom she noticed that there was bright red blood. Also had some in the bed. This is never occurred before. Patient no history of hemorrhoids no history of GI bleeding. No current anticoagulation therapy or NSAIDs. Rectal Bleeding   This is a new problem. The current episode started less than 1 hour ago. The stool is described as blood tinged. Pertinent negatives include no abdominal pain, no flatus, no dysuria, no abdominal distention, no chills, no fever, no nausea, no back pain, no vomiting, no diarrhea and no constipation. He has tried nothing for the symptoms. The treatment provided no relief. His past medical history does not include dementia, neuromuscular disease, irritable bowel syndrome, neurologic disease, small bowel obstruction, abdominal surgery, nursing home resident, endocrine disease or metabolic disease.         Past Medical History:   Diagnosis Date    Acquired hypothyroidism     Aphonia     Back pain, chronic     Benign prostatic hyperplasia with incomplete bladder emptying     Broken bones     Candida laryngitis     Charcot foot due to diabetes mellitus ([facility]     Chronic kidney disease     CKD     Chronic left-sided low back pain with left-sided sciatica     Diabetes (HCC)     TYPE I, IDDM, uses Pump; last A1c 10.20/20 was 7.7; ss of hypo 70    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)     Diabetic nephropathy ([facility]     Diabetic peripheral neuropathy (HCC)     Diabetic retinopathy (HCC)     Dysphonia     GERD (gastroesophageal reflux disease)     managed with medications    HLD (hyperlipidemia)     Hypertension     after HTN meds had ORthostatic and all HTN meds discontinued    Insulin pump in place     Left hip pain     Lumbar radiculopathy     MCI (mild cognitive impairment) with memory loss     Mild cognitive impairment     Moderate episode of recurrent major depressive disorder ([address]     managed with medications    Orthostatic hypotension     LISA (obstructive sleep apnea)     no CPAP    Paraspinal muscle spasm     Polypharmacy     S/P lumbar fusion     Seborrheic keratosis     Spinal cord stimulator status     Spondylolisthesis     Tachycardia     Thyroid disease     managed with medications    Trochanteric bursitis of left hip     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)     Type 1 diabetes mellitus with hyperglycemia (HCC)     Type 1 diabetes mellitus with hypoglycemia ([address]     Vitreous hemorrhage due to type 1 diabetes mellitus ([address]     Vocal cord atrophy     Weakness        Past Surgical History:   Procedure Laterality Date    HX AMPUTATION TOE      distal portion of the 2nd toe of R, foot, distal part of 3rd toe of r foot    HX BUNIONECTOMY Right     HX CATARACT REMOVAL Bilateral     HX COLONOSCOPY  2016    HX HEENT Bilateral     laser treatment for diabetic complication    HX LUMBAR FUSION  2010    L4-5    HX LUMBAR LAMINECTOMY  2008    L5-S1    HX OTHER SURGICAL      spinal neurostimulator    HX SHOULDER ARTHROSCOPY Left     HX TONSIL AND ADENOIDECTOMY      at age 8 per Pt         Family History:   Problem Relation Age of Onset    Heart Disease Father        Social History     Socioeconomic History    Marital status:      Spouse name: Not on file    Number of children: Not on file    Years of education: Not on file    Highest education level: Not on file   Occupational History    Not on file   Tobacco Use    Smoking status: Former Smoker     Quit date: 1996     Years since quittin.9    Smokeless tobacco: Former User   Substance and Sexual Activity    Alcohol use: Not Currently    Drug use: Never    Sexual activity: Not on file   Other Topics Concern    Not on file   Social History Narrative    Not on file     Social Determinants of Health     Financial Resource Strain:     Difficulty of Paying Living Expenses:    Food Insecurity:     Worried About [address] [facility] in the Last Year:     [address] in the Last Year:    Transportation Needs:     Lack of Transportation (Medical):  Lack of Transportation (Non-Medical):    Physical Activity:     Days of Exercise per Week:     Minutes of Exercise per Session:    Stress:     Feeling of Stress :    Social Connections:     Frequency of Communication with Friends and Family:     Frequency of Social Gatherings with Friends and Family:     Attends Yarsanism Services:     Active Member of Clubs or Organizations:     Attends Club or Organization Meetings:     Marital Status:    Intimate Partner Violence:     Fear of Current or Ex-Partner:     Emotionally Abused:     Physically Abused:     Sexually Abused: ALLERGIES: Drixomed [dexbrompheniramine-pseudoephed]    Review of Systems   Constitutional: Negative. Negative for activity change, chills and fever. HENT: Negative. Eyes: Negative. Respiratory: Negative. Cardiovascular: Negative. Gastrointestinal: Positive for anal bleeding. Negative for abdominal distention, abdominal pain, constipation, diarrhea, flatus, nausea and vomiting. Genitourinary: Negative. Negative for dysuria. Musculoskeletal: Negative. Negative for back pain. Skin: Negative. Neurological: Negative. Psychiatric/Behavioral: Negative. All other systems reviewed and are negative. There were no vitals filed for this visit. Physical Exam  Vitals and nursing note reviewed. Constitutional:       General: He is not in acute distress. Appearance: He is well-developed. He is not ill-appearing or diaphoretic. HENT:      Head: Normocephalic and atraumatic. Right Ear: External ear normal.      Left Ear: External ear normal.      Nose: Nose normal.   Eyes:      General: No scleral icterus.         Right eye: No discharge. Left eye: No discharge. Conjunctiva/sclera: Conjunctivae normal.      Pupils: Pupils are equal, round, and reactive to light. Cardiovascular:      Rate and Rhythm: Regular rhythm. Pulmonary:      Effort: Pulmonary effort is normal. No respiratory distress. Breath sounds: Normal breath sounds. No stridor. No wheezing or rales. Abdominal:      General: Bowel sounds are normal. There is no distension. Palpations: Abdomen is soft. Tenderness: There is no abdominal tenderness. Genitourinary:     Rectum: Guaiac result positive. Musculoskeletal:         General: Normal range of motion. Cervical back: Normal range of motion. Skin:     General: Skin is warm and dry. Findings: No rash. Neurological:      Mental Status: He is alert and oriented to person, place, and time. Motor: No abnormal muscle tone. Coordination: Coordination normal.   Psychiatric:         Behavior: Behavior normal.          MDM  Number of Diagnoses or Management Options  Diagnosis management comments: Differential diagnosis includes but is not limited to: Lower GI bleed, hemorrhoids, internal hemorrhoids, anal fissure, diverticulitis,        Patient has hematochezia moderate amount of maroon bloody stool also is constipated seen on  x-ray. We will have him admitted to see GI.        Amount and/or Complexity of Data Reviewed  Clinical lab tests: ordered and reviewed  Tests in the radiology section of CPT®: ordered and reviewed  Tests in the medicine section of CPT®: ordered and reviewed  Decide to obtain previous medical records or to obtain history from someone other than the patient: yes  Review and summarize past medical records: yes  Discuss the patient with other providers: yes  Independent visualization of images, tracings, or specimens: yes    Risk of Complications, Morbidity, and/or Mortality  Presenting problems: high  Diagnostic procedures: high  Management options: high           EKG    Date/Time: 7/26/2021 11:33 PM  Performed by: Alberto Camara MD  Authorized by: Alberto Camara MD     ECG reviewed by ED Physician in the absence of a cardiologist: yes    Previous ECG:     Previous ECG:  Compared to current    Similarity:  No change  Interpretation:     Interpretation: normal    Rate:     ECG rate assessment: normal    Rhythm:     Rhythm: sinus rhythm    Ectopy:     Ectopy: none

## 2021-07-27 NOTE — CONSULTS
[facility] Consult Note       Primary GI Physician: [facility]    Referring Provider:  Dr. Teja Contreras    Consult Date:  7/27/2021    Admit Date:  7/27/2021    Chief Complaint:  hematochezia    Subjective:     History of Present Illness:  Patient is a 78 y.o. male with PMH including but not limited to DM type 1, HTN, neuropathy, and colon polyps who is seen in consultation at the request of Dr. Teja Contreras for hematochezia. He awoke last night with large volume red blood per rectum, all over the sheets. Has continued to \"seep\" out red blood per patient and his wife although this has not been noted by ER staff nor floor nurse. In ER, BP 89/52 with pulse of 106 and hgb 12.2 (12.7 on 7/9/21), MCV 86.3, normla white count and platelet count. BUN 24 and creatinine 1.79. Admitted by hospitalists for GI evaluation. He denies prior history of GIB. Has had serial colonoscopies in [place] for h/o colon polyps. His last colo was 2016 showing only IH but 5 year surveillance recommended secondary to h/o polyps. Was recently admitted for suspicion for CVA and started on plavix. Subsequently saw [facility] in office to discuss surveillance colo. However, they planned to wait a year given the recent ? CVA and plavix. After that, patient had MRI that was negative for CVA and he was actually taken OFF his plavix about 2 weeks ago. Only blood thinner currently is baby aspirin daily. No abdominal pain, nausea, vomiting, or dysphagia. Has chronic reflux and used to take Aciphex but currently taking just Tums which he feels works well. EGD in 2013 normal. Has ost 60 pounds in the last 6 months but says this was with a lot of effort. No family history of colon cancer or polyps.     PMH:  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 ([address]     Diabetic nephropathy ([address]     Diabetic peripheral neuropathy (HCC)     Diabetic retinopathy ([address]     Dysphonia     GERD (gastroesophageal reflux disease)     managed with medications    HLD (hyperlipidemia)     Hypertension     after HTN meds had ORthostatic and all HTN meds discontinued    Insulin pump in place     Left hip pain     Lumbar radiculopathy     MCI (mild cognitive impairment) with memory loss     Mild cognitive impairment     Moderate episode of recurrent major depressive disorder ([address]     managed with medications    Orthostatic hypotension     LISA (obstructive sleep apnea)     no CPAP    Paraspinal muscle spasm     Polypharmacy     S/P lumbar fusion     Seborrheic keratosis     Spinal cord stimulator status     Spondylolisthesis     Tachycardia     Thyroid disease     managed with medications    Trochanteric bursitis of left hip     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)     Type 1 diabetes mellitus with hyperglycemia (HCC)     Type 1 diabetes mellitus with hypoglycemia (HCC)     Vitreous hemorrhage due to type 1 diabetes mellitus ([address]     Vocal cord atrophy     Weakness        PSH:  Past Surgical History:   Procedure Laterality Date    HX AMPUTATION TOE      distal portion of the 2nd toe of R, foot, distal part of 3rd toe of r foot    HX BUNIONECTOMY Right     HX CATARACT REMOVAL Bilateral     HX COLONOSCOPY  2016    HX HEENT Bilateral     laser treatment for diabetic complication    HX LUMBAR FUSION  2010    L4-5    HX LUMBAR LAMINECTOMY  2008    L5-S1    HX OTHER SURGICAL      spinal neurostimulator    HX SHOULDER ARTHROSCOPY Left     HX TONSIL AND ADENOIDECTOMY      at age 8 per Pt       Allergies: Allergies   Allergen Reactions    Drixomed [Dexbrompheniramine-Pseudoephed] Palpitations       Home Medications:  Prior to Admission medications    Medication Sig Start Date End Date Taking? Authorizing Provider   lidocaine (LIDODERM) 5 % 1 Patch by TransDERmal route every twenty-four (24) hours. Apply patch to the affected area for 12 hours a day and remove for 12 hours a day. Yes Provider, Historical   docusate sodium (COLACE) 100 mg capsule Take 100 mg by mouth daily. Yes Provider, Historical   HYDROcodone-acetaminophen (NORCO) 5-325 mg per tablet Take 1 Tablet by mouth daily as needed for Pain. Yes Provider, Historical   rosuvastatin (CRESTOR) 20 mg tablet Take 1 Tablet by mouth nightly for 30 days. Patient taking differently: Take 10 mg by mouth nightly. 7/10/21 8/9/21 Yes Jerome Lorenzo MD   clopidogreL (PLAVIX) 75 mg tab Take 1 Tablet by mouth daily for 21 days. 7/10/21 7/31/21 Yes Jerome Lorenzo MD   morphine 15 mg TR12 Take 15 mg by mouth two (2) times a day. Yes Provider, Historical   ondansetron (Zofran ODT) 4 mg disintegrating tablet Take 1 Tab by mouth every six (6) hours as needed for Nausea (and vomiting). 11/16/20  Yes Macrina Spicer, NP   insulin glargine (Lantus U-100 Insulin) 100 unit/mL injection 43 Units by SubCUTAneous route as needed for Other (diabetes). Only uses when pump fails. Has NEVER had to use the insulin. Yes Provider, Historical   fluticasone propionate (Flonase Allergy Relief) 50 mcg/actuation nasal spray 2 Sprays by Both Nostrils route as needed for Allergies. Yes Provider, Historical   IP [facility] INSULIN LISPRO, HUMALOG, FOR PUMP Basal rate 9643-6346 - 2 units/H, 1780-1523 2.2 units/H and 5984-4067 2.5 units/H   Yes Provider, Historical   sodium bicarbonate 650 mg tablet Take 650 mg by mouth two (2) times a day.    Yes Provider, Historical   finasteride (PROSCAR) 5 mg tablet Take 5 mg by mouth daily. Yes Provider, Historical   buPROPion (WELLBUTRIN) 75 mg tablet Take 75 mg by mouth two (2) times a day. Yes Provider, Historical   levothyroxine (SYNTHROID) 75 mcg tablet Take 75 mcg by mouth Daily (before breakfast). Yes Provider, Historical   amitriptyline (ELAVIL) 10 mg tablet Take 25 mg by mouth nightly. Yes Provider, Historical   alfuzosin SR (UROXATRAL) 10 mg SR tablet Take 10 mg by mouth nightly. Yes Provider, Historical   ondansetron hcl (Zofran) 4 mg tablet Take 4 mg by mouth every eight (8) hours as needed for Nausea or Vomiting. Yes Provider, Historical   cholecalciferol (Vitamin D3) 25 mcg (1,000 unit) cap Take 1,000 Units by mouth daily. Yes Provider, Historical   aspirin 81 mg chewable tablet Take 81 mg by mouth nightly. Yes Provider, Historical   hydrocortisone valerate (WEST-DORI) 0.2 % ointment Apply  to affected area two (2) times daily as needed for Skin Irritation. use thin layer   Yes Provider, Historical   fluticasone propionate (CUTIVATE) 0.05 % topical cream Apply 1 g to affected area two (2) times a day. Yes Provider, Historical   donepeziL (ARICEPT) 5 mg tablet Take 5 mg by mouth daily. Yes Provider, Historical   thiamine HCL (B-1) 100 mg tablet Take 1 Tablet by mouth daily for 30 days. Patient not taking: Reported on 7/27/2021 7/11/21 8/10/21  Ana Paula Luo MD   RABEprazole (Aciphex) 20 mg tablet Take 20 mg by mouth two (2) times a day.   Patient not taking: Reported on 7/8/2021    Provider, Historical       Hospital Medications:  Current Facility-Administered Medications   Medication Dose Route Frequency    sodium chloride (NS) flush 5-40 mL  5-40 mL IntraVENous Q8H    sodium chloride (NS) flush 5-40 mL  5-40 mL IntraVENous PRN    acetaminophen (TYLENOL) tablet 650 mg  650 mg Oral Q6H PRN    Or    acetaminophen (TYLENOL) suppository 650 mg  650 mg Rectal Q6H PRN    polyethylene glycol (MIRALAX) packet 17 g  17 g Oral DAILY PRN    ondansetron (ZOFRAN ODT) tablet 4 mg  4 mg Oral Q8H PRN    Or    ondansetron (ZOFRAN) injection 4 mg  4 mg IntraVENous Q6H PRN    amitriptyline (ELAVIL) tablet 30 mg  30 mg Oral QHS    [Held by provider] aspirin chewable tablet 81 mg  81 mg Oral QHS    buPROPion (WELLBUTRIN) tablet 75 mg  75 mg Oral BID    [Held by provider] clopidogreL (PLAVIX) tablet 75 mg  75 mg Oral DAILY    donepeziL (ARICEPT) tablet 5 mg  5 mg Oral DAILY    finasteride (PROSCAR) tablet 5 mg  5 mg Oral DAILY    levothyroxine (SYNTHROID) tablet 75 mcg  75 mcg Oral ACB    rosuvastatin (CRESTOR) tablet 20 mg  20 mg Oral QHS    insulin lispro (HUMALOG) injection   SubCUTAneous Q6H    dextrose 40% (GLUTOSE) oral gel 1 Tube  15 g Oral PRN    glucagon (GLUCAGEN) injection 1 mg  1 mg IntraMUSCular PRN    dextrose (D50W) injection syrg 12.5-25 g  25-50 mL IntraVENous PRN    lactated Ringers infusion  50 mL/hr IntraVENous CONTINUOUS    polyethylene glycol (MIRALAX) powder 238 g  238 g Oral ONCE    bisacodyL (DULCOLAX) tablet 10 mg  10 mg Oral NOW       Social History:  Social History     Tobacco Use    Smoking status: Former Smoker     Quit date: 1996     Years since quittin.9    Smokeless tobacco: Former User   Substance Use Topics    Alcohol use: Not Currently       Family History:  Family History   Problem Relation Age of Onset    Heart Disease Father    Negative for colon cancer and polyps    Review of Systems:  A detailed 10 system ROS is obtained, with pertinent positives as listed above and in admission H^&P. All others are negative. Diet:  clears    Objective:     Physical Exam:  Vitals:  Visit Vitals  BP (!) 158/87 (BP 1 Location: Right upper arm, BP Patient Position: At rest)   Pulse 100   Temp 98.3 °F (36.8 °C)   Resp 18   Ht 5' 8\" (1.727 m)   Wt 89.5 kg (197 lb 4.8 oz)   SpO2 99%   BMI 30.00 kg/m²     Gen:  Pt is alert, cooperative, no acute distress  Skin:  Extremities and face reveal no rashes. HEENT: Sclerae anicteric. Extra-occular muscles are intact. No oral ulcers. No abnormal pigmentation of the lips. The neck is supple. Cardiovascular: Regular rate and rhythm. No murmurs, gallops, or rubs. Respiratory:  Comfortable breathing with no accessory muscle use. Clear breath sounds anteriorly with no wheezes, rales, or rhonchi. GI:  Abdomen nondistended, soft, and nontender. Normal active bowel sounds. No enlargement of the liver or spleen. No masses palpable. Rectal:  Deferred  Musculoskeletal:  Deformity of feet  Neurological:  Gross memory appears intact although he does defer to his wife for answers to some questions. Patient is alert and oriented. Psychiatric:  Mood appears appropriate with judgement intact. Laboratory:    Recent Labs     07/26/21  2345   WBC 7.2   HGB 12.2*   HCT 39.7*      MCV 86.3      K 4.5      CO2 26   BUN 24*   CREA 1.79*   CA 9.2   *      AST 15   ALT 21   TBILI 0.4   ALB 3.1*   TP 6.9   LPSE 57*          Assessment:     Principal Problem:    Hematochezia (7/27/2021)    Active Problems:    DM (diabetes mellitus) type I controlled with renal manifestation ([facility] (10/4/2020)      Stroke-like symptom (7/8/2021)      HTN (hypertension) (7/27/2021)      HLD (hyperlipidemia) (7/27/2021)    78 y.o. male with PMH including but not limited to DM type 1, HTN, neuropathy, and colon polyps who is seen in consultation at the request of Dr. Jose L Celestin for painless hematochezia with hgb 12.2 but initially mildly hypotensive and tachycardic. Suspect diverticular or hemorrhoidal bleeding. He is due for repeat colonoscopy bc of h/o polyps and we can perform this procedure tomorrow (he has been off plavix x 2 weeks). With his neuropathy and h/o falls, it would be difficult for him to prep for procedure as outpatient    Plan:   1) prep today for colo tomorrow.  Procedure and risks including but not limited to bleeding, perforation reaction to sedation discussed with patient who understands and wishes to proceed. He has bedside commode already    Patient is seen and examined in collaboration with Dr. Delma Doan. Assessment and plan as per Dr. Tania Kaplan. IVAN Frias   ATTENDING NOTE:  I have seen and examined the patient along with my NP/PA. The assessment and plan above is my own.      Hg 12.2   Hemodynamically stable   Due for screening colo with hx of polyps and last colo about 5 years ago   Off plavix and no evidence of recent stroke  Plan:  Colonoscopy after bowel prep    Mahesh Navarro MD

## 2021-07-27 NOTE — DIABETES MGMT
Patient admitted with hematochezia. Admitting glucose 228 at 2345 last night. Patient is alert and oriented x4. Admitting blood glucose 212. HbA1c 8.3 on July 8, 2021. Patient has a past medical history of type 1 diabetes, CKD stage 3, BPH, HTN, HLD, history of alcohol and tobacco abuse, neuropathy. Patient states they were diagnosed around age 14 with diabetes and in his early 25s was diagnosed with type 1 diabetes. Patient voices his mother's twin brother had a history of insulin dependent diabetes at a younger age. Patient states they do have a working glucometer with supplies at home and patient has this at bedside. Per patient they typically check blood glucose levels 4-5 times a day. Patient had dexcom sensor on but states this is out of date and he does not have another sensor with him and typically does not place dexcom on himself. Patient removed dexcom but placed dexcom transmitter in his supply bag. Patient states they are currently using a medtronic insulin pump for management of diabetes. Patient pump infusing Humalog. Patient basal rates:  0864-4676: 2.0 units  3747-9893: 2.2 units  7494-3079: 2.5 units  4762-3524: 2.0 units  Total daily basal: 52.8 units  Carb ratio: 0000: 5, 1600: 5  Insulin sensitivity: 15  BG target: 110-140    Questioned patient regarding pump use. Patient at first could not verbalize how to change basal rates for NPO status and mentions frustration regarding not being allowed to use insulin pump previously in the hospital setting. However when informing patient that if he is unable to adjust insulin pump he will likely have to be on subcutaneous regimen, patient showed educator how he can set his pump to 50% but states he does not want people to know this as he likes other people to do things for him. Patient also correctly verbalized and showed educator how he would use the bolus wizard. Noted insulin pump agreement was signed and placed in patient medical chart.  Patient states they do have extra supplies for their insulin pump. Patient states last site change was 7/25/21. Patient verbalizes understanding that per hospital policy staff will check fingerstick blood glucose levels as ordered by provider. Patient also verbalizes understanding that they need to report bolus doses to primary RN for documentation. Patient has attended formal diabetes education in the past but would like to attend a CGM class stating he is not comfortable putting on and setting up his own Dexcom. Referral placed to [facility]. Patient reports no difficulty with affording their diabetic supplies. Patient states they see Dr. Carly Peterson for management of their diabetes. Educated patient to report any signs and symptoms of hypoglycemia to primary RN. Patient verbalizes understanding and voices no further questions regarding diabetes management at this time. If patient becomes confused at any time and is unable to use insulin pump properly primary RN to update provider as patient would then have to be switched to subcutaneous regimen. Patient currently on clear liquid diet noted patient had drank a regular ibeth mist, diet ibeth mist provided to patient. Patient to be NPO for colonoscopy tomorrow and plans to run basal rates at 50% provider updated.

## 2021-07-27 NOTE — PROGRESS NOTES
BSN, CM met with pt at bedside with appropriate PPE and social distancing. Pt lying in bed. Pt alert and oriented to person, place, time, and situation. Pt verified demographic information. PCP verified as Dr. Zarina Mcconnell but would like new PCP as recently moved and too far to travel to this PCP. Referral placed with [facility]. Pt lives in 2 story home with spouse, 6 steps to enter into the home and pt does not have to go upstairs. Pt reports being independent with ADLs but not driving prior to admission. Pt reports having DMEs such as cane, walker, glucometer, insulin pump, manual and electric wheelchair. Pt reports not using electric w/c as battery is bad but after visit with Neurologist he has been using walker as he report neurologist davila snot want him using w/c if he is able to use walker. Pt family able to transport home, to doctor apt,  medications, and get groceries. Pt primary pharmacy is [facility] in . Pt reports being able to afford medications. Pt reports plans to discharge home. Pt is currently active with [facility] and will resume at d/c if appropriate. Pt has never been to SNF. Readmission assessment complete. CM to continue to follow and monitor for any needs that may occur. Care Management Interventions  PCP Verified by CM: Yes (Dr. Zarina Mcconnell but would like new PCP)  Mode of Transport at Discharge:  Other (see comment) (family )  Transition of Care Consult (CM Consult): Discharge Planning, [address]: Yes  Discharge Durable Medical Equipment: No  Physical Therapy Consult: No  Occupational Therapy Consult: No  Speech Therapy Consult: No  Current Support Network: Own Home, Lives with Spouse  Confirm Follow Up Transport: Family  The Plan for Transition of Care is Related to the Following Treatment Goals : Return to baseline   The Patient and/or Patient Representative was Provided with a Choice of Provider and Agrees with the Discharge Plan?: Yes  Name of the Patient Representative Who was Provided with a Choice of Provider and Agrees with the Discharge Plan: pt   Freedom of Choice List was Provided with Basic Dialogue that Supports the Patient's Individualized Plan of Care/Goals, Treatment Preferences and Shares the Quality Data Associated with the Providers?: Yes  [place] Resource Information Provided?: No  Discharge Location  Discharge Placement: Home with home health ([facility])     Readmission Assessment  Number of days since last admission?: 8-30 days  Previous disposition: Outpatient Rehab  Who is being interviewed?: Patient  What was the patient's/caregiver's perception as to why they think they needed to return back to the hospital?: Other (Comment) (new issue)  Did you visit your Primary Care Physician after you left the hospital, before you returned this time?: No  Why weren't you able to visit your PCP?: Did not have an appointment  Did you see a specialist, such as Cardiac, Pulmonary, Orthopedic Physician, etc. after you left the hospital?: Yes  Who advised the patient to return to the hospital?: Self-referral  Does the patient report anything that got in the way of taking their medications?: No  In our efforts to provide the best possible care to you and others like you, can you think of anything that we could have done to help you after you left the hospital the first time, so that you might not have needed to return so soon?: Other (Comment) (none)

## 2021-07-27 NOTE — PROGRESS NOTES
Problem: Diabetes Self-Management  Goal: *Disease process and treatment process  Description: Define diabetes and identify own type of diabetes; list 3 options for treating diabetes. Outcome: Progressing Towards Goal  Goal: *Incorporating nutritional management into lifestyle  Description: Describe effect of type, amount and timing of food on blood glucose; list 3 methods for planning meals. Outcome: Progressing Towards Goal  Goal: *Incorporating physical activity into lifestyle  Description: State effect of exercise on blood glucose levels. Outcome: Progressing Towards Goal  Goal: *Developing strategies to promote health/change behavior  Description: Define the ABC's of diabetes; identify appropriate screenings, schedule and personal plan for screenings. Outcome: Progressing Towards Goal  Goal: *Using medications safely  Description: State effect of diabetes medications on diabetes; name diabetes medication taking, action and side effects. Outcome: Progressing Towards Goal  Goal: *Monitoring blood glucose, interpreting and using results  Description: Identify recommended blood glucose targets  and personal targets. Outcome: Progressing Towards Goal  Goal: *Prevention, detection, treatment of acute complications  Description: List symptoms of hyper- and hypoglycemia; describe how to treat low blood sugar and actions for lowering  high blood glucose level. Outcome: Progressing Towards Goal  Goal: *Prevention, detection and treatment of chronic complications  Description: Define the natural course of diabetes and describe the relationship of blood glucose levels to long term complications of diabetes.   Outcome: Progressing Towards Goal  Goal: *Developing strategies to address psychosocial issues  Description: Describe feelings about living with diabetes; identify support needed and support network  Outcome: Progressing Towards Goal  Goal: *Insulin pump training  Outcome: Progressing Towards Goal  Goal: *Sick day guidelines  Outcome: Progressing Towards Goal  Goal: *Patient Specific Goal (EDIT GOAL, INSERT TEXT)  Outcome: Progressing Towards Goal     Problem: Patient Education: Go to Patient Education Activity  Goal: Patient/Family Education  Outcome: Progressing Towards Goal     Problem: Pressure Injury - Risk of  Goal: *Prevention of pressure injury  Description: Document Shashank Scale and appropriate interventions in the flowsheet. Outcome: Progressing Towards Goal  Note: Pressure Injury Interventions:  Sensory Interventions: Discuss PT/OT consult with provider, Float heels, Keep linens dry and wrinkle-free, Maintain/enhance activity level, Minimize linen layers, Pressure redistribution bed/mattress (bed type)    Moisture Interventions: Absorbent underpads, Apply protective barrier, creams and emollients, Check for incontinence Q2 hours and as needed    Activity Interventions: Increase time out of bed, Pressure redistribution bed/mattress(bed type), PT/OT evaluation    Mobility Interventions: Assess need for specialty bed, Float heels, HOB 30 degrees or less, Pressure redistribution bed/mattress (bed type), PT/OT evaluation, Turn and reposition approx. every two hours(pillow and wedges)    Nutrition Interventions: Document food/fluid/supplement intake    Friction and Shear Interventions: Apply protective barrier, creams and emollients, Foam dressings/transparent film/skin sealants                Problem: Patient Education: Go to Patient Education Activity  Goal: Patient/Family Education  Outcome: Progressing Towards Goal     Problem: Falls - Risk of  Goal: *Absence of Falls  Description: Document Aristeo Fall Risk and appropriate interventions in the flowsheet.   Outcome: Progressing Towards Goal  Note: Fall Risk Interventions:  Mobility Interventions: Bed/chair exit alarm, OT consult for ADLs, Patient to call before getting OOB, PT Consult for mobility concerns, PT Consult for assist device competence    Mentation Interventions: Bed/chair exit alarm, Adequate sleep, hydration, pain control    Medication Interventions: Bed/chair exit alarm, Patient to call before getting OOB, Teach patient to arise slowly    Elimination Interventions: Bed/chair exit alarm, Call light in reach, Patient to call for help with toileting needs, Stay With Me (per policy), Toilet paper/wipes in reach, Toileting schedule/hourly rounds, Urinal in reach    History of Falls Interventions: Bed/chair exit alarm, Investigate reason for fall         Problem: Patient Education: Go to Patient Education Activity  Goal: Patient/Family Education  Outcome: Progressing Towards Goal

## 2021-07-27 NOTE — PROGRESS NOTES
07/27/21 0816   Dual Skin Pressure Injury Assessment   Dual Skin Pressure Injury Assessment WDL   Second Care Provider (Based on [address] [place] [address]) LISANDRO Santiago     All skin intact, including leeann heels and sacrum, no breakdown noted.

## 2021-07-27 NOTE — ED NOTES
TRANSFER - OUT REPORT:    Verbal report given to 9th floor (name) on Naty Rothman.  being transferred to th brain (unit) for routine progression of care       Report consisted of patients Situation, Background, Assessment and   Recommendations(SBAR). Information from the following report(s) SBAR, ED Summary, [facility] and Recent Results was reviewed with the receiving nurse. Lines:   Peripheral IV 07/26/21 Left Antecubital (Active)   Site Assessment Clean, dry, & intact 07/26/21 2348   Phlebitis Assessment 0 07/26/21 2348   Infiltration Assessment 0 07/26/21 2348   Dressing Status Clean, dry, & intact 07/26/21 2348   Hub Color/Line Status Green 07/26/21 2348   Action Taken Blood drawn 07/26/21 2348        Opportunity for questions and clarification was provided.       Patient transported with:   transport

## 2021-07-27 NOTE — PROGRESS NOTES
Hospitalist Progress Note     Admit Date:  2021  1:22 AM   Name:  Lady Whitney. Age:  78 y.o.  :  1945   MRN:  [id number]     Presenting Complaint: Rectal Bleeding    Initial Admission Diagnosis: Hematochezia [K92.1]     Assessment and Plan:     Hospital Problems as of 2021 Date Reviewed: 2020        Codes Class Noted - Resolved POA    * (Principal) Hematochezia ICD-10-CM: K92.1  ICD-9-CM: 578.1  2021 - Present Unknown        HTN (hypertension) ICD-10-CM: I10  ICD-9-CM: 401.9  2021 - Present Unknown        HLD (hyperlipidemia) ICD-10-CM: E78.5  ICD-9-CM: 272.4  2021 - Present Unknown        Stroke-like symptom ICD-10-CM: R29.90  ICD-9-CM: 781.99  2021 - Present Yes        DM (diabetes mellitus) type I controlled with renal manifestation ([facility] [address] ICD-10-CM: E10.29  ICD-9-CM: 250.41  10/4/2020 - Present Yes              Plan:  · Agree with plan as documented in H&P with following comments/changes:  · [facility] [employer] for pt to use his insulin pump at half usual rate. DM educator said he demonstrated understanding of use. · Added no conc sweets to diet  · Added ROX and other issues to problem list  · Increase IVF to 100/hr. Baseline Cr appears to be 1.2    Discharge planning:     Likely home at AL; 1-2 days    No charge billed to patient for this same-day follow up. Diet: ADULT DIET Clear Liquid  DIET NPO Sips of Water with [facility] Course: Follow up note. Patient was admitted this morning. Please see H&P for details.     Objective:     Patient Vitals for the past 24 hrs:   Temp Pulse Resp BP SpO2   21 0740 98.3 °F (36.8 °C) 100 18 (!) 158/87 99 %   21 0654  98   100 %   21 0653  99  100/61 98 %   21 0646  97  (!) 89/52 97 %   21 0605  (!) 102  (!) 120/59 100 %   21 0525    132/68    21 0524  99   99 %   21 0505  (!) 101  136/69 100 %   21 0443  (!) 103  (!) 166/91 100 %   21 7482 98.3 °F (36.8 °C) (!) 106 20 103/62 100 %     Oxygen Therapy  O2 Sat (%): 99 % (07/27/21 0740)  Pulse via Oximetry: 98 beats per minute (07/27/21 0654)  O2 Device: None (Room air) (07/27/21 0740)    Estimated body mass index is 30 kg/m² as calculated from the following:    Height as of this encounter: 5' 8\" (1.727 m). Weight as of this encounter: 89.5 kg (197 lb 4.8 oz). No intake or output data in the 24 hours ending 07/27/21 1155    *Note that automatically entered I/Os may not be accurate; dependent on patient compliance with collection and accurate  by assistants. I have reviewed all labs, meds, and other studies shown below:  Last 24hr Labs:  Recent Results (from the past 24 hour(s))   LIPASE    Collection Time: 07/26/21 11:45 PM   Result Value Ref Range    Lipase 57 (L) 73 - 178 U/L   METABOLIC PANEL, COMPREHENSIVE    Collection Time: 07/26/21 11:45 PM   Result Value Ref Range    Sodium 137 136 - 145 mmol/L    Potassium 4.5 3.5 - 5.1 mmol/L    Chloride 104 98 - 107 mmol/L    CO2 26 21 - 32 mmol/L    Anion gap 7 7 - 16 mmol/L    Glucose 228 (H) 65 - 100 mg/dL    BUN 24 (H) 8 - 23 MG/DL    Creatinine 1.79 (H) 0.8 - 1.5 MG/DL    GFR est AA 48 (L) >60 ml/min/1.73m2    GFR est non-AA 40 (L) >60 ml/min/1.73m2    Calcium 9.2 8.3 - 10.4 MG/DL    Bilirubin, total 0.4 0.2 - 1.1 MG/DL    ALT (SGPT) 21 12 - 65 U/L    AST (SGOT) 15 15 - 37 U/L    Alk.  phosphatase 115 50 - 136 U/L    Protein, total 6.9 6.3 - 8.2 g/dL    Albumin 3.1 (L) 3.2 - 4.6 g/dL    Globulin 3.8 (H) 2.3 - 3.5 g/dL    A-G Ratio 0.8 (L) 1.2 - 3.5     CBC WITH AUTOMATED DIFF    Collection Time: 07/26/21 11:45 PM   Result Value Ref Range    WBC 7.2 4.3 - 11.1 K/uL    RBC 4.60 4.23 - 5.6 M/uL    HGB 12.2 (L) 13.6 - 17.2 g/dL    HCT 39.7 (L) 41.1 - 50.3 %    MCV 86.3 79.6 - 97.8 FL    MCH 26.5 26.1 - 32.9 PG    MCHC 30.7 (L) 31.4 - 35.0 g/dL    RDW 15.2 (H) 11.9 - 14.6 %    PLATELET 690 760 - 509 K/uL    MPV 10.2 9.4 - 12.3 FL    ABSOLUTE NRBC 0.00 0.0 - 0.2 K/uL    DF AUTOMATED      NEUTROPHILS 74 43 - 78 %    LYMPHOCYTES 17 13 - 44 %    MONOCYTES 7 4.0 - 12.0 %    EOSINOPHILS 1 0.5 - 7.8 %    BASOPHILS 1 0.0 - 2.0 %    IMMATURE GRANULOCYTES 0 0.0 - 5.0 %    ABS. NEUTROPHILS 5.3 1.7 - 8.2 K/UL    ABS. LYMPHOCYTES 1.2 0.5 - 4.6 K/UL    ABS. MONOCYTES 0.5 0.1 - 1.3 K/UL    ABS. EOSINOPHILS 0.1 0.0 - 0.8 K/UL    ABS. BASOPHILS 0.0 0.0 - 0.2 K/UL    ABS. IMM.  GRANS. 0.0 0.0 - 0.5 K/UL   TYPE & SCREEN    Collection Time: 07/26/21 11:45 PM   Result Value Ref Range    Crossmatch Expiration 07/29/2021,2359     ABO/Rh(D) A POSITIVE     Antibody screen NEG    URINE MICROSCOPIC    Collection Time: 07/27/21  3:15 AM   Result Value Ref Range    WBC 0-3 0 /hpf    RBC 0-3 0 /hpf    Epithelial cells 0-3 0 /hpf    Bacteria 0 0 /hpf    Casts 0-3 0 /lpf   GLUCOSE, POC    Collection Time: 07/27/21 10:18 AM   Result Value Ref Range    Glucose (POC) 212 (H) 65 - 100 mg/dL    Performed by Bryce        All Micro Results     None          Current Meds:  Current Facility-Administered Medications   Medication Dose Route Frequency    sodium chloride (NS) flush 5-40 mL  5-40 mL IntraVENous Q8H    sodium chloride (NS) flush 5-40 mL  5-40 mL IntraVENous PRN    acetaminophen (TYLENOL) tablet 650 mg  650 mg Oral Q6H PRN    Or    acetaminophen (TYLENOL) suppository 650 mg  650 mg Rectal Q6H PRN    polyethylene glycol (MIRALAX) packet 17 g  17 g Oral DAILY PRN    ondansetron (ZOFRAN ODT) tablet 4 mg  4 mg Oral Q8H PRN    Or    ondansetron (ZOFRAN) injection 4 mg  4 mg IntraVENous Q6H PRN    amitriptyline (ELAVIL) tablet 30 mg  30 mg Oral QHS    [Held by provider] aspirin chewable tablet 81 mg  81 mg Oral QHS    buPROPion (WELLBUTRIN) tablet 75 mg  75 mg Oral BID    [Held by provider] clopidogreL (PLAVIX) tablet 75 mg  75 mg Oral DAILY    donepeziL (ARICEPT) tablet 5 mg  5 mg Oral DAILY    finasteride (PROSCAR) tablet 5 mg  5 mg Oral DAILY    levothyroxine (SYNTHROID) tablet 75 mcg  75 mcg Oral ACB    rosuvastatin (CRESTOR) tablet 20 mg  20 mg Oral QHS    dextrose 40% (GLUTOSE) oral gel 1 Tube  15 g Oral PRN    glucagon (GLUCAGEN) injection 1 mg  1 mg IntraMUSCular PRN    dextrose (D50W) injection syrg 12.5-25 g  25-50 mL IntraVENous PRN    lactated Ringers infusion  50 mL/hr IntraVENous CONTINUOUS    polyethylene glycol (MIRALAX) powder 238 g  238 g Oral ONCE    bisacodyL (DULCOLAX) tablet 10 mg  10 mg Oral NOW    insulin pump (PATIENT SUPPLIED)   SubCUTAneous CONTINUOUS       Other Studies:    XR ABD ACUTE W 1 V CHEST    Result Date: 7/27/2021  EXAM: Acute abdomen series. INDICATION: Rectal bleeding. COMPARISON: Prior chest x-rays on November 15 and November 13, 2020. TECHNIQUE: Supine and upright views of the abdomen were supplemented with a frontal view of the chest. FINDINGS: The heart size, mediastinal contour and pulmonary vasculature are normal. There is chronic linear scarring in the right upper lobe and pleural thickening along the outer right lung. The left lung is clear. No pneumothorax or pleural effusion. There is moderate to severe constipation. No bowel obstruction or free intraperitoneal air is identified. A spinal stimulator lead projects into the lower thoracic spinal canal.     1. No acute intrathoracic process. 2. Moderate to severe constipation.       Signed:  Bee Torres MD

## 2021-07-27 NOTE — ED NOTES
Pt arrives via EMS from. CO red stain on sheets when he got up as well as rectal bleeding. Denies pain. Denies blood thinners only ASA.

## 2021-07-27 NOTE — H&P
Admit date: 2021   Name:  Donnie Zavaleta. Age:  78 y.o.   :  1945   MRN:  [id number]   PCP:  Kaylene Mendoza DO   Provider:  Gilda Bolanos MD      ASSESSMENT AND PLAN  21-year-old male with a past medical history of type 1 diabetes mellitus on insulin pump, CKD stage III, hypothyroidism, BPH, GERD, hyperlipidemia, hypertension, mild cognitive impairment, that presents in setting of hematochezia    1. Acute blood loss anemia in the setting of hematochezia  -Monitor H&H  -Transfuse if hemoglobin lower than 7  -Hold aspirin and Plavix for now  -N.p.o. for now  -Gastroenterology consult for possible colonoscopy    2. Diabetes mellitus  -We will consult diabetic management for insulin pump  -In the meantime insulin sliding scale with blood sugar checks every 6 hours    3. Hypertension  -Currently not on medications  -Monitor    4. Hyperlipidemia  -Continue Crestor    5. Recent admission concerning of CVA  -Holding aspirin and Plavix for now in the setting of hematochezia    6. Neuropathy  -Continue amitriptyline    7. BPH  -Continue finasteride    DVT prophylaxis with SCD pumps    Full code    Patient aware of plan          CHIEF COMPLAINT:  Hematochezia      HISTORY OF PRESENT ILLNESS:  21-year-old male with a past medical history of type 1 diabetes mellitus on insulin pump, CKD stage III, hypothyroidism, BPH, GERD, hyperlipidemia, hypertension, mild cognitive impairment, that presents in setting of hematochezia. The patient states that he was in his usual state of health until yesterday when he noticed that he was having bright red blood per rectum. States that he had significant amount of blood in his stools so he decided to come to the emergency department. He denies any abdominal pain, no nausea vomiting or diarrhea. Otherwise denies any chest pain, no shortness of breath, no cough. In the emergency department patient was found to be tachycardic and hemoglobin of 12.2.   He will be admitted to the medical floor for further management.       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)     Diabetic nephropathy ([facility] [address]     Diabetic peripheral neuropathy (HCC)     Diabetic retinopathy (HCC)     Dysphonia     GERD (gastroesophageal reflux disease)     managed with medications    HLD (hyperlipidemia)     Hypertension     after HTN meds had ORthostatic and all HTN meds discontinued    Insulin pump in place     Left hip pain     Lumbar radiculopathy     MCI (mild cognitive impairment) with memory loss     Mild cognitive impairment     Moderate episode of recurrent major depressive disorder (HCC)     managed with medications    Orthostatic hypotension     LISA (obstructive sleep apnea)     no CPAP    Paraspinal muscle spasm     Polypharmacy     S/P lumbar fusion     Seborrheic keratosis     Spinal cord stimulator status     Spondylolisthesis     Tachycardia     Thyroid disease     managed with medications    Trochanteric bursitis of left hip     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)     Type 1 diabetes mellitus with hyperglycemia (HCC)     Type 1 diabetes mellitus with hypoglycemia (HCC)     Vitreous hemorrhage due to type 1 diabetes mellitus (HCC)     Vocal cord atrophy     Weakness        Past Surgical History:   Procedure Laterality Date    HX AMPUTATION TOE      distal portion of the 2nd toe of R, foot, distal part of 3rd toe of r foot    HX BUNIONECTOMY Right     HX CATARACT REMOVAL Bilateral     HX COLONOSCOPY  2016    HX HEENT Bilateral     laser treatment for diabetic complication    HX LUMBAR FUSION  2010    L4-5    HX LUMBAR LAMINECTOMY  2008    L5-S1    HX OTHER SURGICAL      spinal neurostimulator    HX SHOULDER ARTHROSCOPY Left     HX TONSIL AND ADENOIDECTOMY      at age 8 per Pt          HOME MEDICATION:  Prior to Admission medications    Medication Sig Start Date End Date Taking? Authorizing Provider   docusate sodium (COLACE) 100 mg capsule Take 100 mg by mouth daily. Provider, Historical   HYDROcodone-acetaminophen (NORCO) 5-325 mg per tablet Take 1 Tablet by mouth daily as needed for Pain. Provider, Historical   rosuvastatin (CRESTOR) 20 mg tablet Take 1 Tablet by mouth nightly for 30 days. 7/10/21 8/9/21  Madisyn Jean MD   clopidogreL (PLAVIX) 75 mg tab Take 1 Tablet by mouth daily for 21 days. 7/10/21 7/31/21  Madisyn Jean MD   thiamine HCL (B-1) 100 mg tablet Take 1 Tablet by mouth daily for 30 days. 7/11/21 8/10/21  Madisyn Jean MD   morphine 15 mg TR12 Take 15 mg by mouth two (2) times a day. Provider, Historical   ondansetron (Zofran ODT) 4 mg disintegrating tablet Take 1 Tab by mouth every six (6) hours as needed for Nausea (and vomiting). 11/16/20   Macrina Spicer, NP   insulin glargine (Lantus U-100 Insulin) 100 unit/mL injection 43 Units by SubCUTAneous route as needed for Other (diabetes). Only uses when pump fails. Has NEVER had to use the insulin. Provider, Historical   fluticasone propionate (Flonase Allergy Relief) 50 mcg/actuation nasal spray 2 Sprays by Both Nostrils route as needed for Allergies. Provider, Historical   IP [facility] INSULIN LISPRO, HUMALOG, FOR PUMP Basal rate 4131-5842 - 2 units/H, 4677-7290 2.2 units/H and 9551-5419 2.5 units/H    Provider, Historical   sodium bicarbonate 650 mg tablet Take 650 mg by mouth two (2) times a day.     Provider, Historical   RABEprazole (Aciphex) 20 mg tablet Take 20 mg by mouth two (2) times a day. Patient not taking: Reported on 2021    Provider, Historical   finasteride (PROSCAR) 5 mg tablet Take 5 mg by mouth daily. Provider, Historical   buPROPion (WELLBUTRIN) 75 mg tablet Take 75 mg by mouth two (2) times a day. Provider, Historical   levothyroxine (SYNTHROID) 75 mcg tablet Take 75 mcg by mouth Daily (before breakfast). Provider, Historical   amitriptyline (ELAVIL) 10 mg tablet Take 30 mg by mouth nightly. Provider, Historical   alfuzosin SR (UROXATRAL) 10 mg SR tablet Take 10 mg by mouth nightly. Provider, Historical   ondansetron hcl (Zofran) 4 mg tablet Take 4 mg by mouth every eight (8) hours as needed for Nausea or Vomiting. Provider, Historical   cholecalciferol (Vitamin D3) 25 mcg (1,000 unit) cap Take 1,000 Units by mouth daily. Provider, Historical   aspirin 81 mg chewable tablet Take 81 mg by mouth nightly. Provider, Historical   hydrocortisone valerate (WEST-DORI) 0.2 % ointment Apply  to affected area two (2) times daily as needed for Skin Irritation. use thin layer    Provider, Historical   fluticasone propionate (CUTIVATE) 0.05 % topical cream Apply 1 g to affected area two (2) times a day. Provider, Historical   donepeziL (ARICEPT) 5 mg tablet Take 5 mg by mouth daily.     Provider, Historical         REVIEW OF SYSTEMS:  14 ROS negative except from stated on HPI      Social History     Tobacco Use    Smoking status: Former Smoker     Quit date: 1996     Years since quittin.9    Smokeless tobacco: Former User   Substance Use Topics    Alcohol use: Not Currently    Drug use: Never         Family History   Problem Relation Age of Onset    Heart Disease Father          Allergies   Allergen Reactions    Drixomed [Dexbrompheniramine-Pseudoephed] Palpitations         Vitals:    21 0443 21 0505 21 0524 21 0525   BP: (!) 166/91 136/69  132/68   Pulse: (!) 103 (!) 101 99    Resp:       Temp: SpO2: 100% 100% 99%    Weight:       Height:             PHYSICAL EXAM:  General: Alert, oriented, NAD  HEENT: NC/AT, EOM are intact  Neck: supple, no JVD  Cardiovascular: RRR, S1, S2, no murmurs  Respiratory: Lungs are clear, no wheezes or rales  Abdomen: Soft, NT, ND  Back: No CVA tenderness, no paraspinal tenderness  Extremities: LE without pedal edema, no erythema  Neuro: A&O, CN are intact, no focal deficits  Skin: no rash or ulcers  Psych: good mood and affect      I have personally reviewed patients laboratory data showing  Lab Results   Component Value Date    WBC 7.2 07/26/2021    HGB 12.2 (L) 07/26/2021    HCT 39.7 (L) 07/26/2021    MCV 86.3 07/26/2021     07/26/2021     No results found for: CKMB  Lab Results   Component Value Date     (H) 07/26/2021     07/26/2021    K 4.5 07/26/2021    CO2 26 07/26/2021     07/26/2021    BUN 24 (H) 07/26/2021           I have personally reviewed patients imaging showing  XR ABD ACUTE W 1 V CHEST   Final Result   1. No acute intrathoracic process. 2. Moderate to severe constipation.             Likely length of stay more than 2 midnights

## 2021-07-28 ENCOUNTER — ANESTHESIA (OUTPATIENT)
Dept: ENDOSCOPY | Age: 76
DRG: 378 | End: 2021-07-28
Payer: MEDICARE

## 2021-07-28 VITALS
DIASTOLIC BLOOD PRESSURE: 88 MMHG | HEIGHT: 68 IN | SYSTOLIC BLOOD PRESSURE: 153 MMHG | OXYGEN SATURATION: 99 % | BODY MASS INDEX: 29.9 KG/M2 | RESPIRATION RATE: 17 BRPM | HEART RATE: 93 BPM | TEMPERATURE: 97.8 F | WEIGHT: 197.3 LBS

## 2021-07-28 LAB
ANION GAP SERPL CALC-SCNC: 7 MMOL/L (ref 7–16)
BASOPHILS # BLD: 0.1 K/UL (ref 0–0.2)
BASOPHILS NFR BLD: 1 % (ref 0–2)
BUN SERPL-MCNC: 21 MG/DL (ref 8–23)
CALCIUM SERPL-MCNC: 8.6 MG/DL (ref 8.3–10.4)
CHLORIDE SERPL-SCNC: 108 MMOL/L (ref 98–107)
CO2 SERPL-SCNC: 25 MMOL/L (ref 21–32)
CREAT SERPL-MCNC: 1.45 MG/DL (ref 0.8–1.5)
DIFFERENTIAL METHOD BLD: ABNORMAL
EOSINOPHIL # BLD: 0.1 K/UL (ref 0–0.8)
EOSINOPHIL NFR BLD: 2 % (ref 0.5–7.8)
ERYTHROCYTE [DISTWIDTH] IN BLOOD BY AUTOMATED COUNT: 15.2 % (ref 11.9–14.6)
GLUCOSE BLD STRIP.AUTO-MCNC: 174 MG/DL (ref 65–100)
GLUCOSE BLD STRIP.AUTO-MCNC: 214 MG/DL (ref 65–100)
GLUCOSE SERPL-MCNC: 172 MG/DL (ref 65–100)
HCT VFR BLD AUTO: 40.7 % (ref 41.1–50.3)
HGB BLD-MCNC: 12.4 G/DL (ref 13.6–17.2)
IMM GRANULOCYTES # BLD AUTO: 0 K/UL (ref 0–0.5)
IMM GRANULOCYTES NFR BLD AUTO: 0 % (ref 0–5)
LYMPHOCYTES # BLD: 1.1 K/UL (ref 0.5–4.6)
LYMPHOCYTES NFR BLD: 17 % (ref 13–44)
MCH RBC QN AUTO: 26.5 PG (ref 26.1–32.9)
MCHC RBC AUTO-ENTMCNC: 30.5 G/DL (ref 31.4–35)
MCV RBC AUTO: 87 FL (ref 79.6–97.8)
MONOCYTES # BLD: 0.4 K/UL (ref 0.1–1.3)
MONOCYTES NFR BLD: 7 % (ref 4–12)
NEUTS SEG # BLD: 4.5 K/UL (ref 1.7–8.2)
NEUTS SEG NFR BLD: 73 % (ref 43–78)
NRBC # BLD: 0 K/UL (ref 0–0.2)
PLATELET # BLD AUTO: 230 K/UL (ref 150–450)
PMV BLD AUTO: 10 FL (ref 9.4–12.3)
POTASSIUM SERPL-SCNC: 3.9 MMOL/L (ref 3.5–5.1)
RBC # BLD AUTO: 4.68 M/UL (ref 4.23–5.6)
SERVICE CMNT-IMP: ABNORMAL
SERVICE CMNT-IMP: ABNORMAL
SODIUM SERPL-SCNC: 140 MMOL/L (ref 136–145)
WBC # BLD AUTO: 6.2 K/UL (ref 4.3–11.1)

## 2021-07-28 PROCEDURE — 82962 GLUCOSE BLOOD TEST: CPT

## 2021-07-28 PROCEDURE — 3331090002 HH PPS REVENUE DEBIT

## 2021-07-28 PROCEDURE — 74011250637 HC RX REV CODE- 250/637: Performed by: INTERNAL MEDICINE

## 2021-07-28 PROCEDURE — 80048 BASIC METABOLIC PNL TOTAL CA: CPT

## 2021-07-28 PROCEDURE — 76060000031 HC ANESTHESIA FIRST 0.5 HR: Performed by: INTERNAL MEDICINE

## 2021-07-28 PROCEDURE — 74011636637 HC RX REV CODE- 636/637: Performed by: INTERNAL MEDICINE

## 2021-07-28 PROCEDURE — 85025 COMPLETE CBC W/AUTO DIFF WBC: CPT

## 2021-07-28 PROCEDURE — 3331090001 HH PPS REVENUE CREDIT

## 2021-07-28 PROCEDURE — 36415 COLL VENOUS BLD VENIPUNCTURE: CPT

## 2021-07-28 PROCEDURE — 76040000025: Performed by: INTERNAL MEDICINE

## 2021-07-28 PROCEDURE — 74011000250 HC RX REV CODE- 250: Performed by: INTERNAL MEDICINE

## 2021-07-28 PROCEDURE — 74011250636 HC RX REV CODE- 250/636: Performed by: INTERNAL MEDICINE

## 2021-07-28 PROCEDURE — 74011000250 HC RX REV CODE- 250

## 2021-07-28 PROCEDURE — 74011250636 HC RX REV CODE- 250/636

## 2021-07-28 PROCEDURE — 2709999900 HC NON-CHARGEABLE SUPPLY: Performed by: INTERNAL MEDICINE

## 2021-07-28 RX ORDER — SODIUM CHLORIDE, SODIUM LACTATE, POTASSIUM CHLORIDE, CALCIUM CHLORIDE 600; 310; 30; 20 MG/100ML; MG/100ML; MG/100ML; MG/100ML
1000 INJECTION, SOLUTION INTRAVENOUS CONTINUOUS
Status: DISCONTINUED | OUTPATIENT
Start: 2021-07-28 | End: 2021-07-28 | Stop reason: HOSPADM

## 2021-07-28 RX ORDER — PROPOFOL 10 MG/ML
INJECTION, EMULSION INTRAVENOUS AS NEEDED
Status: DISCONTINUED | OUTPATIENT
Start: 2021-07-28 | End: 2021-07-28 | Stop reason: HOSPADM

## 2021-07-28 RX ORDER — CLOPIDOGREL BISULFATE 75 MG/1
75 TABLET ORAL DAILY
Qty: 30 TABLET | Refills: 0 | Status: SHIPPED | OUTPATIENT
Start: 2021-07-30 | End: 2021-07-28

## 2021-07-28 RX ORDER — GUAIFENESIN 100 MG/5ML
81 LIQUID (ML) ORAL
Qty: 30 TABLET | Refills: 0 | Status: SHIPPED | OUTPATIENT
Start: 2021-07-30 | End: 2021-08-22

## 2021-07-28 RX ORDER — LIDOCAINE 4 G/100G
1 PATCH TOPICAL EVERY 24 HOURS
Status: DISCONTINUED | OUTPATIENT
Start: 2021-07-28 | End: 2021-07-28 | Stop reason: HOSPADM

## 2021-07-28 RX ORDER — LIDOCAINE HYDROCHLORIDE 20 MG/ML
INJECTION, SOLUTION EPIDURAL; INFILTRATION; INTRACAUDAL; PERINEURAL AS NEEDED
Status: DISCONTINUED | OUTPATIENT
Start: 2021-07-28 | End: 2021-07-28 | Stop reason: HOSPADM

## 2021-07-28 RX ORDER — POLYETHYLENE GLYCOL 3350 17 G/17G
17 POWDER, FOR SOLUTION ORAL DAILY
Qty: 10 PACKET | Refills: 0 | Status: SHIPPED | OUTPATIENT
Start: 2021-07-28

## 2021-07-28 RX ADMIN — Medication 10 ML: at 13:18

## 2021-07-28 RX ADMIN — LEVOTHYROXINE SODIUM 75 MCG: 0.07 TABLET ORAL at 05:43

## 2021-07-28 RX ADMIN — INSULIN LISPRO 4 UNITS: 100 INJECTION, SOLUTION INTRAVENOUS; SUBCUTANEOUS at 13:01

## 2021-07-28 RX ADMIN — PROPOFOL 100 MCG/KG/MIN: 10 INJECTION, EMULSION INTRAVENOUS at 10:32

## 2021-07-28 RX ADMIN — PROPOFOL 50 MG: 10 INJECTION, EMULSION INTRAVENOUS at 10:31

## 2021-07-28 RX ADMIN — SODIUM CHLORIDE, SODIUM LACTATE, POTASSIUM CHLORIDE, AND CALCIUM CHLORIDE 100 ML/HR: 600; 310; 30; 20 INJECTION, SOLUTION INTRAVENOUS at 08:55

## 2021-07-28 RX ADMIN — ACETAMINOPHEN 650 MG: 325 TABLET ORAL at 03:02

## 2021-07-28 RX ADMIN — BUPROPION HYDROCHLORIDE 75 MG: 75 TABLET, FILM COATED ORAL at 08:24

## 2021-07-28 RX ADMIN — SODIUM CHLORIDE, SODIUM LACTATE, POTASSIUM CHLORIDE, AND CALCIUM CHLORIDE 1000 ML: 600; 310; 30; 20 INJECTION, SOLUTION INTRAVENOUS at 10:00

## 2021-07-28 RX ADMIN — LIDOCAINE HYDROCHLORIDE 60 MG: 20 INJECTION, SOLUTION EPIDURAL; INFILTRATION; INTRACAUDAL; PERINEURAL at 10:31

## 2021-07-28 RX ADMIN — FINASTERIDE 5 MG: 5 TABLET, FILM COATED ORAL at 08:24

## 2021-07-28 RX ADMIN — Medication 10 ML: at 05:41

## 2021-07-28 RX ADMIN — DONEPEZIL HYDROCHLORIDE 5 MG: 5 TABLET, FILM COATED ORAL at 08:25

## 2021-07-28 NOTE — PROGRESS NOTES
Problem: Diabetes Self-Management  Goal: *Disease process and treatment process  Description: Define diabetes and identify own type of diabetes; list 3 options for treating diabetes. Outcome: Progressing Towards Goal  Goal: *Incorporating nutritional management into lifestyle  Description: Describe effect of type, amount and timing of food on blood glucose; list 3 methods for planning meals. Outcome: Progressing Towards Goal  Goal: *Incorporating physical activity into lifestyle  Description: State effect of exercise on blood glucose levels. Outcome: Progressing Towards Goal  Goal: *Developing strategies to promote health/change behavior  Description: Define the ABC's of diabetes; identify appropriate screenings, schedule and personal plan for screenings. Outcome: Progressing Towards Goal  Goal: *Using medications safely  Description: State effect of diabetes medications on diabetes; name diabetes medication taking, action and side effects. Outcome: Progressing Towards Goal  Goal: *Monitoring blood glucose, interpreting and using results  Description: Identify recommended blood glucose targets  and personal targets. Outcome: Progressing Towards Goal  Goal: *Prevention, detection, treatment of acute complications  Description: List symptoms of hyper- and hypoglycemia; describe how to treat low blood sugar and actions for lowering  high blood glucose level. Outcome: Progressing Towards Goal  Goal: *Prevention, detection and treatment of chronic complications  Description: Define the natural course of diabetes and describe the relationship of blood glucose levels to long term complications of diabetes.   Outcome: Progressing Towards Goal  Goal: *Developing strategies to address psychosocial issues  Description: Describe feelings about living with diabetes; identify support needed and support network  Outcome: Progressing Towards Goal  Goal: *Insulin pump training  Outcome: Progressing Towards Goal  Goal: *Sick day guidelines  Outcome: Progressing Towards Goal  Goal: *Patient Specific Goal (EDIT GOAL, INSERT TEXT)  Outcome: Progressing Towards Goal     Problem: Patient Education: Go to Patient Education Activity  Goal: Patient/Family Education  Outcome: Progressing Towards Goal     Problem: Pressure Injury - Risk of  Goal: *Prevention of pressure injury  Description: Document Shashank Scale and appropriate interventions in the flowsheet. Outcome: Progressing Towards Goal  Note: Pressure Injury Interventions:  Sensory Interventions: Discuss PT/OT consult with provider, Float heels, Keep linens dry and wrinkle-free, Maintain/enhance activity level, Minimize linen layers, Pressure redistribution bed/mattress (bed type)    Moisture Interventions: Absorbent underpads, Limit adult briefs, Minimize layers, Apply protective barrier, creams and emollients    Activity Interventions: Assess need for specialty bed, Increase time out of bed, Pressure redistribution bed/mattress(bed type), PT/OT evaluation    Mobility Interventions: Assess need for specialty bed, HOB 30 degrees or less, Pressure redistribution bed/mattress (bed type), PT/OT evaluation    Nutrition Interventions: Document food/fluid/supplement intake, Offer support with meals,snacks and hydration    Friction and Shear Interventions: Apply protective barrier, creams and emollients, Foam dressings/transparent film/skin sealants, HOB 30 degrees or less, Lift sheet, Lift team/patient mobility team, Minimize layers                Problem: Patient Education: Go to Patient Education Activity  Goal: Patient/Family Education  Outcome: Progressing Towards Goal     Problem: Falls - Risk of  Goal: *Absence of Falls  Description: Document Aristeo Fall Risk and appropriate interventions in the flowsheet.   Outcome: Progressing Towards Goal  Note: Fall Risk Interventions:  Mobility Interventions: Bed/chair exit alarm, Patient to call before getting OOB, PT Consult for mobility concerns    Mentation Interventions: Bed/chair exit alarm, Door open when patient unattended, Family/sitter at bedside, More frequent rounding    Medication Interventions: Assess postural VS orthostatic hypotension, Evaluate medications/consider consulting pharmacy, Patient to call before getting OOB, Teach patient to arise slowly, Bed/chair exit alarm    Elimination Interventions: Bed/chair exit alarm, Call light in reach, Elevated toilet seat, Patient to call for help with toileting needs, Toilet paper/wipes in reach, Urinal in reach    History of Falls Interventions: Bed/chair exit alarm, Evaluate medications/consider consulting pharmacy, Investigate reason for fall         Problem: Patient Education: Go to Patient Education Activity  Goal: Patient/Family Education  Outcome: Progressing Towards Goal

## 2021-07-28 NOTE — ROUTINE PROCESS
TRANSFER - OUT REPORT:    Verbal report given to Ravinder Gordon RN on [employer]  being transferred to 1 for routine progression of care       Report consisted of patients Situation, Background, Assessment and   Recommendations(SBAR). Information from the following report(s) SBAR and Procedure Summary was reviewed with the receiving nurse. Lines:   Peripheral IV 07/26/21 Left Antecubital (Active)   Site Assessment Clean, dry, & intact 07/28/21 1123   Phlebitis Assessment 0 07/28/21 1123   Infiltration Assessment 0 07/28/21 1123   Dressing Status Clean, dry, & intact 07/28/21 1123   Dressing Type Transparent;Tape;Stabilization/securement device 07/28/21 1123   Hub Color/Line Status Patent; Flushed 07/28/21 1123   Action Taken Blood drawn 07/26/21 1371   Alcohol Cap Used No 07/28/21 0942        Opportunity for questions and clarification was provided. Patient transported back to room 911 with family member.

## 2021-07-28 NOTE — PROGRESS NOTES
Form completed and sent to Physician's Office electronically via Nurse Pool for review and signature.     Authorization mailed   Patient has discharge orders for home today. Patient services for [facility] will be resumed. Patient family will transport home. Patient has met all treatment goals / milestones. CM will continue to monitor and remain available for any needs or concerns that may occur. Care Management Interventions  PCP Verified by CM: Yes (Dr. Maycol Mims but would like new PCP)  Mode of Transport at Discharge:  Other (see comment) (family )  Transition of Care Consult (CM Consult): Discharge Planning, [address]: Yes  Discharge Durable Medical Equipment: No  Physical Therapy Consult: No  Occupational Therapy Consult: No  Speech Therapy Consult: No  Current Support Network: Own Home, Lives with Spouse  Confirm Follow Up Transport: Family  The Plan for Transition of Care is Related to the Following Treatment Goals : Return to baseline   The Patient and/or Patient Representative was Provided with a Choice of Provider and Agrees with the Discharge Plan?: Yes  Name of the Patient Representative Who was Provided with a Choice of Provider and Agrees with the Discharge Plan: pt   Freedom of Choice List was Provided with Basic Dialogue that Supports the Patient's Individualized Plan of Care/Goals, Treatment Preferences and Shares the Quality Data Associated with the Providers?: Yes   Resource Information Provided?: No  Discharge Location  Discharge Placement: Home with home health ([facility])

## 2021-07-28 NOTE — DIABETES MGMT
Patient admitted with hematochezia. Blood glucose ranged 203-239 yesterday with patient managing insulin pump. Blood glucose this morning was 174. Noted patient decided to take off insulin pump in the middle of the night, primary RN notified provider and Humalog correctional insulin was ordered q6h. Patient refused correctional insulin this AM due to concern for hypoglycemia while NPO. Given patient PMH of type 1 diabetes patient would likely benefit from basal insulin if patient does not restart insulin pump. Provider updated via [employer].

## 2021-07-28 NOTE — PROGRESS NOTES
Pt is stating he wants to turn off his insulin pump and go with our sliding scale. MD notified via [employer] for sliding scale orders.

## 2021-07-28 NOTE — ANESTHESIA PREPROCEDURE EVALUATION
Relevant Problems   NEUROLOGY   (+) Alcohol abuse, in remission      CARDIOVASCULAR   (+) HTN (hypertension)      RENAL FAILURE   (+) Acute renal failure superimposed on stage 3 chronic kidney disease (HCC)   (+) CKD (chronic kidney disease) stage 3, GFR 30-59 ml/min (HCC)      ENDOCRINE   (+) DM (diabetes mellitus) type I controlled with renal manifestation (HCC)   (+) Severe obesity (HCC)       Anesthetic History   No history of anesthetic complications            Review of Systems / Medical History  Patient summary reviewed, nursing notes reviewed and pertinent labs reviewed    Pulmonary          Shortness of breath    Pertinent negatives: Sleep apnea: pt denies.      Neuro/Psych         Psychiatric history     Cardiovascular    Hypertension: well controlled          Hyperlipidemia         GI/Hepatic/Renal     GERD: well controlled    Renal disease: CRI       Endo/Other    Diabetes: well controlled, type 1, using insulin  Hypothyroidism: well controlled  Obesity and arthritis     Other Findings   Comments: Chronic pain and neuropathy- on MSIR and lyrica      GIB now without symptoms           Physical Exam    Airway  Mallampati: II      Mouth opening: Normal     Cardiovascular  Regular rate and rhythm,  S1 and S2 normal,  no murmur, click, rub, or gallop             Dental  No notable dental hx       Pulmonary  Breath sounds clear to auscultation               Abdominal         Other Findings            Anesthetic Plan    ASA: 3  Anesthesia type: total IV anesthesia          Induction: Intravenous  Anesthetic plan and risks discussed with: Patient

## 2021-07-28 NOTE — PROGRESS NOTES
Problem: Diabetes Self-Management  Goal: *Disease process and treatment process  Description: Define diabetes and identify own type of diabetes; list 3 options for treating diabetes. Outcome: Progressing Towards Goal  Goal: *Incorporating nutritional management into lifestyle  Description: Describe effect of type, amount and timing of food on blood glucose; list 3 methods for planning meals. Outcome: Progressing Towards Goal  Goal: *Incorporating physical activity into lifestyle  Description: State effect of exercise on blood glucose levels. Outcome: Progressing Towards Goal  Goal: *Developing strategies to promote health/change behavior  Description: Define the ABC's of diabetes; identify appropriate screenings, schedule and personal plan for screenings. Outcome: Progressing Towards Goal  Goal: *Using medications safely  Description: State effect of diabetes medications on diabetes; name diabetes medication taking, action and side effects. Outcome: Progressing Towards Goal  Goal: *Monitoring blood glucose, interpreting and using results  Description: Identify recommended blood glucose targets  and personal targets. Outcome: Progressing Towards Goal  Goal: *Prevention, detection, treatment of acute complications  Description: List symptoms of hyper- and hypoglycemia; describe how to treat low blood sugar and actions for lowering  high blood glucose level. Outcome: Progressing Towards Goal  Goal: *Prevention, detection and treatment of chronic complications  Description: Define the natural course of diabetes and describe the relationship of blood glucose levels to long term complications of diabetes.   Outcome: Progressing Towards Goal  Goal: *Developing strategies to address psychosocial issues  Description: Describe feelings about living with diabetes; identify support needed and support network  Outcome: Progressing Towards Goal  Goal: *Insulin pump training  Outcome: Progressing Towards Goal  Goal: *Sick day guidelines  Outcome: Progressing Towards Goal  Goal: *Patient Specific Goal (EDIT GOAL, INSERT TEXT)  Outcome: Progressing Towards Goal     Problem: Patient Education: Go to Patient Education Activity  Goal: Patient/Family Education  Outcome: Progressing Towards Goal     Problem: Pressure Injury - Risk of  Goal: *Prevention of pressure injury  Description: Document Shashank Scale and appropriate interventions in the flowsheet. Outcome: Progressing Towards Goal  Note: Pressure Injury Interventions:  Sensory Interventions: Discuss PT/OT consult with provider, Float heels, Keep linens dry and wrinkle-free, Maintain/enhance activity level, Minimize linen layers, Pressure redistribution bed/mattress (bed type)    Moisture Interventions: Absorbent underpads, Apply protective barrier, creams and emollients, Limit adult briefs, Maintain skin hydration (lotion/cream), Minimize layers, Moisture barrier    Activity Interventions: Assess need for specialty bed, Increase time out of bed, Pressure redistribution bed/mattress(bed type)    Mobility Interventions: Assess need for specialty bed, Float heels, HOB 30 degrees or less, Pressure redistribution bed/mattress (bed type), Turn and reposition approx. every two hours(pillow and wedges)    Nutrition Interventions: Document food/fluid/supplement intake    Friction and Shear Interventions: Apply protective barrier, creams and emollients, Foam dressings/transparent film/skin sealants, HOB 30 degrees or less, Lift sheet, Lift team/patient mobility team, Minimize layers                Problem: Patient Education: Go to Patient Education Activity  Goal: Patient/Family Education  Outcome: Progressing Towards Goal     Problem: Falls - Risk of  Goal: *Absence of Falls  Description: Document Aristeo Fall Risk and appropriate interventions in the flowsheet.   Outcome: Progressing Towards Goal  Note: Fall Risk Interventions:  Mobility Interventions: Bed/chair exit alarm, Patient to call before getting OOB    Mentation Interventions: Adequate sleep, hydration, pain control, Bed/chair exit alarm, Door open when patient unattended, Evaluate medications/consider consulting pharmacy, More frequent rounding, Reorient patient, Toileting rounds, Update white board    Medication Interventions: Bed/chair exit alarm, Evaluate medications/consider consulting pharmacy, Patient to call before getting OOB, Teach patient to arise slowly    Elimination Interventions: Bed/chair exit alarm, Call light in reach, Patient to call for help with toileting needs, Stay With Me (per policy), Toilet paper/wipes in reach, Toileting schedule/hourly rounds, Urinal in reach    History of Falls Interventions: Bed/chair exit alarm, Consult care management for discharge planning, Door open when patient unattended, Evaluate medications/consider consulting pharmacy         Problem: Patient Education: Go to Patient Education Activity  Goal: Patient/Family Education  Outcome: Progressing Towards Goal

## 2021-07-28 NOTE — PROGRESS NOTES
He has had no further GIB ing.  Hg and VS's are stable. Bowel was not prepped for colo. Will let him go home on nightly Miralax for chronic constipation. Will schedule outpt colo with a 2 day bowel prep. Hold ASA for 2days then resume.     Hg 12.4 on discharge    Amol Reyes MD

## 2021-07-28 NOTE — PROGRESS NOTES
TRANSFER - IN REPORT:    [place] [facility] [place] [employer]  being received from [phone] for ordered procedure      Report consisted of patients Situation, Background, Assessment and   Recommendations(SBAR). Information from the following report(s) SBAR, Intake/Output, MAR, Recent Results, Med Rec Status and Pre Procedure Checklist was reviewed with the receiving nurse. Opportunity for questions and clarification was provided.

## 2021-07-28 NOTE — PROGRESS NOTES
PRE-PROCEDURE ASSESSMENT:  Chief complaint/HPI and PMH:  Please refer consultation note and progress notes. LUNGS:  Clear and equal.  COR:  RRR without murmurs heard. NEURO:  A and Oriented fully. I have thoroughly explained the preparation, procedure and sedation process to the patient as well as the risks and alternatives. They will sign informed consent prior to the procedure.         Radha Joseph MD

## 2021-07-28 NOTE — ANESTHESIA POSTPROCEDURE EVALUATION
Procedure(s):  COLONOSCOPY/ BMI 30 ROOM 902. total IV anesthesia    Anesthesia Post Evaluation        Patient location during evaluation: bedside  Patient participation: complete - patient participated  Level of consciousness: responsive to verbal stimuli  Pain management: satisfactory to patient  Airway patency: patent  Anesthetic complications: no  Cardiovascular status: hemodynamically stable  Respiratory status: spontaneous ventilation  Hydration status: stable    Final Post Anesthesia Temperature Assessment:  Normothermia (36.0-37.5 degrees C)      INITIAL Post-op Vital signs:   Vitals Value Taken Time   /62 07/28/21 1039   Temp 36.2 °C (97.2 °F) 07/28/21 1039   Pulse 91 07/28/21 1041   Resp 15 07/28/21 1039   SpO2 100 % 07/28/21 1041   Vitals shown include unvalidated device data.

## 2021-07-28 NOTE — DISCHARGE SUMMARY
[facility]ist Discharge Summary     Name:  Sandy Solorzano. Age:76 y.o.    Sex:male  :  1945       MRN:  [id number]       Admitting Physician: Aaron Templeton MD Admit Date: 2021  1:22 AM   Attending Physician: Wayne Michele DO  Primary Care Physician: Sherry Roman DO       Discharge Physician: Sakina Laura DO  Discharge date: 21   Discharged Condition: Stable    Indication for Admission:   Chief Complaint   Patient presents with    Rectal Bleeding        Reasons for hospitalization:  Hospital Problems as of 2021 Date Reviewed: 2020        Codes Class Noted - Resolved POA    * (Principal) Hematochezia ICD-10-CM: K92.1  ICD-9-CM: 578.1  2021 - Present Yes        HTN (hypertension) ICD-10-CM: I10  ICD-9-CM: 401.9  2021 - Present Yes        HLD (hyperlipidemia) ICD-10-CM: E78.5  ICD-9-CM: 272.4  2021 - Present Yes        Acute renal failure superimposed on stage 3 chronic kidney disease ([facility] ICD-10-CM: N17.9, N18.30  ICD-9-CM: 584.9, 585.3  2021 - Present Yes        Stroke-like symptom ICD-10-CM: R29.90  ICD-9-CM: 781.99  2021 - Present Yes        BPH (benign prostatic hyperplasia) ICD-10-CM: N40.0  ICD-9-CM: 600.00  2021 - Present Yes        CKD (chronic kidney disease) stage 3, GFR 30-59 ml/min ([facility] ICD-10-CM: N18.30  ICD-9-CM: 585.3  2021 - Present Yes        Severe obesity ([facility] ICD-10-CM: E66.01  ICD-9-CM: 278.01  10/22/2020 - Present Yes        DM (diabetes mellitus) type I controlled with renal manifestation ([facility] ICD-10-CM: E10.29  ICD-9-CM: 250.41  10/4/2020 - Present Yes                 Discharge Diagnosis: Hematochezia with acute blood loss anemia  Did Patient have Sepsis (YES OR NO): No      Hospital Course/Interval history:  68-year-old male with a past medical history of type 1 diabetes mellitus on insulin pump, CKD stage III, hypothyroidism, BPH, GERD, hyperlipidemia, hypertension, mild cognitive impairment, that presents in setting of hematochezia. The patient states that he was in his usual state of health until yesterday when he noticed that he was having bright red blood per rectum. States that he had significant amount of blood in his stools so he decided to come to the emergency department. He denies any abdominal pain, no nausea vomiting or diarrhea. Otherwise denies any chest pain, no shortness of breath, no cough. In the emergency department patient was found to be tachycardic and hemoglobin of 12.2. He will be admitted to the medical floor for further management. Assessment/Plan:  1. Acute blood loss anemia in the setting of hematochezia-patient initially presenting with hematochezia. Patient's hemoglobin and hematocrit remained stable throughout hospitalization and approximately 12.4 with no further clinical evidence of bleeding. Gastroneurology was consulted and attempted to perform colonoscopy. However bowel prep was not effective at clearing stool from colon. As a result GI recommended initiation of MiraLAX daily for 2 weeks and outpatient follow-up with GI for outpatient colonoscopy. There is been no further clinical evidence of bleeding during hospitalization and at time of discharge patient's Plavix was discontinued and patient was asked to resume his aspirin 2 days from discharge per recommendations of GI.           2.  Diabetes mellitus-patient's insulin pump and outpatient regimen is resumed at time of discharge.       3. Hypertension-patient noted to have a clinical history of hypertension however patient currently blood pressures are to goal and patient is not on any outpatient regimen of antihypertensive medications. This is continued at time of discharge.       4. Hyperlipidemia-stable with no acute process during hospitalization and patient's Crestor was continued at time of discharge       5.   Recent admission concerning of CVA-patient previously seen for concerns of CVA and was initiated on aspirin and Plavix and subsequently has had hematochezia. Patient's Plavix has been discontinued and patient has been asked to continue holding his aspirin for 2 further days as stated above per GI recommendations.       6.  Neuropathy  -Continue amitriptyline     7. BPH  -Continue finasteride       Consults:   IP CONSULT TO GASTROENTEROLOGY     Disposition: Home or Self Care  Diet:   DIET NPO  Code Status: Full Code      Follow up labs/imaging: CBC within 1 week  Follow up with PCP within 1 week, GI within 2 weeks  Pending labs/studies: none    Current Discharge Medication List      START taking these medications    Details   polyethylene glycol (MIRALAX) 17 gram packet Take 1 Packet by mouth daily. Qty: 10 Packet, Refills: 0  Start date: 7/28/2021         CONTINUE these medications which have CHANGED    Details   aspirin 81 mg chewable tablet Take 1 Tablet by mouth nightly. Qty: 30 Tablet, Refills: 0  Start date: 7/30/2021         CONTINUE these medications which have NOT CHANGED    Details   lidocaine (LIDODERM) 5 % 1 Patch by TransDERmal route every twenty-four (24) hours. Apply patch to the affected area for 12 hours a day and remove for 12 hours a day. docusate sodium (COLACE) 100 mg capsule Take 100 mg by mouth daily. HYDROcodone-acetaminophen (NORCO) 5-325 mg per tablet Take 1 Tablet by mouth daily as needed for Pain. rosuvastatin (CRESTOR) 20 mg tablet Take 1 Tablet by mouth nightly for 30 days. Qty: 30 Tablet, Refills: 0      morphine 15 mg TR12 Take 15 mg by mouth two (2) times a day. ondansetron (Zofran ODT) 4 mg disintegrating tablet Take 1 Tab by mouth every six (6) hours as needed for Nausea (and vomiting). Qty: 30 Tab, Refills: 0      insulin glargine (Lantus U-100 Insulin) 100 unit/mL injection 43 Units by SubCUTAneous route as needed for Other (diabetes). Only uses when pump fails. Has NEVER had to use the insulin.        fluticasone propionate (Flonase Allergy Relief) 50 mcg/actuation nasal spray 2 Sprays by Both Nostrils route as needed for Allergies. IP [facility] INSULIN LISPRO, HUMALOG, FOR PUMP Basal rate 3971-7011 - 2 units/H, 0795-4991 2.2 units/H and 7263-3272 2.5 units/H      sodium bicarbonate 650 mg tablet Take 650 mg by mouth two (2) times a day. finasteride (PROSCAR) 5 mg tablet Take 5 mg by mouth daily. buPROPion (WELLBUTRIN) 75 mg tablet Take 75 mg by mouth two (2) times a day. levothyroxine (SYNTHROID) 75 mcg tablet Take 75 mcg by mouth Daily (before breakfast). amitriptyline (ELAVIL) 10 mg tablet Take 25 mg by mouth nightly. alfuzosin SR (UROXATRAL) 10 mg SR tablet Take 10 mg by mouth nightly. ondansetron hcl (Zofran) 4 mg tablet Take 4 mg by mouth every eight (8) hours as needed for Nausea or Vomiting. cholecalciferol (Vitamin D3) 25 mcg (1,000 unit) cap Take 1,000 Units by mouth daily. hydrocortisone valerate (WEST-DORI) 0.2 % ointment Apply  to affected area two (2) times daily as needed for Skin Irritation. use thin layer      fluticasone propionate (CUTIVATE) 0.05 % topical cream Apply 1 g to affected area two (2) times a day. donepeziL (ARICEPT) 5 mg tablet Take 5 mg by mouth daily. thiamine HCL (B-1) 100 mg tablet Take 1 Tablet by mouth daily for 30 days. Qty: 30 Tablet, Refills: 0      RABEprazole (Aciphex) 20 mg tablet Take 20 mg by mouth two (2) times a day.          STOP taking these medications       clopidogreL (PLAVIX) 75 mg tab Comments:   Reason for Stopping:               Medications Discontinued During This Encounter   Medication Reason    insulin lispro (HUMALOG) injection     dextrose 40% (GLUTOSE) oral gel 1 Tube DUPLICATE ORDER    glucagon (GLUCAGEN) injection 1 mg DUPLICATE ORDER    dextrose (D50W) injection syrg 12.4-26 g DUPLICATE ORDER    clopidogreL (PLAVIX) 75 mg tab Discontinued at Discharge    lactated Ringers infusion 1,000 mL Patient Transfer Follow Up Orders: Follow-up Appointments   Procedures    FOLLOW UP VISIT Appointment in: 3 - 5 Days Pcp with in one week GI with in 2 weeks     Pcp with in one week      GI with in 2 weeks     Standing Status:   Standing     Number of Occurrences:   1     Order Specific Question:   Appointment in     Answer:   3 - 5 Days         Follow-up Information     Follow up With Specialties Details Why [address]    [phone] you will receive a call from Physician Services within two days of discharge to schedule an appointment with a primary care physician     Martinez Donis DO Family Medicine   [id number] [address]  [address] [facility]      Krystyna Carty MD Gastroenterology In 2 days Office will call with appoitment  [address] [facility]  [phone]              Discharge Exam:    Patient Vitals for the past 24 hrs:   Temp Pulse Resp BP SpO2   07/28/21 1301 97.8 °F (36.6 °C) 93 17 (!) 153/88 99 %   07/28/21 1122  98 16 124/64 98 %   07/28/21 1110  97 16 127/64 100 %   07/28/21 1100  93 16 131/64 100 %   07/28/21 1050  96 16 119/62 99 %   07/28/21 1039 97.2 °F (36.2 °C) 92 15 115/62 100 %   07/28/21 0948 98.6 °F (37 °C) 100 14 (!) 146/82 99 %   07/28/21 0715 97.5 °F (36.4 °C) 97 14 (!) 145/90 100 %   07/28/21 0419 98.2 °F (36.8 °C) (!) 101 16 128/74 100 %   07/27/21 2308 98.2 °F (36.8 °C) 95 18 133/77 100 %   07/27/21 1845 98.2 °F (36.8 °C) (!) 107 20 (!) 145/81 100 %   07/27/21 1440 98.3 °F (36.8 °C) (!) 107 20 (!) 155/95 99 %     Oxygen Therapy  O2 Sat (%): 99 % (07/28/21 1301)  Pulse via Oximetry: 99 beats per minute (07/28/21 1122)  O2 Device: None (Room air) (07/28/21 1122)  O2 Flow Rate (L/min): 2 l/min (07/28/21 1110)    Estimated body mass index is 30 kg/m² as calculated from the following:    Height as of this encounter: 5' 8\" (1.727 m). Weight as of this encounter: 89.5 kg (197 lb 4.8 oz).       Intake/Output Summary (Last 24 hours) at 7/28/2021 1436  Last data filed at 7/28/2021 1030  Gross per 24 hour   Intake 540 ml   Output 1500 ml   Net -960 ml       *Note that automatically entered I/Os may not be accurate; dependent on patient compliance with collection and accurate  by assistants.     Physical Exam:   General:  No acute distress, speaking in full sentences, no use of accessory muscles   HEENT:  Pupils equal and reactive to light and accommodation, oropharynx is clear   Neck:   Supple, no lymphadenopathy, no JVD   Lungs:  Clear to auscultation bilaterally   CV:  Regular rate and rhythm with normal S1 and S2   Abdomen: Soft, nontender, nondistended, normoactive bowel sounds   Extremities:  No cyanosis clubbing or edema   Neuro:  Nonfocal, A&O x3   Psych:  Normal affect       All Labs from Last 24 Hrs:  Recent Results (from the past 24 hour(s))   GLUCOSE, POC    Collection Time: 07/27/21  4:33 PM   Result Value Ref Range    Glucose (POC) 239 (H) 65 - 100 mg/dL    Performed by Bryce    GLUCOSE, POC    Collection Time: 07/27/21 11:07 PM   Result Value Ref Range    Glucose (POC) 203 (H) 65 - 100 mg/dL    Performed by Il[place]pers[place])SIERRAN    METABOLIC PANEL, BASIC    Collection Time: 07/28/21  5:16 AM   Result Value Ref Range    Sodium 140 136 - 145 mmol/L    Potassium 3.9 3.5 - 5.1 mmol/L    Chloride 108 (H) 98 - 107 mmol/L    CO2 25 21 - 32 mmol/L    Anion gap 7 7 - 16 mmol/L    Glucose 172 (H) 65 - 100 mg/dL    BUN 21 8 - 23 MG/DL    Creatinine 1.45 0.8 - 1.5 MG/DL    GFR est AA >60 >60 ml/min/1.73m2    GFR est non-AA 51 (L) >60 ml/min/1.73m2    Calcium 8.6 8.3 - 10.4 MG/DL   CBC WITH AUTOMATED DIFF    Collection Time: 07/28/21  5:16 AM   Result Value Ref Range    WBC 6.2 4.3 - 11.1 K/uL    RBC 4.68 4.23 - 5.6 M/uL    HGB 12.4 (L) 13.6 - 17.2 g/dL    HCT 40.7 (L) 41.1 - 50.3 %    MCV 87.0 79.6 - 97.8 FL    MCH 26.5 26.1 - 32.9 PG    MCHC 30.5 (L) 31.4 - 35.0 g/dL    RDW 15.2 (H) 11.9 - 14.6 % PLATELET 240 180 - 337 K/uL    MPV 10.0 9.4 - 12.3 FL    ABSOLUTE NRBC 0.00 0.0 - 0.2 K/uL    DF AUTOMATED      NEUTROPHILS 73 43 - 78 %    LYMPHOCYTES 17 13 - 44 %    MONOCYTES 7 4.0 - 12.0 %    EOSINOPHILS 2 0.5 - 7.8 %    BASOPHILS 1 0.0 - 2.0 %    IMMATURE GRANULOCYTES 0 0.0 - 5.0 %    ABS. NEUTROPHILS 4.5 1.7 - 8.2 K/UL    ABS. LYMPHOCYTES 1.1 0.5 - 4.6 K/UL    ABS. MONOCYTES 0.4 0.1 - 1.3 K/UL    ABS. EOSINOPHILS 0.1 0.0 - 0.8 K/UL    ABS. BASOPHILS 0.1 0.0 - 0.2 K/UL    ABS. IMM. GRANS. 0.0 0.0 - 0.5 K/UL   GLUCOSE, POC    Collection Time: 07/28/21  6:09 AM   Result Value Ref Range    Glucose (POC) 174 (H) 65 - 100 mg/dL    Performed by IlirOscar)LAINA    GLUCOSE, POC    Collection Time: 07/28/21 12:51 PM   Result Value Ref Range    Glucose (POC) 214 (H) 65 - 100 mg/dL    Performed by Steve Chavez        All Micro Results     None          SARS-CoV-2 Lab Results  \"Novel Coronavirus\" Test: No results found for: COV2NT   \"Emergent Disease\" Test: No results found for: EDPR  \"SARS-COV-2\" Test: No results found for: XGCOVT  Rapid Test:   No results found for: COVR         Diagnostic Imaging/Tests:   XR ABD ACUTE W 1 V CHEST    Result Date: 7/27/2021  1. No acute intrathoracic process. 2. Moderate to severe constipation. Echocardiogram/EKG results:  No results found for this visit on 07/27/21.     EKG Results     None          Results for orders placed or performed during the hospital encounter of 07/08/21   EKG, 12 LEAD, INITIAL   Result Value Ref Range    Ventricular Rate 95 BPM    Atrial Rate 95 BPM    P-R Interval 180 ms    QRS Duration 76 ms    Q-T Interval 340 ms    QTC Calculation (Bezet) 427 ms    Calculated P Axis 78 degrees    Calculated R Axis 62 degrees    Calculated T Axis 61 degrees    Diagnosis       Normal sinus rhythm  Normal ECG  When compared with ECG of 05-NOV-2020 16:20,  No significant change was found  Confirmed by Federica Dickerson ([id number]) on 7/9/2021 7:06:05 AM Procedures done this admission:  Procedure(s):  COLONOSCOPY/ BMI 30 ROOM 902        Time spent in patient discharge planning and coordination 40 minutes.       Signed By: DO Lilia Hernandez [place] [facility]    July 28, 2021  2:36 PM

## 2021-07-28 NOTE — PROCEDURES
Colonoscopy Procedure Note    Procedure: Colonoscopy    Pre-operative Diagnosis: LGI bleed     Post-operative Diagnosis: Incomplete due to poor, (no?), prep    Recommendations:  See inpt notes. Surveillance interval: ASAP    Anesthesia/Sedation:  MAC,, (see separate report). Procedure Details:  Informed consent was obtained for the procedure, including anesthesia. Risks of perforation, hemorrhage, adverse drug reaction and aspiration were discussed. The patient was placed in the left lateral decubitus position. Based on the pre-procedure assessment, including review of the patient's medical history, medications, allergies, and review of systems, he had been deemed to be an appropriate candidate for this procedure. A rectal examination was performed. The colonoscope was inserted into the rectum and advanced under direct vision to the terminal ileum. The quality of the colonic preparation was poor. None of the colon was examined    Findings: The Rectum was full of solid stool and further prgression wasn't possible. EBL: 0cc's. PATH:  None. Complications: None; patient tolerated the procedure well. Attending Attestation: I performed the procedure.     Dandre Fermin MD

## 2021-07-28 NOTE — PROGRESS NOTES
Pt refused insulin this morning, states he doesn't want his sugar to get too low before his procedure this AM.

## 2021-07-28 NOTE — PROGRESS NOTES
Hourly rounds completed this shift. All needs met at this time. Bed low/locked. Call light within reach. Will continue to monitor and give bedside report to oncoming nurse. Pt has been NPO since MN. Prep completed. Pt had several small bloody BMs throughout shift. C/o L hip pain managed per MAR and position change.

## 2021-07-29 PROCEDURE — 3331090002 HH PPS REVENUE DEBIT

## 2021-07-29 PROCEDURE — 3331090001 HH PPS REVENUE CREDIT

## 2021-07-29 NOTE — PROGRESS NOTES
Late Entry    Spiritual Care visit. Initial Visit, Pre Surgery Consult. Visit and prayer before patient goes to surgery.     Visit by Tj Mendoza M.Ed., Th.B. ,Staff

## 2021-07-30 ENCOUNTER — HOME CARE VISIT (OUTPATIENT)
Dept: HOME HEALTH SERVICES | Facility: HOME HEALTH | Age: 76
End: 2021-07-30
Payer: MEDICARE

## 2021-07-30 PROCEDURE — 3331090001 HH PPS REVENUE CREDIT

## 2021-07-30 PROCEDURE — 3331090002 HH PPS REVENUE DEBIT

## 2021-07-31 PROCEDURE — 3331090001 HH PPS REVENUE CREDIT

## 2021-07-31 PROCEDURE — 3331090002 HH PPS REVENUE DEBIT

## 2021-08-01 PROCEDURE — 3331090001 HH PPS REVENUE CREDIT

## 2021-08-01 PROCEDURE — 3331090002 HH PPS REVENUE DEBIT

## 2021-08-02 PROCEDURE — 3331090001 HH PPS REVENUE CREDIT

## 2021-08-02 PROCEDURE — 3331090002 HH PPS REVENUE DEBIT

## 2021-08-03 ENCOUNTER — HOME CARE VISIT (OUTPATIENT)
Dept: SCHEDULING | Facility: HOME HEALTH | Age: 76
End: 2021-08-03
Payer: MEDICARE

## 2021-08-03 PROCEDURE — G0151 HHCP-SERV OF PT,EA 15 MIN: HCPCS

## 2021-08-03 PROCEDURE — 3331090002 HH PPS REVENUE DEBIT

## 2021-08-03 PROCEDURE — 3331090001 HH PPS REVENUE CREDIT

## 2021-08-03 NOTE — Clinical Note
Situation/Background: 78year old male patient with PMH of acquired hypothyroidism, aphonia, candida laryngitis, Charcot foot due to diabetes, CKD, chronic L sciatica, chronic pain, type 1 DM neurological manifestations, nephropathy, diabetic retinopathy, dysphonia, GERD, hyperlipidemia, HTN, L hip pain, lumbar radiculopathy, mild cognitive impairment with memory loss, recurrent major depressive disorder, orthostatic hypotension, LISA no CPAP, lumbar fusion, spinal cord stimulator, spondylolisthesis, tachycardia, thyroid disease, trochanteric bursitis of left hip, vitreous hemorrhage, vocal cord atrophy, weakness. Lives with wife in single multi level home with several steps to enter. Patient recently seen by HHPT to address functional limitations following hospital stay. Patient readmitted to hospital due to hematochezia on 7/27/21 and discharged home on 7/29/21. Assessment: Patient presents with BLE weakness, decreased balance, decreased endurance, decreased transfer ability; currently ambulating household distances with rollator.    Recommendation: 2w2, 1w2; BLE seated and standing HEP and therex, gait balance and transfer training

## 2021-08-04 ENCOUNTER — HOME CARE VISIT (OUTPATIENT)
Dept: SCHEDULING | Facility: HOME HEALTH | Age: 76
End: 2021-08-04
Payer: MEDICARE

## 2021-08-04 VITALS
RESPIRATION RATE: 16 BRPM | SYSTOLIC BLOOD PRESSURE: 102 MMHG | DIASTOLIC BLOOD PRESSURE: 60 MMHG | OXYGEN SATURATION: 99 % | HEART RATE: 101 BPM | TEMPERATURE: 98.1 F

## 2021-08-04 PROCEDURE — G0299 HHS/HOSPICE OF RN EA 15 MIN: HCPCS

## 2021-08-04 PROCEDURE — 3331090002 HH PPS REVENUE DEBIT

## 2021-08-04 PROCEDURE — 3331090001 HH PPS REVENUE CREDIT

## 2021-08-05 ENCOUNTER — HOME CARE VISIT (OUTPATIENT)
Dept: SCHEDULING | Facility: HOME HEALTH | Age: 76
End: 2021-08-05
Payer: MEDICARE

## 2021-08-05 VITALS
TEMPERATURE: 97.9 F | SYSTOLIC BLOOD PRESSURE: 122 MMHG | OXYGEN SATURATION: 99 % | DIASTOLIC BLOOD PRESSURE: 70 MMHG | HEART RATE: 86 BPM | RESPIRATION RATE: 16 BRPM

## 2021-08-05 PROCEDURE — 3331090002 HH PPS REVENUE DEBIT

## 2021-08-05 PROCEDURE — G0157 HHC PT ASSISTANT EA 15: HCPCS

## 2021-08-05 PROCEDURE — 3331090001 HH PPS REVENUE CREDIT

## 2021-08-06 PROCEDURE — 3331090001 HH PPS REVENUE CREDIT

## 2021-08-06 PROCEDURE — 3331090002 HH PPS REVENUE DEBIT

## 2021-08-07 PROCEDURE — 3331090001 HH PPS REVENUE CREDIT

## 2021-08-07 PROCEDURE — 3331090002 HH PPS REVENUE DEBIT

## 2021-08-08 PROCEDURE — 3331090001 HH PPS REVENUE CREDIT

## 2021-08-08 PROCEDURE — 3331090002 HH PPS REVENUE DEBIT

## 2021-08-09 ENCOUNTER — HOME CARE VISIT (OUTPATIENT)
Dept: SCHEDULING | Facility: HOME HEALTH | Age: 76
End: 2021-08-09
Payer: MEDICARE

## 2021-08-09 VITALS
HEART RATE: 77 BPM | RESPIRATION RATE: 16 BRPM | TEMPERATURE: 97.3 F | DIASTOLIC BLOOD PRESSURE: 72 MMHG | OXYGEN SATURATION: 97 % | SYSTOLIC BLOOD PRESSURE: 110 MMHG

## 2021-08-09 PROCEDURE — G0157 HHC PT ASSISTANT EA 15: HCPCS

## 2021-08-09 PROCEDURE — 3331090002 HH PPS REVENUE DEBIT

## 2021-08-09 PROCEDURE — 3331090001 HH PPS REVENUE CREDIT

## 2021-08-10 ENCOUNTER — HOME CARE VISIT (OUTPATIENT)
Dept: SCHEDULING | Facility: HOME HEALTH | Age: 76
End: 2021-08-10
Payer: MEDICARE

## 2021-08-10 VITALS
DIASTOLIC BLOOD PRESSURE: 62 MMHG | TEMPERATURE: 98.1 F | HEART RATE: 88 BPM | SYSTOLIC BLOOD PRESSURE: 116 MMHG | RESPIRATION RATE: 14 BRPM

## 2021-08-10 VITALS
OXYGEN SATURATION: 98 % | DIASTOLIC BLOOD PRESSURE: 47 MMHG | SYSTOLIC BLOOD PRESSURE: 83 MMHG | HEART RATE: 100 BPM | RESPIRATION RATE: 16 BRPM | TEMPERATURE: 98 F

## 2021-08-10 PROCEDURE — 3331090002 HH PPS REVENUE DEBIT

## 2021-08-10 PROCEDURE — 3331090001 HH PPS REVENUE CREDIT

## 2021-08-10 PROCEDURE — G0299 HHS/HOSPICE OF RN EA 15 MIN: HCPCS

## 2021-08-11 ENCOUNTER — HOME CARE VISIT (OUTPATIENT)
Dept: SCHEDULING | Facility: HOME HEALTH | Age: 76
End: 2021-08-11
Payer: MEDICARE

## 2021-08-11 VITALS
TEMPERATURE: 97.3 F | HEART RATE: 88 BPM | SYSTOLIC BLOOD PRESSURE: 92 MMHG | RESPIRATION RATE: 16 BRPM | OXYGEN SATURATION: 98 % | DIASTOLIC BLOOD PRESSURE: 54 MMHG

## 2021-08-11 PROBLEM — Z79.899 POLYPHARMACY: Status: ACTIVE | Noted: 2018-10-18

## 2021-08-11 PROBLEM — K59.03 CONSTIPATION DUE TO OPIOID THERAPY: Status: ACTIVE | Noted: 2021-02-12

## 2021-08-11 PROBLEM — F33.1 MODERATE EPISODE OF RECURRENT MAJOR DEPRESSIVE DISORDER (HCC): Status: ACTIVE | Noted: 2018-10-18

## 2021-08-11 PROBLEM — G89.29 BACK PAIN, CHRONIC: Status: ACTIVE | Noted: 2020-08-03

## 2021-08-11 PROBLEM — E03.9 ACQUIRED HYPOTHYROIDISM: Status: ACTIVE | Noted: 2018-12-14

## 2021-08-11 PROBLEM — L60.8 TOENAIL DEFORMITY: Status: ACTIVE | Noted: 2020-05-05

## 2021-08-11 PROBLEM — H43.10 VITREOUS HEMORRHAGE DUE TO TYPE 1 DIABETES MELLITUS (HCC): Status: ACTIVE | Noted: 2020-08-03

## 2021-08-11 PROBLEM — M25.511 CHRONIC PAIN OF BOTH SHOULDERS: Status: ACTIVE | Noted: 2021-01-08

## 2021-08-11 PROBLEM — T40.2X5A CONSTIPATION DUE TO OPIOID THERAPY: Status: ACTIVE | Noted: 2021-02-12

## 2021-08-11 PROBLEM — M54.42 CHRONIC LEFT-SIDED LOW BACK PAIN WITH LEFT-SIDED SCIATICA: Status: ACTIVE | Noted: 2018-12-28

## 2021-08-11 PROBLEM — Z98.1 S/P LUMBAR FUSION: Status: ACTIVE | Noted: 2018-12-28

## 2021-08-11 PROBLEM — E11.319 DIABETIC RETINOPATHY (HCC): Status: ACTIVE | Noted: 2020-08-03

## 2021-08-11 PROBLEM — E78.2 MIXED HYPERLIPIDEMIA: Status: ACTIVE | Noted: 2018-09-11

## 2021-08-11 PROBLEM — Z96.41 INSULIN PUMP IN PLACE: Status: ACTIVE | Noted: 2018-09-11

## 2021-08-11 PROBLEM — M54.9 BACK PAIN, CHRONIC: Status: ACTIVE | Noted: 2020-08-03

## 2021-08-11 PROBLEM — I35.8 AORTIC HEART MURMUR: Status: ACTIVE | Noted: 2020-12-23

## 2021-08-11 PROBLEM — E11.42 DIABETIC PERIPHERAL NEUROPATHY (HCC): Status: ACTIVE | Noted: 2019-03-08

## 2021-08-11 PROBLEM — K63.5 POLYP OF COLON: Status: ACTIVE | Noted: 2021-06-24

## 2021-08-11 PROBLEM — G31.84 MILD COGNITIVE IMPAIRMENT WITH MEMORY LOSS: Status: ACTIVE | Noted: 2020-08-03

## 2021-08-11 PROBLEM — M54.16 LUMBAR RADICULOPATHY: Status: ACTIVE | Noted: 2018-12-28

## 2021-08-11 PROBLEM — G47.33 OSA (OBSTRUCTIVE SLEEP APNEA): Status: ACTIVE | Noted: 2020-07-07

## 2021-08-11 PROBLEM — L82.1 SEBORRHEIC KERATOSIS: Status: ACTIVE | Noted: 2020-07-07

## 2021-08-11 PROBLEM — N40.1 BENIGN PROSTATIC HYPERPLASIA WITH INCOMPLETE BLADDER EMPTYING: Status: ACTIVE | Noted: 2018-10-18

## 2021-08-11 PROBLEM — Z96.89 SPINAL CORD STIMULATOR STATUS: Status: ACTIVE | Noted: 2018-10-15

## 2021-08-11 PROBLEM — E11.610 CHARCOT FOOT DUE TO DIABETES MELLITUS (HCC): Status: ACTIVE | Noted: 2020-08-03

## 2021-08-11 PROBLEM — M25.512 CHRONIC PAIN OF BOTH SHOULDERS: Status: ACTIVE | Noted: 2021-01-08

## 2021-08-11 PROBLEM — E11.21 DIABETIC NEPHROPATHY (HCC): Status: ACTIVE | Noted: 2020-08-03

## 2021-08-11 PROBLEM — R53.1 WEAKNESS: Status: ACTIVE | Noted: 2021-08-11

## 2021-08-11 PROBLEM — E10.39 VITREOUS HEMORRHAGE DUE TO TYPE 1 DIABETES MELLITUS (HCC): Status: ACTIVE | Noted: 2020-08-03

## 2021-08-11 PROBLEM — E86.0 LUETSCHER'S SYNDROME: Status: ACTIVE | Noted: 2020-10-04

## 2021-08-11 PROBLEM — M70.62 TROCHANTERIC BURSITIS, LEFT HIP: Status: ACTIVE | Noted: 2019-09-24

## 2021-08-11 PROBLEM — K21.9 GERD (GASTROESOPHAGEAL REFLUX DISEASE): Status: ACTIVE | Noted: 2020-08-03

## 2021-08-11 PROBLEM — R39.14 BENIGN PROSTATIC HYPERPLASIA WITH INCOMPLETE BLADDER EMPTYING: Status: ACTIVE | Noted: 2018-10-18

## 2021-08-11 PROBLEM — F10.11 NONDEPENDENT ALCOHOL ABUSE, IN REMISSION: Status: ACTIVE | Noted: 2020-10-04

## 2021-08-11 PROBLEM — G89.29 CHRONIC LEFT-SIDED LOW BACK PAIN WITH LEFT-SIDED SCIATICA: Status: ACTIVE | Noted: 2018-12-28

## 2021-08-11 PROBLEM — R60.0 BILATERAL EDEMA OF LOWER EXTREMITY: Status: ACTIVE | Noted: 2021-01-08

## 2021-08-11 PROBLEM — G89.29 CHRONIC PAIN OF BOTH SHOULDERS: Status: ACTIVE | Noted: 2021-01-08

## 2021-08-11 PROCEDURE — G0157 HHC PT ASSISTANT EA 15: HCPCS

## 2021-08-11 PROCEDURE — 3331090002 HH PPS REVENUE DEBIT

## 2021-08-11 PROCEDURE — 3331090001 HH PPS REVENUE CREDIT

## 2021-08-12 PROCEDURE — 3331090001 HH PPS REVENUE CREDIT

## 2021-08-12 PROCEDURE — 3331090002 HH PPS REVENUE DEBIT

## 2021-08-13 ENCOUNTER — HOME CARE VISIT (OUTPATIENT)
Dept: SCHEDULING | Facility: HOME HEALTH | Age: 76
End: 2021-08-13
Payer: MEDICARE

## 2021-08-13 PROCEDURE — 3331090002 HH PPS REVENUE DEBIT

## 2021-08-13 PROCEDURE — 3331090001 HH PPS REVENUE CREDIT

## 2021-08-13 PROCEDURE — G0299 HHS/HOSPICE OF RN EA 15 MIN: HCPCS

## 2021-08-14 PROCEDURE — 400018 HH-NO PAY CLAIM PROCEDURE

## 2021-08-14 PROCEDURE — 3331090002 HH PPS REVENUE DEBIT

## 2021-08-14 PROCEDURE — 3331090001 HH PPS REVENUE CREDIT

## 2021-08-15 VITALS
SYSTOLIC BLOOD PRESSURE: 104 MMHG | OXYGEN SATURATION: 99 % | DIASTOLIC BLOOD PRESSURE: 64 MMHG | HEART RATE: 98 BPM | TEMPERATURE: 98 F | RESPIRATION RATE: 16 BRPM

## 2021-08-15 PROCEDURE — 3331090001 HH PPS REVENUE CREDIT

## 2021-08-15 PROCEDURE — 3331090002 HH PPS REVENUE DEBIT

## 2021-08-16 ENCOUNTER — HOME CARE VISIT (OUTPATIENT)
Dept: SCHEDULING | Facility: HOME HEALTH | Age: 76
End: 2021-08-16
Payer: MEDICARE

## 2021-08-16 ENCOUNTER — HOME CARE VISIT (OUTPATIENT)
Dept: HOME HEALTH SERVICES | Facility: HOME HEALTH | Age: 76
End: 2021-08-16
Payer: MEDICARE

## 2021-08-16 VITALS
HEART RATE: 90 BPM | RESPIRATION RATE: 16 BRPM | OXYGEN SATURATION: 97 % | DIASTOLIC BLOOD PRESSURE: 62 MMHG | TEMPERATURE: 97.7 F | SYSTOLIC BLOOD PRESSURE: 112 MMHG

## 2021-08-16 PROCEDURE — G0157 HHC PT ASSISTANT EA 15: HCPCS

## 2021-08-16 PROCEDURE — 400013 HH SOC

## 2021-08-16 PROCEDURE — 3331090002 HH PPS REVENUE DEBIT

## 2021-08-16 PROCEDURE — 3331090001 HH PPS REVENUE CREDIT

## 2021-08-17 ENCOUNTER — HOME CARE VISIT (OUTPATIENT)
Dept: SCHEDULING | Facility: HOME HEALTH | Age: 76
End: 2021-08-17
Payer: MEDICARE

## 2021-08-17 VITALS
OXYGEN SATURATION: 98 % | SYSTOLIC BLOOD PRESSURE: 132 MMHG | DIASTOLIC BLOOD PRESSURE: 74 MMHG | TEMPERATURE: 98.1 F | RESPIRATION RATE: 18 BRPM | HEART RATE: 86 BPM

## 2021-08-17 PROCEDURE — 3331090001 HH PPS REVENUE CREDIT

## 2021-08-17 PROCEDURE — 3331090002 HH PPS REVENUE DEBIT

## 2021-08-17 PROCEDURE — G0299 HHS/HOSPICE OF RN EA 15 MIN: HCPCS

## 2021-08-18 PROCEDURE — 3331090002 HH PPS REVENUE DEBIT

## 2021-08-18 PROCEDURE — 3331090001 HH PPS REVENUE CREDIT

## 2021-08-19 PROCEDURE — 3331090002 HH PPS REVENUE DEBIT

## 2021-08-19 PROCEDURE — 3331090001 HH PPS REVENUE CREDIT

## 2021-08-20 PROCEDURE — 3331090001 HH PPS REVENUE CREDIT

## 2021-08-20 PROCEDURE — 3331090002 HH PPS REVENUE DEBIT

## 2021-08-21 PROCEDURE — 3331090001 HH PPS REVENUE CREDIT

## 2021-08-21 PROCEDURE — 3331090002 HH PPS REVENUE DEBIT

## 2021-08-22 PROBLEM — E10.22 TYPE 1 DIABETES MELLITUS WITH STAGE 3A CHRONIC KIDNEY DISEASE (HCC): Status: ACTIVE | Noted: 2020-10-04

## 2021-08-22 PROBLEM — Z86.79 HISTORY OF HYPERTENSION: Status: ACTIVE | Noted: 2021-07-27

## 2021-08-22 PROBLEM — N18.30 ACUTE RENAL FAILURE SUPERIMPOSED ON STAGE 3 CHRONIC KIDNEY DISEASE (HCC): Status: RESOLVED | Noted: 2021-07-27 | Resolved: 2021-08-22

## 2021-08-22 PROBLEM — N18.31 TYPE 1 DIABETES MELLITUS WITH STAGE 3A CHRONIC KIDNEY DISEASE (HCC): Status: ACTIVE | Noted: 2020-08-03

## 2021-08-22 PROBLEM — Z91.81 AT HIGH RISK FOR FALLS: Status: ACTIVE | Noted: 2021-08-22

## 2021-08-22 PROBLEM — N18.31 TYPE 1 DIABETES MELLITUS WITH STAGE 3A CHRONIC KIDNEY DISEASE (HCC): Status: ACTIVE | Noted: 2020-10-04

## 2021-08-22 PROBLEM — F33.0 MILD EPISODE OF RECURRENT MAJOR DEPRESSIVE DISORDER (HCC): Status: ACTIVE | Noted: 2018-10-18

## 2021-08-22 PROBLEM — K92.1 HEMATOCHEZIA: Status: RESOLVED | Noted: 2021-07-27 | Resolved: 2021-08-22

## 2021-08-22 PROBLEM — E10.42 TYPE 1 DIABETES MELLITUS WITH DIABETIC POLYNEUROPATHY (HCC): Status: ACTIVE | Noted: 2019-03-08

## 2021-08-22 PROBLEM — E78.00 HYPERCHOLESTEROLEMIA: Status: ACTIVE | Noted: 2021-07-27

## 2021-08-22 PROBLEM — Z92.29 COVID-19 VACCINE SERIES COMPLETED: Status: ACTIVE | Noted: 2021-08-22

## 2021-08-22 PROBLEM — N17.9 ACUTE RENAL FAILURE SUPERIMPOSED ON STAGE 3 CHRONIC KIDNEY DISEASE (HCC): Status: RESOLVED | Noted: 2021-07-27 | Resolved: 2021-08-22

## 2021-08-22 PROBLEM — N18.31 STAGE 3A CHRONIC KIDNEY DISEASE (HCC): Status: ACTIVE | Noted: 2021-07-08

## 2021-08-22 PROBLEM — R29.90 STROKE-LIKE SYMPTOM: Status: RESOLVED | Noted: 2021-07-08 | Resolved: 2021-08-22

## 2021-08-22 PROBLEM — E10.319 TYPE 1 DIABETES MELLITUS WITH RETINOPATHY (HCC): Status: ACTIVE | Noted: 2020-08-03

## 2021-08-22 PROBLEM — E10.22 TYPE 1 DIABETES MELLITUS WITH STAGE 3A CHRONIC KIDNEY DISEASE (HCC): Status: ACTIVE | Noted: 2020-08-03

## 2021-08-22 PROCEDURE — 3331090002 HH PPS REVENUE DEBIT

## 2021-08-22 PROCEDURE — 3331090001 HH PPS REVENUE CREDIT

## 2021-08-23 PROCEDURE — 3331090001 HH PPS REVENUE CREDIT

## 2021-08-23 PROCEDURE — 3331090002 HH PPS REVENUE DEBIT

## 2021-08-24 PROCEDURE — 3331090002 HH PPS REVENUE DEBIT

## 2021-08-24 PROCEDURE — 3331090001 HH PPS REVENUE CREDIT

## 2021-08-25 PROCEDURE — 3331090001 HH PPS REVENUE CREDIT

## 2021-08-25 PROCEDURE — 3331090002 HH PPS REVENUE DEBIT

## 2021-08-26 ENCOUNTER — HOME CARE VISIT (OUTPATIENT)
Dept: SCHEDULING | Facility: HOME HEALTH | Age: 76
End: 2021-08-26
Payer: MEDICARE

## 2021-08-26 VITALS
SYSTOLIC BLOOD PRESSURE: 124 MMHG | OXYGEN SATURATION: 97 % | DIASTOLIC BLOOD PRESSURE: 72 MMHG | RESPIRATION RATE: 14 BRPM | TEMPERATURE: 98.4 F | HEART RATE: 68 BPM

## 2021-08-26 PROCEDURE — 3331090001 HH PPS REVENUE CREDIT

## 2021-08-26 PROCEDURE — G0151 HHCP-SERV OF PT,EA 15 MIN: HCPCS

## 2021-08-26 PROCEDURE — 3331090002 HH PPS REVENUE DEBIT

## 2021-10-16 ENCOUNTER — HOSPITAL ENCOUNTER (OUTPATIENT)
Dept: GENERAL RADIOLOGY | Age: 76
Discharge: HOME | End: 2021-10-16
Payer: MEDICARE

## 2021-10-16 DIAGNOSIS — M54.50 ACUTE MIDLINE LOW BACK PAIN WITHOUT SCIATICA: ICD-10-CM

## 2021-10-16 DIAGNOSIS — S39.92XA TAILBONE INJURY, INITIAL ENCOUNTER: ICD-10-CM

## 2021-10-16 DIAGNOSIS — M25.552 LATERAL PAIN OF LEFT HIP: ICD-10-CM
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
## 2021-10-16 PROCEDURE — 72220 X-RAY EXAM SACRUM TAILBONE: CPT

## 2021-10-16 PROCEDURE — 72100 X-RAY EXAM L-S SPINE 2/3 VWS: CPT

## 2021-10-16 PROCEDURE — 73502 X-RAY EXAM HIP UNI 2-3 VIEWS: CPT

## 2021-10-16 NOTE — PROGRESS NOTES
Notified patient and his wife of X-ray results. Reiterated comfort measures. Keep scheduled follow-ups with PCP and pain management. Patient and his wife both verbalized understanding.

## 2021-10-28 PROBLEM — E86.0 DEHYDRATION: Status: RESOLVED | Noted: 2020-10-04 | Resolved: 2021-10-28

## 2021-11-10 PROBLEM — E10.8 DIABETES MELLITUS TYPE 1, WITH COMPLICATION, ON LONG TERM INSULIN PUMP (HCC): Status: ACTIVE | Noted: 2021-11-10

## 2021-11-10 PROBLEM — Z02.89 PAIN MANAGEMENT CONTRACT AGREEMENT: Status: ACTIVE | Noted: 2021-11-10

## 2021-11-10 PROBLEM — I87.2 EDEMA OF BOTH LOWER EXTREMITIES DUE TO PERIPHERAL VENOUS INSUFFICIENCY: Status: ACTIVE | Noted: 2021-01-08

## 2021-11-10 PROBLEM — Z96.41 DIABETES MELLITUS TYPE 1, WITH COMPLICATION, ON LONG TERM INSULIN PUMP (HCC): Status: ACTIVE | Noted: 2021-11-10

## 2022-01-01 ENCOUNTER — PREP FOR PROCEDURE (OUTPATIENT)
Dept: ADMINISTRATIVE | Age: 77
End: 2022-01-01

## 2022-01-01 RX ORDER — SODIUM CHLORIDE 0.9 % (FLUSH) 0.9 %
5-40 SYRINGE (ML) INJECTION PRN
OUTPATIENT
Start: 2022-01-01

## 2022-01-01 RX ORDER — SODIUM CHLORIDE 9 MG/ML
INJECTION, SOLUTION INTRAVENOUS PRN
OUTPATIENT
Start: 2022-01-01

## 2022-01-01 RX ORDER — SODIUM CHLORIDE 0.9 % (FLUSH) 0.9 %
5-40 SYRINGE (ML) INJECTION EVERY 12 HOURS SCHEDULED
OUTPATIENT
Start: 2022-01-01

## 2022-02-22 PROBLEM — N18.32 STAGE 3B CHRONIC KIDNEY DISEASE (HCC): Status: ACTIVE | Noted: 2021-07-27

## 2022-02-24 ENCOUNTER — TELEPHONE (OUTPATIENT)
Dept: DIABETES SERVICES | Age: 77
End: 2022-02-24

## 2022-02-24 NOTE — TELEPHONE ENCOUNTER
Call to wife and patient about Diabetes Referral.  They do not want formal Diabetes Education. They want instruction on his Dexcom G6 and to be shown the T slim pump. Currently on Medtronic pump but is wanting to make a change so coming for a review. Scheduled.  3-8-2022 at 230PM

## 2022-03-08 ENCOUNTER — HOSPITAL ENCOUNTER (OUTPATIENT)
Dept: DIABETES SERVICES | Age: 77
Discharge: HOME | End: 2022-03-08
Payer: MEDICARE

## 2022-03-08 PROCEDURE — 95249 CONT GLUC MNTR PT PROV EQP: CPT

## 2022-03-08 NOTE — PROGRESS NOTES
This is a one on one appointment. Due to being during TQCVP-35 public health emergency, social distancing and mandatory precautions are in place and utilized        This is a one on one appointment. Due to being during WSMKY-72 public health emergency, social distancing and mandatory precautions are in place and utilized. Pt seen today for initial insertion of Dexcom G6 system. Pt instructed on overview of continuous glucose monitoring (CGM) device and use. Instructed on sensor, transmitter and .  will be used. Pt instructed on download of Dexcom billy to phone. Time and date and transmitter ID verified. Instructed on troubleshooting, alerts, alarms, calibration, communication between sensor and , insertion of sensor and transmitter, starting sensor session, start up calibration, ending sensor session, removing sensor pod and transmitter and site rotation. Reviewed CGM guidelines and restrictions on use including no MRI, CT scans, or diathermy electrical treatments, bug spray, sun tanning lotions medications such as-Tylenol greater than standard dose ( 1 gram or 1000 mg every 6 hours) can make Dexcom G6 sensor readings look higher than they really are, or Hydroxyurea used for some cancers or sickle cell anemia - Your sensor readings will be higher than your actual glucose. Instructed only need to calibrate Dexcom G6, if blood glucose is 20 points off when blood glucose is under 70 mg/dl, or if blood sugar is 20% off when over 70 mg/dl. All questions and concerns addressed. Provided Dexcom technical support phone number. He may try use of his cell phone. Instruction provided on Dexcom G6 billy and clarity billy. He also reviewed insulin pumps. He wants the T- Slim. Will notify Dr. Silvino Elena. Medication Reconciliation. No surgery's or procedures.

## 2022-03-18 PROBLEM — N40.1 BENIGN PROSTATIC HYPERPLASIA WITH INCOMPLETE BLADDER EMPTYING: Status: ACTIVE | Noted: 2018-10-18

## 2022-03-18 PROBLEM — Z87.891 HISTORY OF TOBACCO ABUSE: Status: ACTIVE | Noted: 2020-10-04

## 2022-03-18 PROBLEM — F33.0 MILD EPISODE OF RECURRENT MAJOR DEPRESSIVE DISORDER (HCC): Status: ACTIVE | Noted: 2018-10-18

## 2022-03-18 PROBLEM — L82.1 SEBORRHEIC KERATOSIS: Status: ACTIVE | Noted: 2020-07-07

## 2022-03-18 PROBLEM — R39.14 BENIGN PROSTATIC HYPERPLASIA WITH INCOMPLETE BLADDER EMPTYING: Status: ACTIVE | Noted: 2018-10-18

## 2022-03-18 PROBLEM — G31.84 MILD COGNITIVE IMPAIRMENT WITH MEMORY LOSS: Status: ACTIVE | Noted: 2020-08-03

## 2022-03-18 PROBLEM — I87.2 EDEMA OF BOTH LOWER EXTREMITIES DUE TO PERIPHERAL VENOUS INSUFFICIENCY: Status: ACTIVE | Noted: 2021-01-08

## 2022-03-18 PROBLEM — G89.29 BACK PAIN, CHRONIC: Status: ACTIVE | Noted: 2020-08-03

## 2022-03-18 PROBLEM — L60.8 TOENAIL DEFORMITY: Status: ACTIVE | Noted: 2020-05-05

## 2022-03-18 PROBLEM — K21.9 GASTROESOPHAGEAL REFLUX DISEASE WITHOUT ESOPHAGITIS: Status: ACTIVE | Noted: 2020-08-03

## 2022-03-18 PROBLEM — Z02.89 PAIN MANAGEMENT CONTRACT AGREEMENT: Status: ACTIVE | Noted: 2021-11-10

## 2022-03-18 PROBLEM — Z98.1 S/P LUMBAR FUSION: Status: ACTIVE | Noted: 2018-12-28

## 2022-03-18 PROBLEM — Z96.41 DIABETES MELLITUS TYPE 1, WITH COMPLICATION, ON LONG TERM INSULIN PUMP (HCC): Status: ACTIVE | Noted: 2021-11-10

## 2022-03-18 PROBLEM — M54.9 BACK PAIN, CHRONIC: Status: ACTIVE | Noted: 2020-08-03

## 2022-03-18 PROBLEM — E10.8 DIABETES MELLITUS TYPE 1, WITH COMPLICATION, ON LONG TERM INSULIN PUMP (HCC): Status: ACTIVE | Noted: 2021-11-10

## 2022-03-18 PROBLEM — I95.1 CHRONIC ORTHOSTATIC HYPOTENSION: Status: ACTIVE | Noted: 2020-10-04

## 2022-03-18 PROBLEM — G47.33 OSA (OBSTRUCTIVE SLEEP APNEA): Status: ACTIVE | Noted: 2020-07-07

## 2022-03-19 PROBLEM — H43.10 VITREOUS HEMORRHAGE DUE TO TYPE 1 DIABETES MELLITUS (HCC): Status: ACTIVE | Noted: 2020-08-03

## 2022-03-19 PROBLEM — M25.511 CHRONIC PAIN OF BOTH SHOULDERS: Status: ACTIVE | Noted: 2021-01-08

## 2022-03-19 PROBLEM — M54.42 CHRONIC LEFT-SIDED LOW BACK PAIN WITH LEFT-SIDED SCIATICA: Status: ACTIVE | Noted: 2018-12-28

## 2022-03-19 PROBLEM — E78.00 HYPERCHOLESTEROLEMIA: Status: ACTIVE | Noted: 2021-07-27

## 2022-03-19 PROBLEM — G89.29 CHRONIC LEFT-SIDED LOW BACK PAIN WITH LEFT-SIDED SCIATICA: Status: ACTIVE | Noted: 2018-12-28

## 2022-03-19 PROBLEM — Z79.899 POLYPHARMACY: Status: ACTIVE | Noted: 2018-10-18

## 2022-03-19 PROBLEM — G62.9 NEUROPATHY: Status: ACTIVE | Noted: 2021-07-08

## 2022-03-19 PROBLEM — N18.32 STAGE 3B CHRONIC KIDNEY DISEASE (HCC): Status: ACTIVE | Noted: 2021-07-27

## 2022-03-19 PROBLEM — E86.0 LUETSCHER'S SYNDROME: Status: ACTIVE | Noted: 2020-10-04

## 2022-03-19 PROBLEM — K59.03 CONSTIPATION DUE TO OPIOID THERAPY: Status: ACTIVE | Noted: 2021-02-12

## 2022-03-19 PROBLEM — M70.62 TROCHANTERIC BURSITIS, LEFT HIP: Status: ACTIVE | Noted: 2019-09-24

## 2022-03-19 PROBLEM — R53.1 WEAKNESS: Status: ACTIVE | Noted: 2021-08-11

## 2022-03-19 PROBLEM — M25.512 CHRONIC PAIN OF BOTH SHOULDERS: Status: ACTIVE | Noted: 2021-01-08

## 2022-03-19 PROBLEM — J98.19 TRAPPED LUNG: Status: ACTIVE | Noted: 2020-10-14

## 2022-03-19 PROBLEM — T40.2X5A CONSTIPATION DUE TO OPIOID THERAPY: Status: ACTIVE | Noted: 2021-02-12

## 2022-03-19 PROBLEM — E10.39 VITREOUS HEMORRHAGE DUE TO TYPE 1 DIABETES MELLITUS (HCC): Status: ACTIVE | Noted: 2020-08-03

## 2022-03-19 PROBLEM — Z96.89 SPINAL CORD STIMULATOR STATUS: Status: ACTIVE | Noted: 2018-10-15

## 2022-03-19 PROBLEM — E10.42 TYPE 1 DIABETES MELLITUS WITH DIABETIC POLYNEUROPATHY (HCC): Status: ACTIVE | Noted: 2019-03-08

## 2022-03-19 PROBLEM — E11.610 CHARCOT FOOT DUE TO DIABETES MELLITUS (HCC): Status: ACTIVE | Noted: 2020-08-03

## 2022-03-19 PROBLEM — E10.22 TYPE 1 DIABETES MELLITUS WITH STAGE 3A CHRONIC KIDNEY DISEASE (HCC): Status: ACTIVE | Noted: 2020-08-03

## 2022-03-19 PROBLEM — Z96.41 INSULIN PUMP IN PLACE: Status: ACTIVE | Noted: 2018-09-11

## 2022-03-19 PROBLEM — N18.31 TYPE 1 DIABETES MELLITUS WITH STAGE 3A CHRONIC KIDNEY DISEASE (HCC): Status: ACTIVE | Noted: 2020-08-03

## 2022-03-19 PROBLEM — Z86.79 HISTORY OF HYPERTENSION: Status: ACTIVE | Noted: 2021-07-27

## 2022-03-19 PROBLEM — F10.11 ALCOHOL ABUSE, IN REMISSION: Status: ACTIVE | Noted: 2020-10-04

## 2022-03-19 PROBLEM — N18.31 STAGE 3A CHRONIC KIDNEY DISEASE (HCC): Status: ACTIVE | Noted: 2021-07-08

## 2022-03-19 PROBLEM — G89.29 CHRONIC PAIN OF BOTH SHOULDERS: Status: ACTIVE | Noted: 2021-01-08

## 2022-03-19 PROBLEM — J94.1 FIBROTHORAX: Status: ACTIVE | Noted: 2020-11-12

## 2022-03-19 PROBLEM — E03.9 ACQUIRED HYPOTHYROIDISM: Status: ACTIVE | Noted: 2018-12-14

## 2022-03-19 PROBLEM — J94.2 HEMOTHORAX ON RIGHT: Status: ACTIVE | Noted: 2020-10-14

## 2022-03-19 PROBLEM — E10.319 TYPE 1 DIABETES MELLITUS WITH RETINOPATHY (HCC): Status: ACTIVE | Noted: 2020-08-03

## 2022-03-20 PROBLEM — J90 PLEURAL EFFUSION: Status: ACTIVE | Noted: 2020-10-04

## 2022-03-20 PROBLEM — I35.8 AORTIC HEART MURMUR: Status: ACTIVE | Noted: 2020-12-23

## 2022-03-20 PROBLEM — G89.29 CHRONIC PAIN: Status: ACTIVE | Noted: 2021-07-08

## 2022-03-20 PROBLEM — Z91.81 AT HIGH RISK FOR FALLS: Status: ACTIVE | Noted: 2021-08-22

## 2022-03-20 PROBLEM — K63.5 POLYP OF COLON: Status: ACTIVE | Noted: 2021-06-24

## 2022-03-20 PROBLEM — E66.01 CLASS 2 SEVERE OBESITY DUE TO EXCESS CALORIES WITH SERIOUS COMORBIDITY AND BODY MASS INDEX (BMI) OF 35.0 TO 35.9 IN ADULT (HCC): Status: ACTIVE | Noted: 2020-10-22

## 2022-03-20 PROBLEM — M54.16 LUMBAR RADICULOPATHY: Status: ACTIVE | Noted: 2018-12-28

## 2022-03-20 PROBLEM — Z92.29 COVID-19 VACCINE SERIES COMPLETED: Status: ACTIVE | Noted: 2021-08-22

## 2022-04-13 ENCOUNTER — NURSE TRIAGE (OUTPATIENT)
Dept: OTHER | Facility: CLINIC | Age: 77
End: 2022-04-13

## 2022-04-13 NOTE — TELEPHONE ENCOUNTER
Received call from [place] at [facility] with Red Flag Complaint. Subjective: Caller states \"He was in the bed and he reached over to the nightstand and he fell. That side of his head hit the sharp end of the table. \"     Current Symptoms: 4-5 red spots to right side of head, headaches intermittently     Onset: Fall happened a couple weeks ago     Associated Symptoms: NA    Pain Severity: Denies pain at time of call     Temperature: Denies fever and feeling feverish/chills     What has been tried: Tylenol     LMP: NA Pregnant: NA    Recommended disposition: See PCP within 3 Days    Care advice provided, patient verbalizes understanding; denies any other questions or concerns; instructed to call back for any new or worsening symptoms. Patient/Caller agrees with recommended disposition; writer provided warm transfer to [facility] for appointment scheduling. Attention Provider: Thank you for allowing me to participate in the care of your patient. The patient was connected to triage in response to information provided to the [facility]. Please do not respond through this encounter as the response is not directed to a shared pool.     Reason for Disposition   [1] After 72 hours AND [2] headache persists    Protocols used: HEAD INJURY-ADULT-

## 2022-04-15 ENCOUNTER — HOSPITAL ENCOUNTER (OUTPATIENT)
Dept: CT IMAGING | Age: 77
Discharge: HOME | End: 2022-04-15
Attending: INTERNAL MEDICINE
Payer: MEDICARE

## 2022-04-15 DIAGNOSIS — G44.311 INTRACTABLE ACUTE POST-TRAUMATIC HEADACHE: ICD-10-CM

## 2022-04-15 PROCEDURE — 70450 CT HEAD/BRAIN W/O DYE: CPT

## 2022-05-05 ENCOUNTER — NURSE TRIAGE (OUTPATIENT)
Dept: OTHER | Facility: CLINIC | Age: 77
End: 2022-05-05

## 2022-05-05 NOTE — TELEPHONE ENCOUNTER
Received call from Marija belle at [facility] with Percolate. Subjective: Caller is patient's wife and she states \"He fell and I think he broke his left hand. The outside of his left hand is swollen and it is a little pink. The fingers on the left hand are cool compared to the other hand. \"     Current Symptoms: falls, left hand injury and pain    Onset: 3 days ago; unchanged    Associated Symptoms: NA    Pain Severity:  No pain    Temperature: none     What has been tried: Tylenol    LMP: NA Pregnant: NA    Recommended disposition: See in Office Today or Tomorrow    Care advice provided, patient verbalizes understanding; denies any other questions or concerns; instructed to call back for any new or worsening symptoms. Patient/Caller agrees with recommended disposition; writer provided warm transfer to [facility] for appointment scheduling    Attention Provider: Thank you for allowing me to participate in the care of your patient. The patient was connected to triage in response to information provided to the [facility]. Please do not respond through this encounter as the response is not directed to a shared pool.         Reason for Disposition   Followed a hand or wrist injury   High-risk adult (e.g., age > 61 years, osteoporosis, chronic steroid use)    Protocols used: HAND AND WRIST PAIN-ADULT-[place], HAND AND WRIST INJURY-ADULT-[place]

## 2022-05-23 ENCOUNTER — APPOINTMENT (OUTPATIENT)
Dept: CT IMAGING | Age: 77
End: 2022-05-23
Payer: MEDICARE

## 2022-05-23 ENCOUNTER — HOSPITAL ENCOUNTER (EMERGENCY)
Age: 77
Discharge: HOME | End: 2022-05-23
Attending: EMERGENCY MEDICINE | Admitting: EMERGENCY MEDICINE
Payer: MEDICARE

## 2022-05-23 VITALS
DIASTOLIC BLOOD PRESSURE: 94 MMHG | BODY MASS INDEX: 42.38 KG/M2 | SYSTOLIC BLOOD PRESSURE: 151 MMHG | OXYGEN SATURATION: 100 % | TEMPERATURE: 98.2 F | HEART RATE: 112 BPM | RESPIRATION RATE: 13 BRPM | HEIGHT: 67 IN | WEIGHT: 270 LBS
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
## 2022-05-23 LAB
ALBUMIN SERPL-MCNC: 3.3 G/DL (ref 3.2–4.6)
ALBUMIN/GLOB SERPL: 0.8 {RATIO} (ref 1.2–3.5)
ALP SERPL-CCNC: 180 U/L (ref 50–136)
ALT SERPL-CCNC: 23 U/L (ref 12–65)
ANION GAP SERPL CALC-SCNC: 7 MMOL/L (ref 7–16)
APPEARANCE UR: CLEAR
AST SERPL-CCNC: 12 U/L (ref 15–37)
BACTERIA URNS QL MICRO: 0 /HPF
BILIRUB SERPL-MCNC: 0.6 MG/DL (ref 0.2–1.1)
BILIRUB UR QL: NEGATIVE
BILIRUB UR QL: NEGATIVE
BUN SERPL-MCNC: 31 MG/DL (ref 8–23)
CALCIUM SERPL-MCNC: 9.4 MG/DL (ref 8.3–10.4)
CHLORIDE SERPL-SCNC: 100 MMOL/L (ref 98–107)
CO2 SERPL-SCNC: 28 MMOL/L (ref 21–32)
COLOR UR: YELLOW
CREAT SERPL-MCNC: 1.9 MG/DL (ref 0.8–1.5)
EPI CELLS #/AREA URNS HPF: ABNORMAL /HPF
ERYTHROCYTE [DISTWIDTH] IN BLOOD BY AUTOMATED COUNT: 14.3 % (ref 11.9–14.6)
GLOBULIN SER CALC-MCNC: 4.4 G/DL (ref 2.3–3.5)
GLUCOSE SERPL-MCNC: 349 MG/DL (ref 65–100)
GLUCOSE UR QL STRIP.AUTO: 500 MG/DL
GLUCOSE UR STRIP.AUTO-MCNC: 1000 MG/DL
HCT VFR BLD AUTO: 45 % (ref 41.1–50.3)
HGB BLD-MCNC: 14.2 G/DL (ref 13.6–17.2)
HGB UR QL STRIP: NEGATIVE
KETONES UR QL STRIP.AUTO: ABNORMAL MG/DL
KETONES UR-MCNC: ABNORMAL MG/DL
LEUKOCYTE ESTERASE UR QL STRIP.AUTO: NEGATIVE
LEUKOCYTE ESTERASE UR QL STRIP: NEGATIVE
MCH RBC QN AUTO: 26.5 PG (ref 26.1–32.9)
MCHC RBC AUTO-ENTMCNC: 31.6 G/DL (ref 31.4–35)
MCV RBC AUTO: 84.1 FL (ref 79.6–97.8)
NITRITE UR QL STRIP.AUTO: NEGATIVE
NITRITE UR QL: NEGATIVE
NRBC # BLD: 0 K/UL (ref 0–0.2)
OTHER OBSERVATIONS: ABNORMAL
PH UR STRIP: 5.5 [PH] (ref 5–9)
PH UR: 5.5 [PH] (ref 5–9)
PLATELET # BLD AUTO: 185 K/UL (ref 150–450)
PMV BLD AUTO: 11.1 FL (ref 9.4–12.3)
POTASSIUM SERPL-SCNC: 3.9 MMOL/L (ref 3.5–5.1)
PROT SERPL-MCNC: 7.7 G/DL (ref 6.3–8.2)
PROT UR QL: NEGATIVE MG/DL
PROT UR STRIP-MCNC: NEGATIVE MG/DL
RBC # BLD AUTO: 5.35 M/UL (ref 4.23–5.6)
RBC # UR STRIP: NEGATIVE /UL
SERVICE CMNT-IMP: ABNORMAL
SODIUM SERPL-SCNC: 135 MMOL/L (ref 136–145)
SP GR UR REFRACTOMETRY: 1.01 (ref 1–1.02)
SP GR UR: 1.02 (ref 1–1.02)
UROBILINOGEN UR QL STRIP.AUTO: 0.2 EU/DL (ref 0.2–1)
UROBILINOGEN UR QL: 0.2 EU/DL (ref 0.2–1)
WBC # BLD AUTO: 6.1 K/UL (ref 4.3–11.1)

## 2022-05-23 PROCEDURE — 81003 URINALYSIS AUTO W/O SCOPE: CPT

## 2022-05-23 PROCEDURE — 80053 COMPREHEN METABOLIC PANEL: CPT

## 2022-05-23 PROCEDURE — 85027 COMPLETE CBC AUTOMATED: CPT

## 2022-05-23 PROCEDURE — 70450 CT HEAD/BRAIN W/O DYE: CPT

## 2022-05-23 PROCEDURE — 99284 EMERGENCY DEPT VISIT MOD MDM: CPT

## 2022-05-23 RX ORDER — SODIUM CHLORIDE 0.9 % (FLUSH) 0.9 %
3 SYRINGE (ML) INJECTION EVERY 8 HOURS
Status: DISCONTINUED | OUTPATIENT
Start: 2022-05-23 | End: 2022-05-23 | Stop reason: HOSPADM

## 2022-05-23 ASSESSMENT — PAIN DESCRIPTION - DESCRIPTORS: DESCRIPTORS: DULL

## 2022-05-23 ASSESSMENT — PAIN SCALES - GENERAL: PAINLEVEL_OUTOF10: 4

## 2022-05-23 ASSESSMENT — PAIN - FUNCTIONAL ASSESSMENT: PAIN_FUNCTIONAL_ASSESSMENT: 0-10

## 2022-05-23 ASSESSMENT — PAIN DESCRIPTION - LOCATION: LOCATION: BACK

## 2022-05-23 ASSESSMENT — PAIN DESCRIPTION - ORIENTATION: ORIENTATION: LOWER

## 2022-05-23 NOTE — ED PROVIDER NOTES
reviewed and are negative. Review of Systems   Neurological: Positive for weakness. All other systems reviewed and are negative. No past medical history on file. No past surgical history on file. No family history on file. Social Connections:     Frequency of Communication with Friends and Family: Not on file    Frequency of Social Gatherings with Friends and Family: Not on file    Attends Voodoo Services: Not on file    Active Member of Clubs or Organizations: Not on file    Attends Club or Organization Meetings: Not on file    Marital Status: Not on file        No Known Allergies     Vitals signs and nursing note reviewed. Patient Vitals for the past 4 hrs:   Temp Pulse Resp BP SpO2   05/23/22 1222 98.2 °F (36.8 °C) 117 17 119/73 100 %          Physical Exam  Vitals and nursing note reviewed. Constitutional:       Appearance: Normal appearance. HENT:      Head: Normocephalic and atraumatic. Nose: Nose normal.      Mouth/Throat:      Mouth: Mucous membranes are dry. Eyes:      Extraocular Movements: Extraocular movements intact. Conjunctiva/sclera: Conjunctivae normal.      Pupils: Pupils are equal, round, and reactive to light. Cardiovascular:      Rate and Rhythm: Normal rate. Pulses: Normal pulses. Heart sounds: Normal heart sounds. Pulmonary:      Effort: Pulmonary effort is normal.      Breath sounds: Normal breath sounds. Abdominal:      General: Abdomen is flat. Musculoskeletal:         General: Normal range of motion. Cervical back: Normal range of motion and neck supple. Skin:     General: Skin is warm and dry. Capillary Refill: Capillary refill takes less than 2 seconds. Neurological:      General: No focal deficit present. Mental Status: He is alert and oriented to person, place, and time. Psychiatric:         Mood and Affect: Mood normal.         Behavior: Behavior normal.         Thought Content:  Thought content normal.         Judgment: Judgment normal.          Procedures    Labs Reviewed   COMPREHENSIVE METABOLIC PANEL - Abnormal; Notable for the following components:       Result Value    Sodium 135 (*)     Glucose 349 (*)     BUN 31 (*)     CREATININE 1.90 (*)     GFR  45 (*)     GFR Non- 37 (*)     AST 12 (*)     Alk Phosphatase 180 (*)     Globulin 4.4 (*)     Albumin/Globulin Ratio 0.8 (*)     All other components within normal limits   CBC   URINALYSIS   POCT URINALYSIS DIPSTICK        CT Head W/O Contrast    (Results Pending)      CT HEAD: Unremarkable CT head without contrast.      Killen Coma Scale  Eye Opening: Spontaneous  Best Verbal Response: Oriented  Best Motor Response: Obeys commands  Killen Coma Scale Score: 15        EKG interpretation: Sinus tachycardia, rate of 120, no Q waves, no ST segment elevation, MN interval 164 ms. Voice dictation software was used during the making of this note. This software is not perfect and grammatical and other typographical errors may be present. This note has not been completely proofread for errors.       Dina Arceo MD  05/23/22 [address] [place] [address] [address], MD  09/02/22 [address] [place] [address] Street, MD  09/12/22 [id number]

## 2022-05-23 NOTE — ED TRIAGE NOTES
Gloria Mckoy has fallen several times and he fell down the steps the day before yesterday.   We went to urgent care so they told us there was nothing they could do for him\"

## 2022-05-23 NOTE — ED NOTES
Discharge instructions reviewed with pt and spouse.  No prescriptions given, ambulatory with walker to discharge home with spouse     Heraclio De La Torre RN  05/23/22 [id number]

## 2022-05-25 ENCOUNTER — OFFICE VISIT (OUTPATIENT)
Dept: INTERNAL MEDICINE CLINIC | Facility: CLINIC | Age: 77
End: 2022-05-25
Payer: MEDICARE

## 2022-05-25 VITALS
SYSTOLIC BLOOD PRESSURE: 90 MMHG | HEART RATE: 91 BPM | BODY MASS INDEX: 35.2 KG/M2 | HEIGHT: 66 IN | WEIGHT: 219 LBS | DIASTOLIC BLOOD PRESSURE: 64 MMHG | TEMPERATURE: 97.1 F | OXYGEN SATURATION: 100 %

## 2022-05-25 DIAGNOSIS — N18.31 STAGE 3A CHRONIC KIDNEY DISEASE (HCC): ICD-10-CM

## 2022-05-25 DIAGNOSIS — Z79.899 POLYPHARMACY: ICD-10-CM

## 2022-05-25 DIAGNOSIS — R29.6 FALLS FREQUENTLY: ICD-10-CM

## 2022-05-25 DIAGNOSIS — R41.3 MEMORY LOSS: Primary | ICD-10-CM

## 2022-05-25 PROBLEM — G30.9 ALZHEIMER'S DISEASE, UNSPECIFIED (CODE) (HCC): Status: ACTIVE | Noted: 2022-05-25

## 2022-05-25 PROBLEM — N18.30 CHRONIC RENAL DISEASE, STAGE III (HCC): Status: ACTIVE | Noted: 2022-05-25

## 2022-05-25 PROCEDURE — 99214 OFFICE O/P EST MOD 30 MIN: CPT | Performed by: INTERNAL MEDICINE

## 2022-05-25 PROCEDURE — 3017F COLORECTAL CA SCREEN DOC REV: CPT | Performed by: INTERNAL MEDICINE

## 2022-05-25 PROCEDURE — 1123F ACP DISCUSS/DSCN MKR DOCD: CPT | Performed by: INTERNAL MEDICINE

## 2022-05-25 PROCEDURE — G8427 DOCREV CUR MEDS BY ELIG CLIN: HCPCS | Performed by: INTERNAL MEDICINE

## 2022-05-25 PROCEDURE — 1036F TOBACCO NON-USER: CPT | Performed by: INTERNAL MEDICINE

## 2022-05-25 PROCEDURE — G8417 CALC BMI ABV UP PARAM F/U: HCPCS | Performed by: INTERNAL MEDICINE

## 2022-05-25 RX ORDER — BUPROPION HYDROCHLORIDE 75 MG/1
75 TABLET ORAL 2 TIMES DAILY
COMMUNITY
Start: 2020-10-12 | End: 2022-09-16 | Stop reason: SDUPTHER

## 2022-05-25 RX ORDER — AMITRIPTYLINE HYDROCHLORIDE 25 MG/1
25 TABLET, FILM COATED ORAL NIGHTLY
COMMUNITY
Start: 2019-11-25 | End: 2022-06-15 | Stop reason: ALTCHOICE

## 2022-05-25 RX ORDER — DONEPEZIL HYDROCHLORIDE 5 MG/1
5 TABLET, FILM COATED ORAL NIGHTLY
Qty: 90 TABLET | Refills: 1 | Status: SHIPPED | OUTPATIENT
Start: 2022-05-25 | End: 2022-06-15

## 2022-05-25 ASSESSMENT — PATIENT HEALTH QUESTIONNAIRE - PHQ9
2. FEELING DOWN, DEPRESSED OR HOPELESS: 0
SUM OF ALL RESPONSES TO PHQ QUESTIONS 1-9: 0
SUM OF ALL RESPONSES TO PHQ QUESTIONS 1-9: 0
1. LITTLE INTEREST OR PLEASURE IN DOING THINGS: 0
SUM OF ALL RESPONSES TO PHQ9 QUESTIONS 1 & 2: 0
SUM OF ALL RESPONSES TO PHQ QUESTIONS 1-9: 0
SUM OF ALL RESPONSES TO PHQ QUESTIONS 1-9: 0

## 2022-05-25 ASSESSMENT — ENCOUNTER SYMPTOMS
SHORTNESS OF BREATH: 0
CHEST TIGHTNESS: 0

## 2022-05-25 NOTE — PROGRESS NOTES
Adolph Foley was seen today for fall and memory loss. Diagnoses and all orders for this visit:    Memory loss  Comments:  suspect amitryptiline affecting him poorly stop it  Orders:  -     donepezil (ARICEPT) 5 MG tablet; Take 1 tablet by mouth nightly    Stage 3a chronic kidney disease ([address]    Falls frequently  -     [facility], [place] [facility]    Polypharmacy          Arya Alanis. is a 78 y.o. male    Chief Complaint   Patient presents with   Champion Fall    Memory Loss     decline     Memory bad some days,doing well today  Balance poor  On amitryptiline -canot find rx'r but not me    No results found for this visit on 05/25/22.     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)     Diabetic nephropathy ([address]     Diabetic peripheral neuropathy (HCC)     Dysphonia     Essential hypertension     GERD (gastroesophageal reflux disease)     Hypercholesterolemia     Insulin pump status     Left hip pain     Lumbar radiculopathy     Mild cognitive impairment     Moderate episode of recurrent major depressive disorder (HCC)     Obstructive sleep apnea     Orthostatic hypotension     Paraspinal muscle spasm     Polypharmacy     Primary hypothyroidism     Proliferative diabetic retinopathy associated with type 1 diabetes mellitus (HCC)     Seborrheic keratosis     Spinal cord stimulator status     Spondylolisthesis     Stage 3b chronic kidney disease ([address]     Stroke-like symptom 7/8/2021    Tachycardia     Thyroid disease     managed with medications    Trochanteric bursitis of left hip     Type 1 diabetes mellitus (HCC)     Vocal cord atrophy        Family History   Problem Relation Age of Onset    Hypertension Brother     Diabetes Maternal Uncle         type 1    Diabetes Brother         type 2    Thyroid Disease Mother         treated with surgery    Heart Disease Father     Hypertension Mother         Social History     Socioeconomic History    Marital status:      Spouse name: Not on file    Number of children: Not on file    Years of education: Not on file    Highest education level: Not on file   Occupational History    Not on file   Tobacco Use    Smoking status: Former Smoker     Packs/day: 1.00     Quit date: 1996     Years since quittin.7    Smokeless tobacco: Former User   Substance and Sexual Activity    Alcohol use: Not Currently    Drug use: Never    Sexual activity: Not on file   Other Topics Concern    Not on file   Social History Narrative    Not on file     Social Determinants of Health     Financial Resource Strain:     Difficulty of Paying Living Expenses: Not on file   Food Insecurity:     Worried About [address] in the Last Year: Not on file    [address] [employer] in the Last Year: Not on file   Transportation Needs:     Lack of Transportation (Medical): Not on file    Lack of Transportation (Non-Medical):  Not on file   Physical Activity:     Days of Exercise per Week: Not on file    Minutes of Exercise per Session: Not on file   Stress:     Feeling of Stress : Not on file   Social Connections:     Frequency of Communication with Friends and Family: Not on file    Frequency of Social Gatherings with Friends and Family: Not on file    Attends Scientology Services: Not on file    Active Member of Clubs or Organizations: Not on file    Attends Club or Organization Meetings: Not on file    Marital Status: Not on file   Intimate Partner Violence:     Fear of Current or Ex-Partner: Not on file    Emotionally Abused: Not on file    Physically Abused: Not on file    Sexually Abused: Not on file Housing Stability:     Unable to Pay for Housing in the Last Year: Not on file    Number of Places Lived in the Last Year: Not on file    Unstable Housing in the Last Year: Not on file         Current Outpatient Medications:     amitriptyline (ELAVIL) 25 mg tablet, Take 25 mg by mouth nightly , Disp: , Rfl:     buPROPion (WELLBUTRIN) 75 MG tablet, Take 75 mg by mouth 2 times daily, Disp: , Rfl:     donepezil (ARICEPT) 5 MG tablet, Take 1 tablet by mouth nightly, Disp: 90 tablet, Rfl: 1    acetaminophen (TYLENOL) 500 MG tablet, Take 500 mg by mouth every 6 hours as needed, Disp: , Rfl:     vitamin D 25 MCG (1000 UT) CAPS, Take 1,000 Units by mouth daily, Disp: , Rfl:     DULoxetine (CYMBALTA) 60 MG extended release capsule, TAKE 1 CAPSULE BY MOUTH EVERY MORNING, Disp: , Rfl:     finasteride (PROSCAR) 5 MG tablet, Take 5 mg by mouth daily, Disp: , Rfl:     fluticasone (FLONASE) 50 MCG/ACT nasal spray, 2 sprays by Nasal route daily as needed, Disp: , Rfl:     Glucagon (GVOKE PFS) 1 MG/0.2ML SOSY, Inject 1 each into the skin as needed, Disp: , Rfl:     hydrocortisone (WESTCORT) 0.2 % cream, Apply topically, Disp: , Rfl:     insulin lispro (HUMALOG) 100 UNIT/ML SOLN injection vial, 80 units per day via insulin pump, Disp: , Rfl:     levothyroxine (SYNTHROID) 75 MCG tablet, Take 75 mcg by mouth every morning (before breakfast), Disp: , Rfl:     lidocaine (LIDODERM) 5 %, Place 1 patch onto the skin every 24 hours, Disp: , Rfl:     naloxone (NARCAN) 4 MG/0.1ML LIQD nasal spray, 1 SPRAY BY INTRANASAL ROUTE ONCE AS NEEDED FOR UP TO 1 DOSE, Disp: , Rfl:     ondansetron (ZOFRAN-ODT) 4 MG disintegrating tablet, Take 4 mg by mouth every 6 hours as needed, Disp: , Rfl:     pantoprazole (PROTONIX) 40 MG tablet, Take 40 mg by mouth daily, Disp: , Rfl:     polyethylene glycol (GLYCOLAX) 17 GM/SCOOP powder, Take 17 g by mouth daily, Disp: , Rfl:     rosuvastatin (CRESTOR) 10 MG tablet, Take 10 mg by mouth, Disp: Medicine    Polypharmacy                 Crow Pa, DO

## 2022-06-01 LAB
EKG ATRIAL RATE: 120 BPM
EKG DIAGNOSIS: NORMAL
EKG P AXIS: 67 DEGREES
EKG P-R INTERVAL: 164 MS
EKG Q-T INTERVAL: 318 MS
EKG QRS DURATION: 74 MS
EKG QTC CALCULATION (BAZETT): 449 MS
EKG R AXIS: 83 DEGREES
EKG T AXIS: 38 DEGREES
EKG VENTRICULAR RATE: 120 BPM

## 2022-06-02 ENCOUNTER — TELEPHONE (OUTPATIENT)
Dept: INTERNAL MEDICINE CLINIC | Facility: CLINIC | Age: 77
End: 2022-06-02

## 2022-06-02 ENCOUNTER — HOSPITAL ENCOUNTER (OUTPATIENT)
Dept: PHYSICAL THERAPY | Age: 77
Setting detail: RECURRING SERIES
Discharge: HOME | End: 2022-06-05
Payer: MEDICARE

## 2022-06-02 ENCOUNTER — OFFICE VISIT (OUTPATIENT)
Dept: ENDOCRINOLOGY | Age: 77
End: 2022-06-02
Payer: MEDICARE

## 2022-06-02 VITALS
WEIGHT: 210 LBS | HEIGHT: 66 IN | HEART RATE: 111 BPM | DIASTOLIC BLOOD PRESSURE: 62 MMHG | BODY MASS INDEX: 33.75 KG/M2 | SYSTOLIC BLOOD PRESSURE: 100 MMHG | OXYGEN SATURATION: 90 %

## 2022-06-02 DIAGNOSIS — Z96.41 DIABETES MELLITUS TYPE 1, WITH COMPLICATION, ON LONG TERM INSULIN PUMP (HCC): Primary | ICD-10-CM

## 2022-06-02 DIAGNOSIS — E03.9 PRIMARY HYPOTHYROIDISM: ICD-10-CM

## 2022-06-02 DIAGNOSIS — Z96.41 INSULIN PUMP STATUS: ICD-10-CM

## 2022-06-02 DIAGNOSIS — E10.8 DIABETES MELLITUS TYPE 1, WITH COMPLICATION, ON LONG TERM INSULIN PUMP (HCC): Primary | ICD-10-CM

## 2022-06-02 LAB — HBA1C MFR BLD: 11.1 %

## 2022-06-02 PROCEDURE — 3046F HEMOGLOBIN A1C LEVEL >9.0%: CPT | Performed by: PHYSICIAN ASSISTANT

## 2022-06-02 PROCEDURE — 97530 THERAPEUTIC ACTIVITIES: CPT

## 2022-06-02 PROCEDURE — 99214 OFFICE O/P EST MOD 30 MIN: CPT | Performed by: PHYSICIAN ASSISTANT

## 2022-06-02 PROCEDURE — 2022F DILAT RTA XM EVC RTNOPTHY: CPT | Performed by: PHYSICIAN ASSISTANT

## 2022-06-02 PROCEDURE — G8417 CALC BMI ABV UP PARAM F/U: HCPCS | Performed by: PHYSICIAN ASSISTANT

## 2022-06-02 PROCEDURE — 1036F TOBACCO NON-USER: CPT | Performed by: PHYSICIAN ASSISTANT

## 2022-06-02 PROCEDURE — G8427 DOCREV CUR MEDS BY ELIG CLIN: HCPCS | Performed by: PHYSICIAN ASSISTANT

## 2022-06-02 PROCEDURE — 97162 PT EVAL MOD COMPLEX 30 MIN: CPT

## 2022-06-02 PROCEDURE — 83036 HEMOGLOBIN GLYCOSYLATED A1C: CPT | Performed by: PHYSICIAN ASSISTANT

## 2022-06-02 PROCEDURE — 3017F COLORECTAL CA SCREEN DOC REV: CPT | Performed by: PHYSICIAN ASSISTANT

## 2022-06-02 PROCEDURE — 1123F ACP DISCUSS/DSCN MKR DOCD: CPT | Performed by: PHYSICIAN ASSISTANT

## 2022-06-02 RX ORDER — BLOOD-GLUCOSE METER
EACH MISCELLANEOUS
COMMUNITY
Start: 2022-02-22

## 2022-06-02 RX ORDER — MORPHINE SULFATE 15 MG/1
15 TABLET, FILM COATED, EXTENDED RELEASE ORAL 2 TIMES DAILY
COMMUNITY
Start: 2022-06-01

## 2022-06-02 RX ORDER — BLOOD-GLUCOSE SENSOR
EACH MISCELLANEOUS
COMMUNITY
Start: 2022-02-22 | End: 2022-10-06

## 2022-06-02 RX ORDER — INFUSION SET FOR INSULIN PUMP
INFUSION SETS-PARAPHERNALIA MISCELLANEOUS
COMMUNITY
Start: 2022-02-22 | End: 2022-07-08

## 2022-06-02 RX ORDER — INSULIN PUMP SYRINGE, 3 ML
EACH MISCELLANEOUS
COMMUNITY
Start: 2022-02-22 | End: 2022-07-08

## 2022-06-02 RX ORDER — SUBCUTANEOUS INSULIN PUMP
EACH MISCELLANEOUS
COMMUNITY
Start: 2019-09-13 | End: 2022-07-08

## 2022-06-02 RX ORDER — BLOOD-GLUCOSE,RECEIVER,CONT
EACH MISCELLANEOUS
COMMUNITY
Start: 2021-04-20 | End: 2022-10-06

## 2022-06-02 RX ORDER — BLOOD-GLUCOSE TRANSMITTER
EACH MISCELLANEOUS
COMMUNITY
Start: 2022-03-07 | End: 2022-10-06

## 2022-06-02 ASSESSMENT — PAIN SCALES - GENERAL: PAINLEVEL_OUTOF10: 0

## 2022-06-02 NOTE — PLAN OF CARE
Nohemi Rollins. : 1945  Primary: Medicare Part A And B  Secondary: [place] @ [address] [facility] [address]  Phone: [phone]  Fax: [phone] Plan Frequency: 2-3x/week for 8-12 weeks    Plan of Care/Certification Expiration Date: 22      PT Visit Info: Total # of Visits to Date: 1      OUTPATIENT PHYSICAL THERAPY:OP NOTE TYPE: Initial Assessment 2022               Episode  Appt Desk         Treatment Diagnosis:  Generalized Muscle Weakness (M62.81)  Difficulty in walking, Not elsewhere classified (R26.2)  Repeated Falls (R29.6)    Medical/Referring Diagnosis:  Falls frequently [R29.6]  Referring Physician:  Jovan Smith DO MD Orders:  PT Eval and Treat   Return MD Appt:  22  Date of Onset:  No data recorded   Allergies:  Antihistamines, loratadine-type  Restrictions/Precautions:    No data recordedNo data recorded   Medications Last Reviewed:  2022     SUBJECTIVE   History of Injury/Illness (Reason for Referral): Pt presents with chief c/o poor balance and frequent falls. He states that this began about two years ago. He was dx with orthostatic hypotension and reports that his BP is typically very low, and recently spO2 was low. He is not taking medication or supplemental O2 for this. He does have diabetic neuropathy and has no sensation below B knees. He ambulates with a rollator, per wife falls frequently (unsure of number in the last year), and this typically happens when pt is not using AD to ambulate. Most recently, he fell when attempting to take his shirt off and vision was briefly obstructed. He denies dizziness.    Patient Stated Goal(s):  \"improve walking and decreased falls\"  Initial:     010 Post Session:     010  Past Medical History/Comorbidities:   Mr. 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 ([address] Diabetic nephropathy ([address] Diabetic peripheral neuropathy ([address] Dysphonia, Essential hypertension, GERD (gastroesophageal reflux disease), Hypercholesterolemia, Insulin pump status, Left hip pain, Lumbar radiculopathy, Mild cognitive impairment, Moderate episode of recurrent major depressive disorder ([address] Obstructive sleep apnea, Orthostatic hypotension, Paraspinal muscle spasm, Polypharmacy, Primary hypothyroidism, Proliferative diabetic retinopathy associated with type 1 diabetes mellitus ([address] Seborrheic keratosis, Spinal cord stimulator status, Spondylolisthesis, Stage 3b chronic kidney disease ([address] Stroke-like symptom, Tachycardia, Thyroid disease, Trochanteric bursitis of left hip, Type 1 diabetes mellitus ([address] and Vocal cord atrophy. Mr. Maranda mE  has a past surgical history that includes Tonsillectomy and adenoidectomy (age 8); Shoulder arthroscopy (Left); Toe amputation; other surgical history; Bunionectomy (Right); lumbar fusion (2010); lumbar laminectomy (2008); Colonoscopy (2016); Cataract removal (Bilateral); heent (Bilateral); and Colonoscopy (N/A, 7/28/2021). Social History/Living Environment:   Lives With: Spouse     Prior Level of Function/Work/Activity:   Prior level of function: min A  Occupation: Retired  Receives Help From: Family  No data recordedNo data recorded   Learning:   Does the patient/guardian have any barriers to learning?: No barriers  Will there be a co-learner?: Yes  Co-learner name (if applicable):  Aletha Sania (spouse)     Fall Risk Scale:   No data recorded   Dominant Side:  right handed        OBJECTIVE   /62 spO2 86%  ROM:   NT    Strength:   Sit to stand with UE assist, formal MMT NT    Sensation:   Diminished below knees secondary to diabetic neuropathy    Reflexes: NT    Palpation/Joint Mobility:   NT    Special Tests:   NT    Functional Tests/Measures:   FT balance >20\" with some unsteadiness  Otherwise NT due to low vitals - will communicate with PCP  ASSESSMENT   Initial Assessment:  Kishan Sharma is a 78 y.o. male who presents to Physical Therapy with signs and symptoms consistent with diagnosis of decreased balance, risk of falling. Patient presents today with decreased balance, low blood pressure, low spO2. These deficits limit the patients ability to perform these tasks: ambulation, ADLs, safe community navigation. Patient will benefit from skilled therapy to allow the patient to meet set goals and return to PLOF, in which the above noted functional activities were not impaired. **Of note, PT has concern for continuation of PT with pt's current BP and oxygen levels. LVM for Dr. Terry Barthel (PCP and referring provider) to discuss these concerns and plan to move forward for care. Addendum: Received f/u from Dr. Rojas Solomon office re: vitals. Plan to hold on PT and PCP will reach out to pt/spouse to get him in for a f/u with Dr. Terry Barthel to address BP and O2 levels. Problem List: (Impacting functional limitations): Body Structures, Functions, Activity Limitations Requiring Skilled Therapeutic Intervention: Decreased functional mobility ; Decreased ADL status; Decreased ROM; Decreased body mechanics;  Decreased strength; Decreased safe awareness; Decreased cognition; Decreased balance; Decreased coordination     Therapy Prognosis:   Therapy Prognosis: Fair     Assessment Complexity:   Medium Complexity  PLAN   Effective Dates: 6/2/22 TO Plan of Care/Certification Expiration Date: 09/02/22     Frequency/Duration: Plan Frequency: 2-3x/week for 8-12 weeks     Interventions Planned (Treatment may consist of any combination of the following):    Current Treatment Recommendations: Strengthening; ROM; Balance training; Functional mobility training; Transfer training; ADL/Self-care training; Endurance training; Gait training; Stair training; Neuromuscular re-education; Manual Therapy - Soft Tissue Mobilization; Manual Therapy - Joint Manipulation; Home exercise program; Safety education & training; Patient/Caregiver education & training; Modalities; Vestibular rehab; Therapeutic activities     Goals: (Goals have been discussed and agreed upon with patient.)  Short-Term Functional Goals: Time Frame: 4 weeks  1. Romberg balance improved to >30 seconds for improved balance  Discharge Goals: Time Frame: 12 weeks  1. LEFS improved to 32 for improved safety with community navigation  2. Tandem balance >10 seconds B for improved safety with home tasks  3. 30 second sit to stand x5 without UE use for improved transfers         Outcome Measure: Tool Used: Lower Extremity Functional Scale (LEFS)  Score:  Initial: 22/80 Most Recent: X/80 (Date: -- )   Interpretation of Score: 20 questions each scored on a 5 point scale with 0 representing \"extreme difficulty or unable to perform\" and 4 representing \"no difficulty\". The lower the score, the greater the functional disability. 80/80 represents no disability. Minimal detectable change is 9 points. Medical Necessity:   Skilled intervention continues to be required due to decreased participation ability, decreased balance and strength. Reason For Services/Other Comments:  Patient continues to require skilled intervention due to decreased balance and safety with community navigation. Total Duration:  Time In: 1115  Time Out: 1150    Regarding Jose Luis Mcgee Jr.'s therapy, I certify that the treatment plan above will be carried out by a therapist or under their direction.   Thank you for this referral,  Ernestine Zamarripa PT     Referring Physician Signature: Sarah Izquierdo DO _______________________________ Date _____________        Post Session Pain  Charge Capture   POC Link  Treatment Note Link  MD Guidelines  MyChart

## 2022-06-02 NOTE — PROGRESS NOTES
[facility] ENDOCRINOLOGY   AND   THYROID NODULE CLINIC    Christopher Madison PA-C  [facility] Endocrinology and Thyroid [facility]odule [facility]  [address], [address]  [place], [address]  Phone [phone]          Kishan Ochoa. is a 78 y.o. male seen 6/2/2022 for follow up evaluation of type 1 diabetes on insulin pump        Assessment and Plan:    In office COVID-19 PPE worn and precautions taken    1. Diabetes mellitus type 1, with complication, on long term insulin pump (HCC)  With uncontrolled type 1 diabetes on insulin pump, unfortunately patient is not entering any valuable information into his pump. Patient has a new Dexcom that he does not know how to use. Patient also has a new pump on order. I will refer him to diabetes education first for CGM start one-on-one training due to hearing impairment. Moreover I will refer him to diabetes education for more comprehensive understanding of his disease process and appropriate use of his technology    Instructed to at the very least enter blood glucose into his pump either with fingerstick or use of Dexcom    Concerned about hypoglycemia with more regular bolusing. Patient has Maureen Trinidad at home and patient's wife who is with patient today is aware of appropriate use and when to call for assistance      - AMB POC HEMOGLOBIN A1C  - [facility] Diabetic Treatment  - [facility]abetic Treatment    2. Insulin pump status  Controlled diabetes on [employer]23 insulin pump that he is using to obtain basal insulin alone. His glycemic control is suboptimal with an A1c greater than 10. He was instructed to enter information into the pump more regularly including his carbohydrates as well as his blood glucose reading, start with blood glucose readings. Refer to diabetes education for both CGM start and for comprehensive course.     And outweighs benefit of tight glycemic control in this elderly patient with multiple endocrinologist with [facility]. Diabetes was previously  managed by Ngoc Loera and Elaina Soliz, also in that practice. Date of diagnosis: age 14      Current symptoms: See review of systems below      Diet: no particular diet      Exercise: minimal (limited by back pain)      Diabetes education: The patient has received formal diabetes education (many years ago). Pump issues:  None recently (last pump failure many years ago)      Infusion set change frequency: every 2 days      Insulin delivery: Average total daily dose 45.3 (43.6basal 96%, 1.7 bolus 4%)       Diabetic complications:                Retinopathy: Last eye exam was 1/11/2022 and demonstrated proliferative diabetic retinopathy without macular edema OU. Eye care specialist  is Archie Ross. Perla Urias MD with [facility]. Albuminuria/nephropathy:                          4/10/2019: Urine microalbumin to creatinine ratio less than 17 (-).                          2/2/2022: Serum creatinine 1.35 (GFR 51%). 04/08/2022 Cr 1.49, GFR 49                 Neuropathy:  Known Charcot foot (right foot) with established bilateral peripheral neuropathy      Home blood glucose monitoring frequency:~ 4 times per day      Blood glucose: by review of pump download  No data, does not enter, did not bring glucose meter      Hypoglycemia: rare. He has hypoglycemic awareness for blood glucose less than 60. Hemoglobin A1c:   9/11/2018: 8.4%. 12/13/2018: 7.4%. 3/14/2019: 7.3%. 6/14/2019: 8.3%. 9/13/2019: 8.3%.   12/13/2019: 8.5%. 10/20/2020: 7.7%. 1/25/2021: 10.0%. 6/2/2021: 8.6%   7/8/2021: 8.3%. 10/8/2021: 8.3%.   2/22/2022: 10.5%. 06/02/2022: 11.0%      Other pertinent labs:              Lipids:                           4/27/2020:  Total cholesterol 133, triglycerides 189, HDL 47, LDL 63.0                 Thyroid:                           See below                 Adrenal: 4/10/2019: Cortisol 24.7         THYROID DYSFUNCTION   AMIE Gonzalez. is seen for new evaluation/management of hypothyroidism; this was diagnosed in his 64s. Current symptoms:  See review of systems below      Prior treatment: He has been on levothyroxine 75 mcg daily since diagnosis. Pertinent labs:   9/11/2018: TSH 2.752.   12/13/2019: TSH 1.949.   7/8/2021: TSH 0.882, free T4 1.1. Imaging:   none    Allergies & Medications:  Reviewed in chart. Review of Systems    Vital Signs:  /62   Pulse (!) 111   Ht 5' 6\" (1.676 m)   Wt 210 lb (95.3 kg)   SpO2 90%   BMI 33.89 kg/m²       Physical Exam  Constitutional:       Appearance: Normal appearance. He is obese. HENT:      Ears:      Comments: Hearing aids in place  Neck:      Thyroid: No thyromegaly. Cardiovascular:      Rate and Rhythm: Normal rate and regular rhythm. Pulmonary:      Effort: Pulmonary effort is normal.      Breath sounds: Normal breath sounds. Abdominal:      Palpations: Abdomen is soft. Musculoskeletal:      Right foot: Charcot foot present. Comments: Ambulates with walker  Bilateral hand contracture secondary to neuropathy    Feet:      Right foot:      Protective Sensation: 4 sites tested. 0 sites sensed. Skin integrity: Callus and dry skin present. Left foot:      Protective Sensation: 4 sites tested. 0 sites sensed. Skin integrity: Skin breakdown, callus and dry skin present. Comments: Hammer toe of left second toe with erythema    rigth 5th toe with abrasion/ulceration at DIP. no heat, erythema, purulence, or sign of infection  Healing ulceration to distal tip of amputated stump of right 3rd toe, rightsecond toe with well healed amputation  Lymphadenopathy:      Cervical: No cervical adenopathy. Skin:     General: Skin is warm and dry. Neurological:      General: No focal deficit present. Mental Status: He is alert.    Psychiatric:         Mood and Affect: Mood normal.         Behavior: Behavior normal.         Thought Content: Thought content normal.         Judgment: Judgment normal.             Return in about 3 months (around 9/2/2022) for Type 1 with pump f/u. Portions of this note were generated with the assistance of voice recogniton software. As such, some errors in transcription may be present.

## 2022-06-02 NOTE — TELEPHONE ENCOUNTER
I received a call from [facility]. She left a message on my voice mail stating the patient had a low b/p of 100/62 and Os sat 86% on room air. They could not do PT today and wanted advise. I talked with Dr Rah Ibrahim he states we need to schedule a F/U apt. The patient's with will monitor B/P and O2 sat and keep a log.

## 2022-06-02 NOTE — PROGRESS NOTES
Rah Lehman. : 1945  Primary: Medicare Part A And B  Secondary: [place] @ [address] [facility] [address]  Phone: [phone]  Fax: [phone] Plan Frequency: 2-3x/week for 8-12 weeks    Plan of Care/Certification Expiration Date: 22      PT Visit Info: Total # of Visits to Date: 1      OUTPATIENT PHYSICAL THERAPY:OP NOTE TYPE: Treatment Note 2022       Episode  }Appt Desk              Treatment Diagnosis:  Generalized Muscle Weakness (M62.81)  Difficulty in walking, Not elsewhere classified (R26.2)  Repeated Falls (R29.6)  Medical/Referring Diagnosis:  Falls frequently [R29.6]  Referring Physician:  Vidhi Smallwood DO MD Orders:  PT Eval and Treat   Date of Onset:  No data recorded   Allergies:   Antihistamines, loratadine-type  Restrictions/Precautions:  No data recordedNo data recorded   Interventions Planned (Treatment may consist of any combination of the following):    Current Treatment Recommendations: Strengthening; ROM; Balance training; Functional mobility training; Transfer training; ADL/Self-care training; Endurance training; Gait training; Stair training; Neuromuscular re-education; Manual Therapy - Soft Tissue Mobilization; Manual Therapy - Joint Manipulation; Home exercise program; Safety education & training; Patient/Caregiver education & training; Modalities; Vestibular rehab; Therapeutic activities     Subjective Comments: see initial [id number]}     Initial:}    0/10Post Session:       0/10  Medications Last Reviewed:  2022  Updated Objective Findings:  See initial evaluation   Treatment   THERAPEUTIC ACTIVITY: (see below for minutes): Therapeutic activities below to improve mobility, strength, balance and coordination. Required minimal visual, verbal, manual and tactile cues to improve independence and safety with daily activities.   THERAPEUTIC EXERCISE: (see below for minutes): Exercises below to improve mobility, strength, balance and coordination. Required minimal verbal and manual cues to promote proper body alignment, promote proper body posture and promote proper body mechanics. Progressed resistance, range, repetitions and complexity of movement as indicated. NEUROMUSCULAR RE-ED: (see below for minutes): Neuromuscular re-education to restore normal body movement patterns. MANUAL THERAPY: (see below for minutes): Joint mobilization and Soft tissue mobilization was utilized and necessary because of the patient's restricted joint motion, painful spasm, loss of articular motion, and restricted motion of soft tissue. MODALITIES: (see below for minutes): to decrease pain and/or swelling  GAIT TRAINING: (see below for minutes): Gait training to improve and/or restore physical functioning as related to mobility, balance and coordination  SELF CARE: (see below for minutes): Self care to improve and/or restore self-care/home management as related to dressing, bathing and grooming. Required minimal verbal cueing to facilitate activities of daily living skills and compensatory activities  MECHANICAL TRACTION: (see below for minutes): Traction was used due to the patient's radiculopathy in order to relieve pain in or originating from the spine. Manual:   N/a     Exercises/Activities: 10'TA  Education - current vitals, communication with PCP, use of rollator  Romberg balance  3x20\"    HEP and Education:  HEP, rationale for rollator use, f/u with PCP  Pt has verbalized and demonstrated compliance with HEP and scheduled appointments. Modalities:  N/a       Treatment/Session Summary:    · Treatment Assessment: see initial evaluation:[id number]}     · Communication/Consultation:  [facility] for Dr. Noemi Ellsworth nurse re: vitals concerns  · Equipment provided today:  None  · Recommendations/Intent for next treatment session: Next visit will focus on f/u re: vitals and progression of balance.     Total Treatment Billable Duration:  35 minutes   Time In: 1115  Time Out: Frederic Tabares, PT       Charge Capture  }Post Session Pain  MedBridge Portal  MD Guidelines  Scanned Media  Benefits  MyChart    Future Appointments   Date Time Provider Joseph Moraes   6/2/2022  3:30 PM Landry Perez PA-C END [facility] AMB   6/6/2022  3:30 PM [place] Bautista, PT [facility]   6/8/2022  3:30 PM [place] Bautista, PT [facility]   6/10/2022  3:30 PM [place] Bautista, PT [facility]   6/14/2022 10:15 AM [place] Bautista, PT [facility]   6/15/2022  4:15 PM [place] Bautista, PT [facility]   6/17/2022  3:30 PM [place] Bautista, PT [facility]   6/20/2022 11:45 AM [place] Bautista, PT [facility]   6/21/2022  8:45 AM [place] Bautista, PT [facility]   6/24/2022  3:30 PM [place] Bautista, PT [facility] S[facility]   6/27/2022  4:15 PM [place] Bautista, PT [facility]   6/28/2022  4:15 PM [place] Bautista, PT [facility]   7/1/2022  3:30 PM [place] Bautista, PT [facility]   8/31/2022  9:00 AM [facility] AMB   9/7/2022  1:30 PM Joyce Buerger,  TRIM [facility] AMB

## 2022-06-03 ENCOUNTER — TELEPHONE (OUTPATIENT)
Dept: DIABETES SERVICES | Age: 77
End: 2022-06-03

## 2022-06-03 NOTE — TELEPHONE ENCOUNTER
Received two Diabetes referrals. One is for education and the other is for Dexcom G6 start. Call to home and wife Stephanie  answered. She reports he does have the CGM and a Tandem T- slim pump has been ordered thru [employer]. Offered next Tuesday for CGM start but cannot come due to another appointment. They will come on 6- at [facility] for CGM start. Concerning education- He has had Diabetes education since on Medicare. Instructed Medicare will only pay for a two hour follow up. They are unsure if they want all nutrition or split. Informed her if going on a pump he will need to have a strong understanding of carbohydrate counting. She reports he has memory issues that has improved after he saw his PCP. He was having low oxygen and B/P readings per wife. They will call back when they decide what they want to schedule for the education or tell us at the 6- appointment.

## 2022-06-06 ENCOUNTER — APPOINTMENT (OUTPATIENT)
Dept: PHYSICAL THERAPY | Age: 77
End: 2022-06-06
Payer: MEDICARE

## 2022-06-08 ENCOUNTER — APPOINTMENT (OUTPATIENT)
Dept: PHYSICAL THERAPY | Age: 77
End: 2022-06-08
Payer: MEDICARE

## 2022-06-09 ENCOUNTER — HOSPITAL ENCOUNTER (INPATIENT)
Age: 77
LOS: 2 days | Discharge: HOME HEALTH | DRG: 639 | End: 2022-06-11
Attending: EMERGENCY MEDICINE | Admitting: FAMILY MEDICINE
Payer: MEDICARE

## 2022-06-09 ENCOUNTER — APPOINTMENT (OUTPATIENT)
Dept: GENERAL RADIOLOGY | Age: 77
DRG: 639 | End: 2022-06-09
Payer: MEDICARE

## 2022-06-09 DIAGNOSIS — E10.10 DIABETIC KETOACIDOSIS WITHOUT COMA ASSOCIATED WITH TYPE 1 DIABETES MELLITUS (HCC): Primary | ICD-10-CM

## 2022-06-09 DIAGNOSIS — R11.2 NON-INTRACTABLE VOMITING WITH NAUSEA, UNSPECIFIED VOMITING TYPE: ICD-10-CM

## 2022-06-09 DIAGNOSIS — E86.9 VOLUME DEPLETION: ICD-10-CM

## 2022-06-09 PROBLEM — F32.A DEPRESSION: Chronic | Status: ACTIVE | Noted: 2022-06-09

## 2022-06-09 PROBLEM — N18.30 CHRONIC RENAL DISEASE, STAGE 3, MODERATELY DECREASED GLOMERULAR FILTRATION RATE (GFR) BETWEEN 30-59 ML/MIN/1.73 SQUARE METER (HCC): Chronic | Status: ACTIVE | Noted: 2022-06-09

## 2022-06-09 PROBLEM — G62.9 NEUROPATHY: Chronic | Status: ACTIVE | Noted: 2022-06-09

## 2022-06-09 PROBLEM — E03.9 HYPOTHYROID: Chronic | Status: ACTIVE | Noted: 2022-06-09

## 2022-06-09 PROBLEM — R13.10 DYSPHAGIA: Chronic | Status: ACTIVE | Noted: 2022-06-09

## 2022-06-09 PROBLEM — G89.29 CHRONIC PAIN: Chronic | Status: ACTIVE | Noted: 2022-06-09

## 2022-06-09 LAB
ALBUMIN SERPL-MCNC: 3.3 G/DL (ref 3.2–4.6)
ALBUMIN/GLOB SERPL: 0.7 {RATIO} (ref 1.2–3.5)
ALP SERPL-CCNC: 167 U/L (ref 50–136)
ALT SERPL-CCNC: 19 U/L (ref 12–65)
ANION GAP SERPL CALC-SCNC: 11 MMOL/L (ref 7–16)
ANION GAP SERPL CALC-SCNC: 17 MMOL/L (ref 7–16)
ANION GAP SERPL CALC-SCNC: 9 MMOL/L (ref 7–16)
APPEARANCE UR: CLEAR
ARTERIAL PATENCY WRIST A: POSITIVE
AST SERPL-CCNC: 22 U/L (ref 15–37)
BACTERIA URNS QL MICRO: 0 /HPF
BASE DEFICIT BLD-SCNC: 8.3 MMOL/L
BASOPHILS # BLD: 0 K/UL (ref 0–0.2)
BASOPHILS NFR BLD: 0 % (ref 0–2)
BDY SITE: ABNORMAL
BILIRUB SERPL-MCNC: 1.1 MG/DL (ref 0.2–1.1)
BILIRUB UR QL: NEGATIVE
BUN SERPL-MCNC: 41 MG/DL (ref 8–23)
BUN SERPL-MCNC: 42 MG/DL (ref 8–23)
BUN SERPL-MCNC: 42 MG/DL (ref 8–23)
CALCIUM SERPL-MCNC: 8.5 MG/DL (ref 8.3–10.4)
CALCIUM SERPL-MCNC: 8.5 MG/DL (ref 8.3–10.4)
CALCIUM SERPL-MCNC: 9.1 MG/DL (ref 8.3–10.4)
CASTS URNS QL MICRO: 0 /LPF
CHLORIDE SERPL-SCNC: 100 MMOL/L (ref 98–107)
CHLORIDE SERPL-SCNC: 103 MMOL/L (ref 98–107)
CHLORIDE SERPL-SCNC: 103 MMOL/L (ref 98–107)
CO2 SERPL-SCNC: 16 MMOL/L (ref 21–32)
CO2 SERPL-SCNC: 21 MMOL/L (ref 21–32)
CO2 SERPL-SCNC: 22 MMOL/L (ref 21–32)
COLOR UR: YELLOW
CREAT SERPL-MCNC: 1.7 MG/DL (ref 0.8–1.5)
CREAT SERPL-MCNC: 1.8 MG/DL (ref 0.8–1.5)
CREAT SERPL-MCNC: 1.9 MG/DL (ref 0.8–1.5)
CRYSTALS URNS QL MICRO: 0 /LPF
DIFFERENTIAL METHOD BLD: ABNORMAL
EOSINOPHIL # BLD: 0 K/UL (ref 0–0.8)
EOSINOPHIL NFR BLD: 0 % (ref 0.5–7.8)
EPI CELLS #/AREA URNS HPF: 0 /HPF
ERYTHROCYTE [DISTWIDTH] IN BLOOD BY AUTOMATED COUNT: 14.7 % (ref 11.9–14.6)
GAS FLOW.O2 O2 DELIVERY SYS: ABNORMAL L/MIN
GLOBULIN SER CALC-MCNC: 4.5 G/DL (ref 2.3–3.5)
GLUCOSE BLD STRIP.AUTO-MCNC: 165 MG/DL (ref 65–100)
GLUCOSE BLD STRIP.AUTO-MCNC: 181 MG/DL (ref 65–100)
GLUCOSE BLD STRIP.AUTO-MCNC: 253 MG/DL (ref 65–100)
GLUCOSE BLD STRIP.AUTO-MCNC: 335 MG/DL (ref 65–100)
GLUCOSE BLD STRIP.AUTO-MCNC: 350 MG/DL (ref 65–100)
GLUCOSE BLD STRIP.AUTO-MCNC: 410 MG/DL (ref 65–100)
GLUCOSE BLD STRIP.AUTO-MCNC: 515 MG/DL (ref 65–100)
GLUCOSE BLD STRIP.AUTO-MCNC: 557 MG/DL (ref 65–100)
GLUCOSE BLD STRIP.AUTO-MCNC: 565 MG/DL (ref 65–100)
GLUCOSE SERPL-MCNC: 259 MG/DL (ref 65–100)
GLUCOSE SERPL-MCNC: 412 MG/DL (ref 65–100)
GLUCOSE SERPL-MCNC: 616 MG/DL (ref 65–100)
GLUCOSE UR STRIP.AUTO-MCNC: 1000 MG/DL
HCO3 BLD-SCNC: 15.3 MMOL/L (ref 22–26)
HCT VFR BLD AUTO: 46.8 % (ref 41.1–50.3)
HGB BLD-MCNC: 14.6 G/DL (ref 13.6–17.2)
HGB UR QL STRIP: ABNORMAL
IMM GRANULOCYTES # BLD AUTO: 0.1 K/UL (ref 0–0.5)
IMM GRANULOCYTES NFR BLD AUTO: 0 % (ref 0–5)
KETONES UR QL STRIP.AUTO: 40 MG/DL
LEUKOCYTE ESTERASE UR QL STRIP.AUTO: NEGATIVE
LYMPHOCYTES # BLD: 0.8 K/UL (ref 0.5–4.6)
LYMPHOCYTES NFR BLD: 7 % (ref 13–44)
MAGNESIUM SERPL-MCNC: 2.2 MG/DL (ref 1.8–2.4)
MAGNESIUM SERPL-MCNC: 2.3 MG/DL (ref 1.8–2.4)
MCH RBC QN AUTO: 26.6 PG (ref 26.1–32.9)
MCHC RBC AUTO-ENTMCNC: 31.2 G/DL (ref 31.4–35)
MCV RBC AUTO: 85.4 FL (ref 79.6–97.8)
MONOCYTES # BLD: 0.4 K/UL (ref 0.1–1.3)
MONOCYTES NFR BLD: 3 % (ref 4–12)
MUCOUS THREADS URNS QL MICRO: 0 /LPF
NEUTS SEG # BLD: 10 K/UL (ref 1.7–8.2)
NEUTS SEG NFR BLD: 90 % (ref 43–78)
NITRITE UR QL STRIP.AUTO: NEGATIVE
NRBC # BLD: 0 K/UL (ref 0–0.2)
PCO2 BLD: 27.1 MMHG (ref 35–45)
PH BLD: 7.36 [PH] (ref 7.35–7.45)
PH UR STRIP: 5.5 [PH] (ref 5–9)
PHOSPHATE SERPL-MCNC: 2.1 MG/DL (ref 2.3–3.7)
PHOSPHATE SERPL-MCNC: 2.7 MG/DL (ref 2.3–3.7)
PLATELET # BLD AUTO: 192 K/UL (ref 150–450)
PMV BLD AUTO: 11.8 FL (ref 9.4–12.3)
PO2 BLD: 81 MMHG (ref 75–100)
POTASSIUM BLD-SCNC: 4.3 MMOL/L (ref 3.5–5.1)
POTASSIUM SERPL-SCNC: 3.6 MMOL/L (ref 3.5–5.1)
POTASSIUM SERPL-SCNC: 4.7 MMOL/L (ref 3.5–5.1)
POTASSIUM SERPL-SCNC: 4.8 MMOL/L (ref 3.5–5.1)
PROT SERPL-MCNC: 7.8 G/DL (ref 6.3–8.2)
PROT UR STRIP-MCNC: NEGATIVE MG/DL
RBC # BLD AUTO: 5.48 M/UL (ref 4.23–5.6)
RBC #/AREA URNS HPF: 0 /HPF
SAO2 % BLD: 96 %
SERVICE CMNT-IMP: ABNORMAL
SODIUM BLD-SCNC: 133 MMOL/L (ref 136–145)
SODIUM SERPL-SCNC: 133 MMOL/L (ref 138–145)
SODIUM SERPL-SCNC: 134 MMOL/L (ref 136–145)
SODIUM SERPL-SCNC: 135 MMOL/L (ref 136–145)
SP GR UR REFRACTOMETRY: 1.01 (ref 1–1.02)
SPECIMEN SITE: ABNORMAL
UROBILINOGEN UR QL STRIP.AUTO: 0.2 EU/DL (ref 0.2–1)
WBC # BLD AUTO: 11.2 K/UL (ref 4.3–11.1)
WBC URNS QL MICRO: 0 /HPF

## 2022-06-09 PROCEDURE — 82803 BLOOD GASES ANY COMBINATION: CPT

## 2022-06-09 PROCEDURE — 6370000000 HC RX 637 (ALT 250 FOR IP): Performed by: EMERGENCY MEDICINE

## 2022-06-09 PROCEDURE — 2580000003 HC RX 258: Performed by: EMERGENCY MEDICINE

## 2022-06-09 PROCEDURE — 6370000000 HC RX 637 (ALT 250 FOR IP): Performed by: INTERNAL MEDICINE

## 2022-06-09 PROCEDURE — 99285 EMERGENCY DEPT VISIT HI MDM: CPT

## 2022-06-09 PROCEDURE — 80048 BASIC METABOLIC PNL TOTAL CA: CPT

## 2022-06-09 PROCEDURE — 96361 HYDRATE IV INFUSION ADD-ON: CPT

## 2022-06-09 PROCEDURE — 80053 COMPREHEN METABOLIC PANEL: CPT

## 2022-06-09 PROCEDURE — 36415 COLL VENOUS BLD VENIPUNCTURE: CPT

## 2022-06-09 PROCEDURE — 96374 THER/PROPH/DIAG INJ IV PUSH: CPT

## 2022-06-09 PROCEDURE — 85025 COMPLETE CBC W/AUTO DIFF WBC: CPT

## 2022-06-09 PROCEDURE — 83036 HEMOGLOBIN GLYCOSYLATED A1C: CPT

## 2022-06-09 PROCEDURE — 84100 ASSAY OF PHOSPHORUS: CPT

## 2022-06-09 PROCEDURE — 2580000003 HC RX 258: Performed by: INTERNAL MEDICINE

## 2022-06-09 PROCEDURE — 82947 ASSAY GLUCOSE BLOOD QUANT: CPT

## 2022-06-09 PROCEDURE — 84295 ASSAY OF SERUM SODIUM: CPT

## 2022-06-09 PROCEDURE — 81015 MICROSCOPIC EXAM OF URINE: CPT

## 2022-06-09 PROCEDURE — 71045 X-RAY EXAM CHEST 1 VIEW: CPT

## 2022-06-09 PROCEDURE — 83735 ASSAY OF MAGNESIUM: CPT

## 2022-06-09 PROCEDURE — 2100000000 HC CCU R&B

## 2022-06-09 PROCEDURE — 87040 BLOOD CULTURE FOR BACTERIA: CPT

## 2022-06-09 PROCEDURE — 6360000002 HC RX W HCPCS: Performed by: INTERNAL MEDICINE

## 2022-06-09 PROCEDURE — 81003 URINALYSIS AUTO W/O SCOPE: CPT

## 2022-06-09 RX ORDER — DEXTROSE MONOHYDRATE 50 MG/ML
100 INJECTION, SOLUTION INTRAVENOUS PRN
Status: DISCONTINUED | OUTPATIENT
Start: 2022-06-09 | End: 2022-06-11 | Stop reason: HOSPADM

## 2022-06-09 RX ORDER — LIDOCAINE 4 G/G
1 PATCH TOPICAL DAILY
Status: DISCONTINUED | OUTPATIENT
Start: 2022-06-10 | End: 2022-06-11 | Stop reason: HOSPADM

## 2022-06-09 RX ORDER — BUPROPION HYDROCHLORIDE 75 MG/1
75 TABLET ORAL 2 TIMES DAILY
Status: DISCONTINUED | OUTPATIENT
Start: 2022-06-09 | End: 2022-06-11 | Stop reason: HOSPADM

## 2022-06-09 RX ORDER — GLUCAGON 1 MG/ML
1 KIT INJECTION PRN
Status: DISCONTINUED | OUTPATIENT
Start: 2022-06-09 | End: 2022-06-11 | Stop reason: HOSPADM

## 2022-06-09 RX ORDER — FLUTICASONE PROPIONATE 50 MCG
2 SPRAY, SUSPENSION (ML) NASAL DAILY
Status: DISCONTINUED | OUTPATIENT
Start: 2022-06-10 | End: 2022-06-11 | Stop reason: HOSPADM

## 2022-06-09 RX ORDER — DEXTROSE AND SODIUM CHLORIDE 5; .45 G/100ML; G/100ML
INJECTION, SOLUTION INTRAVENOUS CONTINUOUS PRN
Status: DISCONTINUED | OUTPATIENT
Start: 2022-06-09 | End: 2022-06-11 | Stop reason: HOSPADM

## 2022-06-09 RX ORDER — ENOXAPARIN SODIUM 100 MG/ML
30 INJECTION SUBCUTANEOUS 2 TIMES DAILY
Status: DISCONTINUED | OUTPATIENT
Start: 2022-06-09 | End: 2022-06-11 | Stop reason: HOSPADM

## 2022-06-09 RX ORDER — BISACODYL 10 MG
10 SUPPOSITORY, RECTAL RECTAL DAILY PRN
Status: DISCONTINUED | OUTPATIENT
Start: 2022-06-09 | End: 2022-06-11 | Stop reason: HOSPADM

## 2022-06-09 RX ORDER — SODIUM CHLORIDE 9 MG/ML
INJECTION, SOLUTION INTRAVENOUS CONTINUOUS
Status: DISCONTINUED | OUTPATIENT
Start: 2022-06-09 | End: 2022-06-09

## 2022-06-09 RX ORDER — SODIUM CHLORIDE 0.9 % (FLUSH) 0.9 %
3 SYRINGE (ML) INJECTION EVERY 8 HOURS
Status: DISCONTINUED | OUTPATIENT
Start: 2022-06-09 | End: 2022-06-11 | Stop reason: HOSPADM

## 2022-06-09 RX ORDER — POLYETHYLENE GLYCOL 3350 17 G/17G
17 POWDER, FOR SOLUTION ORAL DAILY PRN
Status: DISCONTINUED | OUTPATIENT
Start: 2022-06-09 | End: 2022-06-11 | Stop reason: HOSPADM

## 2022-06-09 RX ORDER — DEXTROSE AND SODIUM CHLORIDE 5; .45 G/100ML; G/100ML
INJECTION, SOLUTION INTRAVENOUS CONTINUOUS
Status: DISCONTINUED | OUTPATIENT
Start: 2022-06-09 | End: 2022-06-10

## 2022-06-09 RX ORDER — 0.9 % SODIUM CHLORIDE 0.9 %
1000 INTRAVENOUS SOLUTION INTRAVENOUS ONCE
Status: COMPLETED | OUTPATIENT
Start: 2022-06-09 | End: 2022-06-09

## 2022-06-09 RX ORDER — ROSUVASTATIN CALCIUM 10 MG/1
10 TABLET, COATED ORAL NIGHTLY
Status: DISCONTINUED | OUTPATIENT
Start: 2022-06-09 | End: 2022-06-11 | Stop reason: HOSPADM

## 2022-06-09 RX ORDER — FINASTERIDE 5 MG/1
5 TABLET, FILM COATED ORAL DAILY
Status: DISCONTINUED | OUTPATIENT
Start: 2022-06-10 | End: 2022-06-11 | Stop reason: HOSPADM

## 2022-06-09 RX ORDER — HYDRALAZINE HYDROCHLORIDE 20 MG/ML
10 INJECTION INTRAMUSCULAR; INTRAVENOUS EVERY 6 HOURS PRN
Status: DISCONTINUED | OUTPATIENT
Start: 2022-06-09 | End: 2022-06-11 | Stop reason: HOSPADM

## 2022-06-09 RX ORDER — LEVOTHYROXINE SODIUM 0.07 MG/1
75 TABLET ORAL DAILY
Status: DISCONTINUED | OUTPATIENT
Start: 2022-06-10 | End: 2022-06-11 | Stop reason: HOSPADM

## 2022-06-09 RX ORDER — SODIUM BICARBONATE 650 MG/1
650 TABLET ORAL 2 TIMES DAILY
Status: DISCONTINUED | OUTPATIENT
Start: 2022-06-09 | End: 2022-06-11 | Stop reason: HOSPADM

## 2022-06-09 RX ADMIN — SODIUM CHLORIDE 10 UNITS/HR: 9 INJECTION, SOLUTION INTRAVENOUS at 15:20

## 2022-06-09 RX ADMIN — SODIUM CHLORIDE: 9 INJECTION, SOLUTION INTRAVENOUS at 21:14

## 2022-06-09 RX ADMIN — DEXTROSE AND SODIUM CHLORIDE: 5; 450 INJECTION, SOLUTION INTRAVENOUS at 21:39

## 2022-06-09 RX ADMIN — ROSUVASTATIN CALCIUM 10 MG: 10 TABLET, FILM COATED ORAL at 20:09

## 2022-06-09 RX ADMIN — SODIUM BICARBONATE 650 MG TABLET 650 MG: at 20:09

## 2022-06-09 RX ADMIN — SODIUM CHLORIDE 9.1 UNITS/HR: 9 INJECTION, SOLUTION INTRAVENOUS at 16:38

## 2022-06-09 RX ADMIN — SODIUM CHLORIDE 1000 ML: 9 INJECTION, SOLUTION INTRAVENOUS at 13:43

## 2022-06-09 RX ADMIN — SODIUM CHLORIDE 1000 ML: 900 INJECTION, SOLUTION INTRAVENOUS at 15:21

## 2022-06-09 RX ADMIN — BUPROPION HYDROCHLORIDE 75 MG: 75 TABLET, FILM COATED ORAL at 20:09

## 2022-06-09 RX ADMIN — SODIUM CHLORIDE: 9 INJECTION, SOLUTION INTRAVENOUS at 17:26

## 2022-06-09 RX ADMIN — INSULIN HUMAN 10 UNITS: 100 INJECTION, SOLUTION PARENTERAL at 14:56

## 2022-06-09 RX ADMIN — SODIUM CHLORIDE, PRESERVATIVE FREE 3 ML: 5 INJECTION INTRAVENOUS at 20:10

## 2022-06-09 RX ADMIN — ENOXAPARIN SODIUM 30 MG: 100 INJECTION SUBCUTANEOUS at 20:09

## 2022-06-09 RX ADMIN — SODIUM CHLORIDE, PRESERVATIVE FREE 3 ML: 5 INJECTION INTRAVENOUS at 13:44

## 2022-06-09 ASSESSMENT — ENCOUNTER SYMPTOMS
SHORTNESS OF BREATH: 0
DIARRHEA: 0
EYE DISCHARGE: 0
VOMITING: 1
ABDOMINAL PAIN: 0
COLOR CHANGE: 0
NAUSEA: 1
EYE REDNESS: 0
RHINORRHEA: 0
FACIAL SWELLING: 0
BACK PAIN: 0
COUGH: 0

## 2022-06-09 ASSESSMENT — PAIN SCALES - GENERAL
PAINLEVEL_OUTOF10: 0
PAINLEVEL_OUTOF10: 0

## 2022-06-09 NOTE — PROGRESS NOTES
Patient is critically ill. Without intervention, there is a high probability of acute organ impairment or life-threatening deterioration in the patient's condition from: DKA  Critical care interventions: ICU, IV insulin drip  Total critical care time spent: 35  minutes. Time is indicative of direct patient attendance at bedside and on the patient's floor nearby. Includes time spent at bedside performing history and exam, performing chart review, discussing findings and treatment plan with patient and/or family, discussing patient with nursing staff, consultants and colleagues, and ordering/reviewing pertinent laboratory and radiographic evaluations. Time excludes procedures. CPT:  55946: First 30-74 minutes  81182: Each block of 30 min.  beyond 76    Jaquelin Woodson MD

## 2022-06-09 NOTE — H&P
Hospitalist History and Physical   Admit Date:  2022  1:25 PM   Name:  Lynn Sher. Age:  78 y.o. Sex:  male  :  1945   MRN:  [id number]   Room:  [address] [place] [address]    Presenting Complaint: Hyperglycemia     Reason(s) for Admission: Volume depletion [E86.9]  DKA, type 1, not at goal [facility]) [E10.10]  Diabetic ketoacidosis without coma associated with type 1 diabetes mellitus ([facility] [address] [E10.10]  Non-intractable vomiting with nausea, unspecified vomiting type [R11.2]     History of Present Illness:     Lynn Sher. is a 78 y.o. male with medical history of DM1 since age 16 yo with insulin pump, neuropathy, debility, hearing loss, hypothyroidism who is evaluated with hyperglycemia. Wife Ela Thorpe present and gives information due to his hearing los. Chart also reviewed. She states he felt poorly a few days ago with nausea and emesis that since has resolved. Apparently he took off his insulin pump and has misplaced it. He has a new one that she is not sure how to set up yet. He should have back up lantus to use if pump malfunctions but he did not use this. Glucose > 600 and he is in DKA. He is sipping water while I am in the room and is noted to aspirate/ choke on water. Wife states this happens a lot and he has a pending EGD for 2022. He is presently on IV insulin drip. Home meds reviewed. Review of Systems:  10 systems reviewed and negative except as noted in HPI.   Assessment & Plan:     Principal Problem:    DKA, type 1, not at goal [facility])  Plan:   · Admit to ICU  · Start insulin drip glucose stabilizer protocol  · IVF  cc/hr   · Trend BMP every 4 hours   · Check CXR, blood and urine cultures         Active Problems:    Neuropathy  Plan:   · Resume cymbalta          Hypothyroid  Plan:  · Resume synthroid           Chronic pain  Plan:   · Resume lidoderm patch         Chronic renal disease, stage 3, moderately decreased glomerular filtration rate (GFR) between 30-59 mL/min/1.73 square meter (HCC)  Plan:   · Trend lab        Dysphagia  Plan:   · NPO  · SLP consult   · Plans for outpatient EGD        Depression  Plan:   · Resume wellbutrin      Dispo/Discharge Planning:     Pending     Diet: NPO  VTE ppx: lovenox  Code status: FULL     Wife Lauren Gomez [phone]    Hospital Problems:  Principal Problem:    DKA, type 1, not at goal [facility])  Active Problems:    Neuropathy    Hypothyroid    Chronic pain    Chronic renal disease, stage 3, moderately decreased glomerular filtration rate (GFR) between 30-59 mL/min/1.73 square meter ([address]    Dysphagia    Depression  Resolved Problems:    * No resolved hospital problems. *       Past History:    past medical history- Dm1, HLP, neuropathy, hearing loss    past surgical history- right 2,3 rd toe amputations  No Known Allergies   Social History     Tobacco Use    Smoking status: No    Smokeless tobacco: No   Substance Use Topics    Alcohol use: No      family history- Dm2       There is no immunization history on file for this patient. Prior to Admit Medications:  No current outpatient medications      Objective:     Patient Vitals for the past 24 hrs:   Temp Pulse Resp BP SpO2   06/09/22 1443 -- -- -- (!) 188/122 --   06/09/22 1358 -- -- -- (!) 159/92 --   06/09/22 1343 -- -- -- (!) 143/90 98 %   06/09/22 1338 98.3 °F (36.8 °C) -- 20 -- --   06/09/22 1333 -- -- -- (!) 176/110 100 %   06/09/22 1230 98.4 °F (36.9 °C) 99 16 (!) 176/111 100 %       Oxygen Therapy  SpO2: 98 %    Estimated body mass index is 42.29 kg/m² as calculated from the following:    Height as of this encounter: 5' 7\" (1.702 m). Weight as of this encounter: 270 lb (122.5 kg). Intake/Output Summary (Last 24 hours) at 6/9/2022 1556  Last data filed at 6/9/2022 1515  Gross per 24 hour   Intake 500 ml   Output 240 ml   Net 260 ml         Physical Exam:    Blood pressure (!) 188/122, pulse 99, temperature 98.3 °F (36.8 °C), temperature source Oral, resp.  rate 20, height 5' 7\" (1.702 m), weight 270 lb (122.5 kg), SpO2 98 %. General:    Well nourished. No overt distress, elderly, pleasant , hard of hearing   Head:  Normocephalic, atraumatic  Eyes:  Sclerae appear normal.  Pupils equally round. ENT:  Nares appear normal, no drainage. Dry oral mucosa  Neck:  No restricted ROM. Trachea midline   CV:   RRR. No m/r/g. No jugular venous distension. No edema   Lungs:   CTAB. No wheezing, rhonchi, or rales. Respirations even, unlabored, anterior   Abdomen: Bowel sounds present. Soft, nontender, nondistended. Extremities:  No edema, right 2,3 rd toe amputations, charcot deformity right foot   Skin:     No rashes and normal coloration. Warm and dry. Neuro:  grossly intact. Psych:  Normal mood and affect.       I have reviewed ordered lab tests and independently visualized imaging below:    Last 24hr Labs:  Recent Results (from the past 24 hour(s))   POCT Glucose    Collection Time: 06/09/22 12:34 PM   Result Value Ref Range    POC Glucose 565 (HH) 65 - 100 mg/dL    Performed by: German    CBC with Auto Differential    Collection Time: 06/09/22 12:36 PM   Result Value Ref Range    WBC 11.2 (H) 4.3 - 11.1 K/uL    RBC 5.48 4.23 - 5.6 M/uL    Hemoglobin 14.6 13.6 - 17.2 g/dL    Hematocrit 46.8 41.1 - 50.3 %    MCV 85.4 79.6 - 97.8 FL    MCH 26.6 26.1 - 32.9 PG    MCHC 31.2 (L) 31.4 - 35.0 g/dL    RDW 14.7 (H) 11.9 - 14.6 %    Platelets 069 530 - 319 K/uL    MPV 11.8 9.4 - 12.3 FL    nRBC 0.00 0.0 - 0.2 K/uL    Differential Type AUTOMATED      Seg Neutrophils 90 (H) 43 - 78 %    Lymphocytes 7 (L) 13 - 44 %    Monocytes 3 (L) 4.0 - 12.0 %    Eosinophils % 0 (L) 0.5 - 7.8 %    Basophils 0 0.0 - 2.0 %    Immature Granulocytes 0 0.0 - 5.0 %    Segs Absolute 10.0 (H) 1.7 - 8.2 K/UL    Absolute Lymph # 0.8 0.5 - 4.6 K/UL    Absolute Mono # 0.4 0.1 - 1.3 K/UL    Absolute Eos # 0.0 0.0 - 0.8 K/UL    Basophils Absolute 0.0 0.0 - 0.2 K/UL    Absolute Immature Granulocyte 0.1 0.0 - 0.5 K/UL   CMP    Collection Time: 06/09/22 12:36 PM   Result Value Ref Range    Sodium 133 (L) 138 - 145 mmol/L    Potassium 4.8 3.5 - 5.1 mmol/L    Chloride 100 98 - 107 mmol/L    CO2 16 (L) 21 - 32 mmol/L    Anion Gap 17 (H) 7 - 16 mmol/L    Glucose 616 (HH) 65 - 100 mg/dL    BUN 42 (H) 8 - 23 MG/DL    CREATININE 1.80 (H) 0.8 - 1.5 MG/DL    GFR  48 (L) >60 ml/min/1.73m2    GFR Non- 39 (L) >60 ml/min/1.73m2    Calcium 9.1 8.3 - 10.4 MG/DL    Total Bilirubin 1.1 0.2 - 1.1 MG/DL    ALT 19 12 - 65 U/L    AST 22 15 - 37 U/L    Alk Phosphatase 167 (H) 50 - 136 U/L    Total Protein 7.8 6.3 - 8.2 g/dL    Albumin 3.3 3.2 - 4.6 g/dL    Globulin 4.5 (H) 2.3 - 3.5 g/dL    Albumin/Globulin Ratio 0.7 (L) 1.2 - 3.5     POCT Blood Gas & Electrolytes    Collection Time: 06/09/22  2:55 PM   Result Value Ref Range    pH, Arterial, POC 7.36 7.35 - 7.45      pCO2, Arterial, POC 27.1 (L) 35 - 45 MMHG    pO2, Arterial, POC 81 75 - 100 MMHG    POC Sodium 133 (L) 136 - 145 MMOL/L    POC Potassium 4.3 3.5 - 5.1 MMOL/L    BASE DEFICIT (POC) 8.3 mmol/L    HCO3, Mixed 15.3 (L) 22 - 26 MMOL/L    POC O2 SAT 96 %    Source ARTERIAL      Site RIGHT RADIAL      POC Theo's Test Positive      DEVICE ROOM AIR      Performed by: Jo     POC GFR  Cannot be calculated >60 ml/min/1.73m2    Glomerular Filtration Rate, POC Cannot be calculated >60 ml/min/1.73m2   POCT Glucose    Collection Time: 06/09/22  3:33 PM   Result Value Ref Range    POC Glucose 557 (HH) 65 - 100 mg/dL    Performed by: Krishna Corrales        Other Studies:  No results found. Echocardiogram:  No results found for this or any previous visit.       Meds previously ordered:  Orders Placed This Encounter   Medications    sodium chloride flush 0.9 % injection 3 mL    sodium chloride 0.9% bolus 1,000 mL    0.9 % sodium chloride bolus    insulin regular (HUMULIN R;NOVOLIN R) injection 10 Units    insulin regular (HUMULIN R; NOVOLIN R) 100 Units in sodium chloride 0.9 % 100 mL infusion         Signed:  Demarco Ariza MD    Part of this note may have been written by using a voice dictation software. The note has been proof read but may still contain some grammatical/other typographical errors.

## 2022-06-09 NOTE — PROGRESS NOTES
TRANSFER - OUT REPORT:    Verbal report given to [address] [place] [address] on [employer]  being transferred to room [phone] for routine progression of patient care       Report consisted of patient's Situation, Background, Assessment and   Recommendations(SBAR). Information from the following report(s) ED SBAR was reviewed with the receiving nurse. Lines:   Peripheral IV 06/09/22 Left;Proximal;Anterior Forearm (Active)       Peripheral IV 06/09/22 Right Antecubital (Active)        Opportunity for questions and clarification was provided.       Patient transported with:  Registered Nurse

## 2022-06-09 NOTE — PROGRESS NOTES
Patient arrived from ED. A/O x4. On RA. Dual skin assessment performed with GINGER Joy. Scabs present on BLE and feet. Distal portion of right 2nd and 3rd toes missing from prior amputation per patient. No pressure injuries noted.

## 2022-06-09 NOTE — ED PROVIDER NOTES
[facility] Provider Note                   PCP:                Giles Herron DO               Age: 78 y.o. Sex: male       ICD-10-CM    1. Diabetic ketoacidosis without coma associated with type 1 diabetes mellitus ([facility]  E10.10    2. Non-intractable vomiting with nausea, unspecified vomiting type  R11.2    3. Volume depletion  E86.9        DISPOSITION         New Prescriptions    No medications on file       Orders Placed This Encounter   Procedures    CBC with Auto Differential    CMP    Blood Gas, Arterial    Straight cath    POCT Glucose    POCT Glucose    Saline lock IV         Geni Garcia is a 78 y.o. male who presents to the Emergency Department with chief complaint of    Chief Complaint   Patient presents with    Hyperglycemia      Chief complaint : Generalized weakness with nausea and vomiting    HISTORY OF PRESENT ILLNESS :  Location : Generalized    Quality : Nonbloody emesis    Quantity : Too numerous to count    Timing : 2 to 3 days    Severity : Severe    Context : Type 1 diabetes since age of 15,    Alleviating / exacerbating factors : Too sick to take any of his insulin over the past couple days because he has been too sick to eat    Associated Symptoms : Polyuria, polydipsia, polyemesis. Review of Systems   Constitutional: Positive for activity change, appetite change and fatigue. Negative for chills and fever. HENT: Negative for congestion, facial swelling and rhinorrhea. Eyes: Negative for discharge and redness. Respiratory: Negative for cough and shortness of breath. Cardiovascular: Negative for chest pain and palpitations. Gastrointestinal: Positive for nausea and vomiting. Negative for abdominal pain and diarrhea. Genitourinary: Negative for difficulty urinating, dysuria and frequency. Musculoskeletal: Negative for arthralgias, back pain (chronic) and myalgias. Skin: Negative for color change and pallor.    Neurological: Negative for dizziness, light-headedness and headaches. All other systems reviewed and are negative. All other systems reviewed and are negative. No past medical history on file. No past surgical history on file. No family history on file. Social Connections:     Frequency of Communication with Friends and Family: Not on file    Frequency of Social Gatherings with Friends and Family: Not on file    Attends Confucianist Services: Not on file    Active Member of Clubs or Organizations: Not on file    Attends Club or Organization Meetings: Not on file    Marital Status: Not on file        No Known Allergies     Vitals signs and nursing note reviewed. Patient Vitals for the past 4 hrs:   Temp Pulse Resp BP SpO2   06/09/22 1338 98.3 °F (36.8 °C) -- 20 -- --   06/09/22 1333 -- -- -- (!) 176/110 100 %   06/09/22 1230 98.4 °F (36.9 °C) 99 16 (!) 176/111 100 %          Physical Exam  Vitals and nursing note reviewed. Constitutional:       General: He is not in acute distress. Appearance: Normal appearance. He is not ill-appearing, toxic-appearing or diaphoretic. HENT:      Head: Normocephalic and atraumatic. Right Ear: External ear normal.      Left Ear: External ear normal.      Nose: Nose normal. No rhinorrhea. Eyes:      General: No scleral icterus. Right eye: No discharge. Left eye: No discharge. Extraocular Movements: Extraocular movements intact. Conjunctiva/sclera: Conjunctivae normal.   Cardiovascular:      Rate and Rhythm: Regular rhythm. Tachycardia present. Pulmonary:      Effort: Pulmonary effort is normal. No respiratory distress. Breath sounds: Normal breath sounds. No wheezing or rales. Abdominal:      General: Abdomen is flat. Palpations: Abdomen is soft. Tenderness: There is no abdominal tenderness. There is no guarding or rebound. Musculoskeletal:         General: No swelling, tenderness or signs of injury.  Normal range of motion. Cervical back: Normal range of motion and neck supple. No rigidity. Skin:     General: Skin is warm and dry. Coloration: Skin is not jaundiced or pale. Neurological:      General: No focal deficit present. Mental Status: He is alert and oriented to person, place, and time.       Gait: Gait normal.   Psychiatric:         Behavior: Behavior normal.          MDM  Number of Diagnoses or Management Options  Diabetic ketoacidosis without coma associated with type 1 diabetes mellitus ([facility] [address]): new, needed workup  Non-intractable vomiting with nausea, unspecified vomiting type: new, needed workup  Volume depletion: new, needed workup  Diagnosis management comments: Nausea vomiting, with hyperglycemia and DKA  Start insulin drip, admit to hospitalist         Amount and/or Complexity of Data Reviewed  Clinical lab tests: reviewed and ordered (Results Include:    Recent Results (from the past 24 hour(s))  -POCT Glucose:   Collection Time: 06/09/22 12:34 PM       Result                      Value             Ref Range           POC Glucose                 565 (HH)          65 - 100 mg/*       Performed by:                                                 German  -CBC with Auto Differential:   Collection Time: 06/09/22 12:36 PM       Result                      Value             Ref Range           WBC                         11.2 (H)          4.3 - 11.1 K*       RBC                         5.48              4.23 - 5.6 M*       Hemoglobin                  14.6              13.6 - 17.2 *       Hematocrit                  46.8              41.1 - 50.3 %       MCV                         85.4              79.6 - 97.8 *       MCH                         26.6              26.1 - 32.9 *       MCHC                        31.2 (L)          31.4 - 35.0 *       RDW                         14.7 (H)          11.9 - 14.6 %       Platelets                   192               150 - 450 K/*       MPV 11.8              9.4 - 12.3 FL       nRBC                        0.00              0.0 - 0.2 K/*       Differential Type           AUTOMATED                             Seg Neutrophils             90 (H)            43 - 78 %           Lymphocytes                 7 (L)             13 - 44 %           Monocytes                   3 (L)             4.0 - 12.0 %        Eosinophils %               0 (L)             0.5 - 7.8 %         Basophils                   0                 0.0 - 2.0 %         Immature Granulocytes       0                 0.0 - 5.0 %         Segs Absolute               10.0 (H)          1.7 - 8.2 K/*       Absolute Lymph #            0.8               0.5 - 4.6 K/*       Absolute Mono #             0.4               0.1 - 1.3 K/*       Absolute Eos #              0.0               0.0 - 0.8 K/*       Basophils Absolute          0.0               0.0 - 0.2 K/*       Absolute Immature Gran*     0.1               0.0 - 0.5 K/*  -CMP:   Collection Time: 06/09/22 12:36 PM       Result                      Value             Ref Range           Sodium                      133 (L)           138 - 145 mm*       Potassium                   4.8               3.5 - 5.1 mm*       Chloride                    100               98 - 107 mmo*       CO2                         16 (L)            21 - 32 mmol*       Anion Gap                   17 (H)            7 - 16 mmol/L       Glucose                     616 (HH)          65 - 100 mg/*       BUN                         42 (H)            8 - 23 MG/DL        CREATININE                  1.80 (H)          0.8 - 1.5 MG*       GFR         48 (L)            >60 ml/min/1*       GFR Non- Americ*     39 (L)            >60 ml/min/1*       Calcium                     9.1               8.3 - 10.4 M*       Total Bilirubin             1.1               0.2 - 1.1 MG*       ALT                         19                12 - 65 U/L AST                         22                15 - 37 U/L         Alk Phosphatase             167 (H)           50 - 136 U/L        Total Protein               7.8               6.3 - 8.2 g/*       Albumin                     3.3               3.2 - 4.6 g/*       Globulin                    4.5 (H)           2.3 - 3.5 g/*       Albumin/Globulin Ratio      0.7 (L)           1.2 - 3.5      )  Decide to obtain previous medical records or to obtain history from someone other than the patient: yes  Obtain history from someone other than the patient: yes (Wife at bedside)  Discuss the patient with other providers: yes (Hospitalist)    Risk of Complications, Morbidity, and/or Mortality  Presenting problems: moderate  Diagnostic procedures: low  Management options: low    Patient Progress  Patient progress: improved      Procedures    Labs Reviewed   CBC WITH AUTO DIFFERENTIAL - Abnormal; Notable for the following components:       Result Value    WBC 11.2 (*)     MCHC 31.2 (*)     RDW 14.7 (*)     Seg Neutrophils 90 (*)     Lymphocytes 7 (*)     Monocytes 3 (*)     Eosinophils % 0 (*)     Segs Absolute 10.0 (*)     All other components within normal limits   COMPREHENSIVE METABOLIC PANEL - Abnormal; Notable for the following components:    Sodium 133 (*)     CO2 16 (*)     Anion Gap 17 (*)     Glucose 616 (*)     BUN 42 (*)     CREATININE 1.80 (*)     GFR  48 (*)     GFR Non-African American 39 (*)     Alk Phosphatase 167 (*)     Globulin 4.5 (*)     Albumin/Globulin Ratio 0.7 (*)     All other components within normal limits   POCT GLUCOSE - Abnormal; Notable for the following components:    POC Glucose 565 (*)     All other components within normal limits   BLOOD GAS, ARTERIAL   POCT GLUCOSE        No orders to display            Irineo Coma Scale  Eye Opening: Spontaneous  Best Verbal Response: Oriented  Best Motor Response: Obeys commands  Irineo Coma Scale Score: 15                     Voice dictation software was used during the making of this note. This software is not perfect and grammatical and other typographical errors may be present. This note has not been completely proofread for errors.        Kristopher Perez MD  06/09/22 4543

## 2022-06-10 ENCOUNTER — APPOINTMENT (OUTPATIENT)
Dept: NON INVASIVE DIAGNOSTICS | Age: 77
DRG: 639 | End: 2022-06-10
Payer: MEDICARE

## 2022-06-10 ENCOUNTER — APPOINTMENT (OUTPATIENT)
Dept: PHYSICAL THERAPY | Age: 77
End: 2022-06-10
Payer: MEDICARE

## 2022-06-10 LAB
ANION GAP SERPL CALC-SCNC: 10 MMOL/L (ref 7–16)
ANION GAP SERPL CALC-SCNC: 8 MMOL/L (ref 7–16)
BASOPHILS # BLD: 0 K/UL (ref 0–0.2)
BASOPHILS NFR BLD: 0 % (ref 0–2)
BUN SERPL-MCNC: 39 MG/DL (ref 8–23)
BUN SERPL-MCNC: 42 MG/DL (ref 8–23)
CALCIUM SERPL-MCNC: 8.3 MG/DL (ref 8.3–10.4)
CALCIUM SERPL-MCNC: 8.7 MG/DL (ref 8.3–10.4)
CHLORIDE SERPL-SCNC: 103 MMOL/L (ref 98–107)
CHLORIDE SERPL-SCNC: 104 MMOL/L (ref 98–107)
CO2 SERPL-SCNC: 23 MMOL/L (ref 21–32)
CO2 SERPL-SCNC: 23 MMOL/L (ref 21–32)
CREAT SERPL-MCNC: 1.3 MG/DL (ref 0.8–1.5)
CREAT SERPL-MCNC: 1.7 MG/DL (ref 0.8–1.5)
DIFFERENTIAL METHOD BLD: ABNORMAL
ECHO AO ASC DIAM: 2.4 CM
ECHO AO ASCENDING AORTA INDEX: 1.19 CM/M2
ECHO AO ROOT DIAM: 3.3 CM
ECHO AO ROOT INDEX: 1.64 CM/M2
ECHO AV AREA PEAK VELOCITY: 1.8 CM2
ECHO AV AREA VTI: 1.7 CM2
ECHO AV AREA/BSA PEAK VELOCITY: 0.9 CM2/M2
ECHO AV AREA/BSA VTI: 0.8 CM2/M2
ECHO AV MEAN GRADIENT: 11 MMHG
ECHO AV MEAN VELOCITY: 1.5 M/S
ECHO AV PEAK GRADIENT: 17 MMHG
ECHO AV PEAK VELOCITY: 2.1 M/S
ECHO AV VELOCITY RATIO: 0.57
ECHO AV VTI: 41.5 CM
ECHO BSA: 2.41 M2
ECHO LA AREA 2C: 16.9 CM2
ECHO LA AREA 4C: 15.2 CM2
ECHO LA DIAMETER INDEX: 1.99 CM/M2
ECHO LA DIAMETER: 4 CM
ECHO LA MAJOR AXIS: 5.7 CM
ECHO LA MINOR AXIS: 4.9 CM
ECHO LA TO AORTIC ROOT RATIO: 1.21
ECHO LA VOL BP: 42 ML (ref 18–58)
ECHO LA VOL/BSA BIPLANE: 21 ML/M2 (ref 16–34)
ECHO LV E' LATERAL VELOCITY: 12 CM/S
ECHO LV E' SEPTAL VELOCITY: 7 CM/S
ECHO LV EDV A2C: 95 ML
ECHO LV EDV A4C: 95 ML
ECHO LV EDV INDEX A4C: 47 ML/M2
ECHO LV EDV NDEX A2C: 47 ML/M2
ECHO LV EJECTION FRACTION A2C: 69 %
ECHO LV EJECTION FRACTION A4C: 65 %
ECHO LV EJECTION FRACTION BIPLANE: 66 % (ref 55–100)
ECHO LV ESV A2C: 30 ML
ECHO LV ESV A4C: 34 ML
ECHO LV ESV INDEX A2C: 15 ML/M2
ECHO LV ESV INDEX A4C: 17 ML/M2
ECHO LV FRACTIONAL SHORTENING: 18 % (ref 28–44)
ECHO LV INTERNAL DIMENSION DIASTOLE INDEX: 2.24 CM/M2
ECHO LV INTERNAL DIMENSION DIASTOLIC: 4.5 CM (ref 4.2–5.9)
ECHO LV INTERNAL DIMENSION SYSTOLIC INDEX: 1.84 CM/M2
ECHO LV INTERNAL DIMENSION SYSTOLIC: 3.7 CM
ECHO LV IVSD: 0.9 CM (ref 0.6–1)
ECHO LV MASS 2D: 132.8 G (ref 88–224)
ECHO LV MASS INDEX 2D: 66.1 G/M2 (ref 49–115)
ECHO LV POSTERIOR WALL DIASTOLIC: 0.9 CM (ref 0.6–1)
ECHO LV RELATIVE WALL THICKNESS RATIO: 0.4
ECHO LVOT AREA: 3.1 CM2
ECHO LVOT AV VTI INDEX: 0.54
ECHO LVOT DIAM: 2 CM
ECHO LVOT MEAN GRADIENT: 3 MMHG
ECHO LVOT PEAK GRADIENT: 6 MMHG
ECHO LVOT PEAK VELOCITY: 1.2 M/S
ECHO LVOT STROKE VOLUME INDEX: 34.8 ML/M2
ECHO LVOT SV: 70 ML
ECHO LVOT VTI: 22.3 CM
ECHO MV A VELOCITY: 1.46 M/S
ECHO MV E DECELERATION TIME (DT): 320 MS
ECHO MV E VELOCITY: 1 M/S
ECHO MV E/A RATIO: 0.68
ECHO MV E/E' LATERAL: 8.33
ECHO MV E/E' RATIO (AVERAGED): 11.31
ECHO MV E/E' SEPTAL: 14.29
ECHO PV ACCELERATION TIME (AT): 79 MS
ECHO PV MAX VELOCITY: 1.2 M/S
ECHO PV PEAK GRADIENT: 5 MMHG
ECHO RV BASAL DIMENSION: 3 CM
EOSINOPHIL # BLD: 0 K/UL (ref 0–0.8)
EOSINOPHIL NFR BLD: 0 % (ref 0.5–7.8)
ERYTHROCYTE [DISTWIDTH] IN BLOOD BY AUTOMATED COUNT: 14.6 % (ref 11.9–14.6)
EST. AVERAGE GLUCOSE BLD GHB EST-MCNC: 275 MG/DL
GLUCOSE BLD STRIP.AUTO-MCNC: 132 MG/DL (ref 65–100)
GLUCOSE BLD STRIP.AUTO-MCNC: 132 MG/DL (ref 65–100)
GLUCOSE BLD STRIP.AUTO-MCNC: 150 MG/DL (ref 65–100)
GLUCOSE BLD STRIP.AUTO-MCNC: 161 MG/DL (ref 65–100)
GLUCOSE BLD STRIP.AUTO-MCNC: 211 MG/DL (ref 65–100)
GLUCOSE BLD STRIP.AUTO-MCNC: 214 MG/DL (ref 65–100)
GLUCOSE BLD STRIP.AUTO-MCNC: 224 MG/DL (ref 65–100)
GLUCOSE BLD STRIP.AUTO-MCNC: 247 MG/DL (ref 65–100)
GLUCOSE BLD STRIP.AUTO-MCNC: 289 MG/DL (ref 65–100)
GLUCOSE BLD STRIP.AUTO-MCNC: 291 MG/DL (ref 65–100)
GLUCOSE SERPL-MCNC: 132 MG/DL (ref 65–100)
GLUCOSE SERPL-MCNC: 205 MG/DL (ref 65–100)
HBA1C MFR BLD: 11.2 % (ref 4.2–6.3)
HCT VFR BLD AUTO: 39.6 % (ref 41.1–50.3)
HGB BLD-MCNC: 12.5 G/DL (ref 13.6–17.2)
IMM GRANULOCYTES # BLD AUTO: 0.1 K/UL (ref 0–0.5)
IMM GRANULOCYTES NFR BLD AUTO: 1 % (ref 0–5)
LYMPHOCYTES # BLD: 2 K/UL (ref 0.5–4.6)
LYMPHOCYTES NFR BLD: 13 % (ref 13–44)
MAGNESIUM SERPL-MCNC: 2.3 MG/DL (ref 1.8–2.4)
MCH RBC QN AUTO: 26.7 PG (ref 26.1–32.9)
MCHC RBC AUTO-ENTMCNC: 31.6 G/DL (ref 31.4–35)
MCV RBC AUTO: 84.4 FL (ref 79.6–97.8)
MONOCYTES # BLD: 1.4 K/UL (ref 0.1–1.3)
MONOCYTES NFR BLD: 9 % (ref 4–12)
NEUTS SEG # BLD: 12.1 K/UL (ref 1.7–8.2)
NEUTS SEG NFR BLD: 77 % (ref 43–78)
NRBC # BLD: 0 K/UL (ref 0–0.2)
PHOSPHATE SERPL-MCNC: 2.3 MG/DL (ref 2.3–3.7)
PLATELET # BLD AUTO: 184 K/UL (ref 150–450)
PMV BLD AUTO: 11.4 FL (ref 9.4–12.3)
POTASSIUM SERPL-SCNC: 3.4 MMOL/L (ref 3.5–5.1)
POTASSIUM SERPL-SCNC: 3.6 MMOL/L (ref 3.5–5.1)
RBC # BLD AUTO: 4.69 M/UL (ref 4.23–5.6)
SERVICE CMNT-IMP: ABNORMAL
SODIUM SERPL-SCNC: 134 MMOL/L (ref 138–145)
SODIUM SERPL-SCNC: 137 MMOL/L (ref 136–145)
WBC # BLD AUTO: 15.7 K/UL (ref 4.3–11.1)

## 2022-06-10 PROCEDURE — 6370000000 HC RX 637 (ALT 250 FOR IP): Performed by: HOSPITALIST

## 2022-06-10 PROCEDURE — 6370000000 HC RX 637 (ALT 250 FOR IP): Performed by: INTERNAL MEDICINE

## 2022-06-10 PROCEDURE — 82962 GLUCOSE BLOOD TEST: CPT

## 2022-06-10 PROCEDURE — 93306 TTE W/DOPPLER COMPLETE: CPT | Performed by: INTERNAL MEDICINE

## 2022-06-10 PROCEDURE — 6360000002 HC RX W HCPCS: Performed by: HOSPITALIST

## 2022-06-10 PROCEDURE — 6360000002 HC RX W HCPCS: Performed by: INTERNAL MEDICINE

## 2022-06-10 PROCEDURE — A4216 STERILE WATER/SALINE, 10 ML: HCPCS | Performed by: INTERNAL MEDICINE

## 2022-06-10 PROCEDURE — 97162 PT EVAL MOD COMPLEX 30 MIN: CPT

## 2022-06-10 PROCEDURE — 92610 EVALUATE SWALLOWING FUNCTION: CPT

## 2022-06-10 PROCEDURE — 2580000003 HC RX 258: Performed by: INTERNAL MEDICINE

## 2022-06-10 PROCEDURE — 6370000000 HC RX 637 (ALT 250 FOR IP): Performed by: FAMILY MEDICINE

## 2022-06-10 PROCEDURE — 84100 ASSAY OF PHOSPHORUS: CPT

## 2022-06-10 PROCEDURE — 97530 THERAPEUTIC ACTIVITIES: CPT

## 2022-06-10 PROCEDURE — 83036 HEMOGLOBIN GLYCOSYLATED A1C: CPT

## 2022-06-10 PROCEDURE — C8929 TTE W OR WO FOL WCON,DOPPLER: HCPCS

## 2022-06-10 PROCEDURE — 83735 ASSAY OF MAGNESIUM: CPT

## 2022-06-10 PROCEDURE — 36415 COLL VENOUS BLD VENIPUNCTURE: CPT

## 2022-06-10 PROCEDURE — 2580000003 HC RX 258: Performed by: HOSPITALIST

## 2022-06-10 PROCEDURE — 6360000004 HC RX CONTRAST MEDICATION: Performed by: FAMILY MEDICINE

## 2022-06-10 PROCEDURE — 1100000000 HC RM PRIVATE

## 2022-06-10 PROCEDURE — C9113 INJ PANTOPRAZOLE SODIUM, VIA: HCPCS | Performed by: INTERNAL MEDICINE

## 2022-06-10 PROCEDURE — 80048 BASIC METABOLIC PNL TOTAL CA: CPT

## 2022-06-10 PROCEDURE — 85025 COMPLETE CBC W/AUTO DIFF WBC: CPT

## 2022-06-10 RX ORDER — INSULIN LISPRO 100 [IU]/ML
0-8 INJECTION, SOLUTION INTRAVENOUS; SUBCUTANEOUS
Status: DISCONTINUED | OUTPATIENT
Start: 2022-06-10 | End: 2022-06-11 | Stop reason: HOSPADM

## 2022-06-10 RX ORDER — INSULIN LISPRO 100 [IU]/ML
0-4 INJECTION, SOLUTION INTRAVENOUS; SUBCUTANEOUS EVERY 4 HOURS
Status: DISCONTINUED | OUTPATIENT
Start: 2022-06-10 | End: 2022-06-10

## 2022-06-10 RX ORDER — INSULIN LISPRO 100 [IU]/ML
0-4 INJECTION, SOLUTION INTRAVENOUS; SUBCUTANEOUS NIGHTLY
Status: DISCONTINUED | OUTPATIENT
Start: 2022-06-10 | End: 2022-06-10 | Stop reason: SDUPTHER

## 2022-06-10 RX ORDER — POTASSIUM CHLORIDE 20 MEQ/1
40 TABLET, EXTENDED RELEASE ORAL PRN
Status: DISCONTINUED | OUTPATIENT
Start: 2022-06-10 | End: 2022-06-11 | Stop reason: HOSPADM

## 2022-06-10 RX ORDER — MORPHINE SULFATE 15 MG/1
15 TABLET, FILM COATED, EXTENDED RELEASE ORAL 2 TIMES DAILY PRN
Status: DISCONTINUED | OUTPATIENT
Start: 2022-06-10 | End: 2022-06-11 | Stop reason: HOSPADM

## 2022-06-10 RX ORDER — FLUOXETINE HYDROCHLORIDE 20 MG/1
CAPSULE ORAL
COMMUNITY
Start: 2022-03-18 | End: 2022-07-08

## 2022-06-10 RX ORDER — INSULIN LISPRO 100 [IU]/ML
0-4 INJECTION, SOLUTION INTRAVENOUS; SUBCUTANEOUS NIGHTLY
Status: DISCONTINUED | OUTPATIENT
Start: 2022-06-10 | End: 2022-06-11 | Stop reason: HOSPADM

## 2022-06-10 RX ORDER — MORPHINE SULFATE 2 MG/ML
2 INJECTION, SOLUTION INTRAMUSCULAR; INTRAVENOUS ONCE
Status: COMPLETED | OUTPATIENT
Start: 2022-06-10 | End: 2022-06-10

## 2022-06-10 RX ORDER — SODIUM BICARBONATE 650 MG/1
TABLET ORAL
COMMUNITY
Start: 2022-05-09 | End: 2022-07-08 | Stop reason: SDUPTHER

## 2022-06-10 RX ORDER — INSULIN GLARGINE 100 [IU]/ML
10 INJECTION, SOLUTION SUBCUTANEOUS NIGHTLY
Status: DISCONTINUED | OUTPATIENT
Start: 2022-06-10 | End: 2022-06-11

## 2022-06-10 RX ORDER — LEVOTHYROXINE SODIUM 0.07 MG/1
TABLET ORAL
COMMUNITY
Start: 2022-03-14 | End: 2022-07-08 | Stop reason: SDUPTHER

## 2022-06-10 RX ORDER — LIDOCAINE 50 MG/G
PATCH TOPICAL DAILY PRN
COMMUNITY
Start: 2022-03-28 | End: 2022-07-08 | Stop reason: SDUPTHER

## 2022-06-10 RX ORDER — DULOXETIN HYDROCHLORIDE 60 MG/1
CAPSULE, DELAYED RELEASE ORAL
Status: ON HOLD | COMMUNITY
Start: 2022-05-02 | End: 2022-10-19 | Stop reason: HOSPADM

## 2022-06-10 RX ORDER — INSULIN GLARGINE 100 [IU]/ML
10 INJECTION, SOLUTION SUBCUTANEOUS ONCE
Status: COMPLETED | OUTPATIENT
Start: 2022-06-10 | End: 2022-06-10

## 2022-06-10 RX ORDER — MORPHINE SULFATE 15 MG/1
TABLET, FILM COATED, EXTENDED RELEASE ORAL
COMMUNITY
Start: 2022-06-01 | End: 2022-07-08 | Stop reason: SDUPTHER

## 2022-06-10 RX ORDER — SODIUM CHLORIDE 9 MG/ML
INJECTION, SOLUTION INTRAVENOUS CONTINUOUS
Status: DISCONTINUED | OUTPATIENT
Start: 2022-06-10 | End: 2022-06-11 | Stop reason: HOSPADM

## 2022-06-10 RX ORDER — BUPROPION HYDROCHLORIDE 75 MG/1
75 TABLET ORAL
COMMUNITY
Start: 2022-03-09 | End: 2022-07-08 | Stop reason: SDUPTHER

## 2022-06-10 RX ORDER — ROSUVASTATIN CALCIUM 10 MG/1
TABLET, COATED ORAL
COMMUNITY
Start: 2022-03-14 | End: 2022-07-08 | Stop reason: SDUPTHER

## 2022-06-10 RX ORDER — POTASSIUM CHLORIDE 7.45 MG/ML
10 INJECTION INTRAVENOUS PRN
Status: DISCONTINUED | OUTPATIENT
Start: 2022-06-10 | End: 2022-06-11 | Stop reason: HOSPADM

## 2022-06-10 RX ORDER — ACETAMINOPHEN 325 MG/1
650 TABLET ORAL EVERY 4 HOURS PRN
Status: DISCONTINUED | OUTPATIENT
Start: 2022-06-10 | End: 2022-06-11 | Stop reason: HOSPADM

## 2022-06-10 RX ADMIN — SODIUM CHLORIDE: 900 INJECTION, SOLUTION INTRAVENOUS at 05:47

## 2022-06-10 RX ADMIN — PERFLUTREN 1 ML: 6.52 INJECTION, SUSPENSION INTRAVENOUS at 08:41

## 2022-06-10 RX ADMIN — INSULIN LISPRO 4 UNITS: 100 INJECTION, SOLUTION INTRAVENOUS; SUBCUTANEOUS at 17:26

## 2022-06-10 RX ADMIN — BUPROPION HYDROCHLORIDE 75 MG: 75 TABLET, FILM COATED ORAL at 09:01

## 2022-06-10 RX ADMIN — LEVOTHYROXINE SODIUM 75 MCG: 0.07 TABLET ORAL at 07:55

## 2022-06-10 RX ADMIN — SODIUM BICARBONATE 650 MG TABLET 650 MG: at 20:38

## 2022-06-10 RX ADMIN — ENOXAPARIN SODIUM 30 MG: 100 INJECTION SUBCUTANEOUS at 20:38

## 2022-06-10 RX ADMIN — ROSUVASTATIN CALCIUM 10 MG: 10 TABLET, FILM COATED ORAL at 20:38

## 2022-06-10 RX ADMIN — SODIUM CHLORIDE, PRESERVATIVE FREE 3 ML: 5 INJECTION INTRAVENOUS at 03:35

## 2022-06-10 RX ADMIN — INSULIN GLARGINE 10 UNITS: 100 INJECTION, SOLUTION SUBCUTANEOUS at 20:37

## 2022-06-10 RX ADMIN — SODIUM CHLORIDE 1 UNITS/HR: 9 INJECTION, SOLUTION INTRAVENOUS at 03:26

## 2022-06-10 RX ADMIN — SODIUM CHLORIDE 40 MG: 9 INJECTION INTRAMUSCULAR; INTRAVENOUS; SUBCUTANEOUS at 09:03

## 2022-06-10 RX ADMIN — INSULIN LISPRO 2 UNITS: 100 INJECTION, SOLUTION INTRAVENOUS; SUBCUTANEOUS at 12:45

## 2022-06-10 RX ADMIN — SODIUM BICARBONATE 650 MG TABLET 650 MG: at 09:01

## 2022-06-10 RX ADMIN — ENOXAPARIN SODIUM 30 MG: 100 INJECTION SUBCUTANEOUS at 09:01

## 2022-06-10 RX ADMIN — SODIUM CHLORIDE, PRESERVATIVE FREE 3 ML: 5 INJECTION INTRAVENOUS at 20:39

## 2022-06-10 RX ADMIN — MORPHINE SULFATE 2 MG: 2 INJECTION, SOLUTION INTRAMUSCULAR; INTRAVENOUS at 04:11

## 2022-06-10 RX ADMIN — POTASSIUM BICARBONATE 40 MEQ: 782 TABLET, EFFERVESCENT ORAL at 03:31

## 2022-06-10 RX ADMIN — SODIUM CHLORIDE, PRESERVATIVE FREE 3 ML: 5 INJECTION INTRAVENOUS at 12:47

## 2022-06-10 RX ADMIN — INSULIN LISPRO 1 UNITS: 100 INJECTION, SOLUTION INTRAVENOUS; SUBCUTANEOUS at 07:58

## 2022-06-10 RX ADMIN — INSULIN GLARGINE 10 UNITS: 100 INJECTION, SOLUTION SUBCUTANEOUS at 03:30

## 2022-06-10 RX ADMIN — BUPROPION HYDROCHLORIDE 75 MG: 75 TABLET, FILM COATED ORAL at 20:38

## 2022-06-10 RX ADMIN — FINASTERIDE 5 MG: 5 TABLET, FILM COATED ORAL at 09:01

## 2022-06-10 RX ADMIN — MORPHINE SULFATE 15 MG: 15 TABLET, EXTENDED RELEASE ORAL at 13:28

## 2022-06-10 ASSESSMENT — PAIN SCALES - GENERAL
PAINLEVEL_OUTOF10: 3
PAINLEVEL_OUTOF10: 5
PAINLEVEL_OUTOF10: 0
PAINLEVEL_OUTOF10: 8
PAINLEVEL_OUTOF10: 0

## 2022-06-10 ASSESSMENT — PAIN DESCRIPTION - LOCATION
LOCATION: BACK;HIP
LOCATION: BACK;HIP
LOCATION: KNEE

## 2022-06-10 ASSESSMENT — PAIN DESCRIPTION - ORIENTATION
ORIENTATION: ANTERIOR
ORIENTATION: LEFT
ORIENTATION: LEFT

## 2022-06-10 ASSESSMENT — PAIN DESCRIPTION - PAIN TYPE: TYPE: CHRONIC PAIN

## 2022-06-10 ASSESSMENT — PAIN DESCRIPTION - FREQUENCY: FREQUENCY: CONTINUOUS

## 2022-06-10 ASSESSMENT — PAIN DESCRIPTION - DESCRIPTORS
DESCRIPTORS: ACHING

## 2022-06-10 ASSESSMENT — PAIN DESCRIPTION - ONSET: ONSET: GRADUAL

## 2022-06-10 NOTE — PROGRESS NOTES
Critical Care Outreach Nurse Progress Report:    Subjective: In to assess pt secondary to tx from CCU      Vitals:    06/10/22 1500 06/10/22 1530 06/10/22 1600 06/10/22 1646   BP: 126/74  139/88 (!) 153/96   Pulse: 98 96 96 98   Resp: 15 (!) 9 (!) 7    Temp:    98.1 °F (36.7 °C)   TempSrc:       SpO2: 99% 96% 99% 100%   Weight:       Height:            Objective: Pt resting quietly in bed, in NAD. Assessment: Discussed pt status with Primary RN. No concerns or needs voiced. Plan: Will follow per outreach protocol.

## 2022-06-10 NOTE — PROGRESS NOTES
SPEECH LANGUAGE PATHOLOGY: DYSPHAGIA  Initial Assessment and Discharge    NAME: Quirino Carroll. : 1945  MRN: [id number]    ADMISSION DATE: 2022  ADMITTING DIAGNOSIS: has DKA, type 1, not at goal [facility]); Neuropathy; Hypothyroid; Chronic pain; Chronic renal disease, stage 3, moderately decreased glomerular filtration rate (GFR) between 30-59 mL/min/1.73 square meter ([address] Dysphagia; and Depression on their problem list.    ICD-10: Treatment Diagnosis: R13.12 Dysphagia, Oropharyngeal Phase    RECOMMENDATIONS   Diet:  Diet Solids Recommendation: Regular  Liquid Consistency Recommendation: Thin    Medications: PO     Recommendations: Setup assist     Compensatory Swallowing Strategies: Alternate solids and liquids;Upright as possible for all oral intake;Remain upright for 30-45 minutes after meals     Therapeutic Intervention:Patient/Family education     Patient continues to require skilled intervention: No    D/C Recommendations: No follow up therapy recommended post discharge       ASSESSMENT    Dysphagia Diagnosis: Swallow function appears WFL  Dysphagia Impression : No dysphagia identified    Patient presents with oropharyngeal swallow function that is within normal limits. No s/sx of dysphagia identified across 3 ounces of puree, 3 ounces of mixed consistencies, 1 gilberto cracker, and 2 full cups of water. He is unable to give reliable history at this time. Per report EGD is planned for August. No need for speech therapy follow up at this time. If new concerns arise during this admission, may want to consider GI consult to evaluate for esophageal source. GENERAL    History of Present Injury/Illness: Mr. Jadon Mcmahon  has no past medical history on file. Heddy  He also  has no past surgical history on file. Subjective: Pleasantly confused. Unable to give reliable dysphagia history. Per chart review, coughing with water in front of MD yesterday.  OP EGD planned for August. Patient denies any dysphagia at this time. Behavior/Cognition: Alert; Cooperative;Confused  Patient Position: Upright in bed       O2 Device: None (Room air)        Prior Dysphagia History: ? esophageal dysphagia history? Current Diet : NPO  Current Liquid Diet : NPO    Pain:   Patient does not c/o pain                   Vision: Within Functional Limits            Hearing: Exceptions to [facility]    Hearing Exceptions: Hard of hearing/hearing concerns    Communication Observation: Functional  Follows Directions: Complex       OBJECTIVE    Oral Motor Exam      Oral Motor   Labial: No impairment  Dentition: Intact;Full;Natural   Labial: No impairment  Lingual: No impairment    Oropharyngeal Phase:     Assessment Method(s): Observation;Palpation  Patient Position: Upright in bed  Vocal Quality: No Impairment  Consistency Presented: Mixed consistency; Regular;Easy to chew;Pureed; Thin  How Presented: Self-fed/presented;Cup/sip;Spoon;Straw;Successive Swallows  Propulsion: No impairment  Oral Residue: None  Initiation of Swallow: No impairment  Laryngeal Elevation: Functional  Aspiration Signs/Symptoms: None  Pharyngeal Phase Characteristics: No impairment, issues, or problems        Oral Phase - Comment: WNL  Pharyngeal Phase: WNL    PLAN    Duration/Frequency: No treatment indicated at this time    Dysphagia Outcome and Severity Scale (JOSÉ)  Dysphagia Outcome Severity Scale: Level 7: Normal in all situations  Interpretation of Tool: The Dysphagia Outcome and Severity Scale (JOSÉ) is a simple, easy-to-use, 7-point scale developed to systematically rate the functional severity of dysphagia based on objective assessment and make recommendations for diet level, independence level, and type of nutrition.  Normal(7), Functional(6), Mild(5), Mild-Moderate(4), Moderate(3), Moderate-Severe(2), Severe(1)    Speech Therapy Prognosis  Prognosis: Excellent  Prognosis Considerations: Medical Diagnosis    Education: Reg [place]   Patient Education: Dysphagia, role of speech therapy   Patient Education Response: Verbalizes understanding    Current Medications:   No current facility-administered medications on file prior to encounter. No current outpatient medications on file prior to encounter. PRECAUTIONS/ALLERGIES: Patient has no known allergies.    Safety Devices in place: Yes  Type of devices: Left in bed,Call light within reach,Nurse notified    Therapy Time  SLP Individual Minutes  Time In: 0144  Time Out: 0820  Minutes: 1230 [facility] [address] [facility]  6/10/2022 8:27 AM

## 2022-06-10 NOTE — ACP (ADVANCE CARE PLANNING)
Advance Care Planning     General Advance Care Planning (ACP) Conversation    Date of Conversation: 6/9/2022      Healthcare Decision Maker:    Primary Decision Maker: Kayla Beavers - Spouse - [phone]  Click here to complete Healthcare Decision Makers including selection of the Healthcare Decision Maker Relationship (ie \"Primary\"). Today we documented Decision Maker(s) consistent with Legal Next of Kin hierarchy. Content/Action Overview:  No LW/HCPOA on file. Spouse legal NOK unless paperwork presented stating otherwise. Full code per MD orders.      Length of Voluntary ACP Conversation in minutes:  <16 minutes (Non-Billable)    Alex Beauchamp RN

## 2022-06-10 NOTE — PROGRESS NOTES
Patient received to room 804 via wheelchair from ICU  Assessment complete  Alert and oriented to person and place  Respirations even and unlabored on room air  No signs of distress  Skin intact  Patient educated on fall prevention  Patient shown how to use call light and asked to call for assitance before ambulation  Patient verbalizes understanding   Bed alarm in place  Call light and belongings within place

## 2022-06-10 NOTE — PROGRESS NOTES
Hospitalist Progress Note   Admit Date:  2022  1:25 PM   Name:  Nimesh Mcmillan. Age:  78 y.o. Sex:  male  :  1945   MRN:  [id number]   Room:  [address] [place] [address]    Presenting Complaint: Hyperglycemia     Reason(s) for Admission: Volume depletion [E86.9]  DKA, type 1, not at goal [facility]) [E10.10]  Diabetic ketoacidosis without coma associated with type 1 diabetes mellitus ([facility] [address] [E10.10]  Non-intractable vomiting with nausea, unspecified vomiting type [R11.2]     Hospital Course & Interval History:     Nimesh Serna is a 78 y.o. male with medical history of DM1 since age 16 yo with insulin pump, neuropathy, debility, hearing loss, hypothyroidism admitted with DKA. Started on glucose stabilizer protocol including IV fluids and insulin gtt. Anion gap closed and patient off insulin gtt. Transitioned to subcu regimen. Subjective/24hr Events (06/10/22): Patient seen at bedside. Very hard of hearing. Reports he is feeling fine. No active complaint. Denies chest pain, palpitation, shortness of breath, nausea, vomiting or abdominal pain. Off insulin gtt. Patient at home on insulin pump which he lost.  He has new insulin pump but does not know how to set up. Assessment & Plan:     DKA:  Type 1 diabetes mellitus:  Patient supposed to be on insulin pump at home which he lost.  He has new insulin pump but does not know how to set up  Started on glucose stabilizer protocol  Anion gap closed, patient off insulin gtt.  and transitioned to subcu regimen  Replete electrolytes as needed and continue maintenance IV fluids  Inpatient diabetes management consulted    Dysphagia:  Speech evaluated patient and recommending regular diet  Patient scheduled for outpatient EGD    Depression:  Continue Wellbutrin    CKD stage III:  Stable, continue to monitor    Hypothyroidism:  Continue Synthyroid    Neuropathy:  Continue Cymbalta    Chronic pain:  Continue Lidoderm patch      Discharge Planning: Transfer to medical floor PT/OT    Diet:  ADULT DIET; Regular; 4 carb choices (60 gm/meal)  DVT PPx: lovenox  Code status: Full Code    Hospital Problems:  Principal Problem:    DKA, type 1, not at goal [facility])  Active Problems:    Neuropathy    Hypothyroid    Chronic pain    Chronic renal disease, stage 3, moderately decreased glomerular filtration rate (GFR) between 30-59 mL/min/1.73 square meter (HCC)    Dysphagia    Depression  Resolved Problems:    * No resolved hospital problems.  *      Objective:     Patient Vitals for the past 24 hrs:   Temp Pulse Resp BP SpO2   06/10/22 0915 -- (!) 103 19 -- 97 %   06/10/22 0900 -- 98 13 (!) 162/103 99 %   06/10/22 0845 -- 98 14 -- 99 %   06/10/22 0830 -- (!) 101 19 -- 97 %   06/10/22 0815 -- (!) 102 -- -- 100 %   06/10/22 0800 98.7 °F (37.1 °C) 94 16 (!) 156/89 100 %   06/10/22 0745 -- 98 12 -- 100 %   06/10/22 0730 -- (!) 101 23 -- 97 %   06/10/22 0715 -- (!) 101 15 -- 100 %   06/10/22 0700 -- 94 18 (!) 160/88 100 %   06/10/22 0645 -- 100 17 -- 99 %   06/10/22 0605 -- (!) 102 (!) 42 (!) 160/99 98 %   06/10/22 0505 -- 92 19 (!) 156/86 100 %   06/10/22 0405 -- 99 15 (!) 149/93 100 %   06/10/22 0305 98.9 °F (37.2 °C) 93 (!) 9 136/74 99 %   06/10/22 0205 -- 94 14 (!) 144/79 96 %   06/10/22 0105 -- 88 15 126/65 98 %   06/10/22 0020 -- 82 16 134/74 92 %   06/09/22 2315 98.7 °F (37.1 °C) 97 19 111/60 99 %   06/09/22 2200 -- 100 (!) 31 (!) 102/55 99 %   06/09/22 2100 -- (!) 105 23 111/65 100 %   06/09/22 2000 -- (!) 102 (!) 33 117/68 99 %   06/09/22 1900 99.6 °F (37.6 °C) (!) 102 14 (!) 118/55 99 %   06/09/22 1818 -- (!) 106 (!) 31 133/80 --   06/09/22 1803 -- (!) 105 25 134/71 --   06/09/22 1748 -- (!) 107 16 (!) 141/72 --   06/09/22 1733 -- (!) 107 25 (!) 146/72 --   06/09/22 1718 -- (!) 104 -- 136/66 --   06/09/22 1704 -- (!) 104 19 138/69 --   06/09/22 1649 -- (!) 113 -- (!) 143/69 --   06/09/22 1648 -- -- 25 -- --   06/09/22 1633 -- (!) 104 17 (!) 155/75 --   06/09/22 1618 -- (!) 104 -- 129/60 --   06/09/22 1617 -- -- 24 -- --   06/09/22 1610 99.8 °F (37.7 °C) (!) 111 12 (!) 162/91 98 %   06/09/22 1443 -- -- -- (!) 188/122 --   06/09/22 1358 -- -- -- (!) 159/92 --   06/09/22 1343 -- -- -- (!) 143/90 98 %   06/09/22 1338 98.3 °F (36.8 °C) -- 20 -- --   06/09/22 1333 -- -- -- (!) 176/110 100 %       Oxygen Therapy  SpO2: 97 %  Pulse via Oximetry: 102 beats per minute  O2 Device: None (Room air)    Estimated body mass index is 30.94 kg/m² as calculated from the following:    Height as of this encounter: 5' 7\" (1.702 m). Weight as of this encounter: 197 lb 8.5 oz (89.6 kg). Intake/Output Summary (Last 24 hours) at 6/10/2022 1235  Last data filed at 6/10/2022 0800  Gross per 24 hour   Intake 2819.8 ml   Output 690 ml   Net 2129.8 ml         Physical Exam:     Blood pressure (!) 162/103, pulse (!) 103, temperature 98.7 °F (37.1 °C), temperature source Oral, resp. rate 19, height 5' 7\" (1.702 m), weight 197 lb 8.5 oz (89.6 kg), SpO2 97 %. General:    Well nourished. Hard of hearing  Head:  Normocephalic, atraumatic  Eyes:  Sclerae appear normal.  Pupils equally round. ENT:  Nares appear normal, no drainage. Moist oral mucosa  Neck:  No restricted ROM. Trachea midline   CV:   RRR. No m/r/g. No jugular venous distension. Lungs:   CTAB. No wheezing, rhonchi, or rales. Respirations even, unlabored  Abdomen: Bowel sounds present. Soft, nontender, nondistended. Extremities: No cyanosis or clubbing. No edema  Skin:     No rashes and normal coloration. Warm and dry. Neuro:  CN II-XII grossly intact. Sensation intact. A&Ox3  Psych:  Normal mood and affect.       I have reviewed ordered lab tests and independently visualized imaging below:    Recent Labs:  Recent Results (from the past 48 hour(s))   POCT Glucose    Collection Time: 06/09/22 12:34 PM   Result Value Ref Range    POC Glucose 565 (HH) 65 - 100 mg/dL    Performed by: AgostinoRNMary    CBC with Auto Differential Collection Time: 06/09/22 12:36 PM   Result Value Ref Range    WBC 11.2 (H) 4.3 - 11.1 K/uL    RBC 5.48 4.23 - 5.6 M/uL    Hemoglobin 14.6 13.6 - 17.2 g/dL    Hematocrit 46.8 41.1 - 50.3 %    MCV 85.4 79.6 - 97.8 FL    MCH 26.6 26.1 - 32.9 PG    MCHC 31.2 (L) 31.4 - 35.0 g/dL    RDW 14.7 (H) 11.9 - 14.6 %    Platelets 939 844 - 878 K/uL    MPV 11.8 9.4 - 12.3 FL    nRBC 0.00 0.0 - 0.2 K/uL    Differential Type AUTOMATED      Seg Neutrophils 90 (H) 43 - 78 %    Lymphocytes 7 (L) 13 - 44 %    Monocytes 3 (L) 4.0 - 12.0 %    Eosinophils % 0 (L) 0.5 - 7.8 %    Basophils 0 0.0 - 2.0 %    Immature Granulocytes 0 0.0 - 5.0 %    Segs Absolute 10.0 (H) 1.7 - 8.2 K/UL    Absolute Lymph # 0.8 0.5 - 4.6 K/UL    Absolute Mono # 0.4 0.1 - 1.3 K/UL    Absolute Eos # 0.0 0.0 - 0.8 K/UL    Basophils Absolute 0.0 0.0 - 0.2 K/UL    Absolute Immature Granulocyte 0.1 0.0 - 0.5 K/UL   CMP    Collection Time: 06/09/22 12:36 PM   Result Value Ref Range    Sodium 133 (L) 138 - 145 mmol/L    Potassium 4.8 3.5 - 5.1 mmol/L    Chloride 100 98 - 107 mmol/L    CO2 16 (L) 21 - 32 mmol/L    Anion Gap 17 (H) 7 - 16 mmol/L    Glucose 616 (HH) 65 - 100 mg/dL    BUN 42 (H) 8 - 23 MG/DL    CREATININE 1.80 (H) 0.8 - 1.5 MG/DL    GFR  48 (L) >60 ml/min/1.73m2    GFR Non- 39 (L) >60 ml/min/1.73m2    Calcium 9.1 8.3 - 10.4 MG/DL    Total Bilirubin 1.1 0.2 - 1.1 MG/DL    ALT 19 12 - 65 U/L    AST 22 15 - 37 U/L    Alk Phosphatase 167 (H) 50 - 136 U/L    Total Protein 7.8 6.3 - 8.2 g/dL    Albumin 3.3 3.2 - 4.6 g/dL    Globulin 4.5 (H) 2.3 - 3.5 g/dL    Albumin/Globulin Ratio 0.7 (L) 1.2 - 3.5     POCT Blood Gas & Electrolytes    Collection Time: 06/09/22  2:55 PM   Result Value Ref Range    pH, Arterial, POC 7.36 7.35 - 7.45      pCO2, Arterial, POC 27.1 (L) 35 - 45 MMHG    pO2, Arterial, POC 81 75 - 100 MMHG    POC Sodium 133 (L) 136 - 145 MMOL/L    POC Potassium 4.3 3.5 - 5.1 MMOL/L    BASE DEFICIT (POC) 8.3 mmol/L    HCO3, Mixed 15.3 (L) 22 - 26 MMOL/L    POC O2 SAT 96 %    Source ARTERIAL      Site RIGHT RADIAL      POC Theo's Test Positive      DEVICE ROOM AIR      Performed by:  Jo     POC GFR  Cannot be calculated >60 ml/min/1.73m2    Glomerular Filtration Rate, POC Cannot be calculated >60 ml/min/1.73m2   Urinalysis    Collection Time: 06/09/22  3:18 PM   Result Value Ref Range    Color, UA YELLOW      Appearance CLEAR      Specific Gravity, UA 1.010 1.001 - 1.023      pH, Urine 5.5 5.0 - 9.0      Protein, UA Negative NEG mg/dL    Glucose, UA 1000 (A) NEG mg/dL    Ketones, Urine 40 (A) NEG mg/dL    Bilirubin Urine Negative NEG      Blood, Urine TRACE (A) NEG      Urobilinogen, Urine 0.2 0.2 - 1.0 EU/dL    Nitrite, Urine Negative NEG      Leukocyte Esterase, Urine Negative NEG     Urinalysis, Micro    Collection Time: 06/09/22  3:18 PM   Result Value Ref Range    WBC, UA 0 0 /hpf    RBC, UA 0 0 /hpf    Epithelial Cells UA 0 0 /hpf    BACTERIA, URINE 0 0 /hpf    Casts 0 0 /lpf    Crystals 0 0 /LPF    Mucus, UA 0 0 /lpf   POCT Glucose    Collection Time: 06/09/22  3:33 PM   Result Value Ref Range    POC Glucose 557 (HH) 65 - 100 mg/dL    Performed by: Chula Baez    POCT Glucose    Collection Time: 06/09/22  4:33 PM   Result Value Ref Range    POC Glucose 515 (HH) 65 - 100 mg/dL    Performed by: Shirley    POCT Glucose    Collection Time: 06/09/22  5:31 PM   Result Value Ref Range    POC Glucose 350 (H) 65 - 100 mg/dL    Performed by: Shirley    Basic Metabolic Panel    Collection Time: 06/09/22  5:36 PM   Result Value Ref Range    Sodium 135 (L) 136 - 145 mmol/L    Potassium 4.7 3.5 - 5.1 mmol/L    Chloride 103 98 - 107 mmol/L    CO2 21 21 - 32 mmol/L    Anion Gap 11 7 - 16 mmol/L    Glucose 412 (H) 65 - 100 mg/dL    BUN 41 (H) 8 - 23 MG/DL    CREATININE 1.90 (H) 0.8 - 1.5 MG/DL    GFR African American 45 (L) >60 ml/min/1.73m2    GFR Non- 37 (L) >60 ml/min/1.73m2    Calcium 8.5 8.3 - 10.4 MG/DL   Magnesium    Collection Time: 06/09/22  5:36 PM   Result Value Ref Range    Magnesium 2.3 1.8 - 2.4 mg/dL   Phosphorus    Collection Time: 06/09/22  5:36 PM   Result Value Ref Range    Phosphorus 2.7 2.3 - 3.7 MG/DL   Culture, Blood 1    Collection Time: 06/09/22  5:55 PM    Specimen: Blood   Result Value Ref Range    Special Requests RIGHT  FOREARM        Culture NO GROWTH AFTER 11 HOURS     POCT Glucose    Collection Time: 06/09/22  6:39 PM   Result Value Ref Range    POC Glucose 410 (H) 65 - 100 mg/dL    Performed by: Shirley    POCT Glucose    Collection Time: 06/09/22  7:56 PM   Result Value Ref Range    POC Glucose 335 (H) 65 - 100 mg/dL    Performed by: Madelyn    Culture, Blood 1    Collection Time: 06/09/22  8:16 PM    Specimen: Blood   Result Value Ref Range    Special Requests RIGHT  ARM        Culture NO GROWTH AFTER 9 HOURS     Basic Metabolic Panel    Collection Time: 06/09/22  8:16 PM   Result Value Ref Range    Sodium 134 (L) 136 - 145 mmol/L    Potassium 3.6 3.5 - 5.1 mmol/L    Chloride 103 98 - 107 mmol/L    CO2 22 21 - 32 mmol/L    Anion Gap 9 7 - 16 mmol/L    Glucose 259 (H) 65 - 100 mg/dL    BUN 42 (H) 8 - 23 MG/DL    CREATININE 1.70 (H) 0.8 - 1.5 MG/DL    GFR  51 (L) >60 ml/min/1.73m2    GFR Non- 42 (L) >60 ml/min/1.73m2    Calcium 8.5 8.3 - 10.4 MG/DL   Magnesium    Collection Time: 06/09/22  8:16 PM   Result Value Ref Range    Magnesium 2.2 1.8 - 2.4 mg/dL   Phosphorus    Collection Time: 06/09/22  8:16 PM   Result Value Ref Range    Phosphorus 2.1 (L) 2.3 - 3.7 MG/DL   POCT Glucose    Collection Time: 06/09/22  9:03 PM   Result Value Ref Range    POC Glucose 253 (H) 65 - 100 mg/dL    Performed by: Madelyn    POCT Glucose    Collection Time: 06/09/22 10:04 PM   Result Value Ref Range    POC Glucose 181 (H) 65 - 100 mg/dL    Performed by: Madelyn    POCT Glucose    Collection Time: 06/09/22 11:08 PM   Result Value Ref Range    POC Glucose 165 (H) 65 - 100 mg/dL    Performed by: Jayashree    POCT Glucose    Collection Time: 06/10/22 12:09 AM   Result Value Ref Range    POC Glucose 132 (H) 65 - 100 mg/dL    Performed by: Madelyn    Basic Metabolic Panel    Collection Time: 06/10/22 12:41 AM   Result Value Ref Range    Sodium 137 136 - 145 mmol/L    Potassium 3.4 (L) 3.5 - 5.1 mmol/L    Chloride 104 98 - 107 mmol/L    CO2 23 21 - 32 mmol/L    Anion Gap 10 7 - 16 mmol/L    Glucose 132 (H) 65 - 100 mg/dL    BUN 42 (H) 8 - 23 MG/DL    CREATININE 1.70 (H) 0.8 - 1.5 MG/DL    GFR  51 (L) >60 ml/min/1.73m2    GFR Non- 42 (L) >60 ml/min/1.73m2    Calcium 8.7 8.3 - 10.4 MG/DL   Magnesium    Collection Time: 06/10/22 12:41 AM   Result Value Ref Range    Magnesium 2.3 1.8 - 2.4 mg/dL   Phosphorus    Collection Time: 06/10/22 12:41 AM   Result Value Ref Range    Phosphorus 2.3 2.3 - 3.7 MG/DL   Hemoglobin A1c    Collection Time: 06/10/22 12:49 AM   Result Value Ref Range    Hemoglobin A1C 11.2 (H) 4.20 - 6.30 %    eAG 275 mg/dL   CBC with Auto Differential    Collection Time: 06/10/22 12:49 AM   Result Value Ref Range    WBC 15.7 (H) 4.3 - 11.1 K/uL    RBC 4.69 4.23 - 5.6 M/uL    Hemoglobin 12.5 (L) 13.6 - 17.2 g/dL    Hematocrit 39.6 (L) 41.1 - 50.3 %    MCV 84.4 79.6 - 97.8 FL    MCH 26.7 26.1 - 32.9 PG    MCHC 31.6 31.4 - 35.0 g/dL    RDW 14.6 11.9 - 14.6 %    Platelets 784 464 - 164 K/uL    MPV 11.4 9.4 - 12.3 FL    nRBC 0.00 0.0 - 0.2 K/uL    Differential Type AUTOMATED      Seg Neutrophils 77 43 - 78 %    Lymphocytes 13 13 - 44 %    Monocytes 9 4.0 - 12.0 %    Eosinophils % 0 (L) 0.5 - 7.8 %    Basophils 0 0.0 - 2.0 %    Immature Granulocytes 1 0.0 - 5.0 %    Segs Absolute 12.1 (H) 1.7 - 8.2 K/UL    Absolute Lymph # 2.0 0.5 - 4.6 K/UL    Absolute Mono # 1.4 (H) 0.1 - 1.3 K/UL    Absolute Eos # 0.0 0.0 - 0.8 K/UL    Basophils Absolute 0.0 0.0 - 0.2 K/UL    Absolute Immature Granulocyte 0.1 0.0 - 0.5 K/UL   POCT Glucose    Collection Time: 06/10/22  1:12 AM   Result Value Ref Range    POC Glucose 132 (H) 65 - 100 mg/dL    Performed by: Madelyn    POCT Glucose    Collection Time: 06/10/22  2:16 AM   Result Value Ref Range    POC Glucose 150 (H) 65 - 100 mg/dL    Performed by: Madelyn    POCT Glucose    Collection Time: 06/10/22  3:25 AM   Result Value Ref Range    POC Glucose 161 (H) 65 - 100 mg/dL    Performed by: Madelyn    POCT Glucose    Collection Time: 06/10/22  4:25 AM   Result Value Ref Range    POC Glucose 224 (H) 65 - 100 mg/dL    Performed by: Madelyn    Basic Metabolic Panel    Collection Time: 06/10/22  5:06 AM   Result Value Ref Range    Sodium 134 (L) 138 - 145 mmol/L    Potassium 3.6 3.5 - 5.1 mmol/L    Chloride 103 98 - 107 mmol/L    CO2 23 21 - 32 mmol/L    Anion Gap 8 7 - 16 mmol/L    Glucose 205 (H) 65 - 100 mg/dL    BUN 39 (H) 8 - 23 MG/DL    CREATININE 1.30 0.8 - 1.5 MG/DL    GFR African American >60 >60 ml/min/1.73m2    GFR Non- 57 (L) >60 ml/min/1.73m2    Calcium 8.3 8.3 - 10.4 MG/DL   POCT Glucose    Collection Time: 06/10/22  5:30 AM   Result Value Ref Range    POC Glucose 211 (H) 65 - 100 mg/dL    Performed by: Madelyn    POCT Glucose    Collection Time: 06/10/22  7:43 AM   Result Value Ref Range    POC Glucose 214 (H) 65 - 100 mg/dL    Performed by: Adi Romero    Transthoracic echocardiogram (TTE) complete with contrast, bubble, strain, and 3D PRN    Collection Time: 06/10/22  8:42 AM   Result Value Ref Range    LA Minor Axis 4.9 cm    LA Major Axis 5.7 cm    LA Area 2C 16.9 cm2    LA Area 4C 15.2 cm2    LA Volume BP 42 18 - 58 mL    LA Diameter 4.0 cm    AV Mean Velocity 1.5 m/s    AV Mean Gradient 11 mmHg    AV VTI 41.5 cm    AV Peak Velocity 2.1 m/s    AV Peak Gradient 17 mmHg    AV Area by VTI 1.7 cm2    AV Area by Peak Velocity 1.8 cm2    Aortic Root 3.3 cm    Ascending Aorta 2.4 cm    IVSd 0.9 0.6 - 1.0 cm    LVIDd 4.5 4.2 - 5.9 cm    LVIDs 3.7 cm    LVOT Diameter 2.0 cm    LVOT Mean Gradient 3 mmHg    LVOT VTI 22.3 cm    LVOT Peak Velocity 1.2 m/s    LVOT Peak Gradient 6 mmHg    LVPWd 0.9 0.6 - 1.0 cm    LV E' Lateral Velocity 12 cm/s    LV E' Septal Velocity 7 cm/s    LVOT Area 3.1 cm2    LVOT SV 70.0 ml    MV E Wave Deceleration Time 320.0 ms    MV A Velocity 1.46 m/s    MV E Velocity 1.00 m/s    PV AT 79.0 ms    PV Max Velocity 1.2 m/s    PV Peak Gradient 5 mmHg    RV Basal Dimension 3.0 cm    LV EDV A2C 95 mL    LV EDV A4C 95 mL    LV ESV A2C 30 mL    LV ESV A4C 34 mL    LV Ejection Fraction A2C 69 %    LV Ejection Fraction A4C 65 %    EF BP 66 55 - 100 %    Body Surface Area 2.41 m2    Fractional Shortening 2D 18 28 - 44 %    LV ESV Index A4C 17 mL/m2    LV EDV Index A4C 47 mL/m2    LV ESV Index A2C 15 mL/m2    LV EDV Index A2C 47 mL/m2    LVIDd Index 2.24 cm/m2    LVIDs Index 1.84 cm/m2    LV RWT Ratio 0.40     LV Mass 2D 132.8 88 - 224 g    LV Mass 2D Index 66.1 49 - 115 g/m2    MV E/A 0.68     E/E' Ratio (Averaged) 11.31     E/E' Lateral 8.33     E/E' Septal 14.29     LA Volume Index BP 21 16 - 34 ml/m2    LVOT Stroke Volume Index 34.8 mL/m2    LA Size Index 1.99 cm/m2    LA/AO Root Ratio 1.21     Ao Root Index 1.64 cm/m2    Ascending Aorta Index 1.19 cm/m2    AV Velocity Ratio 0.57     LVOT:AV VTI Index 0.54     XANDER/BSA VTI 0.8 cm2/m2    XANDER/BSA Peak Velocity 0.9 cm2/m2       Other Studies:  XR CHEST PORTABLE   Final Result   Probable interstitial edema and right pleural effusion. No focal   infiltrate to suggest pneumonia.              Current Meds:  Current Facility-Administered Medications   Medication Dose Route Frequency    potassium chloride (KLOR-CON M) extended release tablet 40 mEq  40 mEq Oral PRN    Or    potassium bicarb-citric acid (EFFER-K) effervescent tablet 40 mEq  40 mEq Oral PRN    Or    potassium chloride 10 mEq/100 mL IVPB (Peripheral Line)  10 mEq IntraVENous PRN    0.9 % sodium chloride infusion IntraVENous Continuous    insulin lispro (HUMALOG) injection vial 0-8 Units  0-8 Units SubCUTAneous TID WC    insulin lispro (HUMALOG) injection vial 0-4 Units  0-4 Units SubCUTAneous Nightly    insulin glargine (LANTUS) injection vial 10 Units  10 Units SubCUTAneous Nightly    sodium chloride flush 0.9 % injection 3 mL  3 mL IntraVENous Q8H    polyethylene glycol (GLYCOLAX) packet 17 g  17 g Oral Daily PRN    bisacodyl (DULCOLAX) suppository 10 mg  10 mg Rectal Daily PRN    dextrose 5 % and 0.45 % sodium chloride infusion   IntraVENous Continuous PRN    enoxaparin Sodium (LOVENOX) injection 30 mg  30 mg SubCUTAneous BID    glucose chewable tablet 16 g  4 tablet Oral PRN    dextrose bolus 10% 125 mL  125 mL IntraVENous PRN    Or    dextrose bolus 10% 250 mL  250 mL IntraVENous PRN    glucagon injection 1 mg  1 mg IntraMUSCular PRN    dextrose 5 % solution  100 mL/hr IntraVENous PRN    pantoprazole (PROTONIX) 40 mg in sodium chloride (PF) 10 mL injection  40 mg IntraVENous Daily    fluticasone (FLONASE) 50 MCG/ACT nasal spray 2 spray  2 spray Each Nostril Daily    buPROPion (WELLBUTRIN) tablet 75 mg  75 mg Oral BID    finasteride (PROSCAR) tablet 5 mg  5 mg Oral Daily    levothyroxine (SYNTHROID) tablet 75 mcg  75 mcg Oral Daily    lidocaine 4 % external patch 1 patch  1 patch TransDERmal Daily    rosuvastatin (CRESTOR) tablet 10 mg  10 mg Oral Nightly    sodium bicarbonate tablet 650 mg  650 mg Oral BID    hydrALAZINE (APRESOLINE) injection 10 mg  10 mg IntraVENous Q6H PRN       Signed:  Fredi Medina MD    Part of this note may have been written by using a voice dictation software. The note has been proof read but may still contain some grammatical/other typographical errors.

## 2022-06-10 NOTE — PROGRESS NOTES
Bedside and verbal shift change report received from  [id number] (offgoing nurse). Report included the following information SBAR, Kardex, ED Summary, Intake/Output, MAR, Accordion, Recent Results, Med Rec Status, Cardiac Rhythm NSR/ST and Alarm Parameters .      Dual skin assessment completed at bedside: scabs on BLE, missing toes on R foot (list pertinent skin assessment findings)    Dual verification of gtts completed (name of gtts verified): none

## 2022-06-10 NOTE — PROGRESS NOTES
Occupational Therapy Note:    Participated in interdisciplinary rounds in ICU/CCU and chart reviewed. Patient is experiencing decrease in function from baseline. Patient would benefit from skilled acute therapy to increase independence with self care/ADLs, strength, endurance, and functional mobility. Recommend OT/PT consult when medically stable and MD agrees.     Thank you for your consideration,  Caitlin Culver, OTR/L

## 2022-06-10 NOTE — PROGRESS NOTES
PHYSICAL THERAPY Initial Assessment and PM  (Link to Caseload Tracking: PT Visit Days : 1  Acknowledge Orders  Time In/Out  PT Charge Capture  Rehab Caseload Tracker    Archie Buckley is a 78 y.o. male   PRIMARY DIAGNOSIS: DKA, type 1, not at goal ([address]  Volume depletion [E86.9]  DKA, type 1, not at goal [facility]) [E10.10]  Diabetic ketoacidosis without coma associated with type 1 diabetes mellitus ([address] [E10.10]  Non-intractable vomiting with nausea, unspecified vomiting type [R11.2]       Reason for Referral: Generalized Muscle Weakness (M62.81)  Difficulty in walking, Not elsewhere classified (R26.2)  Inpatient: Payor: [employer] / Plan: [place]IC[place] PPO / Product Type: *No Product type* /     ASSESSMENT:     REHAB RECOMMENDATIONS:   Recommendation to date pending progress:  Setting:   No further skilled therapy after discharge from hospital    Equipment:     To Be Determined     ASSESSMENT:  Mr. Maranda Em was supine with appearance he was trying to escape bed at arrival with wife present and agrees to treat. He needed modA to EOB, Charlie to stand and walk. His pace is low and he is proud of slow pace. He walked about 10ft shorter than desired. He returned to room and sat in chair. Pt will benefit from skilled and sophisticated PT to help improve impairments.         [facility]6 Clicks Basic Mobility Inpatient Short Form  AM-PAC Mobility Inpatient   How much difficulty turning over in bed?: A Lot  How much difficulty sitting down on / standing up from a chair with arms?: A Little  How much difficulty moving from lying on back to sitting on side of bed?: A Lot  How much help from another person moving to and from a bed to a chair?: A Little  How much help from another person needed to walk in hospital room?: A Little  How much help from another person for climbing 3-5 steps with a railing?: A Lot  AM-PAC Inpatient Mobility Raw Score : 15  AM-PAC Inpatient T-Scale Score : 39.45  Mobility Inpatient CMS 0-100% Score: 57.7  Mobility Inpatient CMS G-Code Modifier : CK    SUBJECTIVE:   Mr. Low Comes states, \"im trying to put my pants on\"     Social/Functional Lives With: Spouse  Type of Home: House  Home Layout: Two level  Home Equipment: Rollator  ADL Assistance: Independent  Homemaking Assistance: Independent    OBJECTIVE:     PAIN: Jones Nadira / O2: Dora Hays / Krista Cassidy / Lizzie Joneslow:   Pre Treatment:   Pain Assessment: 0-10  Pain Level: 0      Post Treatment: 0 Vitals   Vitals  BP: (!) 142/84  BP Location: Right Arm  BP Method: Automatic    Oxygen      IV    RESTRICTIONS/PRECAUTIONS:                    GROSS EVALUATION: Intact Impaired (Comments):   AROM [x]     PROM []    Strength [x]     Balance [x]     Posture [] Forward Head  Rounded Shoulders   Sensation [x]     Coordination []      Tone []     Edema []    Activity Tolerance [] Patient limited by fatigue    []      COGNITION/  PERCEPTION: Intact Impaired (Comments):   Orientation [x]     Vision [x]     Hearing [] Hard of hearing/hearing concerns   Cognition  []       MOBILITY: I Mod I S SBA CGA Min Mod Max Total  NT x2 Comments:   Bed Mobility    Rolling [] [] [] [] [] [] [x] [] [] [] []    Supine to Sit [] [] [] [] [] [] [x] [] [] [] []    Scooting [] [] [] [] [] [x] [] [] [] [] []    Sit to Supine [] [] [] [] [] [] [] [] [] [x] []    Transfers    Sit to Stand [] [] [] [] [] [x] [] [] [] [] []    Bed to Chair [] [] [] [] [] [x] [] [] [] [] []    Stand to Sit [] [] [] [] [] [x] [] [] [] [] []     [] [] [] [] [] [] [] [] [] [] []    I=Independent, Mod I=Modified Independent, S=Supervision, SBA=Standby Assistance, CGA=Contact Guard Assistance,   Min=Minimal Assistance, Mod=Moderate Assistance, Max=Maximal Assistance, Total=Total Assistance, NT=Not Tested    GAIT: I Mod I S SBA CGA Min Mod Max Total  NT x2 Comments:   Level of Assistance [] [] [] [] [] [x] [] [] [] [] []    Distance 70 feet    DME Rolling Walker    Gait Quality Decreased vicky , Decreased step clearance, Decreased step length, Decreased stance and Shuffling     Weightbearing Status      Stairs      I=Independent, Mod I=Modified Independent, S=Supervision, SBA=Standby Assistance, CGA=Contact Guard Assistance,   Min=Minimal Assistance, Mod=Moderate Assistance, Max=Maximal Assistance, Total=Total Assistance, NT=Not Tested    PLAN:   ACUTE PHYSICAL THERAPY GOALS:   (Developed with and agreed upon by patient and/or caregiver. )  LTG:  (1.)Mr. Cassius Pepper will move from supine to sit and sit to supine , scoot up and down and roll side to side in bed with SUPERVISION within 7 treatment day(s). (2.)Mr. Cassius Pepper will transfer from bed to chair and chair to bed with SUPERVISION using the least restrictive device within 7 treatment day(s). (3.)Mr. Cassius Pepper will ambulate with SUPERVISION for 200+ feet with the least restrictive device within 7 treatment day(s). (4.)Mr. Cassius Pepper will ambulate up/down 12 steps with SUPERVISION  with the least restrictive device within 7 treatment day(s).   ________________________________________________________________________________________________        FREQUENCY AND DURATION: 3 times/week for duration of hospital stay or until stated goals are met, whichever comes first.    THERAPY PROGNOSIS: Good    PROBLEM LIST:   (Skilled intervention is medically necessary to address:)  Decreased ADL/Functional Activities  Decreased Activity Tolerance  Decreased AROM/PROM  Decreased Balance  Decreased Cognition  Decreased Coordination  Decreased Gait Ability  Decreased Safety Awareness  Decreased Strength  Decreased Transfer Abilities  Increased Pain INTERVENTIONS PLANNED:   (Benefits and precautions of physical therapy have been discussed with the patient.)  Therapeutic Activity  Therapeutic Exercise/HEP  Neuromuscular Re-education  Gait Training  Education       TREATMENT:   EVALUATION: MODERATE COMPLEXITY: (Untimed Charge)    TREATMENT:   Therapeutic Activity (12 Minutes): Therapeutic activity included Rolling, Supine to Sit, Scooting, Lateral Scooting, Transfer Training, Ambulation on level ground, Sitting balance  and Standing balance to improve functional Activity tolerance, Balance, Coordination, Mobility and Strength.     TREATMENT GRID:  N/A    AFTER TREATMENT PRECAUTIONS: Bed/Chair Locked, Call light within reach, Chair, Needs within reach and Visitors at bedside    INTERDISCIPLINARY COLLABORATION:  RN/ PCT and PT/ PTA    EDUCATION: Education Given To: Patient  Education Provided: Role of Therapy;Plan of Care  Education Method: Verbal  Barriers to Learning: None  Education Outcome: Verbalized understanding    TIME IN/OUT:  Time In: 1415  Time Out: [address] [place]   Minutes: [facility], PT

## 2022-06-10 NOTE — PROGRESS NOTES
Patient A&Ox2 at shift change with some confusion and impulsivity, noticed pupils uneven sizes R pupil 2 and L pupil 3. Both brisk round and reactive. Hospitalist notified of changes orders to continue to monitor given.

## 2022-06-10 NOTE — INTERDISCIPLINARY ROUNDS
Multi-D Rounds/Checklist (leapfrog):  Lines: can any be removed?: Yes   DVT Prophylaxis: Yes   Vent: HOB elevated? N/A              CC/Kg?: N/A              Vent Day?: N/A   Nutrition Ordered/appropriate: No   Can antibiotics or other drugs be stopped?: No   Consults needed: No   A: Is pain control adequate? Yes   B: Sedation break and SBT? N/A   C: Is sedation choice appropriate? N/A   D: Delirium/CAM-ICU? No   E: Mobility goals/appropriateness? Yes   F: Family update and plan?  is primary contact and is being updated daily by primary attending and nursing staff.     ourmelisa Ann, APRN - CNP

## 2022-06-10 NOTE — CARE COORDINATION
Chart reviewed and pt seen in CCU s/p admission DKA, volume depletion/N/V. [place] and hx of dementia. Pt discussed with RN and wife, Flossie Hamman, via phone. Wife concerned regarding d/c home poss. States they have stairs and pt has had some balance issues. RN voiced these concerns as well. Pt has new glucometer/pump and wife unsure how to use. Wife provides transport. No current HH or rehab. Wife is certainly open to both. OT has not evaluated at present. PT not recommending any service or ST. Discussed with wife at very least could request home health for RN, PT, OT to re-eval. RN for diabetic education and medicine education/management. PT/OT for balance/safety issues. She would like to think about agency, states they have not been in area very long. They have 2 son, one in [place] and other in [place], but active in care and concern of pt and spouse. Could possibly benefit from diabetic management education if here til Monday, RN states they saw, but no note currently. CM to follow for any assist and d/c needs/POC. MD aware.         06/10/22 2890   Service Assessment   Cognition Dementia / Early Alzheimer's   History Provided By [facility]   Primary Caregiver Self   Support Systems Spouse/Significant Other;Children;Family Members   Patient's Healthcare Decision Maker is: Legal Next of Kin   PCP Verified by CM Yes   Prior Functional Level Independent in ADLs/IADLs   Can patient return to prior living arrangement Unknown at present   Ability to make needs known: Fair   Family able to assist with home care needs: Yes   Financial Resources Medicare  (Pt has MCR/BCBS supplemental per wife, not advantage plan)   Social/Functional History   Lives With Spouse   Type of [address] One level   Home Access Stairs to enter with rails   Entrance Stairs - Number of Steps 5-7   Bathroom Equipment Grab bars in [facility] [address], rolling;Grab bars  (new glucometer, BP cuff, Bilateral hearing aids)   [facility] Help From Family   Active  No   Patient's  Info spouse   Occupation Retired   Discharge Planning   Living Arrangements Spouse/Significant Other   Current Services Prior To Admission None   Potential [id number] OT;PT;Nursing [address] Discharge   Confirm Follow Up Transport Family   Condition of Participation: Discharge Planning   Freedom of Choice list was provided with basic dialogue that supports the patient's individualized plan of care/goals, treatment preferences, and shares the quality data associated with the providers?   Yes

## 2022-06-10 NOTE — DIABETES MGMT
Patient admitted with DKA. Admit lab work: blood glucose 616, anion gap 17, and CO2 16. HgbA1C 11.2. Blood glucose ranged 132-616 yesterday with patient receiving insulin drip. Insulin drip stopped 547. Patient received Lantus 10 units. Most recent lab work: Cr 1.30 anion gap 8, and CO2 23. Blood glucose this morning was 214. Creatinine 1.30. GFR 57. Reviewed patient current regimen: Lantus 10 units HS and Humalog correctional insulin. Patient states he has Humalog for insulin pump. Patient wife states she has a vial of Lantus at home in the refrigerator for pump failure, but does not know if it is . Noted per chart review patient NPO. When patient diet resumes, patient will likely benefit from initiation of prandial insulin to help offset hyperglycemia during the day related to caloric intake, as patient has PMH of DM type 1. Patient seen for assessment regarding diabetes management. Patient with some intermitent confusion per chart review. Patient wife at bedside. Patient has a past medical history of CKD, neuropathy, and DM type 1. Patient states they have been living with diabetes since 15years old and voices positive family history of diabetes. Patient wife states they do have a working glucometer with supplies at home. Per patient they typically check blood glucose levels 3 -4 times a day, but per patient wife patient has not been checking since Wednesday. Patient has had a Dexcom in the past, but has not been using it recently as patient's wife states he forgot how to use it. Patient wife states they are currently have an insulin pump at home for management of diabetes. Patient wife states patient took insulin pump off some time Wednesday. Patient wife states patient typically manages pump and blood sugars himself. Patient voices that they have experienced hypoglycemia in the past. Educated regarding hypoglycemia signs, symptoms, and treatment.  Patient has attended formal diabetes education in the past. Patient reports no difficulty with affording their diabetic supplies. Patient wife instructed on the preparation and injection of insulin dose via vial and syringe method. Patient returned demonstrated proper insulin dose preparation and injection technique using injection model, syringe, and vial of saline. Patient wife educated regarding insulin administration sites. Patient wife also educated regarding the importance of site rotation, proper storage of insulin, and proper sharps disposal.  Patient wife states she is able to assist patient with insulin administration if needed. Patient prefers vials. Patient will need prescription for Lantus and Humalog at discharge. Patient educated regarding sick day management. Discussed the importance of determining a sick day plan with PCP. Educated patient on the importance of frequent blood glucose checks (every 2 to 4 hours) while sick. Also discussed the importance of recording all test results. Educated patient to continue taking diabetes medications unless otherwise instructed by their PCP. Encouraged patient to discuss insulin use while sick with their PCP as they may need less or more insulin when they are sick. Discussed the importance of hydration while sick and encouraged patient to drink 8 ounces of sugar-free fluids every hour. Adults should aim to have about 45-50g of carbohydrates every 3-4hrs or 15g of carbohydrates every hour. Also educated patient on the importance of avoiding beverages containing caffeine while sick as these can worsen dehydration. Patient instructed to call PCP for fever >101, persistent nausea/vomiting/diarrhea, shortness of breath, inability to eat or drink for > 4 hours, or persistent hyperglycemia >240mg/dL. Patient verbalized understanding and voices no further questions regarding diabetes management at this time. Encouraged compliance with discharge regimen.  Encouraged patient to continue to work on lifestyle modifications and to follow up with primary care provider (Dr. Jordon So, Endocrinology) for further titration of regimen. Patient verbalized understanding and voices no further questions regarding diabetes management at this time. Patient has appointment on Tuesday to set up new insulin pump. Patient has all supplies for new insulin pump, however patient wife unable to assist with this. Encouraged patient and patient wife to review pump use and pump failure if patient unable to properly use pump.

## 2022-06-10 NOTE — PROGRESS NOTES
TRANSFER - OUT REPORT:    Verbal report given to [facility] RN(name) on [employer]  being transferred to [facility](unit) for routine progression of patient care       Report consisted of patients Situation, Background, Assessment and   Recommendations(SBAR). Information from the following report(s) Nurse Handoff Report, Intake/Output, MAR, Recent Results, Med Rec Status, Cardiac Rhythm NSR/ST and Alarm Parameters was reviewed with the receiving nurse. Opportunity for questions and clarification was provided.       Patient transported with:   Registered Nurse    Lines: PIVs

## 2022-06-11 VITALS
WEIGHT: 197.53 LBS | RESPIRATION RATE: 18 BRPM | TEMPERATURE: 97.9 F | BODY MASS INDEX: 31 KG/M2 | HEIGHT: 67 IN | OXYGEN SATURATION: 100 % | SYSTOLIC BLOOD PRESSURE: 127 MMHG | HEART RATE: 99 BPM | DIASTOLIC BLOOD PRESSURE: 84 MMHG

## 2022-06-11 LAB
BASOPHILS # BLD: 0 K/UL (ref 0–0.2)
BASOPHILS NFR BLD: 0 % (ref 0–2)
DIFFERENTIAL METHOD BLD: ABNORMAL
EOSINOPHIL # BLD: 0 K/UL (ref 0–0.8)
EOSINOPHIL NFR BLD: 0 % (ref 0.5–7.8)
ERYTHROCYTE [DISTWIDTH] IN BLOOD BY AUTOMATED COUNT: 14.5 % (ref 11.9–14.6)
GLUCOSE BLD STRIP.AUTO-MCNC: 238 MG/DL (ref 65–100)
GLUCOSE BLD STRIP.AUTO-MCNC: 309 MG/DL (ref 65–100)
HCT VFR BLD AUTO: 37.9 % (ref 41.1–50.3)
HGB BLD-MCNC: 12.2 G/DL (ref 13.6–17.2)
IMM GRANULOCYTES # BLD AUTO: 0 K/UL (ref 0–0.5)
IMM GRANULOCYTES NFR BLD AUTO: 0 % (ref 0–5)
LYMPHOCYTES # BLD: 1.2 K/UL (ref 0.5–4.6)
LYMPHOCYTES NFR BLD: 16 % (ref 13–44)
MCH RBC QN AUTO: 26.4 PG (ref 26.1–32.9)
MCHC RBC AUTO-ENTMCNC: 32.2 G/DL (ref 31.4–35)
MCV RBC AUTO: 82 FL (ref 79.6–97.8)
MONOCYTES # BLD: 0.7 K/UL (ref 0.1–1.3)
MONOCYTES NFR BLD: 9 % (ref 4–12)
NEUTS SEG # BLD: 5.6 K/UL (ref 1.7–8.2)
NEUTS SEG NFR BLD: 75 % (ref 43–78)
NRBC # BLD: 0 K/UL (ref 0–0.2)
PLATELET # BLD AUTO: 141 K/UL (ref 150–450)
PMV BLD AUTO: 11.2 FL (ref 9.4–12.3)
RBC # BLD AUTO: 4.62 M/UL (ref 4.23–5.6)
SERVICE CMNT-IMP: ABNORMAL
SERVICE CMNT-IMP: ABNORMAL
WBC # BLD AUTO: 7.5 K/UL (ref 4.3–11.1)

## 2022-06-11 PROCEDURE — C9113 INJ PANTOPRAZOLE SODIUM, VIA: HCPCS | Performed by: INTERNAL MEDICINE

## 2022-06-11 PROCEDURE — 2580000003 HC RX 258: Performed by: INTERNAL MEDICINE

## 2022-06-11 PROCEDURE — 36415 COLL VENOUS BLD VENIPUNCTURE: CPT

## 2022-06-11 PROCEDURE — 6370000000 HC RX 637 (ALT 250 FOR IP): Performed by: INTERNAL MEDICINE

## 2022-06-11 PROCEDURE — 85025 COMPLETE CBC W/AUTO DIFF WBC: CPT

## 2022-06-11 PROCEDURE — 97116 GAIT TRAINING THERAPY: CPT

## 2022-06-11 PROCEDURE — 6370000000 HC RX 637 (ALT 250 FOR IP): Performed by: FAMILY MEDICINE

## 2022-06-11 PROCEDURE — 97165 OT EVAL LOW COMPLEX 30 MIN: CPT

## 2022-06-11 PROCEDURE — 6360000002 HC RX W HCPCS: Performed by: INTERNAL MEDICINE

## 2022-06-11 PROCEDURE — 2580000003 HC RX 258: Performed by: FAMILY MEDICINE

## 2022-06-11 PROCEDURE — 82962 GLUCOSE BLOOD TEST: CPT

## 2022-06-11 PROCEDURE — A4216 STERILE WATER/SALINE, 10 ML: HCPCS | Performed by: INTERNAL MEDICINE

## 2022-06-11 PROCEDURE — 97530 THERAPEUTIC ACTIVITIES: CPT

## 2022-06-11 RX ORDER — INSULIN LISPRO 100 [IU]/ML
INJECTION, SOLUTION INTRAVENOUS; SUBCUTANEOUS
Qty: 1 EACH | Refills: 1 | Status: SHIPPED | OUTPATIENT
Start: 2022-06-11 | End: 2022-07-08

## 2022-06-11 RX ORDER — INSULIN LISPRO 100 [IU]/ML
8 INJECTION, SOLUTION INTRAVENOUS; SUBCUTANEOUS
Qty: 1 EACH | Refills: 1 | Status: SHIPPED | OUTPATIENT
Start: 2022-06-11 | End: 2022-07-08 | Stop reason: SDUPTHER

## 2022-06-11 RX ORDER — INSULIN LISPRO 100 [IU]/ML
5 INJECTION, SOLUTION INTRAVENOUS; SUBCUTANEOUS
Status: DISCONTINUED | OUTPATIENT
Start: 2022-06-11 | End: 2022-06-11

## 2022-06-11 RX ORDER — INSULIN LISPRO 100 [IU]/ML
8 INJECTION, SOLUTION INTRAVENOUS; SUBCUTANEOUS
Status: DISCONTINUED | OUTPATIENT
Start: 2022-06-11 | End: 2022-06-11 | Stop reason: HOSPADM

## 2022-06-11 RX ORDER — INSULIN GLARGINE 100 [IU]/ML
25 INJECTION, SOLUTION SUBCUTANEOUS NIGHTLY
Qty: 1 EACH | Refills: 1 | Status: SHIPPED | OUTPATIENT
Start: 2022-06-11 | End: 2022-07-13 | Stop reason: SDUPTHER

## 2022-06-11 RX ORDER — INSULIN GLARGINE 100 [IU]/ML
16 INJECTION, SOLUTION SUBCUTANEOUS NIGHTLY
Status: DISCONTINUED | OUTPATIENT
Start: 2022-06-11 | End: 2022-06-11

## 2022-06-11 RX ORDER — IBUPROFEN 200 MG
1 TABLET ORAL DAILY
Qty: 100 EACH | Refills: 0 | Status: SHIPPED | OUTPATIENT
Start: 2022-06-11

## 2022-06-11 RX ORDER — INSULIN GLARGINE 100 [IU]/ML
20 INJECTION, SOLUTION SUBCUTANEOUS NIGHTLY
Status: DISCONTINUED | OUTPATIENT
Start: 2022-06-11 | End: 2022-06-11 | Stop reason: HOSPADM

## 2022-06-11 RX ADMIN — LEVOTHYROXINE SODIUM 75 MCG: 0.07 TABLET ORAL at 06:27

## 2022-06-11 RX ADMIN — INSULIN LISPRO 5 UNITS: 100 INJECTION, SOLUTION INTRAVENOUS; SUBCUTANEOUS at 12:06

## 2022-06-11 RX ADMIN — ACETAMINOPHEN 650 MG: 325 TABLET ORAL at 03:55

## 2022-06-11 RX ADMIN — SODIUM BICARBONATE 650 MG TABLET 650 MG: at 09:49

## 2022-06-11 RX ADMIN — FINASTERIDE 5 MG: 5 TABLET, FILM COATED ORAL at 09:49

## 2022-06-11 RX ADMIN — INSULIN LISPRO 6 UNITS: 100 INJECTION, SOLUTION INTRAVENOUS; SUBCUTANEOUS at 12:07

## 2022-06-11 RX ADMIN — SODIUM CHLORIDE, PRESERVATIVE FREE 3 ML: 5 INJECTION INTRAVENOUS at 04:15

## 2022-06-11 RX ADMIN — SODIUM CHLORIDE: 900 INJECTION, SOLUTION INTRAVENOUS at 04:00

## 2022-06-11 RX ADMIN — SODIUM CHLORIDE 40 MG: 9 INJECTION INTRAMUSCULAR; INTRAVENOUS; SUBCUTANEOUS at 09:49

## 2022-06-11 RX ADMIN — BUPROPION HYDROCHLORIDE 75 MG: 75 TABLET, FILM COATED ORAL at 09:49

## 2022-06-11 NOTE — PROGRESS NOTES
Pt resting quietly in bed. Respirations are even and unlabored. No signs of distress noted or needs stated at this time. Call light in reach. Will continue to monitor. SBAR given to oncoming nurse.

## 2022-06-11 NOTE — PROGRESS NOTES
Patient to be discharged home with his wife. Patient's IV out with no difficulty. Discharge instructions given to wife and patient with time given for questions and answers. Patient and family verbalized understanding. Teaching was completed with wife for insulin administration. A copy of discharge paperwork was given to patient/wife but esign was not available for esignature. Wife to transport patient home.

## 2022-06-11 NOTE — DISCHARGE SUMMARY
Hospitalist Discharge Summary   Admit Date:  2022  1:25 PM   DC Note date: 2022  Name:  Mattie Elizabeth Age:  78 y.o. Sex:  male  :  1945   MRN:  [id number]   Room:  [facility]  PCP:  Crystal Lazar DO    Presenting Complaint: Hyperglycemia     Initial Admission Diagnosis: Volume depletion [E86.9]  DKA, type 1, not at goal [facility]) [E10.10]  Diabetic ketoacidosis without coma associated with type 1 diabetes mellitus ([facility] [E10.10]  Non-intractable vomiting with nausea, unspecified vomiting type [R11.2]     Problem List for this Hospitalization (present on admission):    Principal Problem:    DKA, type 1, not at goal [facility])  Active Problems:    Neuropathy    Hypothyroid    Chronic pain    Chronic renal disease, stage 3, moderately decreased glomerular filtration rate (GFR) between 30-59 mL/min/1.73 square meter ([facility]    Dysphagia    Depression  Resolved Problems:    * No resolved hospital problems. *      Hospital Course:  Becca Vega Jr. is a 78 y. o. male with medical history of DM1 since age 14 yo with insulin pump, neuropathy, debility, hearing loss, hypothyroidism admitted with DKA. Patient supposed to be on insulin pump at home which he lost.  He has new insulin pump but does not know how to set up. He was started on glucose stabilizer protocol including IV fluids, insulin gtt, and supportive care. Electrolytes repleted as needed. Anion gap closed and patient off insulin gtt. Transitioned to subcu regimen. Inpatient diabetes management consulted and educated patient on insulin administration. Educated regarding hypoglycemia signs, symptoms, and treatment. Patient preferred vials. Patient has new insulin pump and needs to follow-up with endocrinology for set up and further management of diabetes mellitus. Meanwhile patient will be discharged home on Lantus 25 units at bedtime and Humalog 8 units 3 times daily AC and continuation of correctional scale Humalog.     SLP evaluated patient and recommend regular diet. Patient is following GI outpatient and is scheduled for outpatient EGD. PT/OT consulted and recommend no further skilled therapy required. All other chronic conditions stable and we continued essential home meds. No new complaint on the day of discharge. Patient is stable to discharge home today with close follow-up with PCP and endocrinologist for further management of diabetes mellitus type 1. Disposition: Home health  Diet: ADULT DIET; Regular; 4 carb choices (60 gm/meal)  Code Status: Full Code    Follow Ups:  PCP and endocrinologist in 3 to 5 days      Time spent in patient discharge and coordination 38 minutes. Plan was discussed with PATIENT AND WIFE. All questions answered. Patient was stable at time of discharge. Instructions given to call a physician or return if any concerns. Current Discharge Medication List      START taking these medications    Details   insulin glargine (LANTUS) 100 UNIT/ML injection vial Inject 25 Units into the skin nightly  Qty: 1 each, Refills: 1      INSULIN SYRINGE .5CC/29G ([employer] INS SYR .5CC/29G) 29G X 1/2\" 0.5 ML MISC 1 each by Does not apply route daily  Qty: 100 each, Refills: 0      !! insulin lispro (HUMALOG) 100 UNIT/ML SOLN injection vial **Medium Dose Corrective Algorithm** Glucose:     Dose:   No Insulin  200-249 2 Units   250-299 4 Units   300-349 6 Units   Over 349 8 Units  Qty: 1 each, Refills: 1      !! insulin lispro (HUMALOG) 100 UNIT/ML SOLN injection vial Inject 8 Units into the skin 3 times daily (before meals)  Qty: 1 each, Refills: 1       !! - Potential duplicate medications found. Please discuss with provider.       CONTINUE these medications which have NOT CHANGED    Details   buPROPion (WELLBUTRIN) 75 MG tablet 75 mg      DULoxetine (CYMBALTA) 60 MG extended release capsule TAKE 2 CAPSULES BY MOUTH EVERY MORNING      FLUoxetine (PROZAC) 20 MG capsule TAKE 1 CAPSULE BY MOUTH DAILY levothyroxine (SYNTHROID) 75 MCG tablet       lidocaine (LIDODERM) 5 % daily as needed      morphine (MS CONTIN) 15 MG extended release tablet TAKE 1 TABLET BY MOUTH IN THE MORNING AND 1 TABLET BY MOUTH IN THE EVENING      rosuvastatin (CRESTOR) 10 MG tablet       sodium bicarbonate 650 MG tablet              Procedures done this admission:  * No surgery found *    Consults this admission:  IP CONSULT TO DIABETES MANAGEMENT  FOLLOW UP VISIT    Echocardiogram results:  06/09/22    TRANSTHORACIC ECHOCARDIOGRAM (TTE) COMPLETE (CONTRAST/BUBBLE/3D PRN) 06/10/2022  9:43 AM (Final)    Interpretation Summary    Left Ventricle: Normal left ventricular systolic function with a visually estimated EF of 60 - 65%. Left ventricle size is normal. Normal wall thickness. Normal wall motion. Normal diastolic function. Average E/e' ratio is 11.31.    Aortic Valve:  Mild stenosis of the aortic valve. AV mean gradient is 11 mmHg. AV peak gradient is 17 mmHg. AV area by continuity VTI is 1.7 cm2.   Tricuspid Valve: Unable to assess RVSP due to inadequate or insignificant tricuspid regurgitation.   Pericardium: No pericardial effusion.   Contrast used: Definity. Signed by: Jt Moreno MD on 6/10/2022  9:43 AM      Diagnostic Imaging/Tests:   XR CHEST PORTABLE    Result Date: 6/9/2022  Chest X-ray INDICATION: Diabetic ketoacidosis. A portable AP view of the chest was obtained. FINDINGS: Mild interstitial lung changes are present possibly reflecting interstitial edema. No pneumothorax or left pleural effusion. Small right pleural effusion possibly layering about the right lung apex and lateral right chest wall. .  The heart size is normal.  The bony thorax is intact. Postoperative changes noted in the proximal left humerus. Spinal stimulator leads overlie the mid thoracic spine. Probable interstitial edema and right pleural effusion. No focal infiltrate to suggest pneumonia.      Transthoracic echocardiogram (TTE) complete with contrast, bubble, strain, and 3D PRN    Result Date: 6/10/2022    Left Ventricle: Normal left ventricular systolic function with a visually estimated EF of 60 - 65%. Left ventricle size is normal. Normal wall thickness. Normal wall motion. Normal diastolic function. Average E/e' ratio is 11.31.   Aortic Valve:  Mild stenosis of the aortic valve. AV mean gradient is 11 mmHg. AV peak gradient is 17 mmHg. AV area by continuity VTI is 1.7 cm2.   Tricuspid Valve: Unable to assess RVSP due to inadequate or insignificant tricuspid regurgitation.   Pericardium: No pericardial effusion.   Contrast used: Definity. Labs: Results:       BMP, Mg, Phos Recent Labs     06/09/22  1736 06/09/22  1736 06/09/22  2016 06/10/22  0041 06/10/22  0506   *   < > 134* 137 134*   K 4.7   < > 3.6 3.4* 3.6      < > 103 104 103   CO2 21   < > 22 23 23   BUN 41*   < > 42* 42* 39*   MG 2.3  --  2.2 2.3  --    PHOS 2.7  --  2.1* 2.3  --     < > = values in this interval not displayed.       CBC Recent Labs     06/09/22  1236 06/10/22  0049 06/11/22  0315   WBC 11.2* 15.7* 7.5   RBC 5.48 4.69 4.62   HGB 14.6 12.5* 12.2*   HCT 46.8 39.6* 37.9*    184 141*      LFT Recent Labs     06/09/22  1236   ALT 19   GLOB 4.5*      Cardiac Testing No results found for: BNP, CPK, RCK1, CKMB   Coagulation Tests No results found for: INR, APTT   A1c No results found for: HBA1C   Lipid Panel No results found for: CHOL, CHOLPOCT, CHOLX, CHLST, CHOLV, 601381, HDL, HDLC, LDL, LDLC, 317227, VLDLC, VLDL, TGLX, TRIGL   Thyroid Panel No results found for: TSH, T4, FT4     Most Recent UA Lab Results   Component Value Date    MUCUS 0 06/09/2022    UCOM RESULTS VERIFIED MANUALLY 05/23/2022          All Labs from Last 24 Hrs:  Recent Results (from the past 24 hour(s))   POCT Glucose    Collection Time: 06/10/22 12:33 PM   Result Value Ref Range    POC Glucose 247 (H) 65 - 100 mg/dL    Performed by: Caitlyn Arenas    POCT Glucose Collection Time: 06/10/22  4:46 PM   Result Value Ref Range    POC Glucose 289 (H) 65 - 100 mg/dL    Performed by: Macarena)    POCT Glucose    Collection Time: 06/10/22  8:30 PM   Result Value Ref Range    POC Glucose 291 (H) 65 - 100 mg/dL    Performed by: Carmela    CBC with Auto Differential    Collection Time: 06/11/22  3:15 AM   Result Value Ref Range    WBC 7.5 4.3 - 11.1 K/uL    RBC 4.62 4.23 - 5.6 M/uL    Hemoglobin 12.2 (L) 13.6 - 17.2 g/dL    Hematocrit 37.9 (L) 41.1 - 50.3 %    MCV 82.0 79.6 - 97.8 FL    MCH 26.4 26.1 - 32.9 PG    MCHC 32.2 31.4 - 35.0 g/dL    RDW 14.5 11.9 - 14.6 %    Platelets 235 (L) 974 - 450 K/uL    MPV 11.2 9.4 - 12.3 FL    nRBC 0.00 0.0 - 0.2 K/uL    Differential Type AUTOMATED      Seg Neutrophils 75 43 - 78 %    Lymphocytes 16 13 - 44 %    Monocytes 9 4.0 - 12.0 %    Eosinophils % 0 (L) 0.5 - 7.8 %    Basophils 0 0.0 - 2.0 %    Immature Granulocytes 0 0.0 - 5.0 %    Segs Absolute 5.6 1.7 - 8.2 K/UL    Absolute Lymph # 1.2 0.5 - 4.6 K/UL    Absolute Mono # 0.7 0.1 - 1.3 K/UL    Absolute Eos # 0.0 0.0 - 0.8 K/UL    Basophils Absolute 0.0 0.0 - 0.2 K/UL    Absolute Immature Granulocyte 0.0 0.0 - 0.5 K/UL   POCT Glucose    Collection Time: 06/11/22  7:52 AM   Result Value Ref Range    POC Glucose 238 (H) 65 - 100 mg/dL    Performed by: Demarcus    POCT Glucose    Collection Time: 06/11/22 11:38 AM   Result Value Ref Range    POC Glucose 309 (H) 65 - 100 mg/dL    Performed by: Demarcus        No Known Allergies    There is no immunization history on file for this patient.     Recent Vital Data:  Patient Vitals for the past 24 hrs:   Temp Pulse Resp BP SpO2   06/11/22 1056 97.9 °F (36.6 °C) 99 18 127/84 100 %   06/11/22 0725 97.3 °F (36.3 °C) 90 18 (!) 148/95 100 %   06/11/22 0401 97.5 °F (36.4 °C) 92 20 137/84 100 %   06/10/22 2246 98.7 °F (37.1 °C) 98 20 120/80 96 %   06/10/22 1934 98.8 °F (37.1 °C) 95 20 127/89 100 %   06/10/22 1646 98.1 °F (36.7 °C) 98 -- (!) 153/96 100 %   06/10/22 1600 -- 96 (!) 7 139/88 99 %   06/10/22 1530 -- 96 (!) 9 -- 96 %   06/10/22 1500 -- 98 15 126/74 99 %   06/10/22 1449 -- -- -- (!) 142/84 --   06/10/22 1400 -- 98 -- (!) 155/92 100 %   06/10/22 1330 -- (!) 103 22 -- 99 %   06/10/22 1300 -- (!) 101 25 (!) 157/79 96 %       Oxygen Therapy  SpO2: 100 %  Pulse via Oximetry: 96 beats per minute  O2 Device: None (Room air)    Estimated body mass index is 30.94 kg/m² as calculated from the following:    Height as of this encounter: 5' 7\" (1.702 m). Weight as of this encounter: 197 lb 8.5 oz (89.6 kg). No intake or output data in the 24 hours ending 06/11/22 1232      Physical Exam:    General:    Well nourished. No overt distress  Head:  Normocephalic, atraumatic  Eyes:  Sclerae appear normal.  Pupils equally round. HENT:  Nares appear normal, no drainage. Moist mucous membranes  Neck:  No restricted ROM. Trachea midline  CV:   RRR. No m/r/g. No JVD  Lungs:   CTAB. No wheezing, rhonchi, or rales. Even, unlabored  Abdomen:   Soft, nontender, nondistended. Extremities: Warm and dry. No cyanosis or clubbing. No edema. Skin:     No rashes. Normal coloration  Neuro:  CN II-XII grossly intact. Psych:  Normal mood and affect. Signed:  John West MD    Part of this note may have been written by using a voice dictation software. The note has been proof read but may still contain some grammatical/other typographical errors.

## 2022-06-11 NOTE — PROGRESS NOTES
Pt is resting quietly in bed. Respirations even and unlabored on RA. No signs of distress noted at this time. Call light within reach. Will continue to monitor.

## 2022-06-11 NOTE — PROGRESS NOTES
PHYSICAL THERAPY Daily Note  (Link to Caseload Tracking: PT Visit Days : 2  Time In/Out PT Charge Capture  Rehab Caseload Tracker  Orders      Maile Alfaro is a 78 y.o. male   PRIMARY DIAGNOSIS: DKA, type 1, not at goal ([facility]  Volume depletion [E86.9]  DKA, type 1, not at goal [facility]) [E10.10]  Diabetic ketoacidosis without coma associated with type 1 diabetes mellitus ([facility] [E10.10]  Non-intractable vomiting with nausea, unspecified vomiting type [R11.2]       Inpatient: Payor: Randy Arauz / Plan: [address] / Product Type: *No Product type* /     ASSESSMENT:     REHAB RECOMMENDATIONS:   Recommendation to date pending progress:  [address] [place] [address] Therapy vs no needs    Equipment:     None     ASSESSMENT:  Mr. Sherly Calabrese presents supine in bed with his wife present and is agreeable to therapy. He demonstrates improved bed mobility and transfers today and is able to perform these with SBA-CGA. Pt provided instruction for transfers with RW for improved safety. He ambulated 150' with a very slow pace needing cueing for improved awareness. Pt ambulated up/down a flight of 10 steps with B hand rails and CGA for safety. He is making good progress towards his goals. PT will continue to follow.       SUBJECTIVE:   Mr. Sherly Calabrese states, \"I am ready to go home\"     Social/Functional Lives With: Spouse  Type of Home: House  Home Layout: One level  Home Access: Stairs to enter with rails  Entrance Stairs - Number of Steps: 5-7  Bathroom Shower/Tub: Walk-in shower  Bathroom Equipment: Shower chair  Home Equipment: Ronnald Real, rolling,Rollator  Has the patient had two or more falls in the past year or any fall with injury in the past year?: No  Receives Help From: Family  ADL Assistance: Needs assistance  Bath: Modified independent  Dressing: Independent  Grooming: Independent  Feeding: Independent  Toileting: Independent  Homemaking Assistance: Independent  Active : No  Patient's  Info: spouse  Occupation: Retired  OBJECTIVE:     PAIN: VITALS / O2: PRECAUTION / Iver Emigdioser / Ana Paula Conine:   Pre Treatment:   Pain Assessment: None - Denies Pain      Post Treatment: 0/10 Vitals        Oxygen    None    RESTRICTIONS/PRECAUTIONS:        MOBILITY: I Mod I S SBA CGA Min Mod Max Total  NT x2 Comments:   Bed Mobility    Rolling [] [] [] [x] [] [] [] [] [] [] []    Supine to Sit [] [] [] [x] [] [] [] [] [] [] []    Scooting [] [] [] [x] [] [] [] [] [] [] []    Sit to Supine [] [] [] [] [] [] [] [] [] [x] []    Transfers    Sit to Stand [] [] [] [] [x] [] [] [] [] [] []    Bed to Chair [] [] [] [] [] [] [] [] [] [x] []    Stand to Sit [] [] [] [x] [x] [] [] [] [] [] []     [] [] [] [] [] [] [] [] [] [] []    I=Independent, Mod I=Modified Independent, S=Supervision, SBA=Standby Assistance, CGA=Contact Guard Assistance,   Min=Minimal Assistance, Mod=Moderate Assistance, Max=Maximal Assistance, Total=Total Assistance, NT=Not Tested    BALANCE: Good Fair+ Fair Fair- Poor NT Comments   Sitting Static [x] [] [] [] [] []    Sitting Dynamic [x] [] [] [] [] []              Standing Static [] [x] [] [] [] []    Standing Dynamic [] [x] [x] [] [] []      GAIT: I Mod I S SBA CGA Min Mod Max Total  NT x2 Comments:   Level of Assistance [] [] [] [] [x] [] [] [] [] [] []    Distance 150 feet    DME Rolling Walker    Gait Quality Decreased vicky , Decreased step clearance and Decreased step length    Weightbearing Status      Stairs Stairs/Curb  Stairs?: Yes  Stairs  # Steps : 10  Rails: Bilateral    I=Independent, Mod I=Modified Independent, S=Supervision, SBA=Standby Assistance, CGA=Contact Guard Assistance,   Min=Minimal Assistance, Mod=Moderate Assistance, Max=Maximal Assistance, Total=Total Assistance, NT=Not Tested    PLAN:   ACUTE PHYSICAL THERAPY GOALS:   (Developed with and agreed upon by patient and/or caregiver. )  LTG:  (1.)Mr. Js Vogt will move from supine to sit and sit to supine , scoot up and down and roll side to side in bed with SUPERVISION within 7 treatment day(s). (2.)Mr. Sherly Calabrese will transfer from bed to chair and chair to bed with SUPERVISION using the least restrictive device within 7 treatment day(s). (3.)Mr. Sherly Calabrese will ambulate with SUPERVISION for 200+ feet with the least restrictive device within 7 treatment day(s). (4.)Mr. Sherly Calabrese will ambulate up/down 12 steps with SUPERVISION  with the least restrictive device within 7 treatment day(s). ________________________________________________________________________________________________       FREQUENCY AND DURATION: 3 times/week for duration of hospital stay or until stated goals are met, whichever comes first.    TREATMENT:   TREATMENT:   Co-Treatment PT/OT necessary due to patient's decreased overall endurance/tolerance levels, as well as need for high level skilled assistance to complete functional transfers/mobility and functional tasks  Gait Training (15 Minutes): Gait training for 150 feet utilizing [facility]. Patient required Verbal cueing to improve Assistive Device Utilization and improved safety awareness.      TREATMENT GRID:  N/A    AFTER TREATMENT PRECAUTIONS: Bed/Chair Locked, Call light within reach, Chair, Needs within reach, RN notified and Visitors at bedside    INTERDISCIPLINARY COLLABORATION:  RN/ PCT, PT/ PTA and OT/ BOWDEN    EDUCATION: Education Given To: Patient  Education Provided: Transfer Training  Education Method: Verbal  Barriers to Learning: None  Education Outcome: Verbalized understanding    TIME IN/OUT:  Time In: 1119  Time Out: [facility]  Minutes: [place], [address] [place] [address]

## 2022-06-11 NOTE — PROGRESS NOTES
OCCUPATIONAL THERAPY Initial Assessment and Daily Note       OT Visit Days: 1  Acknowledge Orders  Time  OT Charge Capture  Rehab Caseload Tracker      Claude Mendoza is a 78 y.o. male   PRIMARY DIAGNOSIS: DKA, type 1, not at goal ([facility] [address]  Volume depletion [E86.9]  DKA, type 1, not at goal [facility]) [E10.10]  Diabetic ketoacidosis without coma associated with type 1 diabetes mellitus ([facility] [address] [E10.10]  Non-intractable vomiting with nausea, unspecified vomiting type [R11.2]       Reason for Referral: Generalized Muscle Weakness (M62.81)  Difficulty in walking, Not elsewhere classified (R26.2)  Other abnormalities of gait and mobility (R26.89)  Inpatient: Payor: Olya How / Plan: [place] / Product Type: *No Product type* /     ASSESSMENT:     REHAB RECOMMENDATIONS:   Recommendation to date pending progress:  [address] [place] [address] Therapy    Equipment:     None--pt has RW, rollator and SC at home     ASSESSMENT:  Mr. David Amin is a 77 y/o male presents with DKA and debility. At baseline pt is mod I-I all ADLs, uses a rollator and lives with his wife. Today pt presents with decreased strength, mobility and balance impacting ADLs. Pt overall SBA-CGA RW for functional transfers and mobility of household distances. Pt with a very slow vicky and walks very cautiously. Pt able to complete UE dressing EOB with set up and good sitting balance. Pt then able to walk up/down 10 steps with CGA and PT assistance. Pt did not require rest breaks today and with good activity tolerance. Pt is currently functioning close to baseline but would benefit from skilled OT services to address OT goals and plan of care. Rec HHOT at d/c.       [address] [facility] [address] [address] AM-PAC 6 Clicks Daily Activity Inpatient Short Form:    AM-PAC Daily Activity Inpatient   How much help for putting on and taking off regular lower body clothing?: A Little  How much help for Bathing?: A Little  How much help for Toileting?: A Little  How much help for putting on and taking off regular upper body clothing?: None  How much help for taking care of personal grooming?: None  How much help for eating meals?: None  AM-PAC Inpatient Daily Activity Raw Score: 21  AM-PAC Inpatient ADL T-Scale Score : 44.27  ADL Inpatient CMS 0-100% Score: 32.79  ADL Inpatient CMS G-Code Modifier : CJ           SUBJECTIVE:     Mr. Salas Annette states, \"I am slow, but I will get there\"     Social/Functional Lives With: Spouse  Type of Home: House  Home Layout: One level  Home Access: Stairs to enter with rails  Entrance Stairs - Number of Steps: 5-7  Bathroom Shower/Tub: Walk-in shower  Bathroom Equipment: Shower chair  Home Equipment: Arvil Licking, rolling,Rollator  Has the patient had two or more falls in the past year or any fall with injury in the past year?: No  Receives Help From: Family  ADL Assistance: Needs assistance  Bath: Modified independent  Dressing: Independent  Grooming: Independent  Feeding: Independent  Toileting: Independent  Homemaking Assistance: Independent  Active : No  Patient's  Info: spouse  Occupation: Retired    OBJECTIVE:     Delmas Hockey / Laveta Hives / Lorraine Killian: None    RESTRICTIONS/PRECAUTIONS:  Restrictions/Precautions: Fall Risk    PAIN: VITALS / O2:   Pre Treatment:   Pain Assessment: None - Denies Pain      Post Treatment: no c/o pain, resting comfortably in bedside chair       Vitals          Oxygen            GROSS EVALUATION: INTACT IMPAIRED   (See Comments)   UE AROM [x] []   UE PROM [x] []   Strength []   generally weak     Posture / Balance [] Posture: Fair  Sitting - Static: Good  Sitting - Dynamic: Good  Standing - Static: Fair  Standing - Dynamic: Fair   Sensation [x]     Coordination [x]       Tone [x]       Edema [x]    Activity Tolerance [x]       Hand Dominance R [] L []      COGNITION/  PERCEPTION: INTACT IMPAIRED   (See Comments)   Orientation [x]     Vision [x]     Hearing [x]     Cognition  [x]     Perception [] Decreased safety awareness MOBILITY: I Mod I S SBA CGA Min Mod Max Total  NT x2 Comments:   Bed Mobility    Rolling [] [] [] [] [] [] [] [] [] [x] []    Supine to Sit [] [] [x] [] [] [] [] [] [] [] []    Scooting [] [] [x] [] [] [] [] [] [] [] []    Sit to Supine [] [] [] [] [] [] [] [] [] [x] []    Transfers    Sit to Stand [] [] [] [x] [] [] [] [] [] [] [] RW   Bed to Chair [] [] [] [] [x] [] [] [] [] [] [] RW   Stand to Sit [] [] [] [x] [] [] [] [] [] [] [] RW   Tub/Shower [] [] [] [] [] [] [] [] [] [x] []     Toilet [] [] [] [] [] [] [] [] [] [x] []      [] [] [] [] [] [] [] [] [] [x] []    I=Independent, Mod I=Modified Independent, S=Supervision/Setup, SBA=Standby Assistance, CGA=Contact Guard Assistance, Min=Minimal Assistance, Mod=Moderate Assistance, Max=Maximal Assistance, Total=Total Assistance, NT=Not Tested    ACTIVITIES OF DAILY LIVING: I Mod I S SBA CGA Min Mod Max Total NT Comments   BASIC ADLs:              Upper Body Bathing  [] [] [] [] [] [] [] [] [] [x]    Lower Body Bathing [] [] [] [] [] [] [] [] [] [x]    Toileting [] [] [] [] [] [] [] [] [] [x]    Upper Body Dressing [] [] [x] [] [] [] [] [] [] [] Vista gown and donning pullover shirt EOB   Lower Body Dressing [] [] [] [] [] [] [] [] [] [x]    Feeding [] [] [] [] [] [] [] [] [] [x]    Grooming [] [] [] [] [] [] [] [] [] [x]    Personal Device Care [] [] [] [] [] [] [] [] [] [x]    Functional Mobility [] [] [] [] [x] [] [] [] [] [] RW   I=Independent, Mod I=Modified Independent, S=Supervision/Setup, SBA=Standby Assistance, CGA=Contact Guard Assistance, Min=Minimal Assistance, Mod=Moderate Assistance, Max=Maximal Assistance, Total=Total Assistance, NT=Not Tested    PLAN:     FREQUENCY/DURATION   OT Plan of Care: 3 times/week for duration of hospital stay or until stated goals are met, whichever comes first.    ACUTE OCCUPATIONAL THERAPY GOALS:   (Developed with and agreed upon by patient and/or caregiver.)  1.  Patient will complete lower body bathing and dressing with MOD I and adaptive equipment as needed. 2. Patient will complete toileting with MOD I.   3. Patient will complete grooming ADL with MOD I.  4. Patient will tolerate 25 minutes of OT treatment with 1-2 rest breaks to increase activity tolerance for ADLs. 5. Patient will complete functional transfers with MOD I and adaptive equipment as needed. 6. Patient will tolerate 10 minutes BUE exercises to increase strength for safe, functional transfers. Timeframe: 7 visits       PROBLEM LIST:   (Skilled intervention is medically necessary to address:)  Decreased ADL/Functional Activities  Decreased Balance  Decreased Safety Awareness  Decreased Strength  Decreased Transfer Abilities   INTERVENTIONS PLANNED:  (Benefits and precautions of occupational therapy have been discussed with the patient.)  Self Care Training  Therapeutic Activity  Therapeutic Exercise/HEP  Neuromuscular Re-education  Manual Therapy  Education         TREATMENT:     EVALUATION: LOW COMPLEXITY: (Untimed Charge)    TREATMENT:   Co-Treatment PT/OT necessary due to patient's decreased overall endurance/tolerance levels, as well as need for high level skilled assistance to complete functional transfers/mobility and functional tasks  Therapeutic Activity (12 Minutes): Therapeutic activity included Supine to Sit, Scooting, Transfer Training, Ambulation on level ground, Stair Training, Sitting balance  and Standing balance to improve functional Activity tolerance, Balance, Coordination, Mobility and Strength.     TREATMENT GRID:  N/A    AFTER TREATMENT PRECAUTIONS: Call light within reach, Chair, Needs within reach, RN notified and Visitors at bedside    INTERDISCIPLINARY COLLABORATION:  RN/ PCT, PT/ PTA and OT/ BOWDEN    EDUCATION:   To patient and family about role of therapy and plan of care    TOTAL TREATMENT DURATION AND TIME:  Time In: 1119  Time Out: [address]  Minutes: [address] [facility],Sixth Floor, OT

## 2022-06-11 NOTE — PROGRESS NOTES
Patient will be d/c home today. Patient has no needs identified at being d/c home today. Family will transport home. Patient has met all treatment goals / milestones. CM will continue to monitor and remain available for any needs that may occur. ASSESSMENT NOTE    Attending Physician: Kendrick Mercedes MD  Admit Problem: Volume depletion [E86.9]  DKA, type 1, not at goal [facility]) [E10.10]  Diabetic ketoacidosis without coma associated with type 1 diabetes mellitus ([facility] [address] [E10.10]  Non-intractable vomiting with nausea, unspecified vomiting type [R11.2]  Date/Time of Admission: 6/9/2022  1:25 PM  Problem List:  Patient Active Problem List   Diagnosis    DKA, type 1, not at goal [facility])    Neuropathy    Hypothyroid    Chronic pain    Chronic renal disease, stage 3, moderately decreased glomerular filtration rate (GFR) between 30-59 mL/min/1.73 square meter ([facility] [address]    Dysphagia    Depression       Service Assessment  Patient Orientation     Cognition Dementia / Early Alzheimer's   History Provided By Spouse   Primary Caregiver Self   Accompanied By/Relationship     Support Systems Spouse/Significant Other,Children,Family Members   Patient's Healthcare Decision Maker is: Legal Next of Miya 71   PCP Verified by CM Yes   Last Visit to PCP     Prior Functional Level Independent in ADLs/IADLs   Current Functional Level     Can patient return to prior living arrangement Unknown at present   Ability to make needs known: Fair   Family able to assist with home care needs: Yes   Would you like for me to discuss the discharge plan with any other family members/significant others, and if so, who?      Financial Resources Medicare (Pt has MCR/BCBS supplemental per wife, not advantage plan)   Community Resources     CM/SW Referral       Social/Functional History  Lives With Spouse   Type of [address] [facility] One level   Home Access Stairs to enter with rails   Entrance Stairs - Number of Steps 5-7   Entrance Stairs - [employer] Bathroom Shower/Tub Walk-in shower   Bathroom Toilet     Bathroom Equipment Shower chair   [address], rolling,Rollator   Receives Help From Family   ADL Assistance Needs assistance   Bath Modified independent   Dressing Independent   Grooming Independent   Feeding Independent   [address] [facility]   Meal Prep     [address] [facility] Work     Driving     Shopping          Other (Comment)     [address] [facility] Responsibilities     Meal Prep Responsibility     Laundry Responsibility     Cleaning Responsibility     Bill Paying/Finance Responsibility     Shopping Responsibility     Dependent Care Responsibility     Health Care Management     Other (Comment)     Ambuation Assistance     Transfer Assisstance     Active  No   Patient's  Info spouse   Mode of Transportation     Education     Occupation Retired   Type of Occupation       Discharge Planning   Type of Residence     Living Arrangements Spouse/Significant Other   Support Systems Spouse/Significant Other,Children,Family Members   Current Services Prior To Admission None   [address]   DME     DME     DME Ordered? Potential Assistance Purchasing Medications     Meds-to-Beds: Does the patient want to have any new prescriptions delivered to bedside prior to discharge? Type of [address]   Patient expects to be discharged to: Follow Up Appointment: Best Day/Time     One/Two Story Residence:     # of Interior Steps     Height of Each Step (in)     [employer] Available     History of Falls? Services At/After Discharge  Transition of Care Consult (CM Consult):      Internal Home Health     Internal Hospice     Reason Outside Agency [address]     Partner SNF     Reason Why Partner SNF Not Chosen     Internal Comfort Care     Reason Outside [address] [facility] [address] [facility]     [employer] Information Provided? Mode of Transport at [facility] Time of Discharge     Confirm Follow Up Transport Family     Condition of Participation: Discharge Planning  The plan for Transition of Care is related to the following treatment goals: The Patient and/or Patient Representative was provided with a Choice of Provider? Name of the Patient Representative who was provided with the Choice of Provider and agrees with the Discharge Plan? The Patient and/or Patient Representative Agree with the Discharge Plan? Freedom of Choice list was provided with basic dialogue that supports the individualized plan of care/goals, treatment preferences, and shares the quality data associated with the providers?  Yes     Documentation for Discharge Appeal  Discharge Appealed by     Date notified by QIO of appeal request:     Time notified by QIO of appeal request:     Detailed Notice of Discharge given to:     Date Notice of Discharge given:     Time Notice of Discharge given:     [facility]     Time [facility]     Date Notified of Outcome     Time Notified of Outcome     Outcome of appeal           DAVID Cook 06/11/22 1:49 PM

## 2022-06-14 ENCOUNTER — HOSPITAL ENCOUNTER (OUTPATIENT)
Dept: DIABETES SERVICES | Age: 77
Setting detail: RECURRING SERIES
Discharge: HOME | End: 2022-06-17

## 2022-06-14 ENCOUNTER — APPOINTMENT (OUTPATIENT)
Dept: PHYSICAL THERAPY | Age: 77
End: 2022-06-14
Payer: MEDICARE

## 2022-06-14 PROCEDURE — 95249 CONT GLUC MNTR PT PROV EQP: CPT

## 2022-06-14 NOTE — PROGRESS NOTES
This is a one on one appointment. Due to being during ZYDOI-27 public health emergency, social distancing and mandatory precautions are in place and utilized             This is a one on one appointment. Due to being during XCJGU-23 public health emergency, social distancing and mandatory precautions are in place and utilized. Pt seen today for initial insertion of Dexcom G6 system. Pt instructed on overview of continuous glucose monitoring (CGM) device and use. Instructed on sensor, transmitter and .  will be used. Pt instructed on download of Dexcom saul to phone. Time and date and transmitter ID verified. Instructed on troubleshooting, alerts, alarms, calibration, communication between sensor and , insertion of sensor and transmitter, starting sensor session, start up calibration, ending sensor session, removing sensor pod and transmitter and site rotation. Reviewed CGM guidelines and restrictions on use including no MRI, CT scans, or diathermy electrical treatments, bug spray, sun tanning lotions medications such as-Tylenol greater than standard dose ( 1 gram or 1000 mg every 6 hours) can make Dexcom G6 sensor readings look higher than they really are, or Hydroxyurea used for some cancers or sickle cell anemia - Your sensor readings will be higher than your actual glucose. Instructed only need to calibrate Dexcom G6, if blood glucose is 20 points off when blood glucose is under 70 mg/dl, or if blood sugar is 20% off when over 70 mg/dl. All questions and concerns addressed. Provided Dexcom technical support phone number. Medication Reconciliation. No surgery's or procedures. He was just released from the hospital recently and is unsure the amount of Lantus and Humalog he is suppose to be taking.  Lauren Gomez reports at home they have a bunch of papers from the hospital and sliding scale is in those papers, but she does not know what to give him, or remembers what it said. She wants to know if she follow those orders? They do not know what to do? Wife Ileana Sosa reports the amount given in the hospital will not control his blood sugars. He is having blood sugars into the low 300 mg/dl range. Set his Dexcom high for now at 350 mg/dl, so it does not beep all the time. Instructed alerts can be adjusted at any time. A high blood sugar is over 250 mg/dl. He has a T- Slim pump ordered, and once they can confirm location, he will receive instruction. Suggested to patient and Ileana Sosa to go by Endocrinology office and verify his medications since do not know what to give on their way out. Also sent in basket message to Corinne Dorsey. They said they would double check at the office on the way out. We also received referral for Diabetes Education. They have had education on Medicare. Qualifies for 2 hour follow up. They want an hour with [employer] and will decide at that appointment what they will attend for the second hour.

## 2022-06-15 ENCOUNTER — OFFICE VISIT (OUTPATIENT)
Dept: INTERNAL MEDICINE CLINIC | Facility: CLINIC | Age: 77
End: 2022-06-15
Payer: MEDICARE

## 2022-06-15 ENCOUNTER — TELEPHONE (OUTPATIENT)
Dept: ENDOCRINOLOGY | Age: 77
End: 2022-06-15

## 2022-06-15 ENCOUNTER — APPOINTMENT (OUTPATIENT)
Dept: PHYSICAL THERAPY | Age: 77
End: 2022-06-15
Payer: MEDICARE

## 2022-06-15 VITALS
BODY MASS INDEX: 33.89 KG/M2 | OXYGEN SATURATION: 98 % | DIASTOLIC BLOOD PRESSURE: 60 MMHG | HEART RATE: 102 BPM | TEMPERATURE: 97.3 F | HEIGHT: 66 IN | SYSTOLIC BLOOD PRESSURE: 100 MMHG

## 2022-06-15 DIAGNOSIS — Z96.41 DIABETES MELLITUS TYPE 1, WITH COMPLICATION, ON LONG TERM INSULIN PUMP (HCC): Primary | ICD-10-CM

## 2022-06-15 DIAGNOSIS — E10.8 DIABETES MELLITUS TYPE 1, WITH COMPLICATION, ON LONG TERM INSULIN PUMP (HCC): Primary | ICD-10-CM

## 2022-06-15 DIAGNOSIS — Z09 HOSPITAL DISCHARGE FOLLOW-UP: Primary | ICD-10-CM

## 2022-06-15 DIAGNOSIS — E10.65 UNCONTROLLED TYPE 1 DIABETES MELLITUS WITH HYPERGLYCEMIA (HCC): ICD-10-CM

## 2022-06-15 PROCEDURE — 99215 OFFICE O/P EST HI 40 MIN: CPT | Performed by: INTERNAL MEDICINE

## 2022-06-15 PROCEDURE — 1111F DSCHRG MED/CURRENT MED MERGE: CPT | Performed by: INTERNAL MEDICINE

## 2022-06-15 RX ORDER — INSULIN GLARGINE 100 [IU]/ML
INJECTION, SOLUTION SUBCUTANEOUS
Qty: 0.1 ML | Refills: 0
Start: 2022-06-15 | End: 2022-06-15 | Stop reason: SDUPTHER

## 2022-06-15 RX ORDER — INSULIN GLARGINE 100 [IU]/ML
INJECTION, SOLUTION SUBCUTANEOUS
Qty: 10 ML | Refills: 0
Start: 2022-06-15 | End: 2022-06-29 | Stop reason: SDUPTHER

## 2022-06-15 RX ORDER — INSULIN LISPRO 100 [IU]/ML
INJECTION, SOLUTION INTRAVENOUS; SUBCUTANEOUS
Qty: 10 ML | Refills: 1
Start: 2022-06-15 | End: 2022-06-28 | Stop reason: SDUPTHER

## 2022-06-15 ASSESSMENT — PATIENT HEALTH QUESTIONNAIRE - PHQ9
SUM OF ALL RESPONSES TO PHQ QUESTIONS 1-9: 1
1. LITTLE INTEREST OR PLEASURE IN DOING THINGS: 0
SUM OF ALL RESPONSES TO PHQ QUESTIONS 1-9: 1
2. FEELING DOWN, DEPRESSED OR HOPELESS: 1
SUM OF ALL RESPONSES TO PHQ QUESTIONS 1-9: 1
SUM OF ALL RESPONSES TO PHQ QUESTIONS 1-9: 1
SUM OF ALL RESPONSES TO PHQ9 QUESTIONS 1 & 2: 1

## 2022-06-15 NOTE — PROGRESS NOTES
There are no diagnoses linked to this encounter. Vivi Wang. is a 78 y.o. male    Chief Complaint   Patient presents with    Follow-Up from Hospital         No results found for this visit on 06/15/22.     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)     Diabetic nephropathy (HCC)     Diabetic peripheral neuropathy (HCC)     Dysphonia     Essential hypertension     GERD (gastroesophageal reflux disease)     Hypercholesterolemia     Insulin pump status     Left hip pain     Lumbar radiculopathy     Mild cognitive impairment     Moderate episode of recurrent major depressive disorder (HCC)     Obstructive sleep apnea     Orthostatic hypotension     Paraspinal muscle spasm     Polypharmacy     Primary hypothyroidism     Proliferative diabetic retinopathy associated with type 1 diabetes mellitus (HCC)     Seborrheic keratosis     Spinal cord stimulator status     Spondylolisthesis     Stage 3b chronic kidney disease ([facility]     Stroke-like symptom 7/8/2021    Tachycardia     Thyroid disease     managed with medications    Trochanteric bursitis of left hip     Type 1 diabetes mellitus (HCC)     Vocal cord atrophy        Family History   Problem Relation Age of Onset    Hypertension Brother     Diabetes Maternal Uncle         type 1    Diabetes Brother         type 2    Thyroid Disease Mother         treated with surgery    Heart Disease Father     Hypertension Mother         Social History     Socioeconomic History    Marital status:      Spouse name: Not on file    Number of children: Not on file    Years of education: Not on file    Highest education level: Not on file Occupational History    Not on file   Tobacco Use    Smoking status: Former Smoker     Packs/day: 1.00     Years: 30.00     Pack years: 30.00     Quit date: 1996     Years since quittin.8    Smokeless tobacco: Former User   Vaping Use    Vaping Use: Never used   Substance and Sexual Activity    Alcohol use: Not Currently    Drug use: Never    Sexual activity: Not on file   Other Topics Concern    Not on file   Social History Narrative    Not on file     Social Determinants of Health     Financial Resource Strain:     Difficulty of Paying Living Expenses: Not on file   Food Insecurity:     Worried About [address] in the Last Year: Not on file    [address] in the Last Year: Not on file   Transportation Needs:     Lack of Transportation (Medical): Not on file    Lack of Transportation (Non-Medical): Not on file   Physical Activity:     Days of Exercise per Week: Not on file    Minutes of Exercise per Session: Not on file   Stress:     Feeling of Stress : Not on file   Social Connections:     Frequency of Communication with Friends and Family: Not on file    Frequency of Social Gatherings with Friends and Family: Not on file    Attends Sabianism Services: Not on file    Active Member of [address] [place] [address] or Organizations: Not on file    Attends Club or Organization Meetings: Not on file    Marital Status: Not on file   Intimate Partner Violence:     Fear of Current or Ex-Partner: Not on file    Emotionally Abused: Not on file    Physically Abused: Not on file    Sexually Abused: Not on file   Housing Stability:     Unable to Pay for Housing in the Last Year: Not on file    Number of [place] in the Last Year: Not on file    Unstable Housing in the Last Year: Not on file         Current Outpatient Medications:     Blood Glucose Monitoring Suppl (CONTOUR MONITOR) ELLE, Check blood glucose 4 times daily.   Dx E10.65, Disp: , Rfl:     Glucose Blood (CONTOUR NEXT TEST VI), USE TO TEST THREE TIMES DAILY, Disp: , Rfl:     Continuous Blood Gluc  ([address]) ELLE, by Other route, Disp: , Rfl:     Continuous Blood Gluc Sensor (DEXCOM G6 SENSOR) MISC, Change every 10 days as directed, Disp: , Rfl:     Handicap Placard MISC, Handicap parking placard; permanent disability, Disp: , Rfl:     Continuous Blood Gluc Transmit (DEXCOM G6 TRANSMITTER) MISC, USE AS DIRECTED FOUR TIMES DAILY, Disp: , Rfl:     morphine (MS CONTIN) 15 MG extended release tablet, Take 15 mg by mouth 2 times daily.  , Disp: , Rfl:     amitriptyline (ELAVIL) 25 mg tablet, Take 25 mg by mouth nightly , Disp: , Rfl:     buPROPion (WELLBUTRIN) 75 MG tablet, Take 75 mg by mouth 2 times daily, Disp: , Rfl:     acetaminophen (TYLENOL) 500 MG tablet, Take 500 mg by mouth every 6 hours as needed, Disp: , Rfl:     vitamin D 25 MCG (1000 UT) CAPS, Take 1,000 Units by mouth daily, Disp: , Rfl:     DULoxetine (CYMBALTA) 60 MG extended release capsule, TAKE 1 CAPSULE BY MOUTH EVERY MORNING, Disp: , Rfl:     finasteride (PROSCAR) 5 MG tablet, Take 5 mg by mouth daily, Disp: , Rfl:     fluticasone (CUTIVATE) 0.05 % cream, Apply 1 g topically 2 times daily , Disp: , Rfl:     fluticasone (FLONASE) 50 MCG/ACT nasal spray, 2 sprays by Nasal route daily as needed, Disp: , Rfl:     Glucagon (GVOKE PFS) 1 MG/0.2ML SOSY, Inject 1 each into the skin as needed, Disp: , Rfl:     hydrocortisone (WESTCORT) 0.2 % cream, Apply topically, Disp: , Rfl:     insulin glargine (LANTUS) 100 UNIT/ML injection vial, Inject 25 Units into the skin as needed , Disp: , Rfl:     insulin lispro (HUMALOG) 100 UNIT/ML SOLN injection vial, SS units per day via insulin pump, Disp: , Rfl:     levothyroxine (SYNTHROID) 75 MCG tablet, Take 75 mcg by mouth every morning (before breakfast), Disp: , Rfl:     lidocaine (LIDODERM) 5 %, Place 1 patch onto the skin every 24 hours, Disp: , Rfl:     naloxone (NARCAN) 4 MG/0.1ML LIQD nasal spray, 1 SPRAY BY INTRANASAL ROUTE ONCE AS NEEDED FOR UP TO 1 DOSE, Disp: , Rfl:     ondansetron (ZOFRAN-ODT) 4 MG disintegrating tablet, Take 4 mg by mouth every 6 hours as needed, Disp: , Rfl:     pantoprazole (PROTONIX) 40 MG tablet, Take 40 mg by mouth daily, Disp: , Rfl:     polyethylene glycol (GLYCOLAX) 17 GM/SCOOP powder, Take 17 g by mouth daily, Disp: , Rfl:     rosuvastatin (CRESTOR) 10 MG tablet, Take 10 mg by mouth, Disp: , Rfl:     sodium bicarbonate 650 MG tablet, Take 650 mg by mouth 2 times daily, Disp: , Rfl:     Insulin Infusion Pump Supplies (MINIMED QUICK SET INF SET 32\") MISC, Change every 2 days (Patient not taking: Reported on 6/14/2022), Disp: , Rfl:     Insulin Infusion Pump Supplies (MINIMED RESERVOIR 3ML) MISC, Change every 2 days (Patient not taking: Reported on 6/14/2022), Disp: , Rfl:     Insulin Infusion Pump (MINIMED 530G INSULIN PUMP) ELLE, Basals:  48.225 units/24h 12A: 1.75 u/hr 730a 2 2p 2.3 9p 2 Carbohydrate: Insulin Ratio: MN 5 4p 5 Sensitivity: 15 Target: -140, 8a 110, 10p 110-140 Active insulin time: 4 (Patient not taking: Reported on 6/14/2022), Disp: , Rfl:     Insulin Infusion Pump Supplies (INSULIN INFUSION PUMP INFU SET) MISC, Pump settings:   standard pattern- 24 hour total 47.950 units   Time  00:00  1.70, 7:30a 2.00, 2pm 2.40, 9pm    1.80  Carb Ratio 00:00  6 Insulin Sensitivity 30 Target low  110 Target high 130 Active Insulin time 4:00 Max Bolus 25 units (Patient not taking: Reported on 6/14/2022), Disp: 1 each, Rfl: 0    Alpha-Lipoic Acid 300 MG CAPS, TAKE 2 CAPSULES BY MOUTH DAILY (Patient not taking: Reported on 6/14/2022), Disp: , Rfl:     Allergies   Allergen Reactions    Drixoral Cough-Sore Throat [Acetaminophen-Dm] Other (See Comments) and Palpitations     Heart racing.        Other Palpitations     Antihistamines  Antihistamines  Antihistamines  Antihistamines  Antihistamines  Antihistamines      Antihistamines, Loratadine-Type Palpitations Review of Systems      Vitals:    06/15/22 1318   BP: 100/60   Pulse: (!) 102   Temp: 97.3 °F (36.3 °C)   TempSrc: Temporal   SpO2: 98%   Height: 5' 6\" (1.676 m)           Physical Exam       There are no diagnoses linked to this encounter. Millicent Campbell, DO     Post-Discharge Transitional Care  Follow Up      Hernesto Johnson. YOB: 1945    Date of Office Visit:  6/15/2022  Date of Hospital Admission: 7/27/21  Date of Hospital Discharge: 7/28/21  Risk of hospital readmission (high >=14%. Medium >=10%) :No data recorded    Care management risk score Rising risk (score 2-5) and Complex Care (Scores >=6): 2     Non face to face  following discharge, date last encounter closed (first attempt may have been earlier): *No documented post hospital discharge outreach found in the last 14 days    Call initiated 2 business days of discharge: *No response recorded in the last 14 days    ASSESSMENT/PLAN:   Hospital discharge follow-up  -     UT DISCHARGE MEDS RECONCILED W/ CURRENT OUTPATIENT MED LIST      Medical Decision Making: moderate complexity  No follow-ups on file. On this date 6/15/2022 I have spent 30 minutes reviewing previous notes, test results and face to face with the patient discussing the diagnosis and importance of compliance with the treatment plan as well as documenting on the day of the visit. Subjective:   HPI:  Follow up of Hospital problems/diagnosis(es): hyperglycemia    Inpatient course: Discharge summary reviewed- see chart.     Interval history/Current status: nearly resolved    Patient Active Problem List   Diagnosis    Proliferative diabetic retinopathy associated with type 1 diabetes mellitus (HCC)    Pain management contract agreement    Chronic orthostatic hypotension    Toenail deformity    History of tobacco abuse    Mild cognitive impairment with memory loss    S/P lumbar fusion    Dysphonia    Edema of both lower extremities due to peripheral venous insufficiency    Gastroesophageal reflux disease without esophagitis    Diabetes mellitus type 1, with complication, on long term insulin pump (HCC)    Vocal cord atrophy    Back pain, chronic    BRITTANY (obstructive sleep apnea)    Mild episode of recurrent major depressive disorder (HCC)    Seborrheic keratosis    Benign prostatic hyperplasia with incomplete bladder emptying    Orthostatic hypotension    Essential hypertension    Neuropathy    Chronic left-sided low back pain with left-sided sciatica    Constipation due to opioid therapy    Weakness    Insulin pump status    Diabetic peripheral neuropathy (HCC)    Fibrothorax    Alcohol abuse, in remission    Hemothorax on right    Trapped lung    Acquired hypothyroidism    Type 1 diabetes mellitus with diabetic polyneuropathy (HCC)    Chronic pain of both shoulders    Trochanteric bursitis, left hip    Aphonia    Primary hypothyroidism    Hypercholesterolemia    Insulin pump in place    Charcot foot due to diabetes mellitus ([address]    History of hypertension    Candida laryngitis    Type 1 diabetes mellitus with stage 3a chronic kidney disease (HCC)    Vitreous hemorrhage due to type 1 diabetes mellitus ([facility])    Polypharmacy    Type 1 diabetes mellitus with retinopathy ([address]    Luetscher's syndrome    Stage 3a chronic kidney disease ([address]    Stage 3b chronic kidney disease ([address]    Diabetic nephropathy ([address]    Spinal cord stimulator status    Class 2 severe obesity due to excess calories with serious comorbidity and body mass index (BMI) of 35.0 to 35.9 in adult [facility])    At high risk for falls    COVID-19 vaccine series completed    Chronic pain    Pleural effusion    Lumbar radiculopathy    Aortic heart murmur    Polyp of colon    Chronic renal disease, stage III ([address] [[id number]]    Alzheimer's disease, unspecified       Medications listed as ordered at the time of discharge from hospital Medication List          Accurate as of Sachi 15, 2022  1:45 PM. If you have any questions, ask your nurse or doctor.             CONTINUE taking these medications    acetaminophen 500 MG tablet  Commonly known as: TYLENOL     Alpha-Lipoic Acid 300 MG Caps     amitriptyline 25 MG tablet  Commonly known as: ELAVIL     buPROPion 75 MG tablet  Commonly known as: WELLBUTRIN     Contour Monitor Carlene     CONTOUR NEXT TEST VI     Dexcom G6  Carlene     Dexcom G6 Sensor Misc     Dexcom G6 Transmitter Misc     DULoxetine 60 MG extended release capsule  Commonly known as: CYMBALTA     finasteride 5 MG tablet  Commonly known as: PROSCAR     fluticasone 0.05 % cream  Commonly known as: CUTIVATE     fluticasone 50 MCG/ACT nasal spray  Commonly known as: FLONASE     Gvoke PFS 1 MG/0.2ML Sosy  Generic drug: Glucagon     Handicap Placard Misc     HumaLOG 100 UNIT/ML Soln injection vial  Generic drug: insulin lispro     hydrocortisone 0.2 % cream  Commonly known as: WESTCORT     Lantus 100 UNIT/ML injection vial  Generic drug: insulin glargine     levothyroxine 75 MCG tablet  Commonly known as: SYNTHROID     lidocaine 5 %  Commonly known as: LIDODERM     MiniMed 530G Insulin Pump Carlene     * MiniMed Quick Set Inf Set 32\" Misc     * MiniMed Reservoir 3ml Misc     * Insulin Infusion Pump Infu Set Misc  Pump settings:     standard pattern- 24 hour total 47.950 units     Time  00:00  1.70, 7:30a 2.00, 2pm 2.40, 9pm    1.80    Carb Ratio 00:00  6  Insulin Sensitivity 30  Target low  110  Target high 130  Active Insulin time 4:00  Max Bolus 25 units     morphine 15 MG extended release tablet  Commonly known as: MS CONTIN     Narcan 4 MG/0.1ML Liqd nasal spray  Generic drug: naloxone     ondansetron 4 MG disintegrating tablet  Commonly known as: ZOFRAN-ODT     pantoprazole 40 MG tablet  Commonly known as: PROTONIX     polyethylene glycol 17 GM/SCOOP powder  Commonly known as: GLYCOLAX     rosuvastatin 10 MG tablet  Commonly known as: CRESTOR     sodium bicarbonate 650 MG tablet     vitamin D 25 MCG (1000 UT) Caps         * This list has 3 medication(s) that are the same as other medications prescribed for you. Read the directions carefully, and ask your doctor or other care provider to review them with you. Medications marked \"taking\" at this time  Outpatient Medications Marked as Taking for the 6/15/22 encounter (Office Visit) with Wander Zhanna, DO   Medication Sig Dispense Refill    Blood Glucose Monitoring Suppl (CONTOUR MONITOR) ELLE Check blood glucose 4 times daily. Dx E10.65      Glucose Blood (CONTOUR NEXT TEST VI) USE TO TEST THREE TIMES DAILY      Continuous Blood Gluc  (DEXCOM G6 ) ELLE by Other route      Continuous Blood Gluc Sensor (DEXCOM G6 SENSOR) MISC Change every 10 days as directed      Handicap Placard MISC Handicap parking placard; permanent disability      Continuous Blood Gluc Transmit (DEXCOM G6 TRANSMITTER) MISC USE AS DIRECTED FOUR TIMES DAILY      morphine (MS CONTIN) 15 MG extended release tablet Take 15 mg by mouth 2 times daily.        amitriptyline (ELAVIL) 25 mg tablet Take 25 mg by mouth nightly       buPROPion (WELLBUTRIN) 75 MG tablet Take 75 mg by mouth 2 times daily      acetaminophen (TYLENOL) 500 MG tablet Take 500 mg by mouth every 6 hours as needed      vitamin D 25 MCG (1000 UT) CAPS Take 1,000 Units by mouth daily      DULoxetine (CYMBALTA) 60 MG extended release capsule TAKE 1 CAPSULE BY MOUTH EVERY MORNING      finasteride (PROSCAR) 5 MG tablet Take 5 mg by mouth daily      fluticasone (CUTIVATE) 0.05 % cream Apply 1 g topically 2 times daily       fluticasone (FLONASE) 50 MCG/ACT nasal spray 2 sprays by Nasal route daily as needed      Glucagon (GVOKE PFS) 1 MG/0.2ML SOSY Inject 1 each into the skin as needed      hydrocortisone (WESTCORT) 0.2 % cream Apply topically      insulin glargine (LANTUS) 100 UNIT/ML injection vial Inject 25 Units into the skin as needed       insulin lispro (HUMALOG) 100 UNIT/ML SOLN injection vial SS units per day via insulin pump      levothyroxine (SYNTHROID) 75 MCG tablet Take 75 mcg by mouth every morning (before breakfast)      lidocaine (LIDODERM) 5 % Place 1 patch onto the skin every 24 hours      naloxone (NARCAN) 4 MG/0.1ML LIQD nasal spray 1 SPRAY BY INTRANASAL ROUTE ONCE AS NEEDED FOR UP TO 1 DOSE      ondansetron (ZOFRAN-ODT) 4 MG disintegrating tablet Take 4 mg by mouth every 6 hours as needed      pantoprazole (PROTONIX) 40 MG tablet Take 40 mg by mouth daily      polyethylene glycol (GLYCOLAX) 17 GM/SCOOP powder Take 17 g by mouth daily      rosuvastatin (CRESTOR) 10 MG tablet Take 10 mg by mouth      sodium bicarbonate 650 MG tablet Take 650 mg by mouth 2 times daily          Medications patient taking as of now reconciled against medications ordered at time of hospital discharge: Yes    A comprehensive review of systems was negative except for what was noted in the HPI. Objective:    /60   Pulse (!) 102   Temp 97.3 °F (36.3 °C) (Temporal)   Ht 5' 6\" (1.676 m)   SpO2 98%   BMI 33.89 kg/m²   General Appearance: alert and oriented to person, place and time, well-developed and well-nourished, in no acute distress      An electronic signature was used to authenticate this note.   --Denis Tran DO

## 2022-06-15 NOTE — TELEPHONE ENCOUNTER
Spoke to pt and wife    Wife reports pt was taken to hospital with vomiting with DKA after losing insulin pump    Had f/up with PCP today:  Was told to take Lantus 18 units in am        15 units in pm        Humalog     By ss ? ? Cannot find paperwork, they do not know how much to give    Pump settings: (pt was never entering carbs into pump)  24 hour total 47.950 units Time 00:00 1.70, 7:30a 2.00, 2pm 2.40, 9pm 1.80 Carb Ratio 00:00 6 Insulin Sensitivity 30 Target low 110 Target high 130 Active Insulin time 4:00 Max Bolus 25 units      Patient was given shot sheet for a 1 unit of prandial insulin plus a 1 per 50 greater than 150 correction  Given a correction scale for bedtime at 1 per 50 greater than 200    Sent to son to print:  [email or web address] [email or web address]    Will start tandem pump next Monday

## 2022-06-15 NOTE — TELEPHONE ENCOUNTER
Patient's wife came into office concerned about patient's blood sugar. Patient was in the hospital for five days previously. Patient is no longer on pump at this time. Patient's blood sugars have been elevated since coming home. Patient was started on CGM yesterday at diabetes education. Asked patient's wife how it was working. Patient's wife said she didn't have a way to read the blood sugars and have been doing finger sticks. Patient's wife states patient is on Insulin Glargine 25 units nightly. Patient is on sliding scale for his Humalog. Scale for insulin as follow  no insulin, 200-249 2 units, 250-299 4 units, 300-349 6 units, 350-400 8 units. Patient has follow up with primary provider today. Advised patient's wife to take hospital paperwork to appointment so that provider can see what changes were made.

## 2022-06-15 NOTE — TELEPHONE ENCOUNTER
----- Message from Lulu Jhaveri, RN sent at 6/14/2022  2:15 PM EDT -----  Seeing him today for CGM start. You also had sent a referral for education. Please send referral again for education. That includes:  Will need one on one education due to [facility] even with hearing aids.     Thank you    Maggie Condon

## 2022-06-17 ENCOUNTER — APPOINTMENT (OUTPATIENT)
Dept: PHYSICAL THERAPY | Age: 77
End: 2022-06-17
Payer: MEDICARE

## 2022-06-20 ENCOUNTER — TELEPHONE (OUTPATIENT)
Dept: ENDOCRINOLOGY | Age: 77
End: 2022-06-20

## 2022-06-20 ENCOUNTER — APPOINTMENT (OUTPATIENT)
Dept: PHYSICAL THERAPY | Age: 77
End: 2022-06-20
Payer: MEDICARE

## 2022-06-20 NOTE — TELEPHONE ENCOUNTER
Spoke to pump  who reports pt is poorly equipt for pump start. Did not have dexcom reader, wearing sensor without reader and doing fingersticks.  On basal bolus insulin regimen, feeling well    Reschedule pump start for tomorrow and is aware to remain on basal/bolus regimen

## 2022-06-20 NOTE — TELEPHONE ENCOUNTER
Called patient's wife to ask about patient's blood sugar. This morning patient's blood sugar was 188. Patient is no longer having vomiting and nausea at this time. Patient is going to get tandem pump today. Patient's blood sugar over the weekend Saturday were 349 fasting, lunch 339, dinner 347, bedtime 329. Sunday blood sugars fasting 314, 341 lunch, 340 dinner, 307 bedtime.

## 2022-06-21 ENCOUNTER — APPOINTMENT (OUTPATIENT)
Dept: PHYSICAL THERAPY | Age: 77
End: 2022-06-21
Payer: MEDICARE

## 2022-06-23 ENCOUNTER — TELEPHONE (OUTPATIENT)
Dept: ENDOCRINOLOGY | Age: 77
End: 2022-06-23

## 2022-06-23 ENCOUNTER — TELEPHONE (OUTPATIENT)
Dept: DIABETES SERVICES | Age: 77
End: 2022-06-23

## 2022-06-23 NOTE — TELEPHONE ENCOUNTER
Wife Lalo Cheung called and was wondering when the Nutrition class was going to be scheduled. Called her back and let her know it was scheduled at [facility] start. Appointment is 7-1-2022 at 2:30 PM.  Appreciative of information.

## 2022-06-23 NOTE — TELEPHONE ENCOUNTER
Wife called back and said patient took 22 units of Lantus this morning. The directions he will follow is 22U in the morning and 19U at night of Lantus. Pt is taking Humalog with correction scale 2/50>150 before meals but blood sugar won't come down below 400 even when taking the max of 10 units of Humalog correction.

## 2022-06-23 NOTE — TELEPHONE ENCOUNTER
Spoke to pt's wife who states pt pulled out all the tubing for his Tandem pump from his body in the middle of the night. They can not remember how to get it back on and Marge Rodriguez (T[employer] RN) that trained them yesterday is currently having dental surgery. Per wife, patient has been on MDI therapy since this morning. Pt is pricking his fingers to check blood sugar and injecting Humalog depending on the number. Pt took Lantus this morning but neither his wife nor him can recall how much he took. They will be home in 40 mins and will be able to tell me how much of Lantus he took. Wife states his blood sugar is staying in the 400s.

## 2022-06-23 NOTE — TELEPHONE ENCOUNTER
Patient, previously on medtronic pump, started with tandem pump VERY RECENTLY with MDI in interim since losing medtronic pump and starting tandem pump      Got message from T[employer] rep stating that the pump site pulled out of patient and they needed help putting it back in    Please call patient to ensure he was able to get back on pump, if not, he will need to return to Multiple Daily Injections

## 2022-06-24 ENCOUNTER — APPOINTMENT (OUTPATIENT)
Dept: PHYSICAL THERAPY | Age: 77
End: 2022-06-24
Payer: MEDICARE

## 2022-06-27 ENCOUNTER — APPOINTMENT (OUTPATIENT)
Dept: PHYSICAL THERAPY | Age: 77
End: 2022-06-27
Payer: MEDICARE

## 2022-06-27 ENCOUNTER — OFFICE VISIT (OUTPATIENT)
Dept: INTERNAL MEDICINE CLINIC | Facility: CLINIC | Age: 77
End: 2022-06-27
Payer: MEDICARE

## 2022-06-27 VITALS
HEART RATE: 105 BPM | SYSTOLIC BLOOD PRESSURE: 110 MMHG | OXYGEN SATURATION: 98 % | TEMPERATURE: 96.5 F | BODY MASS INDEX: 33.89 KG/M2 | HEIGHT: 66 IN | DIASTOLIC BLOOD PRESSURE: 70 MMHG

## 2022-06-27 DIAGNOSIS — G89.29 CHRONIC INTRACTABLE HEADACHE, UNSPECIFIED HEADACHE TYPE: Primary | ICD-10-CM

## 2022-06-27 DIAGNOSIS — R51.9 CHRONIC INTRACTABLE HEADACHE, UNSPECIFIED HEADACHE TYPE: Primary | ICD-10-CM

## 2022-06-27 DIAGNOSIS — R51.9 RIGHT FACIAL PAIN: ICD-10-CM

## 2022-06-27 LAB
BASOPHILS # BLD: 0 K/UL (ref 0–0.2)
BASOPHILS NFR BLD: 1 % (ref 0–2)
CRP SERPL-MCNC: 1.7 MG/DL (ref 0–0.9)
DIFFERENTIAL METHOD BLD: ABNORMAL
EOSINOPHIL # BLD: 0.1 K/UL (ref 0–0.8)
EOSINOPHIL NFR BLD: 1 % (ref 0.5–7.8)
ERYTHROCYTE [DISTWIDTH] IN BLOOD BY AUTOMATED COUNT: 15.6 % (ref 11.9–14.6)
ERYTHROCYTE [SEDIMENTATION RATE] IN BLOOD: 11 MM/HR
HCT VFR BLD AUTO: 41.3 % (ref 41.1–50.3)
HGB BLD-MCNC: 12.4 G/DL (ref 13.6–17.2)
IMM GRANULOCYTES # BLD AUTO: 0 K/UL (ref 0–0.5)
IMM GRANULOCYTES NFR BLD AUTO: 0 % (ref 0–5)
LYMPHOCYTES # BLD: 1.1 K/UL (ref 0.5–4.6)
LYMPHOCYTES NFR BLD: 21 % (ref 13–44)
MCH RBC QN AUTO: 26.5 PG (ref 26.1–32.9)
MCHC RBC AUTO-ENTMCNC: 30 G/DL (ref 31.4–35)
MCV RBC AUTO: 88.2 FL (ref 79.6–97.8)
MONOCYTES # BLD: 0.4 K/UL (ref 0.1–1.3)
MONOCYTES NFR BLD: 8 % (ref 4–12)
NEUTS SEG # BLD: 3.7 K/UL (ref 1.7–8.2)
NEUTS SEG NFR BLD: 70 % (ref 43–78)
NRBC # BLD: 0 K/UL (ref 0–0.2)
PLATELET # BLD AUTO: 210 K/UL (ref 150–450)
PMV BLD AUTO: 11.1 FL (ref 9.4–12.3)
RBC # BLD AUTO: 4.68 M/UL (ref 4.23–5.6)
WBC # BLD AUTO: 5.3 K/UL (ref 4.3–11.1)

## 2022-06-27 PROCEDURE — 1036F TOBACCO NON-USER: CPT | Performed by: INTERNAL MEDICINE

## 2022-06-27 PROCEDURE — 99214 OFFICE O/P EST MOD 30 MIN: CPT | Performed by: INTERNAL MEDICINE

## 2022-06-27 PROCEDURE — G8428 CUR MEDS NOT DOCUMENT: HCPCS | Performed by: INTERNAL MEDICINE

## 2022-06-27 PROCEDURE — G8417 CALC BMI ABV UP PARAM F/U: HCPCS | Performed by: INTERNAL MEDICINE

## 2022-06-27 PROCEDURE — 3017F COLORECTAL CA SCREEN DOC REV: CPT | Performed by: INTERNAL MEDICINE

## 2022-06-27 PROCEDURE — 1123F ACP DISCUSS/DSCN MKR DOCD: CPT | Performed by: INTERNAL MEDICINE

## 2022-06-27 RX ORDER — OLANZAPINE 2.5 MG/1
TABLET ORAL
COMMUNITY
Start: 2022-06-20

## 2022-06-27 RX ORDER — ESCITALOPRAM OXALATE 5 MG/1
TABLET ORAL
Status: ON HOLD | COMMUNITY
Start: 2022-06-20 | End: 2022-10-19 | Stop reason: HOSPADM

## 2022-06-27 NOTE — PROGRESS NOTES
[facility] was seen today for headache and hip pain. Diagnoses and all orders for this visit:    Chronic intractable headache, unspecified headache type  Comments:  consider mri but has spinal cord stimulator, had ct recentlty,  ct sinus maybe only option  Orders:  -     Sedimentation Rate; Future  -     C-Reactive Protein; Future  -     CBC with Auto Differential; Future  -     [place], , Neurology, [facility]; Future  -     CBC with Auto Differential  -     C-Reactive Protein  -     Sedimentation Rate    Right facial pain  -     Sedimentation Rate; Future  -     C-Reactive Protein; Future  -     CBC with Auto Differential; Future  -     [facility] [place], [place], , Neurology, [facility]; Future  -     CBC with Auto Differential  -     C-Reactive Protein  -     Sedimentation Rate          Arya Alanis. is a 78 y.o. male    Chief Complaint   Patient presents with    Headache     months    Hip Pain     left off and on for years     Rt side face painful last night  Hurt some last month  Eye check last month    hAS CHRONIC pain issues inr eviewing chart.     Results for orders placed or performed in visit on 06/27/22   CBC with Auto Differential   Result Value Ref Range    WBC 5.3 4.3 - 11.1 K/uL    RBC 4.68 4.23 - 5.6 M/uL    Hemoglobin 12.4 (L) 13.6 - 17.2 g/dL    Hematocrit 41.3 41.1 - 50.3 %    MCV 88.2 79.6 - 97.8 FL    MCH 26.5 26.1 - 32.9 PG    MCHC 30.0 (L) 31.4 - 35.0 g/dL    RDW 15.6 (H) 11.9 - 14.6 %    Platelets 745 256 - 880 K/uL    MPV 11.1 9.4 - 12.3 FL    nRBC 0.00 0.0 - 0.2 K/uL    Differential Type AUTOMATED      Seg Neutrophils 70 43 - 78 %    Lymphocytes 21 13 - 44 %    Monocytes 8 4.0 - 12.0 %    Eosinophils % 1 0.5 - 7.8 %    Basophils 1 0.0 - 2.0 %    Immature Granulocytes 0 0.0 - 5.0 %    Segs Absolute 3.7 1.7 - 8.2 K/UL    Absolute Lymph # 1.1 0.5 - 4.6 K/UL    Absolute Mono # 0.4 0.1 - 1.3 K/UL    Absolute Eos # 0.1 0.0 - 0.8 K/UL    Basophils Absolute 0.0 0.0 - 0.2 K/UL    Absolute Immature Granulocyte 0.0 0.0 - 0.5 K/UL   C-Reactive Protein   Result Value Ref Range    CRP 1.7 (H) 0.0 - 0.9 mg/dL   Sedimentation Rate   Result Value Ref Range    Sed Rate, Automated 11 (L) 15 mm/hr       Past Medical History:   Diagnosis Date    Aphonia     Back pain, chronic     Benign prostatic hyperplasia with incomplete bladder emptying     Candida laryngitis     Charcot foot due to diabetes mellitus (HCC)     Chronic left-sided low back pain with left-sided sciatica     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)     Diabetic nephropathy (HCC)     Diabetic peripheral neuropathy (HCC)     Dysphonia     Essential hypertension     GERD (gastroesophageal reflux disease)     Hypercholesterolemia     Insulin pump status     Left hip pain     Lumbar radiculopathy     Mild cognitive impairment     Moderate episode of recurrent major depressive disorder ([facility])     Obstructive sleep apnea     Orthostatic hypotension     Paraspinal muscle spasm     Polypharmacy     Primary hypothyroidism     Proliferative diabetic retinopathy associated with type 1 diabetes mellitus ([facility])     Seborrheic keratosis     Spinal cord stimulator status     Spondylolisthesis     Stage 3b chronic kidney disease ([address]     Stroke-like symptom 7/8/2021    Tachycardia     Thyroid disease     managed with medications    Trochanteric bursitis of left hip     Type 1 diabetes mellitus ([address]     Vocal cord atrophy        Family History   Problem Relation Age of Onset    Hypertension Brother     Diabetes Maternal Uncle         type 1    Diabetes Brother         type 2    Thyroid Disease Mother         treated with surgery    Heart Disease Father     Hypertension Mother         Social History     Socioeconomic History    Marital status:      Spouse name: Not on file    Number of children: Not on file    Years of education: Not on file    Highest education level: Not on file   Occupational History    Not on file   Tobacco Use    Smoking status: Former Smoker     Packs/day: 1.00     Years: 30.00     Pack years: 30.00     Quit date: 1996     Years since quittin.8    Smokeless tobacco: Former User   Vaping Use    Vaping Use: Never used   Substance and Sexual Activity    Alcohol use: Not Currently    Drug use: Never    Sexual activity: Not on file   Other Topics Concern    Not on file   Social History Narrative    Not on file     Social Determinants of Health     Financial Resource Strain:     Difficulty of Paying Living Expenses: Not on file   Food Insecurity:     Worried About [address] in the Last Year: Not on file    [address] [employer] in the Last Year: Not on file   Transportation Needs:     Lack of Transportation (Medical): Not on file    Lack of Transportation (Non-Medical):  Not on file   Physical Activity:     Days of Exercise per Week: Not on file    Minutes of Exercise per Session: Not on file   Stress:     Feeling of Stress : Not on file   Social Connections:     Frequency of Communication with Friends and Family: Not on file    Frequency of Social Gatherings with Friends and Family: Not on file    Attends Religion Services: Not on file    Active Member of [address] [place] [address] or Organizations: Not on file    Attends Club or Organization Meetings: Not on file    Marital Status: Not on file   Intimate Partner Violence:     Fear of Current or Ex-Partner: Not on file    Emotionally Abused: Not on file    Physically Abused: Not on file    Sexually Abused: Not on file   Housing Stability:     Unable to Pay for Housing in the Last Year: Not on file    Number of [place] in the Last Year: Not on file    Unstable Housing in the Last Year: Not on file         Current Outpatient Medications:     escitalopram (LEXAPRO) 5 MG tablet, TAKE 1 TABLET BY MOUTH EVERY EVENING, Disp: , Rfl:     OLANZapine (ZYPREXA) 2.5 MG tablet, TAKE 1 TABLET BY MOUTH EVERY EVENING, Disp: , Rfl:     HUMALOG 100 UNIT/ML SOLN injection vial, 1 unit TIDAC plus a 1 per 50 greater than 150 correction, QHS 1 per 50 greater than 200, max daily dose 20 units, Disp: 10 mL, Rfl: 1    insulin glargine (LANTUS) 100 UNIT/ML injection vial, 15 units  PM,  18 units AM, Disp: 10 mL, Rfl: 0    Blood Glucose Monitoring Suppl (CONTOUR MONITOR) ELLE, Check blood glucose 4 times daily. Dx E10.65, Disp: , Rfl:     Glucose Blood (CONTOUR NEXT TEST VI), USE TO TEST THREE TIMES DAILY, Disp: , Rfl:     Continuous Blood Gluc  (DEXCOM G6 ) ELLE, by Other route, Disp: , Rfl:     Continuous Blood Gluc Sensor (DEXCOM G6 SENSOR) MISC, Change every 10 days as directed, Disp: , Rfl:     Handicap Placard MISC, Handicap parking placard; permanent disability, Disp: , Rfl:     Continuous Blood Gluc Transmit (DEXCOM G6 TRANSMITTER) MISC, USE AS DIRECTED FOUR TIMES DAILY, Disp: , Rfl:     morphine (MS CONTIN) 15 MG extended release tablet, Take 15 mg by mouth 2 times daily.  , Disp: , Rfl:     buPROPion (WELLBUTRIN) 75 MG tablet, Take 75 mg by mouth 2 times daily, Disp: , Rfl:     acetaminophen (TYLENOL) 500 MG tablet, Take 500 mg by mouth every 6 hours as needed, Disp: , Rfl:     vitamin D 25 MCG (1000 UT) CAPS, Take 1,000 Units by mouth daily, Disp: , Rfl:     finasteride (PROSCAR) 5 MG tablet, Take 5 mg by mouth daily, Disp: , Rfl:     fluticasone (CUTIVATE) 0.05 % cream, Apply 1 g topically 2 times daily , Disp: , Rfl:     fluticasone (FLONASE) 50 MCG/ACT nasal spray, 2 sprays by Nasal route daily as needed, Disp: , Rfl:     Glucagon (GVOKE PFS) 1 MG/0.2ML SOSY, Inject 1 each into the skin as needed, Disp: , Rfl:     hydrocortisone (WESTCORT) 0.2 % cream, Apply topically, Disp: , Rfl:     levothyroxine (SYNTHROID) 75 MCG tablet, Take 75 mcg by mouth every morning (before breakfast), Disp: , Rfl:     lidocaine (LIDODERM) 5 %, Place 1 patch onto the skin every 24 hours, Disp: , Rfl:     naloxone (NARCAN) 4 MG/0.1ML LIQD nasal spray, 1 SPRAY BY INTRANASAL ROUTE ONCE AS NEEDED FOR UP TO 1 DOSE, Disp: , Rfl:     ondansetron (ZOFRAN-ODT) 4 MG disintegrating tablet, Take 4 mg by mouth every 6 hours as needed, Disp: , Rfl:     pantoprazole (PROTONIX) 40 MG tablet, Take 40 mg by mouth daily, Disp: , Rfl:     polyethylene glycol (GLYCOLAX) 17 GM/SCOOP powder, Take 17 g by mouth daily, Disp: , Rfl:     rosuvastatin (CRESTOR) 10 MG tablet, Take 10 mg by mouth, Disp: , Rfl:     sodium bicarbonate 650 MG tablet, Take 650 mg by mouth 2 times daily, Disp: , Rfl:     Insulin Infusion Pump Supplies (MINIMED QUICK SET INF SET 32\") MISC, Change every 2 days (Patient not taking: Reported on 6/14/2022), Disp: , Rfl:     Insulin Infusion Pump Supplies (MINIMED RESERVOIR 3ML) MISC, Change every 2 days (Patient not taking: Reported on 6/14/2022), Disp: , Rfl:     Insulin Infusion Pump (MINIMED 530G INSULIN PUMP) ELLE, Basals:  48.225 units/24h 12A: 1.75 u/hr 730a 2 2p 2.3 9p 2 Carbohydrate: Insulin Ratio: MN 5 4p 5 Sensitivity: 15 Target: -140, 8a 110, 10p 110-140 Active insulin time: 4 (Patient not taking: Reported on 6/14/2022), Disp: , Rfl:     Insulin Infusion Pump Supplies (INSULIN INFUSION PUMP INFU SET) MISC, Pump settings:   standard pattern- 24 hour total 47.950 units   Time  00:00  1.70, 7:30a 2.00, 2pm 2.40, 9pm    1.80  Carb Ratio 00:00  6 Insulin Sensitivity 30 Target low  110 Target high 130 Active Insulin time 4:00 Max Bolus 25 units (Patient not taking: Reported on 6/14/2022), Disp: 1 each, Rfl: 0    Allergies   Allergen Reactions    Drixoral Cough-Sore Throat [Acetaminophen-Dm] Other (See Comments) and Palpitations     Heart racing.        Other Palpitations     Antihistamines  Antihistamines  Antihistamines  Antihistamines  Antihistamines  Antihistamines      Antihistamines, Loratadine-Type Palpitations         Review of Systems Auto Differential  -     C-Reactive Protein  -     Sedimentation Rate    Right facial pain  -     Sedimentation Rate; Future  -     C-Reactive Protein; Future  -     CBC with Auto Differential; Future  -     [place] Gonzales DO, Neurology, [facility];  Future  -     CBC with Auto Differential  -     C-Reactive Protein  -     Sedimentation Rate                 Emily Correia DO

## 2022-06-28 ENCOUNTER — TELEPHONE (OUTPATIENT)
Dept: ENDOCRINOLOGY | Age: 77
End: 2022-06-28

## 2022-06-28 ENCOUNTER — APPOINTMENT (OUTPATIENT)
Dept: PHYSICAL THERAPY | Age: 77
End: 2022-06-28
Payer: MEDICARE

## 2022-06-28 DIAGNOSIS — Z96.41 DIABETES MELLITUS TYPE 1, WITH COMPLICATION, ON LONG TERM INSULIN PUMP (HCC): ICD-10-CM

## 2022-06-28 DIAGNOSIS — E10.8 DIABETES MELLITUS TYPE 1, WITH COMPLICATION, ON LONG TERM INSULIN PUMP (HCC): ICD-10-CM

## 2022-06-28 RX ORDER — INSULIN LISPRO 100 [IU]/ML
INJECTION, SOLUTION INTRAVENOUS; SUBCUTANEOUS
Qty: 10 ML | Refills: 1
Start: 2022-06-28 | End: 2022-06-29 | Stop reason: SDUPTHER

## 2022-06-28 ASSESSMENT — ENCOUNTER SYMPTOMS
CONSTIPATION: 0
NAUSEA: 0
WHEEZING: 0
ABDOMINAL PAIN: 0
SHORTNESS OF BREATH: 0
VOMITING: 0
DIARRHEA: 0
SINUS PAIN: 0

## 2022-06-28 NOTE — TELEPHONE ENCOUNTER
Patient's wife called and stated that patient's blood sugars have been over 530 for the last couple of days. Patient's wife talked to Doctor on call the previous weekend of June 17th and received directions on how to give patient insulin. Patient is currently getting Lantus 22 units in the am before his first meal and 19 units of Lantus at night before bedtime. Patient's wife previously talked to another assistant concerning patient's correction scale being changed as of 06/23/2022. Asked patient's wife if patient is having any symptoms concerning elevated blood sugar. Patient shows some increased altered mental status and some increased thirst.  Patient's previous correction scale was 10 units before each meal for anything above 351. Patient's wife stated that Friday patient received Humalog 10 units four times a day, Saturday was Humalog 10 units four times a day, Sunday patient received only three shots of Humalog 10 units, and Monday patient only received two shots of 10 units of his Humalog. Patient's wife couldn't clarify if previous shorts were before or after a meal. Today patient's fasting blood sugar was 534. Patient received 10 units of Humalog before breakfast. Patient and wife went out for lunch and patient did not receive any short acting insulin before this meal. Patient's blood sugar at time of call was 546. Patient's wife asked if she received updated shot sheet from 06/23/2022. patient's wife checked to see which correction scale she was using at this time. Patient did receive updated 2/50>150 sheet but was not using this correction scale at time of phone call. Patient's wife advised to give patient 15 units of Humalog before his next meal due to patient's blood sugar being over 351. Patient's wife also advised to check patient's blood sugar again in an hour to see if blood sugar has gone up anymore.  Patient's wife also advised that if patient shows any symptoms or worsening in condition, she is to take him to the hospital.  Patient's wife verbalized understanding. Went over current shot sheet with patient's wife to make sure she understood exactly what to give the patient. Patient's wife verbalized understanding. Patient's wife will call office back when son gets there so that instruction can be given to him as well.

## 2022-06-29 ENCOUNTER — OFFICE VISIT (OUTPATIENT)
Dept: ENDOCRINOLOGY | Age: 77
End: 2022-06-29
Payer: MEDICARE

## 2022-06-29 DIAGNOSIS — Z96.41 INSULIN PUMP STATUS: ICD-10-CM

## 2022-06-29 DIAGNOSIS — E10.65 TYPE 1 DIABETES MELLITUS WITH HYPERGLYCEMIA (HCC): Primary | ICD-10-CM

## 2022-06-29 PROCEDURE — G8427 DOCREV CUR MEDS BY ELIG CLIN: HCPCS | Performed by: PHYSICIAN ASSISTANT

## 2022-06-29 PROCEDURE — 2022F DILAT RTA XM EVC RTNOPTHY: CPT | Performed by: PHYSICIAN ASSISTANT

## 2022-06-29 PROCEDURE — 1123F ACP DISCUSS/DSCN MKR DOCD: CPT | Performed by: PHYSICIAN ASSISTANT

## 2022-06-29 PROCEDURE — 1036F TOBACCO NON-USER: CPT | Performed by: PHYSICIAN ASSISTANT

## 2022-06-29 PROCEDURE — 99214 OFFICE O/P EST MOD 30 MIN: CPT | Performed by: PHYSICIAN ASSISTANT

## 2022-06-29 PROCEDURE — 3017F COLORECTAL CA SCREEN DOC REV: CPT | Performed by: PHYSICIAN ASSISTANT

## 2022-06-29 PROCEDURE — G8417 CALC BMI ABV UP PARAM F/U: HCPCS | Performed by: PHYSICIAN ASSISTANT

## 2022-06-29 PROCEDURE — 3046F HEMOGLOBIN A1C LEVEL >9.0%: CPT | Performed by: PHYSICIAN ASSISTANT

## 2022-06-29 RX ORDER — INSULIN GLARGINE 100 [IU]/ML
INJECTION, SOLUTION SUBCUTANEOUS
Qty: 40 ML | Refills: 3 | Status: SHIPPED | OUTPATIENT
Start: 2022-06-29 | End: 2022-07-08 | Stop reason: SDUPTHER

## 2022-06-29 RX ORDER — INSULIN LISPRO 100 [IU]/ML
INJECTION, SOLUTION INTRAVENOUS; SUBCUTANEOUS
Qty: 50 ML | Refills: 3 | Status: SHIPPED | OUTPATIENT
Start: 2022-06-29 | End: 2022-07-13 | Stop reason: SDUPTHER

## 2022-06-29 NOTE — TELEPHONE ENCOUNTER
Called pt this am. They restarted pump but dexcom not connected. Did not take bolus before breakfast. Glucose >500 at time of call. Walked wife through [place] of correction bolus. Put on today's schedule.  Is aware to bring everything including meds, notes, readings, dexcom, pump, and supplies

## 2022-06-29 NOTE — Clinical Note
Rosita Nagel set up a telephone call aug 4, can you set a reminder to have wife bring dexcom reader by office for download a few days before as well as if we set them up any other call from cancel list

## 2022-06-29 NOTE — PROGRESS NOTES
[facility] ENDOCRINOLOGY   AND   THYROID NODULE CLINIC    Walter Noguera PA-C  [facility] Endocrinology and Thyroid Nodule Clinic  [address], [address]  [place], [address]  Phone [phone]          Samira Arreguin. is a 78 y.o. male seen 6/29/2022 for follow up evaluation of type 1 diabetes on insulin pump        Assessment and Plan:    In office COVID-19 PPE worn and precautions taken      1. Type 1 diabetes mellitus with hyperglycemia (HCC)  Patient with type 1 diabetes were taken for an urgent follow-up after attempting to switch from an old Medtronic pump to a tandem T slim X to in control IQ technology with the Dexcom G6. He has not yet been able to successfully start the Dexcom he has not yet been able to successfully utilize the insulin pump he has not yet been able to connect the Dexcom into the pump to benefit from hybrid closed-loop technology. Given his memory impairment I do not believe he will be able to utilize the tandem T slim X to pump. I recommend that they return the pump    Patient has significant lipodystrophy at his previous pump site and patient and wife along with son at office today were encouraged to use the back of patient's arms for insulin administration    5 units of Lantus in the morning, 20 units in the evening. Try to keep basal insulin 12 hours apart. Notify office of hypoglycemia. Anita Nuñez prescribed    Patient receiving 10 to 15 units of Humalog with blood sugars ranging well over 300-400.  10 units of prandial insulin with a 1 per 25 greater than 150 correction scale at the 4 before's. Eat a bedtime snack after taking dose of insulin, patient commonly eats a substantial bedtime snack    Visit we attempted to set up patient's Dexcom to a old Dexcom reader as they did not bring the [facility] reader with them to today's visit.   Additionally we attempted to set up the Dexcom on both insulin pump as well as patient's cell phone but we were unable to successfully complete this task as well. - HUMALOG 100 UNIT/ML SOLN injection vial; 10 unit TIDAC plus a 1 per 25 greater than 150 correction max daily dose 50 units  Dispense: 50 mL; Refill: 3  - LANTUS 100 UNIT/ML injection vial; 25 units AM,  20 units PM  Dispense: 40 mL; Refill: 3    2. Insulin pump status  Patient with type 1 diabetes prescribed an insulin pump that he has not yet been able to use. He has not been able to start treatment with the Dexcom. He has been on basal bolus insulin with poor understanding of disease process and medication administration. Do not attempt to utilize pump. Return pump. See above          Diagnoses and all orders for this visit:    Type 1 diabetes mellitus with hyperglycemia (HCC)  -     HUMALOG 100 UNIT/ML SOLN injection vial; 10 unit TIDAC plus a 1 per 25 greater than 150 correction max daily dose 50 units  -     LANTUS 100 UNIT/ML injection vial; 25 units AM,  20 units PM    Insulin pump status            History of Present Illness:    06/29/2022  Worked in for interim follow-up appointment. Old Paradigm Revel was changed to tandem T slim X to patient has been unable to utilize his Dexcom and unable to utilize the tandem pump. He has been in contact with our office and primary care regarding hyperglycemia off insulin pump. He has been taking basal bolus insulin with 22 units of Lantus in the morning and 19 units of Lantus in the evening +5 units of prandial insulin and a 2 per 50 greater than 150 correction scale. Wife is routinely been giving patient 10 units of insulin before meals often 15 without any significant improvement of blood glucose    Current Regimen: Lantus 22am, 19 pm Humalog 5 plus 1/50>150 correction (10-15 units 3-4 times a day at mealtime)    6/2/2022  History of type 1 diabetes returns to clinic for follow-up. He has an old Paradigm Revel 723 and a new Dexcom G6 that he has not been able to utilize appropriately.   He does not  supper 423 97   Bedtime 337 148     Blood glucose levels are uncontrolled, most significant elevations are constant      Hypoglycemia: rare. He has hypoglycemic awareness for blood glucose less than 60. Hemoglobin A1c:   9/11/2018: 8.4%. 12/13/2018: 7.4%. 3/14/2019: 7.3%. 6/14/2019: 8.3%. 9/13/2019: 8.3%.   12/13/2019: 8.5%. 10/20/2020: 7.7%. 1/25/2021: 10.0%. 6/2/2021: 8.6%   7/8/2021: 8.3%. 10/8/2021: 8.3%.   2/22/2022: 10.5%. 06/02/2022: 11.0%      Other pertinent labs:              Lipids:                           4/27/2020: Total cholesterol 133, triglycerides 189, HDL 47, LDL 63.0                 Thyroid:                           See below                 Adrenal:                          4/10/2019: Cortisol 24.7         THYROID DYSFUNCTION   AMIE Carrillo is seen for follow up evaluation/management of hypothyroidism; this was diagnosed in his 64s. Current symptoms:  See review of systems below      Prior treatment: He has been on levothyroxine 75 mcg daily since diagnosis. Pertinent labs:   9/11/2018: TSH 2.752.   12/13/2019: TSH 1.949.   7/8/2021: TSH 0.882, free T4 1.1. Imaging:   none    Allergies & Medications:  Reviewed in chart. Review of Systems      Vital Signs: There were no vitals taken for this visit. Physical Exam  Constitutional:       Appearance: Normal appearance. He is obese. HENT:      Ears:      Comments: Hearing aids in place  Neck:      Thyroid: No thyromegaly. Cardiovascular:      Rate and Rhythm: Normal rate and regular rhythm. Heart sounds: Murmur heard. Systolic murmur is present with a grade of 2/6. Pulmonary:      Effort: Pulmonary effort is normal.      Breath sounds: Normal breath sounds. Abdominal:      Palpations: Abdomen is soft. Musculoskeletal:      Right foot: Charcot foot present.       Comments: Ambulates with walker  Bilateral hand contracture secondary to neuropathy    Feet:      Right foot:      Protective Sensation: 4 sites tested. 0 sites sensed. Skin integrity: Callus and dry skin present. Left foot:      Protective Sensation: 4 sites tested. 0 sites sensed. Skin integrity: Skin breakdown, callus and dry skin present. Comments: Hammer toe of left second toe with erythema    rigth 5th toe with abrasion/ulceration at DIP. no heat, erythema, purulence, or sign of infection  Healing ulceration to distal tip of amputated stump of right 3rd toe, rightsecond toe with well healed amputation  Lymphadenopathy:      Cervical: No cervical adenopathy. Skin:     General: Skin is warm and dry. Neurological:      General: No focal deficit present. Mental Status: He is alert. Psychiatric:         Mood and Affect: Mood normal.         Behavior: Behavior normal.         Thought Content: Thought content normal.         Judgment: Judgment normal.             Return as scheduled, for Type 1 Diabetes f/u. Portions of this note were generated with the assistance of voice recogniton software. As such, some errors in transcription may be present.

## 2022-06-30 ENCOUNTER — TELEPHONE (OUTPATIENT)
Dept: ENDOCRINOLOGY | Age: 77
End: 2022-06-30

## 2022-06-30 NOTE — TELEPHONE ENCOUNTER
Patient's wife called about patient's Dexcom. Patient's wife wanted to make sure there wasn't any extra pieces needed besides the transmitter and sensor. Walked patient's wife through [facility]  and patient is connected. Asked about patient's blood sugars today and wife stated that patient's am blood sugar was 324 and at lunch it was 300. Patient's wife will call if there are any other issues or questions.

## 2022-07-06 ENCOUNTER — TELEPHONE (OUTPATIENT)
Dept: INTERNAL MEDICINE CLINIC | Facility: CLINIC | Age: 77
End: 2022-07-06

## 2022-07-06 ENCOUNTER — NURSE TRIAGE (OUTPATIENT)
Dept: OTHER | Facility: CLINIC | Age: 77
End: 2022-07-06

## 2022-07-06 ENCOUNTER — HOSPITAL ENCOUNTER (EMERGENCY)
Age: 77
Discharge: HOME | End: 2022-07-06
Attending: EMERGENCY MEDICINE
Payer: MEDICARE

## 2022-07-06 VITALS
HEART RATE: 104 BPM | BODY MASS INDEX: 30.31 KG/M2 | WEIGHT: 200 LBS | OXYGEN SATURATION: 99 % | DIASTOLIC BLOOD PRESSURE: 86 MMHG | HEIGHT: 68 IN | RESPIRATION RATE: 18 BRPM | TEMPERATURE: 98.4 F | SYSTOLIC BLOOD PRESSURE: 152 MMHG

## 2022-07-06 DIAGNOSIS — L03.90 CELLULITIS, UNSPECIFIED CELLULITIS SITE: Primary | ICD-10-CM

## 2022-07-06 DIAGNOSIS — T14.8XXA ABRASION: ICD-10-CM

## 2022-07-06 LAB
ALBUMIN SERPL-MCNC: 3.3 G/DL (ref 3.2–4.6)
ALBUMIN/GLOB SERPL: 0.8 {RATIO} (ref 1.2–3.5)
ALP SERPL-CCNC: 157 U/L (ref 50–136)
ALT SERPL-CCNC: 22 U/L (ref 12–65)
ANION GAP SERPL CALC-SCNC: 5 MMOL/L (ref 7–16)
AST SERPL-CCNC: 12 U/L (ref 15–37)
BILIRUB SERPL-MCNC: 0.4 MG/DL (ref 0.2–1.1)
BUN SERPL-MCNC: 21 MG/DL (ref 8–23)
CALCIUM SERPL-MCNC: 9.1 MG/DL (ref 8.3–10.4)
CHLORIDE SERPL-SCNC: 103 MMOL/L (ref 98–107)
CO2 SERPL-SCNC: 26 MMOL/L (ref 21–32)
CREAT SERPL-MCNC: 1.4 MG/DL (ref 0.8–1.5)
ERYTHROCYTE [DISTWIDTH] IN BLOOD BY AUTOMATED COUNT: 15.1 % (ref 11.9–14.6)
GLOBULIN SER CALC-MCNC: 4.2 G/DL (ref 2.3–3.5)
GLUCOSE SERPL-MCNC: 361 MG/DL (ref 65–100)
HCT VFR BLD AUTO: 40.7 % (ref 41.1–50.3)
HGB BLD-MCNC: 12.7 G/DL (ref 13.6–17.2)
LACTATE SERPL-SCNC: 1.1 MMOL/L (ref 0.4–2)
MCH RBC QN AUTO: 26.6 PG (ref 26.1–32.9)
MCHC RBC AUTO-ENTMCNC: 31.2 G/DL (ref 31.4–35)
MCV RBC AUTO: 85.3 FL (ref 79.6–97.8)
NRBC # BLD: 0 K/UL (ref 0–0.2)
PLATELET # BLD AUTO: 210 K/UL (ref 150–450)
PMV BLD AUTO: 10.2 FL (ref 9.4–12.3)
POTASSIUM SERPL-SCNC: 4.2 MMOL/L (ref 3.5–5.1)
PROT SERPL-MCNC: 7.5 G/DL (ref 6.3–8.2)
RBC # BLD AUTO: 4.77 M/UL (ref 4.23–5.6)
SODIUM SERPL-SCNC: 134 MMOL/L (ref 138–145)
WBC # BLD AUTO: 6.4 K/UL (ref 4.3–11.1)

## 2022-07-06 PROCEDURE — 2500000003 HC RX 250 WO HCPCS: Performed by: PHYSICIAN ASSISTANT

## 2022-07-06 PROCEDURE — 99284 EMERGENCY DEPT VISIT MOD MDM: CPT

## 2022-07-06 PROCEDURE — 96365 THER/PROPH/DIAG IV INF INIT: CPT

## 2022-07-06 PROCEDURE — 85027 COMPLETE CBC AUTOMATED: CPT

## 2022-07-06 PROCEDURE — 80053 COMPREHEN METABOLIC PANEL: CPT

## 2022-07-06 PROCEDURE — 83605 ASSAY OF LACTIC ACID: CPT

## 2022-07-06 RX ORDER — CLINDAMYCIN PHOSPHATE 600 MG/50ML
600 INJECTION INTRAVENOUS
Status: COMPLETED | OUTPATIENT
Start: 2022-07-06 | End: 2022-07-06

## 2022-07-06 RX ORDER — CLINDAMYCIN HYDROCHLORIDE 300 MG/1
300 CAPSULE ORAL 4 TIMES DAILY
Qty: 40 CAPSULE | Refills: 0 | Status: SHIPPED | OUTPATIENT
Start: 2022-07-06 | End: 2022-07-16

## 2022-07-06 RX ADMIN — CLINDAMYCIN PHOSPHATE 600 MG: 600 INJECTION, SOLUTION INTRAVENOUS at 17:43

## 2022-07-06 NOTE — ED NOTES
I have reviewed discharge instructions with the patient. The patient verbalized understanding. Patient left ED via Discharge Method: wheelchair to Home with wife. Opportunity for questions and clarification provided. Patient given 1 scripts. To continue your aftercare when you leave the hospital, you may receive an automated call from our care team to check in on how you are doing.  This is a free service and part of our promise to provide the best care and service to meet your aftercare needs. \" If you have questions, or wish to unsubscribe from this service please call [phone].  Thank you for Choosing our [facility] Emergency Department.         Dede Hairston RN  07/06/22 [id number]

## 2022-07-06 NOTE — TELEPHONE ENCOUNTER
The patient's wife called. States her   left 2nd toe has been red with pus for three days. States they were at his podiatrist office yesterday for his regular visit. But left after waiting for 2 hours. I explained DR Ranulfo Deras did not have any openings today. Suggested urgent care since he is diabetic and this is 3 days with redness and pus.

## 2022-07-06 NOTE — TELEPHONE ENCOUNTER
Received call from [facility] with [employer] Complaint. Subjective: Caller states \"His L foot is swollen and red\"     Current Symptoms: L foot and ankle is swollen and his second toe is red and swollen. Warm to touch. No known injury. Toe has white spot with dark spot around it. No trouble breathing or chest pain. Onset: a few days ago; worsening    Associated Symptoms:     Pain Severity: 0/10; N/A; none    Temperature: denies fever     LMP: NA Pregnant: NA    Recommended disposition: See HCP within 4 Hours (or PCP triage)    Care advice provided, patient verbalizes understanding; denies any other questions or concerns; instructed to call back for any new or worsening symptoms. Writer provided warm transfer to Sherri at [facility] for second level triage     Attention Provider: Thank you for allowing me to participate in the care of your patient. The patient was connected to triage in response to information provided to the ECC/PSC. Please do not respond through this encounter as the response is not directed to a shared pool.         Reason for Disposition   [1] Red area or streak [2] large (> 2 in. or 5 cm)    Protocols used: ANKLE SWELLING-ADULT-AH

## 2022-07-06 NOTE — ED TRIAGE NOTES
Patient arrives in wheelchair to triage with mask in place. Patient reports reports onset of pain and swelling to left foot several days ago. Type 1 diabetic. Sent from [facility] for further work up. Patient reports recently had door fall on left foot. Denies fevers, body aches.

## 2022-07-06 NOTE — ED PROVIDER NOTES
[facility] Emergency Department Provider Note                   PCP:                Markus Johnson DO               Age: 78 y.o. Sex: male     No diagnosis found. DISPOSITION         New Prescriptions    No medications on file       Orders Placed This Encounter   Procedures    CBC    Comprehensive Metabolic Panel    Lactic Acid         Flor Gaffney is a 78 y.o. male who presents to the Emergency Department with chief complaint of  No chief complaint on file. Patient to ER complaining of redness and swelling to the left second toe after direct trauma 2 days ago. Patient states he got up in the night and his bedroom door came off of the hinges following onto him and his left foot. He has slight abrasion to the top of the toe at the time. Patient is diabetic and has neuropathy to the feet. He went to urgent care today and had x-rays done and was sent here for further evaluation. States blood sugars has been running well he has not had any fever          Review of Systems   All other systems reviewed and are negative. All other systems reviewed and are negative.       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)     Diabetic nephropathy ([address]     Diabetic peripheral neuropathy (HCC)     Dysphonia     Essential hypertension     GERD (gastroesophageal reflux disease)     Hypercholesterolemia     Insulin pump status     Left hip pain     Lumbar radiculopathy     Mild cognitive impairment     Moderate episode of recurrent major depressive disorder (HCC)     Obstructive sleep apnea     Orthostatic hypotension     Paraspinal muscle spasm     Polypharmacy     Primary hypothyroidism     Proliferative diabetic retinopathy associated with type 1 diabetes mellitus (HCC)     Seborrheic keratosis     Spinal cord stimulator status     Spondylolisthesis     Stage 3b chronic kidney disease [facility]     Stroke-like symptom 7/8/2021    Tachycardia     Thyroid disease     managed with medications    Trochanteric bursitis of left hip     Type 1 diabetes mellitus ([facility]     Vocal cord atrophy         Past Surgical History:   Procedure Laterality Date    BUNIONECTOMY Right     CATARACT REMOVAL Bilateral     COLONOSCOPY  2016    COLONOSCOPY N/A 7/28/2021    COLONOSCOPY/ BMI 30 ROOM 902 performed by William Lira MD at [facility] Bilateral     laser treatment for diabetic complication    LUMBAR FUSION  2010    L4-5    LUMBAR LAMINECTOMY  2008    L5-S1    OTHER SURGICAL HISTORY      spinal neurostimulator    SHOULDER ARTHROSCOPY Left     TOE AMPUTATION      distal portion of the 2nd toe of R, foot, distal part of 3rd toe of r foot    TONSILLECTOMY AND ADENOIDECTOMY  age 8        Family History   Problem Relation Age of Onset    Hypertension Brother     Diabetes Maternal Uncle         type 1    Diabetes Brother         type 2    Thyroid Disease Mother         treated with surgery    Heart Disease Father     Hypertension Mother            Social Connections:     Frequency of Communication with Friends and Family: Not on file    Frequency of Social Gatherings with Friends and Family: Not on file    Attends Yazidi Services: Not on file    Active Member of Clubs or Organizations: Not on file    Attends Club or Organization Meetings: Not on file    Marital Status: Not on file        Allergies   Allergen Reactions    Drixoral Cough-Sore Throat [Acetaminophen-Dm] Other (See Comments) and Palpitations     Heart racing.        Other Palpitations     Antihistamines  Antihistamines  Antihistamines  Antihistamines  Antihistamines  Antihistamines      Antihistamines, Loratadine-Type Palpitations        Vitals signs and nursing note reviewed. Patient Vitals for the past 4 hrs:   Temp Pulse Resp BP SpO2   07/06/22 1415 98.4 °F (36.9 °C) (!) 112 18 (!) 158/107 98 %          Physical Exam  Vitals reviewed. Constitutional:       Appearance: Normal appearance. He is normal weight. HENT:      Head: Normocephalic and atraumatic. Right Ear: External ear normal.      Left Ear: External ear normal.      Nose: Nose normal.      Mouth/Throat:      Mouth: Mucous membranes are moist.      Pharynx: Oropharynx is clear. Eyes:      Extraocular Movements: Extraocular movements intact. Conjunctiva/sclera: Conjunctivae normal.      Pupils: Pupils are equal, round, and reactive to light. Cardiovascular:      Rate and Rhythm: Normal rate and regular rhythm. Pulmonary:      Effort: Pulmonary effort is normal.      Breath sounds: Normal breath sounds. Chest:      Chest wall: No tenderness. Abdominal:      General: Abdomen is flat. Bowel sounds are normal.      Palpations: Abdomen is soft. Musculoskeletal:         General: Swelling and tenderness present. Normal range of motion. Cervical back: Normal range of motion and neck supple. Comments: Left second toe with redness and swelling there is a small approximately 2 mm abrasion to the top of the toe no drainage noted, no streaking up into the foot. Foot is swollen but this is his baseline. Skin:     General: Skin is warm and dry. Neurological:      General: No focal deficit present. Mental Status: He is alert and oriented to person, place, and time.    Psychiatric:         Mood and Affect: Mood normal.         Behavior: Behavior normal.          MDM  Number of Diagnoses or Management Options  Diagnosis management comments: Labs Reviewed  CBC - Abnormal; Notable for the following components:     Hemoglobin                    12.7 (*)               Hematocrit                    40.7 (*)               MCHC 31.2 (*)               RDW                           15.1 (*)            All other components within normal limits  COMPREHENSIVE METABOLIC PANEL - Abnormal; Notable for the following components:     Sodium                        134 (*)                Anion Gap                     5 (*)                  Glucose                       361 (*)                GFR Non-      53 (*)                 AST                           12 (*)                 Alk Phosphatase               157 (*)                Globulin                      4.2 (*)                Albumin/Globulin Ratio        0.8 (*)             All other components within normal limits  LACTIC ACID    X-rays done at the urgent care loaded into our system show no obvious fracture of the second toe no air around the soft tissues  Feel this is cellulitis at this time. Patient was given 600 mg of Cleocin IV.   Will start on oral Cleocin patient has diabetic shoes wrist he is to keep the area covered and see his primary care physician for recheck in 2 days       Amount and/or Complexity of Data Reviewed  Clinical lab tests: ordered and reviewed  Tests in the radiology section of CPT®: reviewed  Review and summarize past medical records: yes    Risk of Complications, Morbidity, and/or Mortality  Presenting problems: moderate  Diagnostic procedures: moderate  Management options: moderate    Patient Progress  Patient progress: improved      Procedures    Labs Reviewed   CBC - Abnormal; Notable for the following components:       Result Value    Hemoglobin 12.7 (*)     Hematocrit 40.7 (*)     MCHC 31.2 (*)     RDW 15.1 (*)     All other components within normal limits   COMPREHENSIVE METABOLIC PANEL - Abnormal; Notable for the following components:    Sodium 134 (*)     Anion Gap 5 (*)     Glucose 361 (*)     GFR Non- 53 (*)     AST 12 (*)     Alk Phosphatase 157 (*)     Globulin 4.2 (*)     Albumin/Globulin Ratio 0.8 (*)     All other components within normal limits   LACTIC ACID        No orders to display            Irineo Coma Scale  Eye Opening: Spontaneous  Best Verbal Response: Oriented  Best Motor Response: Obeys commands  Irineo Coma Scale Score: 15                     Voice dictation software was used during the making of this note. This software is not perfect and grammatical and other typographical errors may be present. This note has not been completely proofread for errors.      Juan Manuel Lemus  07/06/22 2682

## 2022-07-06 NOTE — ED NOTES
Report given to Marietta Jacques RN and care assumed at this time.       Denisse Mccarty, [place]  07/06/22 9213

## 2022-07-06 NOTE — ED NOTES
Xray at [address] [place] [address] sent to be uploaded.       Jayson Elliott RN  07/06/22 3552

## 2022-07-07 ENCOUNTER — OFFICE VISIT (OUTPATIENT)
Dept: INTERNAL MEDICINE CLINIC | Facility: CLINIC | Age: 77
End: 2022-07-07
Payer: MEDICARE

## 2022-07-07 ENCOUNTER — TELEPHONE (OUTPATIENT)
Dept: ENDOCRINOLOGY | Age: 77
End: 2022-07-07

## 2022-07-07 VITALS
DIASTOLIC BLOOD PRESSURE: 78 MMHG | TEMPERATURE: 97.2 F | HEART RATE: 108 BPM | SYSTOLIC BLOOD PRESSURE: 132 MMHG | OXYGEN SATURATION: 100 %

## 2022-07-07 DIAGNOSIS — E11.610 CHARCOT FOOT DUE TO DIABETES MELLITUS (HCC): ICD-10-CM

## 2022-07-07 DIAGNOSIS — I73.9 PERIPHERAL VASCULAR DISEASE (HCC): ICD-10-CM

## 2022-07-07 DIAGNOSIS — L02.619 CELLULITIS AND ABSCESS OF FOOT: Primary | ICD-10-CM

## 2022-07-07 DIAGNOSIS — L03.119 CELLULITIS AND ABSCESS OF FOOT: Primary | ICD-10-CM

## 2022-07-07 PROCEDURE — G8417 CALC BMI ABV UP PARAM F/U: HCPCS | Performed by: INTERNAL MEDICINE

## 2022-07-07 PROCEDURE — 1123F ACP DISCUSS/DSCN MKR DOCD: CPT | Performed by: INTERNAL MEDICINE

## 2022-07-07 PROCEDURE — 3046F HEMOGLOBIN A1C LEVEL >9.0%: CPT | Performed by: INTERNAL MEDICINE

## 2022-07-07 PROCEDURE — 2022F DILAT RTA XM EVC RTNOPTHY: CPT | Performed by: INTERNAL MEDICINE

## 2022-07-07 PROCEDURE — 99213 OFFICE O/P EST LOW 20 MIN: CPT | Performed by: INTERNAL MEDICINE

## 2022-07-07 PROCEDURE — G8428 CUR MEDS NOT DOCUMENT: HCPCS | Performed by: INTERNAL MEDICINE

## 2022-07-07 PROCEDURE — 1036F TOBACCO NON-USER: CPT | Performed by: INTERNAL MEDICINE

## 2022-07-07 PROCEDURE — 3017F COLORECTAL CA SCREEN DOC REV: CPT | Performed by: INTERNAL MEDICINE

## 2022-07-07 PROCEDURE — 1111F DSCHRG MED/CURRENT MED MERGE: CPT | Performed by: INTERNAL MEDICINE

## 2022-07-07 ASSESSMENT — PATIENT HEALTH QUESTIONNAIRE - PHQ9
3. TROUBLE FALLING OR STAYING ASLEEP: 3
SUM OF ALL RESPONSES TO PHQ QUESTIONS 1-9: 8
1. LITTLE INTEREST OR PLEASURE IN DOING THINGS: 0
SUM OF ALL RESPONSES TO PHQ9 QUESTIONS 1 & 2: 1
10. IF YOU CHECKED OFF ANY PROBLEMS, HOW DIFFICULT HAVE THESE PROBLEMS MADE IT FOR YOU TO DO YOUR WORK, TAKE CARE OF THINGS AT HOME, OR GET ALONG WITH OTHER PEOPLE: 1
SUM OF ALL RESPONSES TO PHQ QUESTIONS 1-9: 8
4. FEELING TIRED OR HAVING LITTLE ENERGY: 1
SUM OF ALL RESPONSES TO PHQ QUESTIONS 1-9: 8
SUM OF ALL RESPONSES TO PHQ QUESTIONS 1-9: 8
5. POOR APPETITE OR OVEREATING: 0
7. TROUBLE CONCENTRATING ON THINGS, SUCH AS READING THE NEWSPAPER OR WATCHING TELEVISION: 3
6. FEELING BAD ABOUT YOURSELF - OR THAT YOU ARE A FAILURE OR HAVE LET YOURSELF OR YOUR FAMILY DOWN: 0
9. THOUGHTS THAT YOU WOULD BE BETTER OFF DEAD, OR OF HURTING YOURSELF: 0
8. MOVING OR SPEAKING SO SLOWLY THAT OTHER PEOPLE COULD HAVE NOTICED. OR THE OPPOSITE, BEING SO FIGETY OR RESTLESS THAT YOU HAVE BEEN MOVING AROUND A LOT MORE THAN USUAL: 0
2. FEELING DOWN, DEPRESSED OR HOPELESS: 1

## 2022-07-07 NOTE — PROGRESS NOTES
Socioeconomic History    Marital status:      Spouse name: Not on file    Number of children: Not on file    Years of education: Not on file    Highest education level: Not on file   Occupational History    Not on file   Tobacco Use    Smoking status: Former     Packs/day: 1.00     Years: 30.00     Pack years: 30.00     Types: Cigarettes     Quit date: 1996     Years since quittin.8    Smokeless tobacco: Former   Vaping Use    Vaping Use: Never used   Substance and Sexual Activity    Alcohol use: Not Currently    Drug use: Never    Sexual activity: Not on file   Other Topics Concern    Not on file   Social History Narrative    Not on file     Social Determinants of Health     Financial Resource Strain: Not on file   Food Insecurity: Not on file   Transportation Needs: Not on file   Physical Activity: Not on file   Stress: Not on file   Social Connections: Not on file   Intimate Partner Violence: Not on file   Housing Stability: Not on file         Current Outpatient Medications:     escitalopram (LEXAPRO) 5 MG tablet, TAKE 1 TABLET BY MOUTH EVERY EVENING, Disp: , Rfl:     OLANZapine (ZYPREXA) 2.5 MG tablet, TAKE 1 TABLET BY MOUTH EVERY EVENING, Disp: , Rfl:     INSULIN SYRINGE .5CC/29G ([employer] INS SYR .5CC/29G) 29G X 1/2\" 0.5 ML MISC, 1 each by Does not apply route daily, Disp: 100 each, Rfl: 0    DULoxetine (CYMBALTA) 60 MG extended release capsule, TAKE 2 CAPSULES BY MOUTH EVERY MORNING, Disp: , Rfl:     Blood Glucose Monitoring Suppl (CONTOUR MONITOR) ELLE, Check blood glucose 4 times daily.   Dx E10.65, Disp: , Rfl:     Glucose Blood (CONTOUR NEXT TEST VI), USE TO TEST THREE TIMES DAILY, Disp: , Rfl:     Continuous Blood Gluc  ([address]) ELLE, by Other route, Disp: , Rfl:     Continuous Blood Gluc Sensor (DEXCOM G6 SENSOR) MISC, Change every 10 days as directed, Disp: , Rfl:     Handicap Placard MISC, Handicap parking placard; permanent disability, Disp: , Rfl:     Continuous (LIDODERM) 5 %, Place 1 patch onto the skin daily 12 hours on, 12 hours off., Disp: 60 patch, Rfl: 5    Allergies   Allergen Reactions    Drixoral Cough-Sore Throat [Acetaminophen-Dm] Other (See Comments) and Palpitations     Heart racing. Other Palpitations     Antihistamines  Antihistamines  Antihistamines  Antihistamines  Antihistamines  Antihistamines      Antihistamines, Loratadine-Type Palpitations         Review of Systems   Constitutional:  Negative for chills and fever. Gastrointestinal:  Negative for nausea and vomiting. Vitals:    07/07/22 1538   BP: 132/78   Pulse: (!) 108   Temp: 97.2 °F (36.2 °C)   TempSrc: Temporal   SpO2: 100%           Physical Exam  Constitutional:       Appearance: He is not ill-appearing. Eyes:      Extraocular Movements: Extraocular movements intact. Pulmonary:      Effort: Pulmonary effort is normal. No respiratory distress. Musculoskeletal:        Feet:    Feet:      Comments: Wound open no exduate erythema visible. improved according to him and wife  Neurological:      General: No focal deficit present. Mental Status: He is alert. Mental status is at baseline. Psychiatric:         Thought Content:  Thought content normal.          Kamille Honeycutt was seen today for follow-up from hospital.    Diagnoses and all orders for this visit:    Charcot foot due to diabetes mellitus ([address]    Peripheral vascular disease ([address]               Bruno Richardson DO

## 2022-07-07 NOTE — TELEPHONE ENCOUNTER
Patient's wife called and stated that patient had to go to ER yesterday due to a wound on his big toe. Patient was diagnosed with cellulitis and put on Clindamycin. Patient's wife stated that she received a new prescription of patient's insulin and wanted to know if she needed to throw out what he currently has. Asked patient's wife if insulin was still in date, had been in the refrigerator, and if there was any discoloration to the insulin. Patient's wife stated that medication was in date, refrigerated, and no discoloration. Patient's wife advised to reach out if patient's blood sugars get worse while on antibiotic. Patient's wife advised to continue using current dose of Lantus 25 am, 20 units pm, and Humalog according to correction scale given last time.

## 2022-07-08 ENCOUNTER — OFFICE VISIT (OUTPATIENT)
Dept: INTERNAL MEDICINE CLINIC | Facility: CLINIC | Age: 77
End: 2022-07-08
Payer: MEDICARE

## 2022-07-08 VITALS
HEIGHT: 68 IN | HEART RATE: 113 BPM | WEIGHT: 214 LBS | SYSTOLIC BLOOD PRESSURE: 124 MMHG | BODY MASS INDEX: 32.43 KG/M2 | OXYGEN SATURATION: 99 % | TEMPERATURE: 97.8 F | DIASTOLIC BLOOD PRESSURE: 80 MMHG

## 2022-07-08 DIAGNOSIS — G89.29 CHRONIC LEFT-SIDED LOW BACK PAIN WITHOUT SCIATICA: ICD-10-CM

## 2022-07-08 DIAGNOSIS — R29.6 FALLS FREQUENTLY: Primary | ICD-10-CM

## 2022-07-08 DIAGNOSIS — M54.50 CHRONIC LEFT-SIDED LOW BACK PAIN WITHOUT SCIATICA: ICD-10-CM

## 2022-07-08 PROCEDURE — 3017F COLORECTAL CA SCREEN DOC REV: CPT | Performed by: INTERNAL MEDICINE

## 2022-07-08 PROCEDURE — 1111F DSCHRG MED/CURRENT MED MERGE: CPT | Performed by: INTERNAL MEDICINE

## 2022-07-08 PROCEDURE — 99213 OFFICE O/P EST LOW 20 MIN: CPT | Performed by: INTERNAL MEDICINE

## 2022-07-08 PROCEDURE — G8417 CALC BMI ABV UP PARAM F/U: HCPCS | Performed by: INTERNAL MEDICINE

## 2022-07-08 PROCEDURE — 1123F ACP DISCUSS/DSCN MKR DOCD: CPT | Performed by: INTERNAL MEDICINE

## 2022-07-08 PROCEDURE — G8427 DOCREV CUR MEDS BY ELIG CLIN: HCPCS | Performed by: INTERNAL MEDICINE

## 2022-07-08 PROCEDURE — 1036F TOBACCO NON-USER: CPT | Performed by: INTERNAL MEDICINE

## 2022-07-08 RX ORDER — LIDOCAINE 50 MG/G
1 PATCH TOPICAL DAILY
Qty: 60 PATCH | Refills: 5 | Status: SHIPPED | OUTPATIENT
Start: 2022-07-08 | End: 2022-08-07

## 2022-07-08 ASSESSMENT — PATIENT HEALTH QUESTIONNAIRE - PHQ9
SUM OF ALL RESPONSES TO PHQ QUESTIONS 1-9: 0
SUM OF ALL RESPONSES TO PHQ9 QUESTIONS 1 & 2: 0
SUM OF ALL RESPONSES TO PHQ QUESTIONS 1-9: 0
1. LITTLE INTEREST OR PLEASURE IN DOING THINGS: 0
SUM OF ALL RESPONSES TO PHQ QUESTIONS 1-9: 0
SUM OF ALL RESPONSES TO PHQ QUESTIONS 1-9: 0
2. FEELING DOWN, DEPRESSED OR HOPELESS: 0

## 2022-07-08 NOTE — PROGRESS NOTES
Thyroid Disease Mother         treated with surgery    Heart Disease Father     Hypertension Mother         Social History     Socioeconomic History    Marital status:      Spouse name: Not on file    Number of children: Not on file    Years of education: Not on file    Highest education level: Not on file   Occupational History    Not on file   Tobacco Use    Smoking status: Former Smoker     Packs/day: 1.00     Years: 30.00     Pack years: 30.00     Quit date: 1996     Years since quittin.8    Smokeless tobacco: Former User   Vaping Use    Vaping Use: Never used   Substance and Sexual Activity    Alcohol use: Not Currently    Drug use: Never    Sexual activity: Not on file   Other Topics Concern    Not on file   Social History Narrative    Not on file     Social Determinants of Health     Financial Resource Strain:     Difficulty of Paying Living Expenses: Not on file   Food Insecurity:     Worried About [address] in the Last Year: Not on file    [address] in the Last Year: Not on file   Transportation Needs:     Lack of Transportation (Medical): Not on file    Lack of Transportation (Non-Medical):  Not on file   Physical Activity:     Days of Exercise per Week: Not on file    Minutes of Exercise per Session: Not on file   Stress:     Feeling of Stress : Not on file   Social Connections:     Frequency of Communication with Friends and Family: Not on file    Frequency of Social Gatherings with Friends and Family: Not on file    Attends Evangelical Services: Not on file    Active Member of Clubs or Organizations: Not on file    Attends Club or Organization Meetings: Not on file    Marital Status: Not on file   Intimate Partner Violence:     Fear of Current or Ex-Partner: Not on file    Emotionally Abused: Not on file    Physically Abused: Not on file    Sexually Abused: Not on file   Housing Stability:     Unable to Pay for Housing in the Last Year: Not on file    Number of Places Lived in the Last Year: Not on file    Unstable Housing in the Last Year: Not on file         Current Outpatient Medications:     lidocaine (LIDODERM) 5 %, Place 1 patch onto the skin daily 12 hours on, 12 hours off., Disp: 60 patch, Rfl: 5    clindamycin (CLEOCIN) 300 MG capsule, Take 1 capsule by mouth 4 times daily for 10 days, Disp: 40 capsule, Rfl: 0    HUMALOG 100 UNIT/ML SOLN injection vial, 10 unit TIDAC plus a 1 per 25 greater than 150 correction max daily dose 50 units, Disp: 50 mL, Rfl: 3    escitalopram (LEXAPRO) 5 MG tablet, TAKE 1 TABLET BY MOUTH EVERY EVENING, Disp: , Rfl:     insulin glargine (LANTUS) 100 UNIT/ML injection vial, Inject 25 Units into the skin nightly, Disp: 1 each, Rfl: 1    INSULIN SYRINGE .5CC/29G ([employer] INS SYR .5CC/29G) 29G X 1/2\" 0.5 ML MISC, 1 each by Does not apply route daily, Disp: 100 each, Rfl: 0    DULoxetine (CYMBALTA) 60 MG extended release capsule, TAKE 2 CAPSULES BY MOUTH EVERY MORNING, Disp: , Rfl:     Blood Glucose Monitoring Suppl (CONTOUR MONITOR) ELLE, Check blood glucose 4 times daily. Dx E10.65, Disp: , Rfl:     Glucose Blood (CONTOUR NEXT TEST VI), USE TO TEST THREE TIMES DAILY, Disp: , Rfl:     Continuous Blood Gluc  (DEXCOM G6 ) ELLE, by Other route, Disp: , Rfl:     Continuous Blood Gluc Sensor (DEXCOM G6 SENSOR) MISC, Change every 10 days as directed, Disp: , Rfl:     Handicap Placard MISC, Handicap parking placard; permanent disability, Disp: , Rfl:     Continuous Blood Gluc Transmit (DEXCOM G6 TRANSMITTER) MISC, USE AS DIRECTED FOUR TIMES DAILY, Disp: , Rfl:     morphine (MS CONTIN) 15 MG extended release tablet, Take 15 mg by mouth 2 times daily.  , Disp: , Rfl:     buPROPion (WELLBUTRIN) 75 MG tablet, Take 75 mg by mouth 2 times daily, Disp: , Rfl:     acetaminophen (TYLENOL) 500 MG tablet, Take 500 mg by mouth every 6 hours as needed, Disp: , Rfl:     vitamin D 25 MCG (1000 UT) CAPS, Take 1,000 Units by mouth daily, Disp: , Rfl:     finasteride (PROSCAR) 5 MG tablet, Take 5 mg by mouth daily, Disp: , Rfl:     fluticasone (CUTIVATE) 0.05 % cream, Apply 1 g topically 2 times daily , Disp: , Rfl:     fluticasone (FLONASE) 50 MCG/ACT nasal spray, 2 sprays by Nasal route daily as needed, Disp: , Rfl:     Glucagon (GVOKE PFS) 1 MG/0.2ML SOSY, Inject 1 each into the skin as needed, Disp: , Rfl:     hydrocortisone (WESTCORT) 0.2 % cream, Apply topically, Disp: , Rfl:     levothyroxine (SYNTHROID) 75 MCG tablet, Take 75 mcg by mouth every morning (before breakfast), Disp: , Rfl:     lidocaine (LIDODERM) 5 %, Place 1 patch onto the skin every 24 hours, Disp: , Rfl:     naloxone (NARCAN) 4 MG/0.1ML LIQD nasal spray, 1 SPRAY BY INTRANASAL ROUTE ONCE AS NEEDED FOR UP TO 1 DOSE, Disp: , Rfl:     ondansetron (ZOFRAN-ODT) 4 MG disintegrating tablet, Take 4 mg by mouth every 6 hours as needed, Disp: , Rfl:     pantoprazole (PROTONIX) 40 MG tablet, Take 40 mg by mouth daily, Disp: , Rfl:     polyethylene glycol (GLYCOLAX) 17 GM/SCOOP powder, Take 17 g by mouth daily, Disp: , Rfl:     rosuvastatin (CRESTOR) 10 MG tablet, Take 10 mg by mouth, Disp: , Rfl:     sodium bicarbonate 650 MG tablet, Take 650 mg by mouth 2 times daily, Disp: , Rfl:     OLANZapine (ZYPREXA) 2.5 MG tablet, TAKE 1 TABLET BY MOUTH EVERY EVENING (Patient not taking: Reported on 7/8/2022), Disp: , Rfl:     Allergies   Allergen Reactions    Drixoral Cough-Sore Throat [Acetaminophen-Dm] Other (See Comments) and Palpitations     Heart racing.        Other Palpitations     Antihistamines  Antihistamines  Antihistamines  Antihistamines  Antihistamines  Antihistamines      Antihistamines, Loratadine-Type Palpitations         Review of Systems      Vitals:    07/08/22 1035   BP: 124/80   Site: Right Upper Arm   Position: Sitting   Cuff Size: Medium Adult   Pulse: (!) 113   Temp: 97.8 °F (36.6 °C)   TempSrc: Temporal   SpO2: 99%   Weight: 214 lb (97.1 kg)   Height: 5' 8\" (1.727 m)           Physical Exam  Constitutional:       Appearance: Normal appearance. He is not ill-appearing. Eyes:      Extraocular Movements: Extraocular movements intact. Pulmonary:      Effort: Pulmonary effort is normal. No respiratory distress. Musculoskeletal:        Legs:    Neurological:      General: No focal deficit present. Mental Status: He is alert. Mental status is at baseline. Psychiatric:         Mood and Affect: Mood normal.         Speech: Speech normal.         Behavior: Behavior normal. Behavior is cooperative. Thought Content: Thought content normal.         Cognition and Memory: Memory is impaired. Isis De Jesus was seen today for neck pain and back pain. Diagnoses and all orders for this visit:    Falls frequently  -     External Referral to Physical Therapy    Chronic left-sided low back pain without sciatica  -     External Referral to Physical Therapy    Other orders  -     lidocaine (LIDODERM) 5 %; Place 1 patch onto the skin daily 12 hours on, 12 hours off.                  Jesse Rosales, DO

## 2022-07-11 PROBLEM — Z89.429 HISTORY OF AMPUTATION OF TOE (HCC): Status: ACTIVE | Noted: 2022-07-11

## 2022-07-13 ENCOUNTER — OFFICE VISIT (OUTPATIENT)
Dept: ENDOCRINOLOGY | Age: 77
End: 2022-07-13
Payer: MEDICARE

## 2022-07-13 VITALS
OXYGEN SATURATION: 98 % | SYSTOLIC BLOOD PRESSURE: 130 MMHG | HEART RATE: 110 BPM | HEIGHT: 68 IN | DIASTOLIC BLOOD PRESSURE: 62 MMHG | BODY MASS INDEX: 33.19 KG/M2 | WEIGHT: 219 LBS

## 2022-07-13 DIAGNOSIS — E10.65 TYPE 1 DIABETES MELLITUS WITH HYPERGLYCEMIA (HCC): Primary | ICD-10-CM

## 2022-07-13 DIAGNOSIS — Z89.429 HISTORY OF AMPUTATION OF TOE (HCC): ICD-10-CM

## 2022-07-13 PROCEDURE — 99214 OFFICE O/P EST MOD 30 MIN: CPT | Performed by: PHYSICIAN ASSISTANT

## 2022-07-13 PROCEDURE — G8427 DOCREV CUR MEDS BY ELIG CLIN: HCPCS | Performed by: PHYSICIAN ASSISTANT

## 2022-07-13 PROCEDURE — G8417 CALC BMI ABV UP PARAM F/U: HCPCS | Performed by: PHYSICIAN ASSISTANT

## 2022-07-13 PROCEDURE — 1123F ACP DISCUSS/DSCN MKR DOCD: CPT | Performed by: PHYSICIAN ASSISTANT

## 2022-07-13 PROCEDURE — 3046F HEMOGLOBIN A1C LEVEL >9.0%: CPT | Performed by: PHYSICIAN ASSISTANT

## 2022-07-13 PROCEDURE — 3017F COLORECTAL CA SCREEN DOC REV: CPT | Performed by: PHYSICIAN ASSISTANT

## 2022-07-13 PROCEDURE — 1036F TOBACCO NON-USER: CPT | Performed by: PHYSICIAN ASSISTANT

## 2022-07-13 PROCEDURE — 2022F DILAT RTA XM EVC RTNOPTHY: CPT | Performed by: PHYSICIAN ASSISTANT

## 2022-07-13 RX ORDER — INSULIN LISPRO 100 [IU]/ML
INJECTION, SOLUTION INTRAVENOUS; SUBCUTANEOUS
Qty: 60 ML | Refills: 3 | Status: SHIPPED | OUTPATIENT
Start: 2022-07-13 | End: 2022-08-04 | Stop reason: SDUPTHER

## 2022-07-13 RX ORDER — INSULIN GLARGINE 100 [IU]/ML
INJECTION, SOLUTION SUBCUTANEOUS
Qty: 40 ML | Refills: 3 | Status: SHIPPED | OUTPATIENT
Start: 2022-07-13 | End: 2022-08-04 | Stop reason: SDUPTHER

## 2022-07-13 NOTE — PROGRESS NOTES
[facility] ENDOCRINOLOGY   AND   THYROID NODULE CLINIC    Gary Rogers PA-C  [facility]  [address], [address]  [place], [address]  Phone [phone]          Shea Sandoval is a 78 y.o. male seen 7/13/2022 for follow up evaluation of type 1 diabetes        Assessment and Plan:    In office COVID-19 PPE worn and precautions taken    1. Type 1 diabetes mellitus with hyperglycemia (HCC)  Patient with type 1 diabetes no longer on insulin pump on basal bolus insulin regimen. Unfortunately he does continue to do the majority of his injections on his abdomen or there is significant lipodystrophy. This was again discouraged. We tried both the patient and his wife to inject in virgin tissue on the back of the arm or on the extreme lateral abdomen. Patient continues to have profound hyperglycemia. Patient given a log on which she should document not only his blood glucose readings but also his insulin use for review at her upcoming telephone follow-up visit. We will increase patient's prandial insulin from 10 units 3 times daily AC to 12 units 3 times daily AC maintaining the same 1 per 25 greater than 150 correction. Patient and wife aware to reach out to this office if hypoglycemia is noted using alternate insulin sites    Of hypoglycemia outweighs benefit of tight glycemic control in this patient    - HUMALOG 100 UNIT/ML SOLN injection vial; 12 unit TIDAC plus a 1 per 25 greater than 150 correction max daily dose 60 units  Dispense: 60 mL; Refill: 3  - LANTUS 100 UNIT/ML injection vial; 25 units in am, 20 units in pm  Dispense: 40 mL; Refill: 3    2. History of amputation of toe ([address]  Care discussed at length, acute on chronic cellulitis from trauma to the right foot. Continue antibiotics until completion            Zechariah Chavarria was seen today for diabetes.     Diagnoses and all orders for this visit:    Type 1 diabetes mellitus with hyperglycemia (HCC)  -     HUMALOG 100 UNIT/ML SOLN injection vial; 12 unit TIDAC plus a 1 per 25 greater than 150 correction max daily dose 60 units  -     LANTUS 100 UNIT/ML injection vial; 25 units in am, 20 units in pm    History of amputation of toe ([address]            History of Present Illness:    7/13/2022  Patient returns clinic for scheduled follow-up, recent hospital mission for cellulitis of his foot after a local traumatic injury. Unable to manage the insulin pump that he was prescribed. Currently doing basal bolus insulin. Unable to effectively use CGM. Current Regimen: Lantus 25am, 20 pm, Humalog 10 plus 1/25>150 correction     06/29/2022  Worked in for interim follow-up appointment. Old Paradigm Revel was changed to tandem T slim X to patient has been unable to utilize his Dexcom and unable to utilize the tandem pump. He has been in contact with our office and primary care regarding hyperglycemia off insulin pump. He has been taking basal bolus insulin with 22 units of Lantus in the morning and 19 units of Lantus in the evening +5 units of prandial insulin and a 2 per 50 greater than 150 correction scale. Wife is routinely been giving patient 10 units of insulin before meals often 15 without any significant improvement of blood glucose          6/2/2022  History of type 1 diabetes returns to clinic for follow-up. He has an old Paradigm Revel 723 and a new Dexcom G6 that he has not been able to utilize appropriately.   He does not enter information into his pump so he is not receiving any bolus insulin for correction or for his carbs               DIABETES MELLITUS/INSULIN PUMP THERAPY  Carlos Jean Jr. is here for follow up evaluation and treatment of type 1 diabetes mellitus.  This is currently treated with insulin delivered via  an insulin pump (Medtronic 723).  He does not use a glucose sensor.  He started on insulin pump therapy ~2010.  This is currently managed by Dr. Angelica Sethi Tanya Franco, endocrinologist with [facility]. Angelic Downs was previously [facility] by Ngoc Montes and Ramiro Gilman, also in that practice.      Date of diagnosis: age 14      Current symptoms: See review of systems below      Diet: no particular diet      Exercise: minimal (limited by back pain)      Diabetes education: The patient has received formal diabetes education (many years ago).     Pump issues:  None recently (last pump failure many years ago)      Infusion set change frequency: every 2 days      Insulin delivery: Average total daily dose 45.3 (43.6basal 96%, 1.7 bolus 4%)       Diabetic complications:                Retinopathy: Last eye exam was 1/11/2022 and demonstrated proliferative diabetic retinopathy without macular edema OU. POLO MONACO [facility] care specialist Viktoriya5 S Santhosh Crowell. Arnulfo Sommers MD with [facility].                 Albuminuria/nephropathy:                          2/34/3664: Urine microalbumin to creatinine ratio less than 17 (-).                         2/2/2022: Serum creatinine 1.35 (GFR 51%).                           04/08/2022      Cr 1.49, GFR 49                 Neuropathy:  Known Charcot foot (right foot) with established bilateral peripheral neuropathy         Home blood glucose monitoring frequency: 3.4 times per day    By review of meter download over past 30 days  Average blood glucose 376 ± 111  Range 128-high     Typical Standard Deviation   Fasting 363 109   AC lunch 387 102   AC supper 394 111   Bedtime 328 127     Blood glucose levels are uncontrolled, most significant elevations are constant, worse post prandial        Hypoglycemia: rare.  He has hypoglycemic awareness for blood glucose less than 60.      Hemoglobin A1c:   9/11/2018: 8.4%.   12/13/2018: 7.4%.   3/14/2019: 7.3%.   6/14/2019: 8.3%.   9/13/2019: 8.3%.   12/13/2019: 8.5%.   10/20/2020: 7.7%.   1/25/2021: 10.0%.   6/2/2021: 8.6%   7/8/2021: 8.3%.   10/8/2021: 8.3%.   2/22/2022: 10.5%.  06/02/2022: 11.0%      Other pertinent labs:              UWUGS:                           4/27/2020: Total cholesterol 133, triglycerides 189, HDL 47, LDL 63.0                 Thyroid:                           See below                 Adrenal:                          4/10/2019: Cortisol 24.7         THYROID DYSFUNCTION   AMIE Gonzalez Jr. is seen for follow up evaluation/management of hypothyroidism; this was diagnosed in his 64s.        Current symptoms:  See review of systems below      Prior treatment: He has been on levothyroxine 75 mcg daily since diagnosis.     Pertinent labs:   9/11/2018: TSH 2.752.   12/13/2019: TSH 1.949.   7/8/2021: TSH 0.882, free T4 1.1.      Imaging:   none           Allergies & Medications:  Reviewed in chart. Review of Systems    Vital Signs:  /62   Pulse (!) 110   Ht 5' 8\" (1.727 m)   Wt 219 lb (99.3 kg)   SpO2 98%   BMI 33.30 kg/m²       Physical Exam  Constitutional:       Appearance: Normal appearance. He is obese. HENT:      Ears:      Comments: Hearing aids in place  Neck:      Thyroid: No thyromegaly. Cardiovascular:      Rate and Rhythm: Normal rate and regular rhythm. Heart sounds: Murmur heard. Systolic murmur is present with a grade of 2/6. Pulmonary:      Effort: Pulmonary effort is normal.      Breath sounds: Normal breath sounds. Abdominal:      Palpations: Abdomen is soft. Musculoskeletal:      Right foot: Charcot foot present. Comments: Ambulates with walker  Bilateral hand contracture secondary to neuropathy    Feet:      Right foot:      Protective Sensation: 4 sites tested. 0 sites sensed. Skin integrity: Callus and dry skin present. Left foot:      Protective Sensation: 4 sites tested. 0 sites sensed. Skin integrity: Skin breakdown, callus and dry skin present. Comments: Hammer toe of left second toe with erythema    Right 2nd toe with abrasion/ulceration .  no heat, erythema, purulence, or sign of infection  Healing ulceration to distal tip of amputated stump of right 3rd toe, right second toe with well healed amputation    Complicated by underlying charcot deformity of right foot  Lymphadenopathy:      Cervical: No cervical adenopathy. Skin:     General: Skin is warm and dry. Neurological:      General: No focal deficit present. Mental Status: He is alert. Psychiatric:         Mood and Affect: Mood normal.         Behavior: Behavior normal.         Thought Content: Thought content normal.         Judgment: Judgment normal.             Return as scheduled, for Type 1 Diabetes f/u. Portions of this note were generated with the assistance of voice recogniton software. As such, some errors in transcription may be present.

## 2022-07-14 ENCOUNTER — OFFICE VISIT (OUTPATIENT)
Dept: INTERNAL MEDICINE CLINIC | Facility: CLINIC | Age: 77
End: 2022-07-14
Payer: MEDICARE

## 2022-07-14 VITALS
HEART RATE: 95 BPM | SYSTOLIC BLOOD PRESSURE: 100 MMHG | TEMPERATURE: 97.6 F | HEIGHT: 68 IN | DIASTOLIC BLOOD PRESSURE: 60 MMHG | OXYGEN SATURATION: 95 % | BODY MASS INDEX: 33.3 KG/M2

## 2022-07-14 DIAGNOSIS — E11.610 CHARCOT FOOT DUE TO DIABETES MELLITUS (HCC): Primary | ICD-10-CM

## 2022-07-14 DIAGNOSIS — R29.6 FALLS FREQUENTLY: ICD-10-CM

## 2022-07-14 PROCEDURE — G8428 CUR MEDS NOT DOCUMENT: HCPCS | Performed by: INTERNAL MEDICINE

## 2022-07-14 PROCEDURE — 3017F COLORECTAL CA SCREEN DOC REV: CPT | Performed by: INTERNAL MEDICINE

## 2022-07-14 PROCEDURE — 2022F DILAT RTA XM EVC RTNOPTHY: CPT | Performed by: INTERNAL MEDICINE

## 2022-07-14 PROCEDURE — 1123F ACP DISCUSS/DSCN MKR DOCD: CPT | Performed by: INTERNAL MEDICINE

## 2022-07-14 PROCEDURE — G8417 CALC BMI ABV UP PARAM F/U: HCPCS | Performed by: INTERNAL MEDICINE

## 2022-07-14 PROCEDURE — 3046F HEMOGLOBIN A1C LEVEL >9.0%: CPT | Performed by: INTERNAL MEDICINE

## 2022-07-14 PROCEDURE — 99213 OFFICE O/P EST LOW 20 MIN: CPT | Performed by: INTERNAL MEDICINE

## 2022-07-14 PROCEDURE — 1036F TOBACCO NON-USER: CPT | Performed by: INTERNAL MEDICINE

## 2022-07-14 NOTE — PROGRESS NOTES
Madelyn Anne was seen today for toe injury. Diagnoses and all orders for this visit:    Charcot foot due to diabetes mellitus ([address]    Falls frequently        Vera Ceballos is a 78 y.o. male    Chief Complaint   Patient presents with    Toe Injury     F/u has had multiple issues recently           No results found for this visit on 07/14/22.     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)     Diabetic nephropathy (HCC)     Diabetic peripheral neuropathy (HCC)     Dysphonia     Essential hypertension     GERD (gastroesophageal reflux disease)     Hypercholesterolemia     Insulin pump status     Left hip pain     Lumbar radiculopathy     Mild cognitive impairment     Moderate episode of recurrent major depressive disorder ([facility])     Obstructive sleep apnea     Orthostatic hypotension     Paraspinal muscle spasm     Polypharmacy     Primary hypothyroidism     Proliferative diabetic retinopathy associated with type 1 diabetes mellitus (HCC)     Seborrheic keratosis     Spinal cord stimulator status     Spondylolisthesis     Stage 3b chronic kidney disease ([address]     Stroke-like symptom 7/8/2021    Tachycardia     Thyroid disease     managed with medications    Trochanteric bursitis of left hip     Type 1 diabetes mellitus ([address]     Vocal cord atrophy        Family History   Problem Relation Age of Onset    Hypertension Brother     Diabetes Maternal Uncle         type 1    Diabetes Brother         type 2    Thyroid Disease Mother         treated with surgery    Heart Disease Father     Hypertension Mother         Social History     Socioeconomic History    Marital status:      Spouse name: Not on file    Number of children: Not on file    Years of education: Not on file    Highest education level: Not on file   Occupational History    Not on file   Tobacco Use    Smoking status: Former     Packs/day: 1.00     Years: 30.00     Pack years: 30.00     Types: Cigarettes     Quit date: 1996     Years since quittin.9    Smokeless tobacco: Former   Vaping Use    Vaping Use: Never used   Substance and Sexual Activity    Alcohol use: Not Currently    Drug use: Never    Sexual activity: Not on file   Other Topics Concern    Not on file   Social History Narrative    Not on file     Social Determinants of Health     Financial Resource Strain: Not on file   Food Insecurity: Not on file   Transportation Needs: Not on file   Physical Activity: Not on file   Stress: Not on file   Social Connections: Not on file   Intimate Partner Violence: Not on file   Housing Stability: Not on file         Current Outpatient Medications:     HUMALOG 100 UNIT/ML SOLN injection vial, 12 unit TIDAC plus a 1 per 25 greater than 150 correction max daily dose 60 units, Disp: 60 mL, Rfl: 3    LANTUS 100 UNIT/ML injection vial, 25 units in am, 20 units in pm, Disp: 40 mL, Rfl: 3    lidocaine (LIDODERM) 5 %, Place 1 patch onto the skin daily 12 hours on, 12 hours off., Disp: 60 patch, Rfl: 5    escitalopram (LEXAPRO) 5 MG tablet, TAKE 1 TABLET BY MOUTH EVERY EVENING (Patient not taking: Reported on 2022), Disp: , Rfl:     OLANZapine (ZYPREXA) 2.5 MG tablet, TAKE 1 TABLET BY MOUTH EVERY EVENING (Patient not taking: Reported on 2022), Disp: , Rfl:     INSULIN SYRINGE .5CC/29G ([employer] INS SYR .5CC/29G) 29G X 1/2\" 0.5 ML MISC, 1 each by Does not apply route daily, Disp: 100 each, Rfl: 0    DULoxetine (CYMBALTA) 60 MG extended release capsule, TAKE 2 CAPSULES BY MOUTH EVERY MORNING (Patient not taking: Reported on 2022), Disp: , Rfl:     Blood Glucose Monitoring Suppl (CONTOUR MONITOR) ELLE, Check blood glucose 4 times daily.   Dx E10.65 (Patient not taking: Reported on 7/21/2022), Disp: , Rfl:     Glucose Blood (CONTOUR NEXT TEST VI), USE TO TEST THREE TIMES DAILY, Disp: , Rfl:     Continuous Blood Gluc  (Walter[address] Boyd) Chel Laureano, by Other route (Patient not taking: Reported on 7/21/2022), Disp: , Rfl:     Continuous Blood Gluc Sensor (DEXCOM G6 SENSOR) MISC, Change every 10 days as directed (Patient not taking: Reported on 7/21/2022), Disp: , Rfl:     Handicap Placard MISC, Handicap parking placard; permanent disability (Patient not taking: Reported on 7/21/2022), Disp: , Rfl:     Continuous Blood Gluc Transmit (DEXCOM G6 TRANSMITTER) MISC, USE AS DIRECTED FOUR TIMES DAILY (Patient not taking: Reported on 7/21/2022), Disp: , Rfl:     morphine (MS CONTIN) 15 MG extended release tablet, Take 15 mg by mouth 2 times daily.  , Disp: , Rfl:     buPROPion (WELLBUTRIN) 75 MG tablet, Take 75 mg by mouth 2 times daily (Patient not taking: Reported on 7/21/2022), Disp: , Rfl:     acetaminophen (TYLENOL) 500 MG tablet, Take 500 mg by mouth every 6 hours as needed, Disp: , Rfl:     vitamin D 25 MCG (1000 UT) CAPS, Take 1,000 Units by mouth daily (Patient not taking: Reported on 7/21/2022), Disp: , Rfl:     finasteride (PROSCAR) 5 MG tablet, Take 5 mg by mouth daily (Patient not taking: Reported on 7/21/2022), Disp: , Rfl:     fluticasone (CUTIVATE) 0.05 % cream, Apply 1 g topically 2 times daily  (Patient not taking: Reported on 7/21/2022), Disp: , Rfl:     fluticasone (FLONASE) 50 MCG/ACT nasal spray, 2 sprays by Nasal route daily as needed (Patient not taking: Reported on 7/21/2022), Disp: , Rfl:     Glucagon (GVOKE PFS) 1 MG/0.2ML SOSY, Inject 1 each into the skin as needed (Patient not taking: Reported on 7/21/2022), Disp: , Rfl:     hydrocortisone (WESTCORT) 0.2 % cream, Apply topically (Patient not taking: Reported on 7/21/2022), Disp: , Rfl:     levothyroxine (SYNTHROID) 75 MCG tablet, Take 75 mcg by mouth every morning (before breakfast) (Patient not taking: Reported on 7/21/2022), Disp: , Rfl:     lidocaine (LIDODERM) 5 %, Place 1 patch onto the skin every 24 hours, Disp: , Rfl:     naloxone (NARCAN) 4 MG/0.1ML LIQD nasal spray, 1 SPRAY BY INTRANASAL ROUTE ONCE AS NEEDED FOR UP TO 1 DOSE, Disp: , Rfl:     ondansetron (ZOFRAN-ODT) 4 MG disintegrating tablet, Take 4 mg by mouth every 6 hours as needed (Patient not taking: Reported on 7/21/2022), Disp: , Rfl:     pantoprazole (PROTONIX) 40 MG tablet, Take 40 mg by mouth daily (Patient not taking: Reported on 7/21/2022), Disp: , Rfl:     polyethylene glycol (GLYCOLAX) 17 GM/SCOOP powder, Take 17 g by mouth daily (Patient not taking: Reported on 7/21/2022), Disp: , Rfl:     rosuvastatin (CRESTOR) 10 MG tablet, Take 10 mg by mouth (Patient not taking: Reported on 7/21/2022), Disp: , Rfl:     sodium bicarbonate 650 MG tablet, Take 650 mg by mouth 2 times daily, Disp: , Rfl:     nirmatrelvir/ritonavir (PAXLOVID) 20 x 150 MG & 10 x 100MG, Take 3 tablets (two 150 mg nirmatrelvir and one 100 mg ritonavir tablets) by mouth every 12 hours for 5 days. , Disp: 30 tablet, Rfl: 0    Allergies   Allergen Reactions    Drixoral Cough-Sore Throat [Acetaminophen-Dm] Other (See Comments) and Palpitations     Heart racing. Other Palpitations     Antihistamines  Antihistamines  Antihistamines  Antihistamines  Antihistamines  Antihistamines      Antihistamines, Loratadine-Type Palpitations         Review of Systems      Vitals:    07/14/22 1340   BP: 100/60   Pulse: 95   Temp: 97.6 °F (36.4 °C)   TempSrc: Temporal   SpO2: 95%   Height: 5' 8\" (1.727 m)           Physical Exam  Constitutional:       Appearance: He is not ill-appearing. Eyes:      Extraocular Movements: Extraocular movements intact. Pulmonary:      Effort: Pulmonary effort is normal. No respiratory distress. Musculoskeletal:        Feet:    Feet:      Comments: Wound open no exduate erythema visible. improved according to him and wife--again looks good dry.  Neurological:      General: No focal deficit present. Mental Status: He is alert. Mental status is at baseline. Psychiatric:         Thought Content: Thought content normal.          Leidy Aviles was seen today for toe injury. Diagnoses and all orders for this visit:    Charcot foot due to diabetes mellitus ([facility]    Falls frequently        Doing well.see in future as sched.  Or sooner prn       Michael Alcaraz, DO

## 2022-07-18 ASSESSMENT — ENCOUNTER SYMPTOMS
VOMITING: 0
NAUSEA: 0

## 2022-07-20 ENCOUNTER — NURSE TRIAGE (OUTPATIENT)
Dept: OTHER | Facility: CLINIC | Age: 77
End: 2022-07-20

## 2022-07-20 NOTE — TELEPHONE ENCOUNTER
Received call from Inez    at [facility] with [employer]. Subjective: Caller states \" He fell last night and hit his head- early this morning. \"     Current Symptoms  +he said he does not feel well - wife tested positive for COVID   +soreness in the left shoulder   +head pain above the right eyebrow   +fell from a standing position   +left hip pain -right hip abrasion   -no confusion since accident - chronic confusion x 8 months   -denies vomiting or neck pain     Onset: 1 day ago; sudden    Associated Symptoms: NA    Pain Severity: 8/10; dull; constant    Temperature: Denies     What has been tried: tylenol , prescribed morphine 2 times daily     Recommended disposition: See HCP within 4 Hours (or PCP triage)    Care advice provided, patient verbalizes understanding; denies any other questions or concerns; instructed to call back for any new or worsening symptoms. Patient/Caller agrees with recommended disposition; writer provided warm transfer to [facility]  at [facility] for appointment scheduling     Attention Provider: Thank you for allowing me to participate in the care of your patient. The patient was connected to triage in response to information provided to the ECC/PSC. Please do not respond through this encounter as the response is not directed to a shared pool.        Reason for Disposition   [1] Age over 59 years AND [2] swelling or bruise    Protocols used: Head Injury-ADULT-

## 2022-07-21 ENCOUNTER — TELEMEDICINE (OUTPATIENT)
Dept: INTERNAL MEDICINE CLINIC | Facility: CLINIC | Age: 77
End: 2022-07-21
Payer: MEDICARE

## 2022-07-21 DIAGNOSIS — U07.1 COVID-19: Primary | ICD-10-CM

## 2022-07-21 PROCEDURE — 3017F COLORECTAL CA SCREEN DOC REV: CPT | Performed by: NURSE PRACTITIONER

## 2022-07-21 PROCEDURE — 99213 OFFICE O/P EST LOW 20 MIN: CPT | Performed by: NURSE PRACTITIONER

## 2022-07-21 PROCEDURE — G8427 DOCREV CUR MEDS BY ELIG CLIN: HCPCS | Performed by: NURSE PRACTITIONER

## 2022-07-21 PROCEDURE — 1123F ACP DISCUSS/DSCN MKR DOCD: CPT | Performed by: NURSE PRACTITIONER

## 2022-07-21 ASSESSMENT — PATIENT HEALTH QUESTIONNAIRE - PHQ9
SUM OF ALL RESPONSES TO PHQ QUESTIONS 1-9: 0
1. LITTLE INTEREST OR PLEASURE IN DOING THINGS: 0
2. FEELING DOWN, DEPRESSED OR HOPELESS: 0
SUM OF ALL RESPONSES TO PHQ QUESTIONS 1-9: 0
SUM OF ALL RESPONSES TO PHQ9 QUESTIONS 1 & 2: 0

## 2022-07-21 ASSESSMENT — ENCOUNTER SYMPTOMS
COUGH: 1
ABDOMINAL PAIN: 0
RHINORRHEA: 1
DIARRHEA: 0
NAUSEA: 0
SORE THROAT: 1

## 2022-07-21 NOTE — PROGRESS NOTES
Siri Erickson (:  1945) is a Established patient, here for evaluation of the following:    Assessment & Plan   Below is the assessment and plan developed based on review of pertinent history, physical exam, labs, studies, and medications. 1. COVID-19  No follow-ups on file. Patient with covid, symptoms x 1 day  No acute distress   High risk with DM and age  Start paxlovid, discussed interaction with wellbutrin and decreased wellbutrin efficacy but benefit outweighs that risk  Hydrate well and rest, quarantine per cdc guidelines    Subjective   Patient is here for virtual visit. He feels poorly. He has a sore throat, headache, runny nose, and generally feels bad and his voice is hoarse. He tested for covid yesterday and it was positive. URI   This is a new problem. The current episode started yesterday. There has been no fever. Associated symptoms include coughing, rhinorrhea and a sore throat. Pertinent negatives include no abdominal pain, diarrhea, joint pain, joint swelling or nausea. Review of Systems   HENT:  Positive for rhinorrhea and sore throat. Respiratory:  Positive for cough. Gastrointestinal:  Negative for abdominal pain, diarrhea and nausea. Musculoskeletal:  Negative for joint pain.         Objective   Patient-Reported Vitals  No data recorded     Physical Exam  [INSTRUCTIONS:  \"[x]\" Indicates a positive item  \"[]\" Indicates a negative item  -- DELETE ALL ITEMS NOT EXAMINED]    Constitutional: [x] Appears well-developed and well-nourished [x] No apparent distress      [] Abnormal -     Mental status: [x] Alert and awake  [x] Oriented to person/place/time [x] Able to follow commands    [] Abnormal -     Eyes:   EOM    [x]  Normal    [] Abnormal -   Sclera  [x]  Normal    [] Abnormal -          Discharge [x]  None visible   [] Abnormal -     HENT: [x] Normocephalic, atraumatic  [] Abnormal -   [x] Mouth/Throat: Mucous membranes are moist    External Ears [x] Normal  [] Abnormal -    Neck: [x] No visualized mass [] Abnormal -     Pulmonary/Chest: [x] Respiratory effort normal   [x] No visualized signs of difficulty breathing or respiratory distress        [] Abnormal -      Musculoskeletal:   [x] Normal gait with no signs of ataxia         [x] Normal range of motion of neck        [] Abnormal -     Neurological:        [x] No Facial Asymmetry (Cranial nerve 7 motor function) (limited exam due to video visit)          [x] No gaze palsy        [] Abnormal -          Skin:        [x] No significant exanthematous lesions or discoloration noted on facial skin         [] Abnormal -            Psychiatric:       [x] Normal Affect [] Abnormal -        [x] No Hallucinations    Other pertinent observable physical exam findings:-             Ary Sanders., was evaluated through a synchronous (real-time) audio-video encounter. The patient (or guardian if applicable) is aware that this is a billable service, which includes applicable co-pays. This Virtual Visit was conducted with patient's (and/or legal guardian's) consent. The visit was conducted pursuant to the emergency declaration under the [facility], [address] [place] [address] authority and the [employer] and [employer] General Act. Patient identification was verified, and a caregiver was present when appropriate. The patient was located at Home: [address] [place] [address]. Provider [facility] [address] [place] [address],  [address] [address].         --ROSEANNE Dowell - CNP

## 2022-07-22 NOTE — PLAN OF CARE
Ramón Hufflitzy. : 1945  Primary: Medicare Part A [place] B  Secondary: [place] @ [address] [facility] [address]  Phone: [phone]  Fax: [phone] Plan Frequency: 2-3x/week for 8-12 weeks    Plan of Care/Certification Expiration Date: 22      PT Visit Info: Total # of Visits to Date: 1      OUTPATIENT PHYSICAL THERAPY:OP NOTE TYPE: Discharge Summary 2022               Episode  Appt Desk         Treatment Diagnosis:  Generalized Muscle Weakness (M62.81)  Difficulty in walking, Not elsewhere classified (R26.2)  Repeated Falls (R29.6)    Medical/Referring Diagnosis:  Falls frequently [R29.6]  Referring Physician:  Liberty Rogers DO MD Orders:  PT Eval and Treat   Return MD Appt:  22  Date of Onset:  No data recorded   Allergies:  Drixoral cough-sore throat [acetaminophen-dm]; Other; and Antihistamines, loratadine-type  Restrictions/Precautions:    Restrictions/Precautions: Fall Risk  No data recorded   Medications Last Reviewed:  2022     SUBJECTIVE   History of Injury/Illness (Reason for Referral): Pt presents with chief c/o poor balance and frequent falls. He states that this began about two years ago. He was dx with orthostatic hypotension and reports that his BP is typically very low, and recently spO2 was low. He is not taking medication or supplemental O2 for this. He does have diabetic neuropathy and has no sensation below B knees. He ambulates with a rollator, per wife falls frequently (unsure of number in the last year), and this typically happens when pt is not using AD to ambulate. Most recently, he fell when attempting to take his shirt off and vision was briefly obstructed. He denies dizziness.    Patient Stated Goal(s):  \"improve walking and decreased falls\"  Initial:     010 Post Session:     010  Past Medical History/Comorbidities:   Mr. 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 ([address] Diabetic nephropathy ([address] Diabetic peripheral neuropathy ([address] Dysphonia, Essential hypertension, GERD (gastroesophageal reflux disease), Hypercholesterolemia, Insulin pump status, Left hip pain, Lumbar radiculopathy, Mild cognitive impairment, Moderate episode of recurrent major depressive disorder ([address] Obstructive sleep apnea, Orthostatic hypotension, Paraspinal muscle spasm, Polypharmacy, Primary hypothyroidism, Proliferative diabetic retinopathy associated with type 1 diabetes mellitus ([address] Seborrheic keratosis, Spinal cord stimulator status, Spondylolisthesis, Stage 3b chronic kidney disease ([address] Stroke-like symptom, Tachycardia, Thyroid disease, Trochanteric bursitis of left hip, Type 1 diabetes mellitus ([address] and Vocal cord atrophy. Mr. Cierra Galarza  has a past surgical history that includes Tonsillectomy and adenoidectomy (age 8); Shoulder arthroscopy (Left); Toe amputation; other surgical history; Bunionectomy (Right); lumbar fusion (2010); lumbar laminectomy (2008); Colonoscopy (2016); Cataract removal (Bilateral); heent (Bilateral); and Colonoscopy (N/A, 7/28/2021).   Social History/Living Environment:   Lives With: Spouse  Type of Home: House  Home Layout: One level  Home Access: Stairs to enter with rails  Entrance Stairs - Number of Steps: 5-7  Bathroom Shower/Tub: Walk-in shower  Bathroom Equipment: Shower chair  Home Equipment: Walker, rolling; Rollator     Prior Level of Function/Work/Activity:   Prior level of function: min A  Occupation: Retired  Receives Help From: Family  ADL Assistance: Needs assistance  Bath: Modified independent  Dressing: Independent  Grooming: Independent  Feeding: Independent  Toileting: Independent  Homemaking Assistance: Independent  Active : No  Patient's  Info: spouse  No data recorded   Learning:   Does the patient/guardian have any barriers to learning?: No barriers  Will there be a co-learner?: Yes  Co-learner name (if applicable): Linda Pemberton (spouse)     Fall Risk Scale: Total Score: 85  Lee Fall Risk: High (45 and higher)     Dominant Side:  right handed        OBJECTIVE   /62 spO2 86%  ROM:   NT    Strength:   Sit to stand with UE assist, formal MMT NT    Sensation:   Diminished below knees secondary to diabetic neuropathy    Reflexes:   NT    Palpation/Joint Mobility:   NT    Special Tests:   NT    Functional Tests/Measures:   FT balance >20\" with some unsteadiness  Otherwise NT due to low vitals - will communicate with PCP  ASSESSMENT   Initial Assessment:  Peggy Mckeon. is a 78 y.o. male who presents to Physical Therapy with signs and symptoms consistent with diagnosis of decreased balance, risk of falling. Patient presents today with decreased balance, low blood pressure, low spO2. These deficits limit the patients ability to perform these tasks: ambulation, ADLs, safe community navigation. Patient will benefit from skilled therapy to allow the patient to meet set goals and return to OF, in which the above noted functional activities were not impaired. **Of note, PT has concern for continuation of PT with pt's current BP and oxygen levels. LVM for Dr. Maircruz Wallace (PCP and referring provider) to discuss these concerns and plan to move forward for care. Addendum: Received f/u from Dr. Crissy Argueta office re: vitals. Plan to hold on PT and PCP will reach out to pt/spouse to get him in for a f/u with Dr. Maricruz Wallace to address BP and O2 levels. Problem List: (Impacting functional limitations): Body Structures, Functions, Activity Limitations Requiring Skilled Therapeutic Intervention: Decreased functional mobility ; Decreased ADL status; Decreased ROM; Decreased body mechanics;  Decreased strength; Decreased safe awareness; Decreased cognition; Decreased balance; Decreased coordination     Therapy Prognosis:   Therapy Prognosis: Fair     Assessment Complexity:   Medium Complexity  PLAN   Effective Dates: 6/2/22 TO Plan of Care/Certification Expiration Date: 09/02/22     Frequency/Duration: Plan Frequency: 2-3x/week for 8-12 weeks     Interventions Planned (Treatment may consist of any combination of the following):    Current Treatment Recommendations: Strengthening; ROM; Balance training; Functional mobility training; Transfer training; ADL/Self-care training; Endurance training; Gait training; Stair training; Neuromuscular re-education; Manual Therapy - Soft Tissue Mobilization; Manual Therapy - Joint Manipulation; Home exercise program; Safety education & training; Patient/Caregiver education & training; Modalities; Vestibular rehab; Therapeutic activities     Goals: (Goals have been discussed and agreed upon with patient.)  Short-Term Functional Goals: Time Frame: 4 weeks  Romberg balance improved to >30 seconds for improved balance  Discharge Goals: Time Frame: 12 weeks  LEFS improved to 32 for improved safety with community navigation  Tandem balance >10 seconds B for improved safety with home tasks  30 second sit to stand x5 without UE use for improved transfers    Goals not met 7/22: pt has not been able to return to PT since evaluation on 6/2/22         Outcome Measure: Tool Used: Lower Extremity Functional Scale (LEFS)  Score:  Initial: 22/80 Most Recent: X/80 (Date: -- )   Interpretation of Score: 20 questions each scored on a 5 point scale with 0 representing \"extreme difficulty or unable to perform\" and 4 representing \"no difficulty\". The lower the score, the greater the functional disability. 80/80 represents no disability. Minimal detectable change is 9 points.       Medical Necessity:   Skilled intervention continues to be required due to decreased participation ability, decreased balance and strength. Plan: discharge, pt has not returned to PT since evaluation on 6/2  Total Duration:  Time In: 1115  Time Out: 7007 Ivone Akhtar Jr.'s therapy, I certify that the treatment plan above will be carried out by a therapist or under their direction.   Thank you for this referral,  Justin Bennett, PT     Referring Physician Signature: Schuyler Garduno, DO _______________________________ Date _____________        Post Session Pain  Charge Capture   POC Link  Treatment Note Link  MD Guidelines  MyChart

## 2022-07-28 ENCOUNTER — NURSE TRIAGE (OUTPATIENT)
Dept: OTHER | Facility: CLINIC | Age: 77
End: 2022-07-28

## 2022-07-28 NOTE — TELEPHONE ENCOUNTER
Received call from Nahid at [facility] with [employer] Complaint. Wife, Tang Barker, is calling, patient is sleeping, limited triage. Subjective: Caller states \"fatigue\"     Current Symptoms: see above    Onset: a few hours ago; sudden    Associated Symptoms: reduced appetite    Pain Severity: unable to assess    Temperature: wife denies fever  n/a    What has been tried: prescribed pain medication    LMP: NA Pregnant: NA    Recommended disposition: Go to [facility]    Care advice provided, patient verbalizes understanding; denies any other questions or concerns; instructed to call back for any new or worsening symptoms. Patient/Caller agrees with recommended disposition; writer provided warm transfer to [employer] at [facility] for appointment scheduling     Attention Provider: Thank you for allowing me to participate in the care of your patient. The patient was connected to triage in response to information provided to the ECC/PSC. Please do not respond through this encounter as the response is not directed to a shared pool.     Reason for Disposition   MODERATE weakness (i.e., interferes with work, school, normal activities) and cause unknown (Exceptions: weakness with acute minor illness, or weakness from poor fluid intake)    Protocols used: Weakness (Generalized) and Fatigue-ADULT-OH

## 2022-08-01 ENCOUNTER — ANESTHESIA EVENT (OUTPATIENT)
Dept: ENDOSCOPY | Age: 77
End: 2022-08-01
Payer: MEDICARE

## 2022-08-01 RX ORDER — HYDROMORPHONE HYDROCHLORIDE 2 MG/ML
0.5 INJECTION, SOLUTION INTRAMUSCULAR; INTRAVENOUS; SUBCUTANEOUS EVERY 10 MIN PRN
Status: CANCELLED | OUTPATIENT
Start: 2022-08-01

## 2022-08-01 RX ORDER — OXYCODONE HYDROCHLORIDE 5 MG/1
5 TABLET ORAL PRN
Status: CANCELLED | OUTPATIENT
Start: 2022-08-01 | End: 2022-08-01

## 2022-08-01 RX ORDER — OXYCODONE HYDROCHLORIDE 5 MG/1
10 TABLET ORAL PRN
Status: CANCELLED | OUTPATIENT
Start: 2022-08-01 | End: 2022-08-01

## 2022-08-01 RX ORDER — HYDROMORPHONE HYDROCHLORIDE 2 MG/ML
0.25 INJECTION, SOLUTION INTRAMUSCULAR; INTRAVENOUS; SUBCUTANEOUS EVERY 5 MIN PRN
Status: CANCELLED | OUTPATIENT
Start: 2022-08-01

## 2022-08-01 RX ORDER — ONDANSETRON 2 MG/ML
4 INJECTION INTRAMUSCULAR; INTRAVENOUS
Status: CANCELLED | OUTPATIENT
Start: 2022-08-01 | End: 2022-08-01

## 2022-08-01 RX ORDER — DIPHENHYDRAMINE HYDROCHLORIDE 50 MG/ML
12.5 INJECTION INTRAMUSCULAR; INTRAVENOUS
Status: CANCELLED | OUTPATIENT
Start: 2022-08-01 | End: 2022-08-01

## 2022-08-01 RX ORDER — SODIUM CHLORIDE, SODIUM LACTATE, POTASSIUM CHLORIDE, CALCIUM CHLORIDE 600; 310; 30; 20 MG/100ML; MG/100ML; MG/100ML; MG/100ML
INJECTION, SOLUTION INTRAVENOUS CONTINUOUS
Status: CANCELLED | OUTPATIENT
Start: 2022-08-01

## 2022-08-02 ENCOUNTER — HOSPITAL ENCOUNTER (OUTPATIENT)
Age: 77
Setting detail: OUTPATIENT SURGERY
Discharge: HOME | End: 2022-08-02
Attending: INTERNAL MEDICINE | Admitting: INTERNAL MEDICINE
Payer: MEDICARE

## 2022-08-02 ENCOUNTER — ANESTHESIA (OUTPATIENT)
Dept: ENDOSCOPY | Age: 77
End: 2022-08-02
Payer: MEDICARE

## 2022-08-02 VITALS
OXYGEN SATURATION: 97 % | DIASTOLIC BLOOD PRESSURE: 89 MMHG | BODY MASS INDEX: 30.41 KG/M2 | SYSTOLIC BLOOD PRESSURE: 143 MMHG | WEIGHT: 200 LBS | RESPIRATION RATE: 18 BRPM | HEART RATE: 93 BPM | TEMPERATURE: 98 F

## 2022-08-02 LAB
GLUCOSE BLD STRIP.AUTO-MCNC: 298 MG/DL (ref 65–100)
GLUCOSE BLD STRIP.AUTO-MCNC: 311 MG/DL (ref 65–100)
GLUCOSE BLD STRIP.AUTO-MCNC: 318 MG/DL (ref 65–100)
SERVICE CMNT-IMP: ABNORMAL

## 2022-08-02 PROCEDURE — 2709999900 HC NON-CHARGEABLE SUPPLY: Performed by: INTERNAL MEDICINE

## 2022-08-02 PROCEDURE — 6370000000 HC RX 637 (ALT 250 FOR IP): Performed by: ANESTHESIOLOGY

## 2022-08-02 PROCEDURE — 3609017100 HC EGD: Performed by: INTERNAL MEDICINE

## 2022-08-02 PROCEDURE — A4216 STERILE WATER/SALINE, 10 ML: HCPCS | Performed by: ANESTHESIOLOGY

## 2022-08-02 PROCEDURE — 3700000000 HC ANESTHESIA ATTENDED CARE: Performed by: INTERNAL MEDICINE

## 2022-08-02 PROCEDURE — 7100000011 HC PHASE II RECOVERY - ADDTL 15 MIN: Performed by: INTERNAL MEDICINE

## 2022-08-02 PROCEDURE — 6360000002 HC RX W HCPCS

## 2022-08-02 PROCEDURE — 2580000003 HC RX 258: Performed by: ANESTHESIOLOGY

## 2022-08-02 PROCEDURE — 2500000003 HC RX 250 WO HCPCS

## 2022-08-02 PROCEDURE — 7100000010 HC PHASE II RECOVERY - FIRST 15 MIN: Performed by: INTERNAL MEDICINE

## 2022-08-02 PROCEDURE — 2500000003 HC RX 250 WO HCPCS: Performed by: ANESTHESIOLOGY

## 2022-08-02 PROCEDURE — 82962 GLUCOSE BLOOD TEST: CPT

## 2022-08-02 RX ORDER — LIDOCAINE HYDROCHLORIDE 20 MG/ML
INJECTION, SOLUTION EPIDURAL; INFILTRATION; INTRACAUDAL; PERINEURAL PRN
Status: DISCONTINUED | OUTPATIENT
Start: 2022-08-02 | End: 2022-08-02 | Stop reason: SDUPTHER

## 2022-08-02 RX ORDER — PROPOFOL 10 MG/ML
INJECTION, EMULSION INTRAVENOUS CONTINUOUS PRN
Status: DISCONTINUED | OUTPATIENT
Start: 2022-08-02 | End: 2022-08-02 | Stop reason: SDUPTHER

## 2022-08-02 RX ORDER — SODIUM CHLORIDE 0.9 % (FLUSH) 0.9 %
5-40 SYRINGE (ML) INJECTION EVERY 12 HOURS SCHEDULED
Status: DISCONTINUED | OUTPATIENT
Start: 2022-08-02 | End: 2022-08-02 | Stop reason: HOSPADM

## 2022-08-02 RX ORDER — SODIUM CHLORIDE, SODIUM LACTATE, POTASSIUM CHLORIDE, CALCIUM CHLORIDE 600; 310; 30; 20 MG/100ML; MG/100ML; MG/100ML; MG/100ML
INJECTION, SOLUTION INTRAVENOUS CONTINUOUS
Status: DISCONTINUED | OUTPATIENT
Start: 2022-08-02 | End: 2022-08-02 | Stop reason: HOSPADM

## 2022-08-02 RX ORDER — SODIUM CHLORIDE 0.9 % (FLUSH) 0.9 %
5-40 SYRINGE (ML) INJECTION PRN
Status: DISCONTINUED | OUTPATIENT
Start: 2022-08-02 | End: 2022-08-02 | Stop reason: HOSPADM

## 2022-08-02 RX ORDER — LIDOCAINE HYDROCHLORIDE 10 MG/ML
1 INJECTION, SOLUTION INFILTRATION; PERINEURAL
Status: DISCONTINUED | OUTPATIENT
Start: 2022-08-02 | End: 2022-08-02 | Stop reason: HOSPADM

## 2022-08-02 RX ORDER — GLYCOPYRROLATE 0.2 MG/ML
INJECTION INTRAMUSCULAR; INTRAVENOUS PRN
Status: DISCONTINUED | OUTPATIENT
Start: 2022-08-02 | End: 2022-08-02 | Stop reason: SDUPTHER

## 2022-08-02 RX ORDER — PROPOFOL 10 MG/ML
INJECTION, EMULSION INTRAVENOUS PRN
Status: DISCONTINUED | OUTPATIENT
Start: 2022-08-02 | End: 2022-08-02 | Stop reason: SDUPTHER

## 2022-08-02 RX ADMIN — PROPOFOL 50 MG: 10 INJECTION, EMULSION INTRAVENOUS at 08:16

## 2022-08-02 RX ADMIN — PROPOFOL 160 MCG/KG/MIN: 10 INJECTION, EMULSION INTRAVENOUS at 08:16

## 2022-08-02 RX ADMIN — GLYCOPYRROLATE 0.2 MG: 0.2 INJECTION, SOLUTION INTRAMUSCULAR; INTRAVENOUS at 08:12

## 2022-08-02 RX ADMIN — FAMOTIDINE 20 MG: 10 INJECTION INTRAVENOUS at 08:00

## 2022-08-02 RX ADMIN — INSULIN HUMAN 4 UNITS: 100 INJECTION, SOLUTION PARENTERAL at 07:40

## 2022-08-02 RX ADMIN — LIDOCAINE HYDROCHLORIDE 100 MG: 20 INJECTION, SOLUTION EPIDURAL; INFILTRATION; INTRACAUDAL; PERINEURAL at 08:16

## 2022-08-02 RX ADMIN — SODIUM CHLORIDE, POTASSIUM CHLORIDE, SODIUM LACTATE AND CALCIUM CHLORIDE: 600; 310; 30; 20 INJECTION, SOLUTION INTRAVENOUS at 07:23

## 2022-08-02 RX ADMIN — PROPOFOL 30 MG: 10 INJECTION, EMULSION INTRAVENOUS at 08:18

## 2022-08-02 ASSESSMENT — PAIN - FUNCTIONAL ASSESSMENT: PAIN_FUNCTIONAL_ASSESSMENT: NONE - DENIES PAIN

## 2022-08-02 NOTE — H&P
Chief complaint/HPI and PMH:  Please refer to outpatient note below or attached to the chart. Today's exam:  LUNGS:  Clear and equal.  COR:  RRR without murmurs heard. NEURO:  A and Oriented fully. I have thoroughly explained the preparation, procedure and sedation process to the patient as well as the risks and alternatives. They will sign informed consent prior to the procedure.         Kathryn Palencia MD

## 2022-08-02 NOTE — PROCEDURES
Esophagogastroduodenoscopy (EGD) Procedure Note    Procedure: EGD    Pre-operative Diagnosis: GERD     Post-operative Diagnosis: Normal EGD    Recommendations:  Restart PPI if symptoms of reflux occur    Follow up:   outpt    Anesthesia/Sedation:  MAC, (see separate report). Procedure Details:  Informed consent was obtained for the procedure, including sedation. Risks of perforation, hemorrhage, adverse drug reaction and aspiration were discussed. The patient was placed in the left lateral decubitus position. Based on the pre-procedure assessment, including review of the patient's medical history, medications, allergies, and review of systems, he had been deemed to be an appropriate candidate for anesthesia. The patient was monitored continuously with ECG tracing, pulse oximetry, blood pressure monitoring, and direct observations. Please refer to the anesthesia record for details and dosages of medications. The esophagus was intubated with direct visualization. The esophagus, stomach and duodenum were traversed to the full extent of the endoscope. Retroflexed views of the cardia and fundus were performed. The stomach was fully insufflated and deflated. Findings:   OROPHARYNX: The oropharynx was briefly viewed on entry and withdrawal with very brief evaluation of the cords, arytenoids and pyriform sinuses. No abnormalities found. ESOPHAGUS:  The esophagus was normal with an intact squamocolumnar z-line without erosions or evidence of Horowitz's intestinal metaplasia. The mucosa was normal.  There were no strictures, rings or noticeable narrowing of the lumen. The endoscope was easily passed into the gastric pouch and there was no apparent hiatal hernia. STOMACH: The gastric pouch inflated and deflated normally. The mucosal surface and gastric rugae were normal without erosions, ulcerations, raised lesions, vascular ectasias or other anomalies.   The pylorus was patent to passage of the endoscope without difficulty. DUODENUM:  The endoscope was easily passed into the third section of the duodenum. The villous mucosa was normal without blunting or scalloped folds. There were no mucosal erosions, ulcerations, raised lesions or vascular ectasias. EBL: 0 cc's. Pathology speciment:  None. Complications: No apparent complications and the patient tolerated sedation and the procedure well. Attending Attestation: I performed the procedure.     Georg Alpers, MD

## 2022-08-02 NOTE — ANESTHESIA PRE PROCEDURE
Department of Anesthesiology  Preprocedure Note       Name:  Pat Halsted. Age:  78 y.o.  :  1945                                          MRN:  [id number]         Date:  2022      Surgeon: Jozef Fishman):  Justin Dodson MD    Procedure: Procedure(s):  EGD ESOPHAGOGASTRODUODENOSCOPY    Medications prior to admission:   Prior to Admission medications    Medication Sig Start Date End Date Taking? Authorizing Provider   HUMALOG 100 UNIT/ML SOLN injection vial 12 unit TIDAC plus a 1 per 25 greater than 150 correction max daily dose 60 units 22   Evaline Bullion PEPE Gonzalez   LANTUS 100 UNIT/ML injection vial 25 units in am, 20 units in pm 22   Regino Gonzalez PA-C   lidocaine (LIDODERM) 5 % Place 1 patch onto the skin daily 12 hours on, 12 hours off. 22  Levar Garland DO   escitalopram (LEXAPRO) 5 MG tablet TAKE 1 TABLET BY MOUTH EVERY EVENING  Patient not taking: Reported on 2022   Historical Provider, MD   OLANZapine (ZYPREXA) 2.5 MG tablet TAKE 1 TABLET BY MOUTH EVERY EVENING  Patient not taking: Reported on 2022   Historical Provider, MD   INSULIN SYRINGE .5CC/29G ([facility] Boll[facility] INS SYR .5CC/29G) 29G X 1/2\" 0.5 ML MISC 1 each by Does not apply route daily 22   Delroy Vanegas MD   DULoxetine (CYMBALTA) 60 MG extended release capsule TAKE 2 CAPSULES BY MOUTH EVERY MORNING  Patient not taking: Reported on 2022   Historical Provider, MD   Blood Glucose Monitoring Suppl (CONTOUR MONITOR) ELLE Check blood glucose 4 times daily.   Dx E10.65  Patient not taking: Reported on 2022   Historical Provider, MD   Glucose Blood (CONTOUR NEXT TEST VI) USE TO TEST THREE TIMES DAILY 21   Historical Provider, MD   Continuous Blood Gluc  ([address]) [address] by Other route  Patient not taking: Reported on 2022   Historical Provider, MD   Continuous Blood Gluc Sensor (DEXCOM G6 SENSOR) MISC Change every 10 days as directed  Patient not taking: Reported on 7/21/2022 2/22/22   Historical Provider, MD   Handicaariana Christopher [address] [facility] Handicap parking placard; permanent disability  Patient not taking: Reported on 7/21/2022 5/18/20   Historical Provider, MD   Continuous Blood Gluc Transmit (DEXCOM G6 TRANSMITTER) MISC USE AS DIRECTED FOUR TIMES DAILY  Patient not taking: Reported on 7/21/2022 3/7/22   Historical Provider, MD   morphine (MS CONTIN) 15 MG extended release tablet Take 15 mg by mouth 2 times daily.   6/1/22   Historical Provider, MD   buPROPion (WELLBUTRIN) 75 MG tablet Take 75 mg by mouth 2 times daily  Patient not taking: Reported on 7/21/2022 10/12/20   Historical Provider, MD   acetaminophen (TYLENOL) 500 MG tablet Take 500 mg by mouth every 6 hours as needed    Ar Automatic Reconciliation   vitamin D 25 MCG (1000 UT) CAPS Take 1,000 Units by mouth daily  Patient not taking: Reported on 7/21/2022    Ar Automatic Reconciliation   finasteride (PROSCAR) 5 MG tablet Take 5 mg by mouth daily  Patient not taking: Reported on 7/21/2022    Ar Automatic Reconciliation   fluticasone (CUTIVATE) 0.05 % cream Apply 1 g topically 2 times daily   Patient not taking: Reported on 7/21/2022    Ar Automatic Reconciliation   fluticasone (FLONASE) 50 MCG/ACT nasal spray 2 sprays by Nasal route daily as needed  Patient not taking: Reported on 7/21/2022    Ar Automatic Reconciliation   Glucagon (GVOKE PFS) 1 MG/0.2ML SOSY Inject 1 each into the skin as needed  Patient not taking: Reported on 7/21/2022 2/22/22   Ar Automatic Reconciliation   hydrocortisone (WESTCORT) 0.2 % cream Apply topically  Patient not taking: Reported on 7/21/2022    Ar Automatic Reconciliation   levothyroxine (SYNTHROID) 75 MCG tablet Take 75 mcg by mouth every morning (before breakfast)  Patient not taking: Reported on 7/21/2022 2/22/22   Ar Automatic Reconciliation   lidocaine (LIDODERM) 5 % Place 1 patch onto the skin every 24 hours    Ar Automatic Reconciliation naloxone (NARCAN) 4 MG/0.1ML LIQD nasal spray 1 SPRAY BY INTRANASAL ROUTE ONCE AS NEEDED FOR UP TO 1 DOSE 8/23/21   Ar Automatic Reconciliation   ondansetron (ZOFRAN-ODT) 4 MG disintegrating tablet Take 4 mg by mouth every 6 hours as needed  Patient not taking: Reported on 7/21/2022 11/16/20   Ar Automatic Reconciliation   pantoprazole (PROTONIX) 40 MG tablet Take 40 mg by mouth daily  Patient not taking: Reported on 7/21/2022 2/16/22   Ar Automatic Reconciliation   polyethylene glycol (GLYCOLAX) 17 GM/SCOOP powder Take 17 g by mouth daily  Patient not taking: Reported on 7/21/2022 7/28/21   Ar Automatic Reconciliation   rosuvastatin (CRESTOR) 10 MG tablet Take 10 mg by mouth  Patient not taking: Reported on 7/21/2022 2/22/22   Ar Automatic Reconciliation   sodium bicarbonate 650 MG tablet Take 650 mg by mouth 2 times daily    Ar Automatic Reconciliation       Current medications:    No current facility-administered medications for this encounter. Allergies: Allergies   Allergen Reactions    Drixoral Cough-Sore Throat [Acetaminophen-Dm] Other (See Comments) and Palpitations     Heart racing.        Other Palpitations     Antihistamines  Antihistamines  Antihistamines  Antihistamines  Antihistamines  Antihistamines      Antihistamines, Loratadine-Type Palpitations       Problem List:    Patient Active Problem List   Diagnosis Code    Proliferative diabetic retinopathy associated with type 1 diabetes mellitus [facility] O03.4215    Pain management contract agreement Z02.89    Chronic orthostatic hypotension I95.1    Toenail deformity L60.8    History of tobacco abuse Z87.891    Mild cognitive impairment with memory loss G31.84    S/P lumbar fusion Z98.1    Dysphonia R49.0    Edema of both lower extremities due to peripheral venous insufficiency I87.2    Gastroesophageal reflux disease without esophagitis K21.9    Diabetes mellitus type 1, with complication, on long term insulin pump (HCC) E10.8, Z96.41    Vocal cord atrophy J38.3    Back pain, chronic M54.9, G89.29    BRITTANY (obstructive sleep apnea) G47.33    Mild episode of recurrent major depressive disorder (HCC) F33.0    Seborrheic keratosis L82.1    Benign prostatic hyperplasia with incomplete bladder emptying N40.1, R39.14    Orthostatic hypotension I95.1    Essential hypertension I10    Neuropathy G62.9    Chronic left-sided low back pain with left-sided sciatica M54.42, G89.29    Constipation due to opioid therapy K59.03, T40.2X5A    Weakness R53.1    Insulin pump status Z96.41    Diabetic peripheral neuropathy (HCC) E11.42    Fibrothorax J94.1    Alcohol abuse, in remission F10.11    Hemothorax on right J94.2    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    Acquired hypothyroidism E03.9    Type 1 diabetes mellitus with diabetic polyneuropathy (HCC) E10.42    Chronic pain of both shoulders M25.511, G89.29, M25.512    Trochanteric bursitis, left hip M70.62    Aphonia R49.1    Primary hypothyroidism E03.9    Hypercholesterolemia E78.00    Insulin pump in place Z96.41    Charcot foot due to diabetes mellitus (HCC) E11.610    History of hypertension Z86.79    Candida laryngitis B37.89    Type 1 diabetes mellitus with stage 3a chronic kidney disease (HCC) E10.21, N18.31    Vitreous hemorrhage due to type 1 diabetes mellitus (HCC) E10.39, H43.10    Polypharmacy Z79.899    Type 1 diabetes mellitus with retinopathy (HCC) E10.319    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    Stage 3a chronic kidney disease (HCC) N18.31    Stage 3b chronic kidney disease (HCC) N18.32    Diabetic nephropathy ([facility] E11.21    Spinal cord stimulator status Z96.89    Class 2 severe obesity due to excess calories with serious comorbidity and body mass index (BMI) of 35.0 to 35.9 in adult ([facility] E66.01, Z68.35    At high risk for falls Z91.81    COVID-19 vaccine series completed Z92.29    Chronic pain G89.29    Pleural effusion J90    Lumbar radiculopathy M54.16    Aortic heart murmur I35.8    Polyp of colon K63.5    Chronic renal disease, stage III ([facility] [[id number]] N18.30    Alzheimer's disease, unspecified G30.9    DKA, type 1, not at goal ([address] E10.10    Neuropathy G62.9    Hypothyroid E03.9    Chronic pain G89.29    Chronic renal disease, stage 3, moderately decreased glomerular filtration rate (GFR) between 30-59 mL/min/1.73 square meter (HCC) N18.30    Dysphagia R13.10    Depression F32. A    History of amputation of toe ([facility]) Z89.429       Past Medical History:        Diagnosis Date    Aphonia     Back pain, chronic     Benign prostatic hyperplasia with incomplete bladder emptying     Candida laryngitis     Charcot foot due to diabetes mellitus ([facility]     Chronic left-sided low back pain with left-sided sciatica     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)     Diabetic nephropathy (HCC)     Diabetic peripheral neuropathy (HCC)     Dysphonia     Essential hypertension     GERD (gastroesophageal reflux disease)     Hypercholesterolemia     Insulin pump status     Left hip pain     Lumbar radiculopathy     Mild cognitive impairment     Moderate episode of recurrent major depressive disorder (HCC)     Obstructive sleep apnea     Orthostatic hypotension     Paraspinal muscle spasm     Polypharmacy     Primary hypothyroidism     Proliferative diabetic retinopathy associated with type 1 diabetes mellitus (HCC)     Seborrheic keratosis     Spinal cord stimulator status     Spondylolisthesis     Stage 3b chronic kidney disease (HCC)     Stroke-like symptom 7/8/2021    Tachycardia     Thyroid disease     managed with medications    Trochanteric bursitis of left hip     Type 1 diabetes mellitus ([address]     Vocal cord atrophy        Past Surgical History:        Procedure Laterality Date    BUNIONECTOMY Right     CATARACT REMOVAL Bilateral     COLONOSCOPY  2016    COLONOSCOPY N/A 7/28/2021    COLONOSCOPY/ BMI 30 ROOM 902 performed by Katie Jean [facility] at [facility] ENDOSCOPY    HEENT Bilateral     laser treatment for diabetic complication    LUMBAR FUSION      L4-5    LUMBAR LAMINECTOMY  2008    L5-S1    OTHER SURGICAL HISTORY      spinal neurostimulator    SHOULDER ARTHROSCOPY Left     TOE AMPUTATION      distal portion of the 2nd toe of R, foot, distal part of 3rd toe of r foot    TONSILLECTOMY AND ADENOIDECTOMY  age 8       Social History:    Social History     Tobacco Use    Smoking status: Former     Packs/day: 1.00     Years: 30.00     Pack years: 30.00     Types: Cigarettes     Quit date: 1996     Years since quittin.9    Smokeless tobacco: Former   Substance Use Topics    Alcohol use: Not Currently                                Counseling given: Not Answered      Vital Signs (Current): There were no vitals filed for this visit.                                            BP Readings from Last 3 Encounters:   22 100/60   22 130/62   22 124/80       NPO Status:                                                                                 BMI:   Wt Readings from Last 3 Encounters:   22 219 lb (99.3 kg)   22 214 lb (97.1 kg)   22 200 lb (90.7 kg)     There is no height or weight on file to calculate BMI.    CBC:   Lab Results   Component Value Date/Time    WBC 6.4 2022 02:26 PM    RBC 4.77 2022 02:26 PM    HGB 12.7 2022 02:26 PM    HCT 40.7 2022 02:26 PM    MCV 85.3 2022 02:26 PM    RDW 15.1 2022 02:26 PM     2022 02:26 PM       CMP:   Lab Results   Component Value Date/Time     2022 02:26 PM    K 4.2 2022 02:26 PM     2022 02:26 PM    CO2 26 2022 02:26 PM    BUN 21 2022 02:26 PM    CREATININE 1.40 2022 02:26 PM    GFRAA >60 2022 02:26 PM    AGRATIO 1.3 2022 09:54 AM    LABGLOM 53 2022 02:26 PM    GLUCOSE 361 2022 02:26 PM    PROT 7.5 2022 02:26 PM CALCIUM 9.1 07/06/2022 02:26 PM    BILITOT 0.4 07/06/2022 02:26 PM    ALKPHOS 157 07/06/2022 02:26 PM    ALKPHOS 146 04/08/2022 09:54 AM    AST 12 07/06/2022 02:26 PM    ALT 22 07/06/2022 02:26 PM       POC Tests: No results for input(s): POCGLU, POCNA, POCK, POCCL, POCBUN, POCHEMO, POCHCT in the last 72 hours. Coags:   Lab Results   Component Value Date/Time    PROTIME 13.0 07/08/2021 04:50 PM    PROTIME 13.4 07/08/2021 04:45 PM    INR 1.1 07/08/2021 04:50 PM    INR 1.0 07/08/2021 04:45 PM       HCG (If Applicable): No results found for: PREGTESTUR, PREGSERUM, HCG, HCGQUANT     ABGs:   Lab Results   Component Value Date/Time    PHART 7.32 11/12/2020 01:17 PM    PO2ART 86 11/12/2020 01:17 PM    HMJ6FGN 51.2 11/12/2020 01:17 PM    VXK5RHS 26.2 11/12/2020 01:17 PM    BEART 0 11/12/2020 01:17 PM        Type & Screen (If Applicable):  No results found for: LABABO, LABRH    Drug/Infectious Status (If Applicable):  No results found for: HIV, HEPCAB    COVID-19 Screening (If Applicable):   Lab Results   Component Value Date/Time    COVID19 Not Detected 11/05/2020 01:08 PM           Anesthesia Evaluation  Patient summary reviewed and Nursing notes reviewed no history of anesthetic complications:   Airway: Mallampati: I  TM distance: >3 FB   Neck ROM: full     Dental: normal exam         Pulmonary: breath sounds clear to auscultation  (+) sleep apnea: on noncompliant,                             Cardiovascular:  Exercise tolerance: good (>4 METS),       Dysrhythmias: tachy when quit smoking years ago.       Rhythm: regular  Rate: normal                 ROS comment: EF 60%     Neuro/Psych:   (+) depression/anxiety              ROS comment: LBP--MS contin qd; did not take today GI/Hepatic/Renal:   (+) GERD:, renal disease (stage 3):,           Endo/Other:    (+) DiabetesType II DM, well controlled, using insulin, hypothyroidism::., .                  ROS comment: Presbycusis  ; insulin 4 units ordered Abdominal: Vascular:     - DVT. Other Findings:           Anesthesia Plan      TIVA     ASA 3       Induction: intravenous. Anesthetic plan and risks discussed with patient.                         Jeffrey Molina MD   8/2/2022

## 2022-08-02 NOTE — DISCHARGE INSTRUCTIONS
Gastrointestinal Esophagogastroduodenoscopy (EGD)/ Endoscopic Ultrasound(EUS)- Upper Exam Discharge Instructions    1. Call Dr. Hunter Morgan  for any problems or questions. ([phone])  2. Contact the doctor's office for follow up appointment as directed. 3. Medication may cause drowsiness for several hours, therefore, do not drive or operate machinery for remainder of the day. 4. No alcohol today. 5. Do not make any important decisions such as signing legal paperwork. 6. Ordinarily, you may resume regular diet and activity after exam unless otherwise specified by your physician. 7. For mild soreness in your throat you may use Cepacol throat lozenges or warm  salt-water gargles as needed. Any additional instructions:   Restart protonix if reflux symptoms start        Instructions given to Aristeo Roblero. and other family members.

## 2022-08-03 ENCOUNTER — TELEPHONE (OUTPATIENT)
Dept: ENDOCRINOLOGY | Age: 77
End: 2022-08-03

## 2022-08-03 NOTE — TELEPHONE ENCOUNTER
Pt wife came into the office and dropped of pt's meter to be downloaded. Placed in [place] folder for review.

## 2022-08-04 ENCOUNTER — SCHEDULED TELEPHONE ENCOUNTER (OUTPATIENT)
Dept: ENDOCRINOLOGY | Age: 77
End: 2022-08-04
Payer: MEDICARE

## 2022-08-04 DIAGNOSIS — E10.65 TYPE 1 DIABETES MELLITUS WITH HYPERGLYCEMIA (HCC): ICD-10-CM

## 2022-08-04 PROCEDURE — 99443 PR PHYS/QHP TELEPHONE EVALUATION 21-30 MIN: CPT | Performed by: PHYSICIAN ASSISTANT

## 2022-08-04 RX ORDER — INSULIN LISPRO 100 [IU]/ML
INJECTION, SOLUTION INTRAVENOUS; SUBCUTANEOUS
Qty: 60 ML | Refills: 3 | Status: ON HOLD
Start: 2022-08-04 | End: 2022-10-19 | Stop reason: HOSPADM

## 2022-08-04 RX ORDER — INSULIN GLARGINE 100 [IU]/ML
INJECTION, SOLUTION SUBCUTANEOUS
Qty: 40 ML | Refills: 3 | Status: ON HOLD
Start: 2022-08-04 | End: 2022-10-19 | Stop reason: SDUPTHER

## 2022-08-04 RX ORDER — PEN NEEDLE, DIABETIC 31 GX5/16"
NEEDLE, DISPOSABLE MISCELLANEOUS
Qty: 800 EACH | Refills: 3
Start: 2022-08-04

## 2022-08-04 NOTE — PROGRESS NOTES
Estefana Cheadle. is a 78 y.o. male evaluated via audio-only technology on 8/4/2022 for Diabetes (Type 1 )  . Pt meets for interim follow up to discuss type 1    Glycemic control remains very poor    Eating snacks like bananas and chips between meals without insulin. Drinking milk between meals. Having several snacks. Rescheduled diabetes education follow up       Lantus- 25 units in am, 20 units in pm  Humalog- 12 unit TIDAC plus a 1 per 25 greater than 150 correction    Drinks milk \"occasionally\"      Home blood glucose monitoring frequency: 2.8 times per day    By review of meter download over past 30 days  Average blood glucose 306 ± 92  Range      Typical Standard Deviation   Fasting 277 67   AC lunch 295 81   AC supper 331 103   Bedtime 312 115     Blood glucose levels are uncontrolled, most significant elevations are constant        Assessment & Plan:   Type 1 diabetes mellitus with hyperglycemia (HCC)  -     LANTUS 100 UNIT/ML injection vial; 30 units in am, 25 units in pm, Disp-40 mL, R-3, DAWNO PRINT  -     HUMALOG 100 UNIT/ML SOLN injection vial; 12 unit TIDAC plus a 1 per 25 greater than 150 correction AC/HS, 5 units AC snacks, max daily dose 60 units, Disp-60 mL, R-3, DAWNO PRINT  -     B-D ULTRAFINE III SHORT PEN 31G X 8 MM MISC; Disp-800 each, R-3, EARLENE, NO PRINTUse with insulin up to 8 times daily, E10.65  No follow-ups on file. 1. Type 1 diabetes mellitus with hyperglycemia ([facility]  Was wildly uncontrolled type 1 diabetes meets for interim follow-up. Previously on insulin pump now doing basal bolus insulin injections. Could not handle the technology associated with either CGM or tandem T slim X to insulin pump. Average blood sugar is well over 300. Patient reports that he does eat constantly snacking frequently between meals and drinking milk between meals. Fasting blood sugar is markedly elevated.     I have asked patient's wife to administer 5 units of prandial insulin before every snack without correction to continue 12 units of prandial insulin plus the current 1 per 25 greater than 150 correction at the 4 before's. We will increase Lantus today from 25 units in the morning up to 30 units and 20 units in the evening up to 25 units. Watch for hypoglycemia, notify office if hypoglycemia develops. Patient reports that his foot wound is healed. We discussed diet and exercise. Patient was encouraged to hydrate and remain active. Limit snacks to 3 times a day if possible      - LANTUS 100 UNIT/ML injection vial; 30 units in am, 25 units in pm  Dispense: 40 mL; Refill: 3  - HUMALOG 100 UNIT/ML SOLN injection vial; 12 unit TIDAC plus a 1 per 25 greater than 150 correction AC/HS, 5 units AC snacks, max daily dose 60 units  Dispense: 60 mL; Refill: 3  - B-D ULTRAFINE III SHORT PEN 31G X 8 MM MISC; Use with insulin up to 8 times daily, E10.65  Dispense: 800 each; Refill: 3      Subjective:       Prior to Admission medications    Medication Sig Start Date End Date Taking?  Authorizing Provider   LANTUS 100 UNIT/ML injection vial 30 units in am, 25 units in pm 8/4/22  Yes Surekha Gonzalez PA-C   HUMALOG 100 UNIT/ML SOLN injection vial 12 unit TIDAC plus a 1 per 25 greater than 150 correction AC/HS, 5 units AC snacks, max daily dose 60 units 8/4/22  Yes Surekha Gonzalez PA-C   B-D ULTRAFINE III SHORT PEN 31G X 8 MM MISC Use with insulin up to 8 times daily, E10.65 8/4/22  Yes Surekha Gonzalez PA-C   lidocaine (LIDODERM) 5 % Place 1 patch onto the skin daily 12 hours on, 12 hours off. 7/8/22 8/7/22 Yes Levar Garland,    escitalopram (LEXAPRO) 5 MG tablet  6/20/22  Yes Historical Provider, MD   OLANZapine (ZYPREXA) 2.5 MG tablet  6/20/22  Yes Historical Provider, MD   INSULIN SYRINGE .5CC/29G ([place] [facility] INS SYR .5CC/29G) 29G X 1/2\" 0.5 ML MISC 1 each by Does not apply route daily 6/11/22  Yes Ofelia Jacinto MD   DULoxetine (CYMBALTA) 60 MG extended release capsule  5/2/22  Yes Historical Provider, MD   Blood Glucose Monitoring Suppl (CONTOUR MONITOR) [place]  2/22/22  Yes Historical Provider, MD   Glucose Blood (CONTOUR NEXT TEST VI) USE TO TEST THREE TIMES DAILY 6/2/21  Yes Historical Provider, MD   Continuous Blood Gluc  ([address]) [address] by Other route 4/20/21  Yes Historical Provider, MD   Continuous Blood Gluc Sensor (DEXCOM G6 SENSOR) [facility]  2/22/22  Yes Historical Provider, MD   Handlety White [address] [facility]  5/18/20  Yes Historical Provider, MD   Continuous Blood Gluc Transmit (DEXCOM G6 TRANSMITTER) MISC  3/7/22  Yes Historical Provider, MD   morphine (MS CONTIN) 15 MG extended release tablet Take 15 mg by mouth 2 times daily. 6/1/22  Yes Historical Provider, MD   buPROPion (WELLBUTRIN) 75 MG tablet Take 75 mg by mouth in the morning and 75 mg before bedtime. 10/12/20  Yes Historical Provider, MD   acetaminophen (TYLENOL) 500 MG tablet Take 500 mg by mouth every 6 hours as needed   Yes Ar Automatic Reconciliation   vitamin D 25 MCG (1000 UT) CAPS Take 1,000 Units by mouth in the morning. Yes Ar Automatic Reconciliation   finasteride (PROSCAR) 5 MG tablet Take 5 mg by mouth in the morning.    Yes Ar Automatic Reconciliation   fluticasone (CUTIVATE) 0.05 % cream Apply 1 g topically 2 times daily   Yes Ar Automatic Reconciliation   fluticasone (FLONASE) 50 MCG/ACT nasal spray 2 sprays by Nasal route daily as needed   Yes Ar Automatic Reconciliation   Glucagon (GVOKE PFS) 1 MG/0.2ML SOSY Inject 1 each into the skin as needed 2/22/22  Yes Ar Automatic Reconciliation   hydrocortisone (WESTCORT) 0.2 % cream Apply topically   Yes Ar Automatic Reconciliation   levothyroxine (SYNTHROID) 75 MCG tablet Take 75 mcg by mouth every morning (before breakfast) 2/22/22  Yes Ar Automatic Reconciliation   naloxone 4 MG/0.1ML LIQD nasal spray 1 SPRAY BY INTRANASAL ROUTE ONCE AS NEEDED FOR UP TO 1 DOSE 8/23/21  Yes Ar Automatic Reconciliation   ondansetron (ZOFRAN-ODT) 4 MG disintegrating tablet Take 4 mg by mouth every 6 hours as needed 11/16/20  Yes Ar Automatic Reconciliation   pantoprazole (PROTONIX) 40 MG tablet Take 40 mg by mouth in the morning. 2/16/22  Yes Ar Automatic Reconciliation   polyethylene glycol (GLYCOLAX) 17 GM/SCOOP powder Take 17 g by mouth daily 7/28/21  Yes Ar Automatic Reconciliation   rosuvastatin (CRESTOR) 10 MG tablet Take 10 mg by mouth 2/22/22  Yes Ar Automatic Reconciliation   sodium bicarbonate 650 MG tablet Take 650 mg by mouth 2 times daily   Yes Ar Automatic Reconciliation     Allergies   Allergen Reactions    Drixoral Cough-Sore Throat [Acetaminophen-Dm] Other (See Comments) and Palpitations     Heart racing.        Other Palpitations     Antihistamines  Antihistamines  Antihistamines  Antihistamines  Antihistamines  Antihistamines      Antihistamines, Loratadine-Type Palpitations     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)     Diabetic nephropathy (HCC)     Diabetic peripheral neuropathy (HCC)     Dysphonia     Essential hypertension     GERD (gastroesophageal reflux disease)     Hypercholesterolemia     Insulin pump status     Left hip pain     Lumbar radiculopathy     Mild cognitive impairment     Moderate episode of recurrent major depressive disorder (HCC)     Obstructive sleep apnea     Orthostatic hypotension     Paraspinal muscle spasm     Polypharmacy     Primary hypothyroidism     Proliferative diabetic retinopathy associated with type 1 diabetes mellitus (HCC)     Seborrheic keratosis     Spinal cord stimulator status     Spondylolisthesis     Stage 3b chronic kidney disease ([facility]     Stroke-like symptom 7/8/2021    Tachycardia     Thyroid disease     managed with medications Trochanteric bursitis of left hip     Type 1 diabetes mellitus ([facility]     Vocal cord atrophy      Review of Systems    No data recorded    Ze Laurent was evaluated through a patient-initiated, synchronous (real-time) audio only encounter. He (or guardian if applicable) is aware that it is a billable service, which includes applicable co-pays, with coverage as determined by his insurance carrier. This visit was conducted with the patient's (and/or Tayler Garcia guardian's) verbal consent. He has not had a related appointment within my department in the past 7 days or scheduled within the next 24 hours. The patient was located in a state where the provider was licensed to provide care. The patient was located at: Home: [address] [place] [id number]  The provider was located at:  Facility (Appt Dept):  [facility] [address] [place] [address]    Total Time: minutes: 26 minutes    Usman Hartley PA-C

## 2022-08-24 ENCOUNTER — HOSPITAL ENCOUNTER (OUTPATIENT)
Dept: DIABETES SERVICES | Age: 77
Setting detail: RECURRING SERIES
Discharge: HOME | End: 2022-08-27
Payer: MEDICARE

## 2022-08-24 PROCEDURE — G0108 DIAB MANAGE TRN  PER INDIV: HCPCS

## 2022-08-24 NOTE — PROGRESS NOTES
This is a one on one appointment. Due to being during HXC-98 public health emergency, social distancing and mandatory precautions are in place and utilized. Client and wife attended one hour of his two hour yearly follow up session today. Topics discussed were client driven. Topics included signs/symptoms of low blood sugar and treatment, nutrition and healing of foot ulcers. Educated that good blood sugar control and increased protein can help aid with wound healing. Educated to use more plant based proteins with CKD. Educated on target blood sugar goals. Educated that carbohydrates have the biggest effect on blood sugars and what are carbohydrate food sources and to limit carbohydrates to 60 grams per meal, try to limit to 45 grams per meal for weight loss and 15-30 grams for a snack. Educated to use high fiber carbohydrates and food sources. Educated to have a 7 gram protein with meals/snacks and to eat the protein food first to help with blood sugar control. Pt's current food intake: 2-3 meals and 1-3 snacks. Educated that skipping meals may increase blood sugars due to liver dumping sugar. Educated on using heart healthy fats and food sources. Educated on eating out and label reading. Educated on weight loss tips. Affirmed pt's water/fluid intake. Pt reported he is not using his CGM. Pt wanted his second hour to be with the [facility]. Scheduled 9/15/22. Written info provided on basics of carbohydrates, proteins, fats, grocery shopping guide/label reading, fast food booklet, carbohydrate counting booklet, signs/symptoms of low blood sugar and treatment and blood sugar goals. Encouraged pt/wife to attend free grocery shopping tour. Current blood sugars reported before meals in 200-400 range. Feel adherence will be fair. Pt did not seem fully engaged in education.

## 2022-08-30 DIAGNOSIS — E78.00 PURE HYPERCHOLESTEROLEMIA, UNSPECIFIED: ICD-10-CM

## 2022-08-30 DIAGNOSIS — E10.22 TYPE 1 DIABETES MELLITUS WITH STAGE 3A CHRONIC KIDNEY DISEASE (HCC): ICD-10-CM

## 2022-08-30 DIAGNOSIS — E10.8 TYPE 1 DIABETES MELLITUS WITH UNSPECIFIED COMPLICATIONS (HCC): Primary | ICD-10-CM

## 2022-08-30 DIAGNOSIS — I10 ESSENTIAL HYPERTENSION: ICD-10-CM

## 2022-08-30 DIAGNOSIS — N18.31 TYPE 1 DIABETES MELLITUS WITH STAGE 3A CHRONIC KIDNEY DISEASE (HCC): ICD-10-CM

## 2022-08-31 ENCOUNTER — NURSE ONLY (OUTPATIENT)
Dept: INTERNAL MEDICINE CLINIC | Facility: CLINIC | Age: 77
End: 2022-08-31

## 2022-08-31 DIAGNOSIS — I10 ESSENTIAL HYPERTENSION: ICD-10-CM

## 2022-08-31 DIAGNOSIS — E78.00 PURE HYPERCHOLESTEROLEMIA, UNSPECIFIED: ICD-10-CM

## 2022-08-31 LAB
ERYTHROCYTE [DISTWIDTH] IN BLOOD BY AUTOMATED COUNT: 14.4 % (ref 11.9–14.6)
HCT VFR BLD AUTO: 39.8 % (ref 41.1–50.3)
HGB BLD-MCNC: 12.1 G/DL (ref 13.6–17.2)
MCH RBC QN AUTO: 27.1 PG (ref 26.1–32.9)
MCHC RBC AUTO-ENTMCNC: 30.4 G/DL (ref 31.4–35)
MCV RBC AUTO: 89.2 FL (ref 79.6–97.8)
NRBC # BLD: 0 K/UL (ref 0–0.2)
PLATELET # BLD AUTO: 159 K/UL (ref 150–450)
PMV BLD AUTO: 11.3 FL (ref 9.4–12.3)
RBC # BLD AUTO: 4.46 M/UL (ref 4.23–5.6)
WBC # BLD AUTO: 4.4 K/UL (ref 4.3–11.1)

## 2022-09-01 LAB
ALBUMIN SERPL-MCNC: 3.2 G/DL (ref 3.2–4.6)
ALBUMIN/GLOB SERPL: 0.9 {RATIO} (ref 1.2–3.5)
ALP SERPL-CCNC: 138 U/L (ref 50–136)
ALT SERPL-CCNC: 20 U/L (ref 12–65)
ANION GAP SERPL CALC-SCNC: 7 MMOL/L (ref 4–13)
AST SERPL-CCNC: 15 U/L (ref 15–37)
BILIRUB SERPL-MCNC: 0.4 MG/DL (ref 0.2–1.1)
BUN SERPL-MCNC: 36 MG/DL (ref 8–23)
CALCIUM SERPL-MCNC: 8.9 MG/DL (ref 8.3–10.4)
CHLORIDE SERPL-SCNC: 105 MMOL/L (ref 101–110)
CHOLEST SERPL-MCNC: 124 MG/DL
CO2 SERPL-SCNC: 26 MMOL/L (ref 21–32)
CREAT SERPL-MCNC: 1.6 MG/DL (ref 0.8–1.5)
GLOBULIN SER CALC-MCNC: 3.5 G/DL (ref 2.3–3.5)
GLUCOSE SERPL-MCNC: 298 MG/DL (ref 65–100)
HDLC SERPL-MCNC: 44 MG/DL (ref 40–60)
HDLC SERPL: 2.8 {RATIO}
LDLC SERPL CALC-MCNC: 55.8 MG/DL
POTASSIUM SERPL-SCNC: 4 MMOL/L (ref 3.5–5.1)
PROT SERPL-MCNC: 6.7 G/DL (ref 6.3–8.2)
SODIUM SERPL-SCNC: 138 MMOL/L (ref 136–145)
TRIGL SERPL-MCNC: 121 MG/DL (ref 35–150)
VLDLC SERPL CALC-MCNC: 24.2 MG/DL (ref 6–23)

## 2022-09-10 ENCOUNTER — APPOINTMENT (OUTPATIENT)
Dept: CT IMAGING | Age: 77
End: 2022-09-10
Payer: MEDICARE

## 2022-09-10 ENCOUNTER — HOSPITAL ENCOUNTER (EMERGENCY)
Age: 77
Discharge: HOME | End: 2022-09-11
Attending: EMERGENCY MEDICINE
Payer: MEDICARE

## 2022-09-10 VITALS
TEMPERATURE: 98.2 F | SYSTOLIC BLOOD PRESSURE: 128 MMHG | HEART RATE: 101 BPM | DIASTOLIC BLOOD PRESSURE: 81 MMHG | RESPIRATION RATE: 14 BRPM | OXYGEN SATURATION: 95 %

## 2022-09-10 DIAGNOSIS — N17.9 AKI (ACUTE KIDNEY INJURY) (HCC): ICD-10-CM

## 2022-09-10 DIAGNOSIS — E10.65 HYPERGLYCEMIA DUE TO TYPE 1 DIABETES MELLITUS (HCC): Primary | ICD-10-CM

## 2022-09-10 LAB
ALBUMIN SERPL-MCNC: 3.2 G/DL (ref 3.2–4.6)
ALBUMIN/GLOB SERPL: 0.8 {RATIO} (ref 1.2–3.5)
ALP SERPL-CCNC: 161 U/L (ref 50–136)
ALT SERPL-CCNC: 18 U/L (ref 12–65)
ANION GAP SERPL CALC-SCNC: 4 MMOL/L (ref 4–13)
APPEARANCE UR: CLEAR
ARTERIAL PATENCY WRIST A: POSITIVE
AST SERPL-CCNC: 24 U/L (ref 15–37)
BASE DEFICIT BLD-SCNC: 1.8 MMOL/L
BASOPHILS # BLD: 0 K/UL (ref 0–0.2)
BASOPHILS NFR BLD: 1 % (ref 0–2)
BDY SITE: ABNORMAL
BILIRUB SERPL-MCNC: 0.4 MG/DL (ref 0.2–1.1)
BILIRUB UR QL: NEGATIVE
BUN SERPL-MCNC: 29 MG/DL (ref 8–23)
CALCIUM SERPL-MCNC: 8.6 MG/DL (ref 8.3–10.4)
CHLORIDE SERPL-SCNC: 105 MMOL/L (ref 101–110)
CK SERPL-CCNC: 134 U/L (ref 21–215)
CO2 SERPL-SCNC: 26 MMOL/L (ref 21–32)
COLOR UR: ABNORMAL
CREAT SERPL-MCNC: 2 MG/DL (ref 0.8–1.5)
DIFFERENTIAL METHOD BLD: ABNORMAL
EOSINOPHIL # BLD: 0.1 K/UL (ref 0–0.8)
EOSINOPHIL NFR BLD: 2 % (ref 0.5–7.8)
ERYTHROCYTE [DISTWIDTH] IN BLOOD BY AUTOMATED COUNT: 14 % (ref 11.9–14.6)
EST. AVERAGE GLUCOSE BLD GHB EST-MCNC: 278 MG/DL
GAS FLOW.O2 O2 DELIVERY SYS: ABNORMAL L/MIN
GLOBULIN SER CALC-MCNC: 4.1 G/DL (ref 2.3–3.5)
GLUCOSE BLD STRIP.AUTO-MCNC: 377 MG/DL (ref 65–100)
GLUCOSE BLD STRIP.AUTO-MCNC: 386 MG/DL (ref 65–100)
GLUCOSE BLD STRIP.AUTO-MCNC: 450 MG/DL (ref 65–100)
GLUCOSE BLD STRIP.AUTO-MCNC: 462 MG/DL (ref 65–100)
GLUCOSE SERPL-MCNC: 512 MG/DL (ref 65–100)
GLUCOSE UR STRIP.AUTO-MCNC: >1000 MG/DL
HBA1C MFR BLD: 11.3 % (ref 4.8–5.6)
HCO3 BLD-SCNC: 22.8 MMOL/L (ref 22–26)
HCT VFR BLD AUTO: 40.8 % (ref 41.1–50.3)
HGB BLD-MCNC: 12.4 G/DL (ref 13.6–17.2)
HGB UR QL STRIP: NEGATIVE
IMM GRANULOCYTES # BLD AUTO: 0 K/UL (ref 0–0.5)
IMM GRANULOCYTES NFR BLD AUTO: 0 % (ref 0–5)
KETONES UR QL STRIP.AUTO: NEGATIVE MG/DL
LEUKOCYTE ESTERASE UR QL STRIP.AUTO: NEGATIVE
LYMPHOCYTES # BLD: 1.2 K/UL (ref 0.5–4.6)
LYMPHOCYTES NFR BLD: 22 % (ref 13–44)
MCH RBC QN AUTO: 27.1 PG (ref 26.1–32.9)
MCHC RBC AUTO-ENTMCNC: 30.4 G/DL (ref 31.4–35)
MCV RBC AUTO: 89.1 FL (ref 79.6–97.8)
MONOCYTES # BLD: 0.4 K/UL (ref 0.1–1.3)
MONOCYTES NFR BLD: 8 % (ref 4–12)
NEUTS SEG # BLD: 3.5 K/UL (ref 1.7–8.2)
NEUTS SEG NFR BLD: 67 % (ref 43–78)
NITRITE UR QL STRIP.AUTO: NEGATIVE
NRBC # BLD: 0 K/UL (ref 0–0.2)
O2/TOTAL GAS SETTING VFR VENT: 21 %
PCO2 BLD: 37.2 MMHG (ref 35–45)
PH BLD: 7.4 [PH] (ref 7.35–7.45)
PH UR STRIP: 6.5 [PH] (ref 5–9)
PLATELET # BLD AUTO: 182 K/UL (ref 150–450)
PMV BLD AUTO: 11 FL (ref 9.4–12.3)
PO2 BLD: 71 MMHG (ref 75–100)
POTASSIUM SERPL-SCNC: 4.6 MMOL/L (ref 3.5–5.1)
PROT SERPL-MCNC: 7.3 G/DL (ref 6.3–8.2)
PROT UR STRIP-MCNC: NEGATIVE MG/DL
RBC # BLD AUTO: 4.58 M/UL (ref 4.23–5.6)
SAO2 % BLD: 93.9 % (ref 95–98)
SERVICE CMNT-IMP: ABNORMAL
SODIUM SERPL-SCNC: 135 MMOL/L (ref 136–145)
SP GR UR REFRACTOMETRY: 1.02 (ref 1–1.02)
SPECIMEN TYPE: ABNORMAL
UROBILINOGEN UR QL STRIP.AUTO: 0.2 EU/DL (ref 0.2–1)
WBC # BLD AUTO: 5.2 K/UL (ref 4.3–11.1)

## 2022-09-10 PROCEDURE — 85025 COMPLETE CBC W/AUTO DIFF WBC: CPT

## 2022-09-10 PROCEDURE — 72125 CT NECK SPINE W/O DYE: CPT

## 2022-09-10 PROCEDURE — 80053 COMPREHEN METABOLIC PANEL: CPT

## 2022-09-10 PROCEDURE — 70450 CT HEAD/BRAIN W/O DYE: CPT

## 2022-09-10 PROCEDURE — 83036 HEMOGLOBIN GLYCOSYLATED A1C: CPT

## 2022-09-10 PROCEDURE — 36600 WITHDRAWAL OF ARTERIAL BLOOD: CPT

## 2022-09-10 PROCEDURE — 82803 BLOOD GASES ANY COMBINATION: CPT

## 2022-09-10 PROCEDURE — 82550 ASSAY OF CK (CPK): CPT

## 2022-09-10 PROCEDURE — 6370000000 HC RX 637 (ALT 250 FOR IP): Performed by: EMERGENCY MEDICINE

## 2022-09-10 PROCEDURE — 99284 EMERGENCY DEPT VISIT MOD MDM: CPT

## 2022-09-10 PROCEDURE — 82962 GLUCOSE BLOOD TEST: CPT

## 2022-09-10 PROCEDURE — 96360 HYDRATION IV INFUSION INIT: CPT

## 2022-09-10 PROCEDURE — 81003 URINALYSIS AUTO W/O SCOPE: CPT

## 2022-09-10 PROCEDURE — 2580000003 HC RX 258: Performed by: EMERGENCY MEDICINE

## 2022-09-10 PROCEDURE — 93005 ELECTROCARDIOGRAM TRACING: CPT | Performed by: EMERGENCY MEDICINE

## 2022-09-10 RX ORDER — 0.9 % SODIUM CHLORIDE 0.9 %
1000 INTRAVENOUS SOLUTION INTRAVENOUS
Status: COMPLETED | OUTPATIENT
Start: 2022-09-10 | End: 2022-09-10

## 2022-09-10 RX ADMIN — INSULIN HUMAN 20 UNITS: 100 INJECTION, SOLUTION PARENTERAL at 23:14

## 2022-09-10 RX ADMIN — SODIUM CHLORIDE 1000 ML: 9 INJECTION, SOLUTION INTRAVENOUS at 19:46

## 2022-09-10 RX ADMIN — INSULIN HUMAN 20 UNITS: 100 INJECTION, SOLUTION PARENTERAL at 19:47

## 2022-09-10 ASSESSMENT — PAIN - FUNCTIONAL ASSESSMENT: PAIN_FUNCTIONAL_ASSESSMENT: 0-10

## 2022-09-10 ASSESSMENT — PAIN SCALES - GENERAL: PAINLEVEL_OUTOF10: 0

## 2022-09-10 NOTE — ED TRIAGE NOTES
Pt arrives via [facility] from home, called out syncope. Pt's wife came back to the house and found pt on the ground, he was alert, and states he believes his neuropathy caused him to fall, he states he remembers falling and landing on the ground. Pt denies head or neck pain, denies blurry or double vision, denies feeling dizzy at any point today. Pt is a Type 1 diabetic, wife states his BGL has been in the 400s for the past few days, this morning at 0430 it was 452 and with EMS their machine ran 'High\". Pt did his normal fasting insulin injection today and another with his breakfast, no lunchtime coverage because pt did not eat lunch. Pt does not wear an insulin pump. Pt was given 1,000cc of NS by EMS. Pt denies chest pain, shob, abd pain, fever/chills, cough, n/v/d. POC Glucose 462.

## 2022-09-11 LAB
EKG ATRIAL RATE: 102 BPM
EKG DIAGNOSIS: NORMAL
EKG P AXIS: 70 DEGREES
EKG P-R INTERVAL: 181 MS
EKG Q-T INTERVAL: 356 MS
EKG QRS DURATION: 92 MS
EKG QTC CALCULATION (BAZETT): 464 MS
EKG R AXIS: 68 DEGREES
EKG T AXIS: 18 DEGREES
EKG VENTRICULAR RATE: 102 BPM
GLUCOSE BLD STRIP.AUTO-MCNC: 289 MG/DL (ref 65–100)
SERVICE CMNT-IMP: ABNORMAL

## 2022-09-11 PROCEDURE — 82962 GLUCOSE BLOOD TEST: CPT

## 2022-09-11 NOTE — ED PROVIDER NOTES
Emergency Department Provider Note                   PCP:                Elayne Rivera DO               Age: 78 y.o. Sex: male       ICD-10-CM    1. Hyperglycemia due to type 1 diabetes mellitus ([facility] [address]  E10.65       2.  FOX (acute kidney injury) ([facility] [address]  N17.9           DISPOSITION Decision To Discharge 09/10/2022 11:38:45 PM       MDM  Number of Diagnoses or Management Options  FOX (acute kidney injury) ([facility] [address]  Hyperglycemia due to type 1 diabetes mellitus ([facility] [address]  Diagnosis management comments: Medication noncompliance, diabetic diet noncompliance,    Hyperglycemia, diabetic ketoacidosis, diabetic coma,    Electrolyte abnormality, dehydration,    Infection, UTI,           Amount and/or Complexity of Data Reviewed  Clinical lab tests: ordered and reviewed  Tests in the radiology section of CPT®: ordered and reviewed  Tests in the medicine section of CPT®: reviewed and ordered  Review and summarize past medical records: yes  Independent visualization of images, tracings, or specimens: yes         Orders Placed This Encounter   Procedures    CT HEAD WO CONTRAST    CT CERVICAL SPINE WO CONTRAST    CBC with Auto Differential    CMP    CK    Blood Gas, Arterial    Urinalysis    Hemoglobin A1C    POCT Glucose    POCT Glucose    POC Blood, Cord, Arterial    POCT Glucose    POCT Glucose    POCT Glucose    POCT Glucose    POCT Glucose    EKG 12 Lead (Select if HR greater than 110)    Saline lock IV        Medications   0.9 % sodium chloride bolus (0 mLs IntraVENous Stopped 9/10/22 2123)   insulin regular (HUMULIN R;NOVOLIN R) injection 20 Units (20 Units SubCUTAneous Given 9/1947)   insulin regular (HUMULIN R;NOVOLIN R) injection 20 Units (20 Units SubCUTAneous Given 9/10/22 2314)       New Prescriptions    No medications on file        Elena Joseph is a 78 y.o. male who presents to the Emergency Department with chief complaint of    Chief Complaint   Patient presents with    Loss of Consciousness Hyperglycemia      72-year-old male presenting to the emergency department today after being found down on the ground at home. Patient not sure how he got there. Patient's wife says that she went to run some errands for about 45 minutes when she came back he was no longer on the couch where he was when she left and he was just laying on the ground. Patient does not recall how he got on the either. Patient is a poorly controlled diabetic and his blood sugars typically run in the 400-500 range. The patient does not follow any kind of diabetic diet and according to his wife loves carbohydrates. The patient has no complaints      All other systems reviewed and are negative unless otherwise stated in the history of present illness section. Review of Systems   Endocrine: Positive for polydipsia. All other systems reviewed and are negative.     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)     Diabetic nephropathy (HCC)     Diabetic peripheral neuropathy (HCC)     Dysphonia     Essential hypertension     GERD (gastroesophageal reflux disease)     Hypercholesterolemia     Insulin pump status     Left hip pain     Lumbar radiculopathy     Mild cognitive impairment     Moderate episode of recurrent major depressive disorder (HCC)     Obstructive sleep apnea     Orthostatic hypotension     Paraspinal muscle spasm     Polypharmacy     Primary hypothyroidism     Proliferative diabetic retinopathy associated with type 1 diabetes mellitus (HCC)     Seborrheic keratosis     Spinal cord stimulator status     Spondylolisthesis     Stage 3b chronic kidney disease (HCC)     Stroke-like symptom 7/8/2021    Tachycardia     Thyroid disease managed with medications    Trochanteric bursitis of left hip     Type 1 diabetes mellitus ([address]     Vocal cord atrophy         Past Surgical History:   Procedure Laterality Date    BUNIONECTOMY Right     CATARACT REMOVAL Bilateral     COLONOSCOPY      COLONOSCOPY N/A 2021    COLONOSCOPY/ BMI 30 ROOM 902 [facility]  Bilateral     laser treatment for diabetic complication    LUMBAR FUSION      L4-5    LUMBAR LAMINECTOMY      L5-S1    OTHER SURGICAL HISTORY      spinal neurostimulator    SHOULDER ARTHROSCOPY Left     TOE AMPUTATION      distal portion of the 2nd toe of R, foot, distal part of 3rd toe of r foot    TONSILLECTOMY AND ADENOIDECTOMY  age 8    UPPER GASTROINTESTINAL ENDOSCOPY N/A 2022    EGD ESOPHAGOGASTRODUODENOSCOPY performed by Bg Zhong MD at [facility] ENDOSCOPY        Family History   Problem Relation Age of Onset    Hypertension Brother     Diabetes Maternal Uncle         type 1    Diabetes Brother         type 2    Thyroid Disease Mother         treated with surgery    Heart Disease Father     Hypertension Mother         Social History     Socioeconomic History    Marital status:    Tobacco Use    Smoking status: Former     Packs/day: 1.00     Years: 30.00     Pack years: 30.00     Types: Cigarettes     Quit date: 1996     Years since quittin.0    Smokeless tobacco: Former   Vaping Use    Vaping Use: Never used   Substance and Sexual Activity    Alcohol use: Not Currently    Drug use: Never        Allergies: Drixoral cough-sore throat [acetaminophen-dm];  Other; and Antihistamines, loratadine-type    Previous Medications    ACETAMINOPHEN (TYLENOL) 500 MG TABLET    Take 500 mg by mouth every 6 hours as needed    B-D ULTRAFINE III SHORT PEN 31G X 8 MM MISC    Use with insulin up to 8 times daily, E10.65    BLOOD GLUCOSE MONITORING SUPPL (CONTOUR MONITOR) ELLE        BUPROPION (WELLBUTRIN) 75 MG TABLET    Take 75 mg by mouth in the morning and 75 mg before bedtime. CONTINUOUS BLOOD GLUC  (DEXCOM G6 ) ELLE    by Other route    CONTINUOUS BLOOD GLUC SENSOR (DEXCOM G6 SENSOR) MISC        CONTINUOUS BLOOD GLUC TRANSMIT (DEXCOM G6 TRANSMITTER) MISC        DULOXETINE (CYMBALTA) 60 MG EXTENDED RELEASE CAPSULE        ESCITALOPRAM (LEXAPRO) 5 MG TABLET        FINASTERIDE (PROSCAR) 5 MG TABLET    Take 5 mg by mouth in the morning. FLUTICASONE (CUTIVATE) 0.05 % CREAM    Apply 1 g topically 2 times daily    FLUTICASONE (FLONASE) 50 MCG/ACT NASAL SPRAY    2 sprays by Nasal route daily as needed    GLUCAGON (GVOKE PFS) 1 MG/0.2ML SOSY    Inject 1 each into the skin as needed    GLUCOSE BLOOD (CONTOUR NEXT TEST VI)    USE TO TEST THREE TIMES DAILY    HANDICAP PLACARD MISC        HUMALOG 100 UNIT/ML SOLN INJECTION VIAL    12 unit TIDAC plus a 1 per 25 greater than 150 correction AC/HS, 5 units AC snacks, max daily dose 60 units    HYDROCORTISONE (WESTCORT) 0.2 % CREAM    Apply topically    INSULIN SYRINGE .5CC/29G ([employer] INS SYR .5CC/29G) 29G X 1/2\" 0.5 ML MISC    1 each by Does not apply route daily    LANTUS 100 UNIT/ML INJECTION VIAL    30 units in am, 25 units in pm    LEVOTHYROXINE (SYNTHROID) 75 MCG TABLET    Take 75 mcg by mouth every morning (before breakfast)    MORPHINE (MS CONTIN) 15 MG EXTENDED RELEASE TABLET    Take 15 mg by mouth 2 times daily. NALOXONE 4 MG/0.1ML LIQD NASAL SPRAY    1 SPRAY BY INTRANASAL ROUTE ONCE AS NEEDED FOR UP TO 1 DOSE    OLANZAPINE (ZYPREXA) 2.5 MG TABLET        ONDANSETRON (ZOFRAN-ODT) 4 MG DISINTEGRATING TABLET    Take 4 mg by mouth every 6 hours as needed    PANTOPRAZOLE (PROTONIX) 40 MG TABLET    Take 40 mg by mouth in the morning.     POLYETHYLENE GLYCOL (GLYCOLAX) 17 GM/SCOOP POWDER    Take 17 g by mouth daily    ROSUVASTATIN (CRESTOR) 10 MG TABLET    Take 10 mg by mouth    SODIUM BICARBONATE 650 MG TABLET    Take 650 mg by mouth 2 times daily    VITAMIN D 25 MCG (1000 UT) CAPS    Take 1,000 Units by mouth in the morning. Vitals signs and nursing note reviewed. No data found. Physical Exam     GENERAL:The patient has There is no height or weight on file to calculate BMI. Well-hydrated. VITAL SIGNS: Heart rate, blood pressure, respiratory rate reviewed as recorded in  nurse's notes  EYES: Pupils reactive. Extraocular motion intact. No conjunctival redness or drainage. NOSE: No nasal drainage or epistaxis. MOUTH/THROAT: Pharynx clear; airway patent. NECK: Supple, no meningeal signs. Trachea midline. No masses or thyromegaly. LUNGS: Breath sounds clear and equal bilaterally no accessory muscle use. CHEST: No deformity  CARDIOVASCULAR: Regular rate and rhythm  ABDOMEN: Soft without tenderness. No palpable masses or organomegaly. No  peritoneal signs. No rigidity. EXTREMITIES: No clubbing or cyanosis. No joint swelling. Normal muscle tone. No  restricted range of motion appreciated. NEUROLOGIC: Sensation is grossly intact. Cranial nerve exam reveals face is  symmetrical, tongue is midline speech is clear. SKIN: No rash or petechiae. Good skin turgor palpated. PSYCHIATRIC: Alert and oriented. Appropriate behavior and judgment. Procedures    ED EKG Interpretation  EKG was interpreted in the absence of a cardiologist.    Rate: Rate: Tachycardia  EKG Interpretation: EKG Interpretation: sinus rhythm  ST Segments: Nonspecific ST segments - NO STEMI    [unfilled]     CT HEAD WO CONTRAST   Final Result   No acute intracranial abnormality evident by CT. CT CERVICAL SPINE WO CONTRAST   Final Result   No acute traumatic abnormality of the cervical spine. ED Course as of 09/10/22 2343   Sat Sep 10, 2022   2132 CT CERVICAL SPINE WO CONTRAST  IMPRESSION:  No acute traumatic abnormality of the cervical spine. [KH]   2132 CT HEAD WO CONTRAST  IMPRESSION:  No acute intracranial abnormality evident by CT. [KH]   2338 Patient is feeling well. They do not want to remain in the emergency department any longer. I counseled the patient and his wife regarding the dangers of chronic hyperglycemia. No evidence of diabetic ketoacidosis present currently. [KH]      ED Course User Index  [DIONTE] Joseph Hull DO        Voice dictation software was used during the making of this note. This software is not perfect and grammatical and other typographical errors may be present. This note has not been completely proofread for errors.        Joseph Hull DO  09/10/22 8563

## 2022-09-13 VITALS
OXYGEN SATURATION: 98 % | BODY MASS INDEX: 32.65 KG/M2 | SYSTOLIC BLOOD PRESSURE: 158 MMHG | TEMPERATURE: 98.1 F | DIASTOLIC BLOOD PRESSURE: 92 MMHG | HEIGHT: 67 IN | WEIGHT: 208 LBS | HEART RATE: 112 BPM | RESPIRATION RATE: 16 BRPM

## 2022-09-13 PROCEDURE — 80053 COMPREHEN METABOLIC PANEL: CPT

## 2022-09-13 PROCEDURE — 4500000002 HC ER NO CHARGE

## 2022-09-13 PROCEDURE — 85025 COMPLETE CBC W/AUTO DIFF WBC: CPT

## 2022-09-13 RX ORDER — 0.9 % SODIUM CHLORIDE 0.9 %
1000 INTRAVENOUS SOLUTION INTRAVENOUS ONCE
Status: DISCONTINUED | OUTPATIENT
Start: 2022-09-13 | End: 2022-09-14 | Stop reason: HOSPADM

## 2022-09-13 ASSESSMENT — PAIN - FUNCTIONAL ASSESSMENT: PAIN_FUNCTIONAL_ASSESSMENT: NONE - DENIES PAIN

## 2022-09-14 ENCOUNTER — HOSPITAL ENCOUNTER (EMERGENCY)
Age: 77
Discharge: LEFT BEFORE DISCHARGE | End: 2022-09-14
Admitting: EMERGENCY MEDICINE
Payer: MEDICARE

## 2022-09-14 LAB
ALBUMIN SERPL-MCNC: 3.5 G/DL (ref 3.2–4.6)
ALBUMIN/GLOB SERPL: 0.8 {RATIO} (ref 1.2–3.5)
ALP SERPL-CCNC: 161 U/L (ref 50–136)
ALT SERPL-CCNC: 20 U/L (ref 12–65)
ANION GAP SERPL CALC-SCNC: 4 MMOL/L (ref 4–13)
AST SERPL-CCNC: 17 U/L (ref 15–37)
BASOPHILS # BLD: 0 K/UL (ref 0–0.2)
BASOPHILS NFR BLD: 1 % (ref 0–2)
BILIRUB SERPL-MCNC: 0.4 MG/DL (ref 0.2–1.1)
BUN SERPL-MCNC: 28 MG/DL (ref 8–23)
CALCIUM SERPL-MCNC: 8.9 MG/DL (ref 8.3–10.4)
CHLORIDE SERPL-SCNC: 100 MMOL/L (ref 101–110)
CO2 SERPL-SCNC: 29 MMOL/L (ref 21–32)
CREAT SERPL-MCNC: 2 MG/DL (ref 0.8–1.5)
DIFFERENTIAL METHOD BLD: ABNORMAL
EOSINOPHIL # BLD: 0.1 K/UL (ref 0–0.8)
EOSINOPHIL NFR BLD: 2 % (ref 0.5–7.8)
ERYTHROCYTE [DISTWIDTH] IN BLOOD BY AUTOMATED COUNT: 14 % (ref 11.9–14.6)
GLOBULIN SER CALC-MCNC: 4.5 G/DL (ref 2.3–3.5)
GLUCOSE SERPL-MCNC: 456 MG/DL (ref 65–100)
HCT VFR BLD AUTO: 42.5 % (ref 41.1–50.3)
HGB BLD-MCNC: 13.1 G/DL (ref 13.6–17.2)
IMM GRANULOCYTES # BLD AUTO: 0 K/UL (ref 0–0.5)
IMM GRANULOCYTES NFR BLD AUTO: 0 % (ref 0–5)
LYMPHOCYTES # BLD: 1.5 K/UL (ref 0.5–4.6)
LYMPHOCYTES NFR BLD: 25 % (ref 13–44)
MCH RBC QN AUTO: 27.6 PG (ref 26.1–32.9)
MCHC RBC AUTO-ENTMCNC: 30.8 G/DL (ref 31.4–35)
MCV RBC AUTO: 89.7 FL (ref 79.6–97.8)
MONOCYTES # BLD: 0.5 K/UL (ref 0.1–1.3)
MONOCYTES NFR BLD: 9 % (ref 4–12)
NEUTS SEG # BLD: 3.7 K/UL (ref 1.7–8.2)
NEUTS SEG NFR BLD: 63 % (ref 43–78)
NRBC # BLD: 0 K/UL (ref 0–0.2)
PLATELET # BLD AUTO: 170 K/UL (ref 150–450)
PMV BLD AUTO: 11.3 FL (ref 9.4–12.3)
POTASSIUM SERPL-SCNC: 3.8 MMOL/L (ref 3.5–5.1)
PROT SERPL-MCNC: 8 G/DL (ref 6.3–8.2)
RBC # BLD AUTO: 4.74 M/UL (ref 4.23–5.6)
SODIUM SERPL-SCNC: 133 MMOL/L (ref 136–145)
WBC # BLD AUTO: 5.8 K/UL (ref 4.3–11.1)

## 2022-09-14 NOTE — ED TRIAGE NOTES
Pt arrives via POV coming from home c/o high blood sugar that started 1 hr ago. Pt states last BGL was 600. Pt was seen at this facility on Saturday for the same issue. Pt is on blood sugar meds and has been complient per EMS.  per EMS. A&Ox3 with EMS. No other complaints at time of triage.     Pt does not want to be seen and \"is not sick\". Wife is concerned about his condition.

## 2022-09-15 ENCOUNTER — HOSPITAL ENCOUNTER (OUTPATIENT)
Dept: DIABETES SERVICES | Age: 77
Setting detail: RECURRING SERIES
Discharge: HOME | End: 2022-09-18
Payer: MEDICARE

## 2022-09-15 PROCEDURE — G0108 DIAB MANAGE TRN  PER INDIV: HCPCS

## 2022-09-15 NOTE — PROGRESS NOTES
Came for the second hour of a two hour follow up. They wanted nothing but a review of the Dexcom. G6.  Assisted in setting up to a . Originally had on wife's phone. He then wanted it on his I phone. Set up on his I phone, and needed to make her phone a Dexcom follow. Called Dexcom Technical support to switch phones. Entered proper sensor and transmitter ID in his phone by photo. Went to add Dexcom follow up saul, and she will need to get her apple ID to complete. Have already  entered in his phone to share with her phone. Should work. If any issues instructed to call Dexcom once obtain Dexcom Follow saul. On her phone.

## 2022-09-16 ENCOUNTER — OFFICE VISIT (OUTPATIENT)
Dept: INTERNAL MEDICINE CLINIC | Facility: CLINIC | Age: 77
End: 2022-09-16
Payer: MEDICARE

## 2022-09-16 VITALS
HEART RATE: 109 BPM | OXYGEN SATURATION: 98 % | SYSTOLIC BLOOD PRESSURE: 100 MMHG | BODY MASS INDEX: 32.58 KG/M2 | HEIGHT: 67 IN | DIASTOLIC BLOOD PRESSURE: 54 MMHG

## 2022-09-16 DIAGNOSIS — E10.21 DIABETIC NEPHROPATHY ASSOCIATED WITH TYPE 1 DIABETES MELLITUS (HCC): ICD-10-CM

## 2022-09-16 DIAGNOSIS — Z00.00 MEDICARE ANNUAL WELLNESS VISIT, SUBSEQUENT: Primary | ICD-10-CM

## 2022-09-16 DIAGNOSIS — E10.22 TYPE 1 DIABETES MELLITUS WITH STAGE 3A CHRONIC KIDNEY DISEASE (HCC): ICD-10-CM

## 2022-09-16 DIAGNOSIS — F33.41 RECURRENT MAJOR DEPRESSIVE DISORDER, IN PARTIAL REMISSION (HCC): ICD-10-CM

## 2022-09-16 DIAGNOSIS — N18.31 TYPE 1 DIABETES MELLITUS WITH STAGE 3A CHRONIC KIDNEY DISEASE (HCC): ICD-10-CM

## 2022-09-16 DIAGNOSIS — G30.9 ALZHEIMER'S DISEASE, UNSPECIFIED (CODE) (HCC): ICD-10-CM

## 2022-09-16 DIAGNOSIS — R26.89 BALANCE PROBLEM: ICD-10-CM

## 2022-09-16 PROCEDURE — 1036F TOBACCO NON-USER: CPT | Performed by: INTERNAL MEDICINE

## 2022-09-16 PROCEDURE — 3046F HEMOGLOBIN A1C LEVEL >9.0%: CPT | Performed by: INTERNAL MEDICINE

## 2022-09-16 PROCEDURE — G8427 DOCREV CUR MEDS BY ELIG CLIN: HCPCS | Performed by: INTERNAL MEDICINE

## 2022-09-16 PROCEDURE — G8417 CALC BMI ABV UP PARAM F/U: HCPCS | Performed by: INTERNAL MEDICINE

## 2022-09-16 PROCEDURE — 3017F COLORECTAL CA SCREEN DOC REV: CPT | Performed by: INTERNAL MEDICINE

## 2022-09-16 PROCEDURE — 2022F DILAT RTA XM EVC RTNOPTHY: CPT | Performed by: INTERNAL MEDICINE

## 2022-09-16 PROCEDURE — G0439 PPPS, SUBSEQ VISIT: HCPCS | Performed by: INTERNAL MEDICINE

## 2022-09-16 PROCEDURE — 1123F ACP DISCUSS/DSCN MKR DOCD: CPT | Performed by: INTERNAL MEDICINE

## 2022-09-16 PROCEDURE — 99213 OFFICE O/P EST LOW 20 MIN: CPT | Performed by: INTERNAL MEDICINE

## 2022-09-16 RX ORDER — BUPROPION HYDROCHLORIDE 75 MG/1
75 TABLET ORAL 2 TIMES DAILY
Qty: 60 TABLET | Refills: 5 | Status: SHIPPED | OUTPATIENT
Start: 2022-09-16 | End: 2022-09-16 | Stop reason: ALTCHOICE

## 2022-09-16 RX ORDER — LEVOFLOXACIN 500 MG/1
TABLET, FILM COATED ORAL
Status: ON HOLD | COMMUNITY
Start: 2022-08-18 | End: 2022-10-19 | Stop reason: HOSPADM

## 2022-09-16 ASSESSMENT — PATIENT HEALTH QUESTIONNAIRE - PHQ9
SUM OF ALL RESPONSES TO PHQ QUESTIONS 1-9: 6
5. POOR APPETITE OR OVEREATING: 1
3. TROUBLE FALLING OR STAYING ASLEEP: 3
SUM OF ALL RESPONSES TO PHQ QUESTIONS 1-9: 6
2. FEELING DOWN, DEPRESSED OR HOPELESS: 0
8. MOVING OR SPEAKING SO SLOWLY THAT OTHER PEOPLE COULD HAVE NOTICED. OR THE OPPOSITE, BEING SO FIGETY OR RESTLESS THAT YOU HAVE BEEN MOVING AROUND A LOT MORE THAN USUAL: 0
SUM OF ALL RESPONSES TO PHQ QUESTIONS 1-9: 6
10. IF YOU CHECKED OFF ANY PROBLEMS, HOW DIFFICULT HAVE THESE PROBLEMS MADE IT FOR YOU TO DO YOUR WORK, TAKE CARE OF THINGS AT HOME, OR GET ALONG WITH OTHER PEOPLE: 0
7. TROUBLE CONCENTRATING ON THINGS, SUCH AS READING THE NEWSPAPER OR WATCHING TELEVISION: 0
6. FEELING BAD ABOUT YOURSELF - OR THAT YOU ARE A FAILURE OR HAVE LET YOURSELF OR YOUR FAMILY DOWN: 0
9. THOUGHTS THAT YOU WOULD BE BETTER OFF DEAD, OR OF HURTING YOURSELF: 0
SUM OF ALL RESPONSES TO PHQ9 QUESTIONS 1 & 2: 0
1. LITTLE INTEREST OR PLEASURE IN DOING THINGS: 0
SUM OF ALL RESPONSES TO PHQ QUESTIONS 1-9: 6
4. FEELING TIRED OR HAVING LITTLE ENERGY: 2

## 2022-09-16 ASSESSMENT — LIFESTYLE VARIABLES
HOW MANY STANDARD DRINKS CONTAINING ALCOHOL DO YOU HAVE ON A TYPICAL DAY: PATIENT DOES NOT DRINK
HOW OFTEN DO YOU HAVE A DRINK CONTAINING ALCOHOL: NEVER

## 2022-09-16 NOTE — PATIENT INSTRUCTIONS
Personalized Preventive Plan for Pat Halsted. - 9/16/2022  Medicare offers a range of preventive health benefits. Some of the tests and screenings are paid in full while other may be subject to a deductible, co-insurance, and/or copay. Some of these benefits include a comprehensive review of your medical history including lifestyle, illnesses that may run in your family, and various assessments and screenings as appropriate. After reviewing your medical record and screening and assessments performed today your provider may have ordered immunizations, labs, imaging, and/or referrals for you. A list of these orders (if applicable) as well as your Preventive Care list are included within your After Visit Summary for your review. Other Preventive Recommendations:    A preventive eye exam performed by an eye specialist is recommended every 1-2 years to screen for glaucoma; cataracts, macular degeneration, and other eye disorders. A preventive dental visit is recommended every 6 months. Try to get at least 150 minutes of exercise per week or 10,000 steps per day on a pedometer . Order or download the FREE \"Exercise & Physical Activity: Your Everyday Guide\" from [employer]. Call 6-396.597.7560 or search [employer] online. You need 0479-5159 mg of calcium and 1249-4083 IU of vitamin D per day. It is possible to meet your calcium requirement with diet alone, but a vitamin D supplement is usually necessary to meet this goal.  When exposed to the sun, use a sunscreen that protects against both UVA and UVB radiation with an SPF of 30 or greater. Reapply every 2 to 3 hours or after sweating, drying off with a towel, or swimming. Always wear a seat belt when traveling in a car. Always wear a helmet when riding a bicycle or motorcycle.

## 2022-09-16 NOTE — PROGRESS NOTES
Stephen was seen today for follow-up, medicare awv and other. Diagnoses and all orders for this visit:    Medicare annual wellness visit, subsequent    Type 1 diabetes mellitus with stage 3a chronic kidney disease ([facility] [address]  -     Comprehensive Metabolic Panel; Future  -     Lipid Panel; Future    Alzheimer's disease, unspecified (CODE) ([facility] [address]    Diabetic nephropathy associated with type 1 diabetes mellitus ([facility] [address]    Balance problem  -     External Referral to Physical Therapy    Recurrent major depressive disorder, in partial remission ([facility] [address]    Other orders  -     Discontinue: buPROPion (WELLBUTRIN) 75 MG tablet; Take 1 tablet by mouth 2 times daily        Leonor Yusuf is a 78 y.o. male    Chief Complaint   Patient presents with    Follow-up    Medicare AWV    Other     More confused   Depression worsening  Off wellbutrin  Feeling down walking poor  Not eating well. blood sugars high. Seing endocrin for managwment. Depressded stting  No results found for this visit on 09/16/22.     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)     Diabetic nephropathy (HCC)     Diabetic peripheral neuropathy (HCC)     Dysphonia     Essential hypertension     GERD (gastroesophageal reflux disease)     Hypercholesterolemia     Insulin pump status     Left hip pain     Lumbar radiculopathy     Mild cognitive impairment     Moderate episode of recurrent major depressive disorder (HCC)     Obstructive sleep apnea     Orthostatic hypotension     Paraspinal muscle spasm     Polypharmacy     Primary hypothyroidism     Proliferative diabetic retinopathy associated with type 1 diabetes mellitus ([facility] [address]     Seborrheic keratosis     Spinal cord stimulator status     Spondylolisthesis     Stage 3b chronic kidney disease ([facility] [address]     Stroke-like symptom 2021    Tachycardia     Thyroid disease     managed with medications    Trochanteric bursitis of left hip     Type 1 diabetes mellitus ([facility] [address]     Vocal cord atrophy        Family History   Problem Relation Age of Onset    Hypertension Brother     Diabetes Maternal Uncle         type 1    Diabetes Brother         type 2    Thyroid Disease Mother         treated with surgery    Heart Disease Father     Hypertension Mother         Social History     Socioeconomic History    Marital status:      Spouse name: Not on file    Number of children: Not on file    Years of education: Not on file    Highest education level: Not on file   Occupational History    Not on file   Tobacco Use    Smoking status: Former     Packs/day: 1.00     Years: 30.00     Pack years: 30.00     Types: Cigarettes     Quit date: 1996     Years since quittin.0    Smokeless tobacco: Former   Vaping Use    Vaping Use: Never used   Substance and Sexual Activity    Alcohol use: Not Currently    Drug use: Never    Sexual activity: Not on file   Other Topics Concern    Not on file   Social History Narrative    Not on file     Social Determinants of Health     Financial Resource Strain: Not on file   Food Insecurity: Not on file   Transportation Needs: Not on file   Physical Activity: Inactive    Days of Exercise per Week: 0 days    Minutes of Exercise per Session: 0 min   Stress: Not on file   Social Connections: Not on file   Intimate Partner Violence: Not on file   Housing Stability: Not on file         Current Outpatient Medications:     levoFLOXacin (LEVAQUIN) 500 MG tablet, TAKE 1 TABLET BY MOUTH EVERY 24 HOURS, Disp: , Rfl:     LANTUS 100 UNIT/ML injection vial, 30 units in am, 25 units in pm, Disp: 40 mL, Rfl: 3    HUMALOG 100 UNIT/ML SOLN injection vial, 12 unit TIDAC plus a 1 per 25 greater than 150 correction AC/HS, 5 units AC snacks, max daily dose 60 units, Disp: 60 mL, Rfl: 3    B-D ULTRAFINE III SHORT PEN 31G X 8 MM MISC, Use with insulin up to 8 times daily, E10.65, Disp: 800 each, Rfl: 3    escitalopram (LEXAPRO) 5 MG tablet, , Disp: , Rfl:     OLANZapine (ZYPREXA) 2.5 MG tablet, , Disp: , Rfl:     INSULIN SYRINGE .5CC/29G ([employer] INS SYR .5CC/29G) 29G X 1/2\" 0.5 ML MISC, 1 each by Does not apply route daily, Disp: 100 each, Rfl: 0    DULoxetine (CYMBALTA) 60 MG extended release capsule, , Disp: , Rfl:     Blood Glucose Monitoring Suppl (CONTOUR MONITOR) ELLE, , Disp: , Rfl:     Glucose Blood (CONTOUR NEXT TEST VI), USE TO TEST THREE TIMES DAILY, Disp: , Rfl:     Continuous Blood Gluc  (DEXCOM G6 ) ELLE, by Other route, Disp: , Rfl:     Continuous Blood Gluc Sensor (DEXCOM G6 SENSOR) MISC, , Disp: , Rfl:     Handicap Placard MISC, , Disp: , Rfl:     Continuous Blood Gluc Transmit (DEXCOM G6 TRANSMITTER) MISC, , Disp: , Rfl:     morphine (MS CONTIN) 15 MG extended release tablet, Take 15 mg by mouth 2 times daily.  , Disp: , Rfl:     acetaminophen (TYLENOL) 500 MG tablet, Take 500 mg by mouth every 6 hours as needed, Disp: , Rfl:     vitamin D 25 MCG (1000 UT) CAPS, Take 1,000 Units by mouth in the morning., Disp: , Rfl:     finasteride (PROSCAR) 5 MG tablet, Take 5 mg by mouth in the morning., Disp: , Rfl:     fluticasone (CUTIVATE) 0.05 % cream, Apply 1 g topically 2 times daily, Disp: , Rfl:     fluticasone (FLONASE) 50 MCG/ACT nasal spray, 2 sprays by Nasal route daily as needed, Disp: , Rfl:     Glucagon (GVOKE PFS) 1 MG/0.2ML SOSY, Inject 1 each into the skin as needed, Disp: , Rfl:     hydrocortisone (WESTCORT) 0.2 % cream, Apply topically, Disp: , Rfl:     levothyroxine (SYNTHROID) 75 MCG tablet, Take 75 mcg by mouth every morning (before breakfast), Disp: , Rfl:     naloxone 4 MG/0.1ML LIQD nasal spray, 1 SPRAY BY INTRANASAL ROUTE ONCE AS NEEDED FOR UP TO 1 DOSE, Disp: , Rfl:     ondansetron (ZOFRAN-ODT) 4 MG disintegrating tablet, Take 4 mg by mouth every 6 hours as needed, Disp: , Rfl:     pantoprazole (PROTONIX) 40 MG tablet, Take 40 mg by mouth in the morning., Disp: , Rfl:     polyethylene glycol (GLYCOLAX) 17 GM/SCOOP powder, Take 17 g by mouth daily, Disp: , Rfl:     rosuvastatin (CRESTOR) 10 MG tablet, Take 10 mg by mouth, Disp: , Rfl:     sodium bicarbonate 650 MG tablet, Take 650 mg by mouth 2 times daily, Disp: , Rfl:     Allergies   Allergen Reactions    Drixoral Cough-Sore Throat [Acetaminophen-Dm] Other (See Comments) and Palpitations     Heart racing. Other Palpitations     Antihistamines  Antihistamines  Antihistamines  Antihistamines  Antihistamines  Antihistamines      Antihistamines, Loratadine-Type Palpitations         Review of Systems   Constitutional:  Positive for fatigue. Psychiatric/Behavioral:  Positive for confusion and dysphoric mood. Vitals:    09/16/22 0904   BP: (!) 100/54   Pulse: (!) 109   SpO2: 98%   Height: 5' 7\" (1.702 m)           Physical Exam       Stephen was seen today for follow-up, medicare awv and other. Diagnoses and all orders for this visit:    Medicare annual wellness visit, subsequent    Type 1 diabetes mellitus with stage 3a chronic kidney disease ([address]  -     Comprehensive Metabolic Panel; Future  -     Lipid Panel; Future    Alzheimer's disease, unspecified (CODE) ([address]    Diabetic nephropathy associated with type 1 diabetes mellitus ([address]    Balance problem  -     External Referral to Physical Therapy    Recurrent major depressive disorder, in partial remission ([address]    Other orders  -     Discontinue: buPROPion (WELLBUTRIN) 75 MG tablet;  Take 1 tablet by mouth 2 times daily               Kelli Salamanca, DO    Medicare Annual Wellness Visit    Maria Aaguilar Yazmin. is here for Follow-up, Medicare AWV, and Other (More confused)    Assessment & Plan   Medicare annual wellness visit, subsequent  Type 1 diabetes mellitus with stage 3a chronic kidney support that you need?: Yes  Do you have a Living Will?: Yes    Advance Directives       Power of  Living Will ACP-Advance Directive ACP-Power of     Not on File Filed on 11/05/20 Filed Not on File          General Health Risk Interventions:  Stress: medication prescribed- wellbutrin    Health Habits/Nutrition:  Physical Activity: Inactive    Days of Exercise per Week: 0 days    Minutes of Exercise per Session: 0 min     Have you lost any weight without trying in the past 3 months?: No     Have you seen the dentist within the past year?: Yes  Health Habits/Nutrition Interventions:  Inadequate physical activity:  patient is not ready to increase his/her physical activity level at this time    Hearing/Vision:  Do you or your family notice any trouble with your hearing that hasn't been managed with hearing aids?: (!) Yes (wears hearing aids)  Do you have difficulty driving, watching TV, or doing any of your daily activities because of your eyesight?: No  Have you had an eye exam within the past year?: Yes  No results found.   Hearing/Vision Interventions:  Hearing concerns:  patient declines any further evaluation/treatment for hearing issues    Safety:  Do you have working smoke detectors?: Yes  Do you have any tripping hazards - clutter in doorways, halls, or stairs?: No  Do you have either shower bars, grab bars, non-slip mats or non-slip surfaces in your shower or bathtub?: (!) No  Do all of your stairways have a railing or banister?: Yes  Do you always fasten your seatbelt when you are in a car?: Yes  Safety Interventions:  Refer balance training    ADLs:  In the past 7 days, did you need help from others to perform any of the following everyday activities: Eating, dressing, grooming, bathing, toileting, or walking/balance?: (!) Yes  Select all that apply: (!) Dressing, Walking/Balance  In the past 7 days, did you need help from others to take care of any of the following: Laundry, housekeeping, banking/finances, shopping, telephone use, food preparation, transportation, or taking medications?: No (wife has always done these things for the past 3 years)  ADL Interventions:  Patient declines any further evaluation/treatment for this issue          Objective   Vitals:    09/16/22 0904   BP: (!) 100/54   Pulse: (!) 109   SpO2: 98%   Height: 5' 7\" (1.702 m)      Body mass index is 32.58 kg/m². Allergies   Allergen Reactions    Drixoral Cough-Sore Throat [Acetaminophen-Dm] Other (See Comments) and Palpitations     Heart racing. Other Palpitations     Antihistamines  Antihistamines  Antihistamines  Antihistamines  Antihistamines  Antihistamines      Antihistamines, Loratadine-Type Palpitations     Prior to Visit Medications    Medication Sig Taking?  Authorizing Provider   levoFLOXacin (LEVAQUIN) 500 MG tablet TAKE 1 TABLET BY MOUTH EVERY 24 HOURS Yes Historical Provider, MD   LANTUS 100 UNIT/ML injection vial 30 units in am, 25 units in pm Yes Cory Gonzalez PA-C   HUMALOG 100 UNIT/ML SOLN injection vial 12 unit TIDAC plus a 1 per 25 greater than 150 correction AC/HS, 5 units AC snacks, max daily dose 60 units Yes Surekha Gonzalez PA-C   B-D ULTRAFINE III SHORT PEN 31G X 8 MM MISC Use with insulin up to 8 times daily, E10.65 Yes Cory Gonzalez PA-C   escitalopram (LEXAPRO) 5 MG tablet  Yes Historical Provider, MD   OLANZapine (ZYPREXA) 2.5 MG tablet  Yes Historical Provider, MD   INSULIN SYRINGE .5CC/29G (Regino Yanez INS SYR .5CC/29G) 29G X 1/2\" 0.5 ML MISC 1 each by Does not apply route daily Yes Sunny Carrera MD   DULoxetine (CYMBALTA) 60 MG extended release capsule  Yes Historical Provider, MD   Blood Glucose Monitoring Suppl (CONTOUR MONITOR) [address]  Yes Historical Provider, MD   Glucose Blood (CONTOUR NEXT TEST VI) USE TO TEST THREE TIMES DAILY Yes Historical Provider, MD   Continuous Blood Gluc  ([address]) [address] by Other route Yes Historical Provider, MD   Continuous Blood Gluc Sensor (DEXCOM G6 SENSOR) MIS  Yes Historical Provider, MD   Handicap Placard [address] [facility]  Yes Historical Provider, MD   Continuous Blood Gluc Transmit (DEXCOM G6 TRANSMITTER) MISC  Yes Historical Provider, MD   morphine (MS CONTIN) 15 MG extended release tablet Take 15 mg by mouth 2 times daily. Yes Historical Provider, MD   acetaminophen (TYLENOL) 500 MG tablet Take 500 mg by mouth every 6 hours as needed Yes Ar Automatic Reconciliation   vitamin D 25 MCG (1000 UT) CAPS Take 1,000 Units by mouth in the morning. Yes Ar Automatic Reconciliation   finasteride (PROSCAR) 5 MG tablet Take 5 mg by mouth in the morning. Yes Ar Automatic Reconciliation   fluticasone (CUTIVATE) 0.05 % cream Apply 1 g topically 2 times daily Yes Ar Automatic Reconciliation   fluticasone (FLONASE) 50 MCG/ACT nasal spray 2 sprays by Nasal route daily as needed Yes Ar Automatic Reconciliation   Glucagon (GVOKE PFS) 1 MG/0.2ML SOSY Inject 1 each into the skin as needed Yes Ar Automatic Reconciliation   hydrocortisone (WESTCORT) 0.2 % cream Apply topically Yes Ar Automatic Reconciliation   levothyroxine (SYNTHROID) 75 MCG tablet Take 75 mcg by mouth every morning (before breakfast) Yes Ar Automatic Reconciliation   naloxone 4 MG/0.1ML LIQD nasal spray 1 SPRAY BY INTRANASAL ROUTE ONCE AS NEEDED FOR UP TO 1 DOSE Yes Ar Automatic Reconciliation   ondansetron (ZOFRAN-ODT) 4 MG disintegrating tablet Take 4 mg by mouth every 6 hours as needed Yes Ar Automatic Reconciliation   pantoprazole (PROTONIX) 40 MG tablet Take 40 mg by mouth in the morning.  Yes Ar Automatic Reconciliation   polyethylene glycol (GLYCOLAX) 17 GM/SCOOP powder Take 17 g by mouth daily Yes Ar Automatic Reconciliation   rosuvastatin (CRESTOR) 10 MG tablet Take 10 mg by mouth Yes Ar Automatic Reconciliation   sodium bicarbonate 650 MG tablet Take 650 mg by mouth 2 times daily Yes Ar Automatic Reconciliation       CareTeam (Including outside providers/suppliers regularly involved in providing care):   Patient Care Team:  Chavez Lopez DO as PCP - General (Internal Medicine)  [facility] DO John as PCP - REHABILITATION [facility] Provider  Chavez Lopez DO as PCP - REHABILITATION [facility] Provider  Enrique Macedo MD as Physician  Stephanie Avalos MD as Physician  Paul Harden APRN - NP as Nurse Practitioner  Herb Leija MD as Physician  Ciro Wei MD as Physician  Gianni Conway DO as Physician  Chavez Lopez DO     Reviewed and updated this visit:  Allergies  Meds  Problems           ,Denisha Plascencia was seen today for follow-up, medicare awv and other. Diagnoses and all orders for this visit:    Medicare annual wellness visit, subsequent    Type 1 diabetes mellitus with stage 3a chronic kidney disease ([address]  -     Comprehensive Metabolic Panel; Future  -     Lipid Panel; Future    Alzheimer's disease, unspecified (CODE) ([address]    Diabetic nephropathy associated with type 1 diabetes mellitus ([address]    Balance problem  -     External Referral to Physical Therapy    Recurrent major depressive disorder, in partial remission ([address]    Other orders  -     Discontinue: buPROPion (WELLBUTRIN) 75 MG tablet;  Take 1 tablet by mouth 2 times daily

## 2022-09-20 ENCOUNTER — NURSE TRIAGE (OUTPATIENT)
Dept: OTHER | Facility: CLINIC | Age: 77
End: 2022-09-20

## 2022-09-20 ENCOUNTER — HOSPITAL ENCOUNTER (EMERGENCY)
Age: 77
Discharge: HOME | End: 2022-09-20
Attending: EMERGENCY MEDICINE
Payer: MEDICARE

## 2022-09-20 ENCOUNTER — APPOINTMENT (OUTPATIENT)
Dept: ULTRASOUND IMAGING | Age: 77
End: 2022-09-20
Payer: MEDICARE

## 2022-09-20 ENCOUNTER — HOSPITAL ENCOUNTER (EMERGENCY)
Dept: GENERAL RADIOLOGY | Age: 77
Discharge: HOME | End: 2022-09-23
Payer: MEDICARE

## 2022-09-20 VITALS
RESPIRATION RATE: 16 BRPM | OXYGEN SATURATION: 96 % | SYSTOLIC BLOOD PRESSURE: 153 MMHG | HEART RATE: 97 BPM | TEMPERATURE: 98.3 F | BODY MASS INDEX: 32.18 KG/M2 | WEIGHT: 205 LBS | DIASTOLIC BLOOD PRESSURE: 91 MMHG | HEIGHT: 67 IN

## 2022-09-20 DIAGNOSIS — L03.116 CELLULITIS OF LEFT LOWER EXTREMITY: Primary | ICD-10-CM

## 2022-09-20 LAB
ALBUMIN SERPL-MCNC: 3.4 G/DL (ref 3.2–4.6)
ALBUMIN/GLOB SERPL: 0.8 {RATIO} (ref 1.2–3.5)
ALP SERPL-CCNC: 140 U/L (ref 50–136)
ALT SERPL-CCNC: 22 U/L (ref 12–65)
ANION GAP SERPL CALC-SCNC: 5 MMOL/L (ref 4–13)
AST SERPL-CCNC: 14 U/L (ref 15–37)
BASOPHILS # BLD: 0 K/UL (ref 0–0.2)
BASOPHILS NFR BLD: 1 % (ref 0–2)
BILIRUB SERPL-MCNC: 0.4 MG/DL (ref 0.2–1.1)
BUN SERPL-MCNC: 27 MG/DL (ref 8–23)
CALCIUM SERPL-MCNC: 9.4 MG/DL (ref 8.3–10.4)
CHLORIDE SERPL-SCNC: 103 MMOL/L (ref 101–110)
CO2 SERPL-SCNC: 28 MMOL/L (ref 21–32)
CREAT SERPL-MCNC: 1.6 MG/DL (ref 0.8–1.5)
DIFFERENTIAL METHOD BLD: ABNORMAL
EOSINOPHIL # BLD: 0.1 K/UL (ref 0–0.8)
EOSINOPHIL NFR BLD: 2 % (ref 0.5–7.8)
ERYTHROCYTE [DISTWIDTH] IN BLOOD BY AUTOMATED COUNT: 14.1 % (ref 11.9–14.6)
GLOBULIN SER CALC-MCNC: 4.2 G/DL (ref 2.3–3.5)
GLUCOSE SERPL-MCNC: 248 MG/DL (ref 65–100)
HCT VFR BLD AUTO: 41.9 % (ref 41.1–50.3)
HGB BLD-MCNC: 12.8 G/DL (ref 13.6–17.2)
IMM GRANULOCYTES # BLD AUTO: 0 K/UL (ref 0–0.5)
IMM GRANULOCYTES NFR BLD AUTO: 0 % (ref 0–5)
LACTATE SERPL-SCNC: 1.1 MMOL/L (ref 0.4–2)
LYMPHOCYTES # BLD: 1.2 K/UL (ref 0.5–4.6)
LYMPHOCYTES NFR BLD: 20 % (ref 13–44)
MCH RBC QN AUTO: 27.4 PG (ref 26.1–32.9)
MCHC RBC AUTO-ENTMCNC: 30.5 G/DL (ref 31.4–35)
MCV RBC AUTO: 89.7 FL (ref 79.6–97.8)
MONOCYTES # BLD: 0.4 K/UL (ref 0.1–1.3)
MONOCYTES NFR BLD: 8 % (ref 4–12)
NEUTS SEG # BLD: 4.1 K/UL (ref 1.7–8.2)
NEUTS SEG NFR BLD: 71 % (ref 43–78)
NRBC # BLD: 0 K/UL (ref 0–0.2)
NT PRO BNP: 122 PG/ML
PLATELET # BLD AUTO: 182 K/UL (ref 150–450)
PMV BLD AUTO: 10.8 FL (ref 9.4–12.3)
POTASSIUM SERPL-SCNC: 3.9 MMOL/L (ref 3.5–5.1)
PROT SERPL-MCNC: 7.6 G/DL (ref 6.3–8.2)
RBC # BLD AUTO: 4.67 M/UL (ref 4.23–5.6)
SODIUM SERPL-SCNC: 136 MMOL/L (ref 138–145)
TROPONIN I SERPL HS-MCNC: 11.2 PG/ML (ref 0–14)
WBC # BLD AUTO: 5.9 K/UL (ref 4.3–11.1)

## 2022-09-20 PROCEDURE — 83605 ASSAY OF LACTIC ACID: CPT

## 2022-09-20 PROCEDURE — 93971 EXTREMITY STUDY: CPT

## 2022-09-20 PROCEDURE — 83880 ASSAY OF NATRIURETIC PEPTIDE: CPT

## 2022-09-20 PROCEDURE — 99284 EMERGENCY DEPT VISIT MOD MDM: CPT

## 2022-09-20 PROCEDURE — 80053 COMPREHEN METABOLIC PANEL: CPT

## 2022-09-20 PROCEDURE — 85025 COMPLETE CBC W/AUTO DIFF WBC: CPT

## 2022-09-20 PROCEDURE — 84484 ASSAY OF TROPONIN QUANT: CPT

## 2022-09-20 PROCEDURE — 71045 X-RAY EXAM CHEST 1 VIEW: CPT

## 2022-09-20 RX ORDER — CLINDAMYCIN HYDROCHLORIDE 150 MG/1
300 CAPSULE ORAL 3 TIMES DAILY
Qty: 60 CAPSULE | Refills: 0 | Status: SHIPPED | OUTPATIENT
Start: 2022-09-20 | End: 2022-09-30

## 2022-09-20 ASSESSMENT — ENCOUNTER SYMPTOMS
RHINORRHEA: 0
DIARRHEA: 0
ABDOMINAL PAIN: 0
BACK PAIN: 0
CONSTIPATION: 0
VOMITING: 0
COLOR CHANGE: 1
COUGH: 0
NAUSEA: 0
SORE THROAT: 0
SHORTNESS OF BREATH: 0

## 2022-09-20 ASSESSMENT — PAIN DESCRIPTION - LOCATION: LOCATION: LEG

## 2022-09-20 ASSESSMENT — PAIN - FUNCTIONAL ASSESSMENT: PAIN_FUNCTIONAL_ASSESSMENT: 0-10

## 2022-09-20 ASSESSMENT — PAIN SCALES - GENERAL: PAINLEVEL_OUTOF10: 6

## 2022-09-20 ASSESSMENT — PAIN DESCRIPTION - DESCRIPTORS: DESCRIPTORS: THROBBING

## 2022-09-20 ASSESSMENT — PAIN DESCRIPTION - ORIENTATION: ORIENTATION: LEFT

## 2022-09-20 NOTE — ED PROVIDER NOTES
Emergency Department Provider Note                   PCP:                Marshal Bonds DO               Age: 78 y.o. Sex: male       ICD-10-CM    1. Cellulitis of left lower extremity  L03. [address] To Discharge 09/20/2022 05:24:07 PM       MDM  Number of Diagnoses or Management Options  Diagnosis management comments: Pt with left lower extremity cellulitis. Redness, swelling, and mild pain present. No acute on blood work and [address] neg for DVT. Will discharge on abx. Amount and/or Complexity of Data Reviewed  Clinical lab tests: ordered and reviewed  Tests in the radiology section of CPT®: ordered and reviewed    Patient Progress  Patient progress: stable             Orders Placed This Encounter   Procedures    XR CHEST 1 VIEW    CBC with Auto Differential    Comprehensive Metabolic Panel    Troponin    Lactic Acid    Brain Natriuretic Peptide    Vascular duplex lower extremity venous left        Medications - No data to display    New Prescriptions    CLINDAMYCIN (CLEOCIN) 150 MG CAPSULE    Take 2 capsules by mouth 3 times daily for 10 days        Victorina Phillip is a 78 y.o. male who presents to the Emergency Department with chief complaint of    Chief Complaint   Patient presents with    Leg Swelling      Patient with previous history of left lower extremity cellulitis. Has bilateral lower extremity edema without history of CHF. Does have some chronic kidney disease. For the past 4 days has had increased swelling in the left leg. Wife noticed some redness yesterday evening and more today laterally. Small wound to the lateral leg. Brought in for evaluation. No chest pain or shortness of breath. No cough or congestion. The history is provided by the patient and the spouse. No  was used.    Foot Problem  Location:  Leg and foot  Time since incident:  4 days  Injury: no    Leg location:  L lower leg  Foot location:  L foot  Pain details: Quality:  Aching    Radiates to:  Does not radiate    Severity:  Mild    Duration:  1 day    Timing:  Constant    Progression:  Worsening  Chronicity:  New  Relieved by:  Nothing  Worsened by:  Nothing  Associated symptoms: swelling    Associated symptoms: no back pain, no decreased ROM, no fatigue, no fever, no neck pain and no numbness      All other systems reviewed and are negative unless otherwise stated in the history of present illness section. Review of Systems   Constitutional:  Negative for chills, fatigue and fever. HENT:  Negative for rhinorrhea and sore throat. Respiratory:  Negative for cough and shortness of breath. Cardiovascular:  Positive for leg swelling. Negative for chest pain and palpitations. Gastrointestinal:  Negative for abdominal pain, constipation, diarrhea, nausea and vomiting. Genitourinary:  Negative for dysuria and hematuria. Musculoskeletal:  Negative for back pain and neck pain. Skin:  Positive for color change and wound. Negative for rash. Neurological:  Negative for numbness and headaches. All other systems reviewed and are negative.     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)     Diabetic nephropathy (HCC)     Diabetic peripheral neuropathy (HCC)     Dysphonia     Essential hypertension     GERD (gastroesophageal reflux disease)     Hypercholesterolemia     Insulin pump status     Left hip pain     Lumbar radiculopathy     Mild cognitive impairment     Moderate episode of recurrent major depressive disorder (HCC)     Obstructive sleep apnea     Orthostatic hypotension     Paraspinal muscle spasm     Polypharmacy     Primary hypothyroidism     Proliferative diabetic retinopathy associated with type 1 diabetes mellitus (HCC)     Seborrheic keratosis     Spinal cord stimulator status     Spondylolisthesis     Stage 3b chronic kidney disease ([facility] [address]     Stroke-like symptom 2021    Tachycardia     Thyroid disease     managed with medications    Trochanteric bursitis of left hip     Type 1 diabetes mellitus ([facility] [address]     Vocal cord atrophy         Past Surgical History:   Procedure Laterality Date    BUNIONECTOMY Right     CATARACT REMOVAL Bilateral     COLONOSCOPY      COLONOSCOPY N/A 2021    COLONOSCOPY/ BMI 30 ROOM 902 performed by William Lira MD at [address] [facility] Bilateral     laser treatment for diabetic complication    LUMBAR FUSION      L4-5    LUMBAR LAMINECTOMY      L5-S1    OTHER SURGICAL HISTORY      spinal neurostimulator    SHOULDER ARTHROSCOPY Left     TOE AMPUTATION      distal portion of the 2nd toe of R, foot, distal part of 3rd toe of r foot    TONSILLECTOMY AND ADENOIDECTOMY  age 8    UPPER GASTROINTESTINAL ENDOSCOPY N/A 2022    EGD ESOPHAGOGASTRODUODENOSCOPY performed by Cammie Solomon MD at [facility] ENDOSCOPY        Family History   Problem Relation Age of Onset    Hypertension Brother     Diabetes Maternal Uncle         type 1    Diabetes Brother         type 2    Thyroid Disease Mother         treated with surgery    Heart Disease Father     Hypertension Mother         Social History     Socioeconomic History    Marital status:    Tobacco Use    Smoking status: Former     Packs/day: 1.00     Years: 30.00     Pack years: 30.00     Types: Cigarettes     Quit date: 1996     Years since quittin.0    Smokeless tobacco: Former   Vaping Use    Vaping Use: Never used   Substance and Sexual Activity    Alcohol use: Not Currently    Drug use: Never     Social Determinants of Health     Physical Activity: Inactive    Days of Exercise per Week: 0 days    Minutes of Exercise per Session: 0 min        Allergies: Drixoral None    Cardiac Silhouette: Within normal limits in size. Mediastinum: Normal mediastinal contours. Lungs: No airspace consolidation. Small right pleural effusion. No  pneumothorax. Upper Abdomen: Normal     Miscellaneous: No fracture or suspicious osseous lesion. Impression    Small right pleural effusion. No other acute findings.        CBC with Auto Differential   Result Value Ref Range    WBC 5.9 4.3 - 11.1 K/uL    RBC 4.67 4.23 - 5.6 M/uL    Hemoglobin 12.8 (L) 13.6 - 17.2 g/dL    Hematocrit 41.9 41.1 - 50.3 %    MCV 89.7 79.6 - 97.8 FL    MCH 27.4 26.1 - 32.9 PG    MCHC 30.5 (L) 31.4 - 35.0 g/dL    RDW 14.1 11.9 - 14.6 %    Platelets 591 554 - 126 K/uL    MPV 10.8 9.4 - 12.3 FL    nRBC 0.00 0.0 - 0.2 K/uL    Differential Type AUTOMATED      Seg Neutrophils 71 43 - 78 %    Lymphocytes 20 13 - 44 %    Monocytes 8 4.0 - 12.0 %    Eosinophils % 2 0.5 - 7.8 %    Basophils 1 0.0 - 2.0 %    Immature Granulocytes 0 0.0 - 5.0 %    Segs Absolute 4.1 1.7 - 8.2 K/UL    Absolute Lymph # 1.2 0.5 - 4.6 K/UL    Absolute Mono # 0.4 0.1 - 1.3 K/UL    Absolute Eos # 0.1 0.0 - 0.8 K/UL    Basophils Absolute 0.0 0.0 - 0.2 K/UL    Absolute Immature Granulocyte 0.0 0.0 - 0.5 K/UL   Comprehensive Metabolic Panel   Result Value Ref Range    Sodium 136 (L) 138 - 145 mmol/L    Potassium 3.9 3.5 - 5.1 mmol/L    Chloride 103 101 - 110 mmol/L    CO2 28 21 - 32 mmol/L    Anion Gap 5 4 - 13 mmol/L    Glucose 248 (H) 65 - 100 mg/dL    BUN 27 (H) 8 - 23 MG/DL    Creatinine 1.60 (H) 0.8 - 1.5 MG/DL    GFR  54 (L) >60 ml/min/1.73m2    GFR Non- 45 (L) >60 ml/min/1.73m2    Calcium 9.4 8.3 - 10.4 MG/DL    Total Bilirubin 0.4 0.2 - 1.1 MG/DL    ALT 22 12 - 65 U/L    AST 14 (L) 15 - 37 U/L    Alk Phosphatase 140 (H) 50 - 136 U/L    Total Protein 7.6 6.3 - 8.2 g/dL    Albumin 3.4 3.2 - 4.6 g/dL    Globulin 4.2 (H) 2.3 - 3.5 g/dL    Albumin/Globulin Ratio 0.8 (L) 1.2 - 3.5     Troponin   Result Value Ref Range    Troponin, High Sensitivity 11.2 0 - 14 pg/mL   Lactic Acid   Result Value Ref Range    Lactic Acid, Plasma 1.1 0.4 - 2.0 MMOL/L   Brain Natriuretic Peptide   Result Value Ref Range    NT Pro- <450 PG/ML   Vascular duplex lower extremity venous left    Narrative    Left lower extremity venous ultrasound    INDICATION:  Pain and swelling,    Doppler ultrasound of the left lower extremity was performed. FINDINGS:  There is normal flow in the greater saphenous, common femoral,  superficial femoral, and popliteal veins. Normal compression and augmentation is  demonstrated. The proximal calf veins are also patent. There is a 6 cm popliteal cyst.  No other masses or fluid collections are seen. Impression    1. No evidence of deep venous thrombosis in the left lower extremity. 2.  6 cm popliteal cyst.          Vascular duplex lower extremity venous left   Final Result   1. No evidence of deep venous thrombosis in the left lower extremity. 2.  6 cm popliteal cyst.         XR CHEST 1 VIEW   Final Result   Small right pleural effusion. No other acute findings. Voice dictation software was used during the making of this note. This software is not perfect and grammatical and other typographical errors may be present. This note has not been completely proofread for errors.      Benjamin Beal III, MD  09/20/22 3723

## 2022-09-20 NOTE — ED TRIAGE NOTES
Patient ambulatory to triage with mask in place. Pt wife present with pt in triage. Patient reports he was sitting at home watching TV and wife noticed BLE pitting edema and brought pt to ED. Pt reports pain in left pain with redness to posterior lower leg. Dr. Bridgett Daniels in triage.

## 2022-09-20 NOTE — ED NOTES
I have reviewed discharge instructions with the patient. The patient verbalized understanding. Patient left ED via Discharge Method: ambulatory to Home with spouse. Opportunity for questions and clarification provided. Patient given 1 scripts. To continue your aftercare when you leave the hospital, you may receive an automated call from our care team to check in on how you are doing. This is a free service and part of our promise to provide the best care and service to meet your aftercare needs.  If you have questions, or wish to unsubscribe from this service please call [phone]. Thank you for Choosing our [facility] Emergency Department.         Anita Gustafson RN  09/20/22 [id number]

## 2022-09-21 ENCOUNTER — OFFICE VISIT (OUTPATIENT)
Dept: INTERNAL MEDICINE CLINIC | Facility: CLINIC | Age: 77
End: 2022-09-21
Payer: MEDICARE

## 2022-09-21 VITALS — HEART RATE: 110 BPM | OXYGEN SATURATION: 99 % | DIASTOLIC BLOOD PRESSURE: 60 MMHG | SYSTOLIC BLOOD PRESSURE: 120 MMHG

## 2022-09-21 DIAGNOSIS — L02.416 CELLULITIS AND ABSCESS OF LEFT LEG: Primary | ICD-10-CM

## 2022-09-21 DIAGNOSIS — I89.0 LYMPHEDEMA: ICD-10-CM

## 2022-09-21 DIAGNOSIS — L03.116 CELLULITIS AND ABSCESS OF LEFT LEG: Primary | ICD-10-CM

## 2022-09-21 PROCEDURE — 99213 OFFICE O/P EST LOW 20 MIN: CPT | Performed by: INTERNAL MEDICINE

## 2022-09-21 PROCEDURE — 1123F ACP DISCUSS/DSCN MKR DOCD: CPT | Performed by: INTERNAL MEDICINE

## 2022-09-21 PROCEDURE — G8417 CALC BMI ABV UP PARAM F/U: HCPCS | Performed by: INTERNAL MEDICINE

## 2022-09-21 PROCEDURE — 1036F TOBACCO NON-USER: CPT | Performed by: INTERNAL MEDICINE

## 2022-09-21 PROCEDURE — 3017F COLORECTAL CA SCREEN DOC REV: CPT | Performed by: INTERNAL MEDICINE

## 2022-09-21 PROCEDURE — G8427 DOCREV CUR MEDS BY ELIG CLIN: HCPCS | Performed by: INTERNAL MEDICINE

## 2022-09-21 ASSESSMENT — ENCOUNTER SYMPTOMS: COLOR CHANGE: 1

## 2022-09-21 NOTE — PROGRESS NOTES
Davie Holman was seen today for follow-up. Diagnoses and all orders for this visit:    Cellulitis and abscess of left leg  Comments:  applied some pressure wrap to assist healing.  should work +abx given ER .rtc prn    Lymphedema        Kurtis May is a 78 y.o. male    Chief Complaint   Patient presents with    Follow-up     ER follow up swelling left lower leg   Given clinda ,dx cellulitis, neg doppler      Admission on 09/20/2022, Discharged on 09/20/2022   Component Date Value Ref Range Status    WBC 09/20/2022 5.9  4.3 - 11.1 K/uL Final    RBC 09/20/2022 4.67  4.23 - 5.6 M/uL Final    Hemoglobin 09/20/2022 12.8 (A) 13.6 - 17.2 g/dL Final    Hematocrit 09/20/2022 41.9  41.1 - 50.3 % Final    MCV 09/20/2022 89.7  79.6 - 97.8 FL Final    MCH 09/20/2022 27.4  26.1 - 32.9 PG Final    MCHC 09/20/2022 30.5 (A) 31.4 - 35.0 g/dL Final    RDW 09/20/2022 14.1  11.9 - 14.6 % Final    Platelets 71/95/8987 182  150 - 450 K/uL Final    MPV 09/20/2022 10.8  9.4 - 12.3 FL Final    nRBC 09/20/2022 0.00  0.0 - 0.2 K/uL Final    **Note: Absolute NRBC parameter is now reported with Hemogram**    Differential Type 09/20/2022 AUTOMATED    Final    Seg Neutrophils 09/20/2022 71  43 - 78 % Final    Lymphocytes 09/20/2022 20  13 - 44 % Final    Monocytes 09/20/2022 8  4.0 - 12.0 % Final    Eosinophils % 09/20/2022 2  0.5 - 7.8 % Final    Basophils 09/20/2022 1  0.0 - 2.0 % Final    Immature Granulocytes 09/20/2022 0  0.0 - 5.0 % Final    Segs Absolute 09/20/2022 4.1  1.7 - 8.2 K/UL Final    Absolute Lymph # 09/20/2022 1.2  0.5 - 4.6 K/UL Final    Absolute Mono # 09/20/2022 0.4  0.1 - 1.3 K/UL Final    Absolute Eos # 09/20/2022 0.1  0.0 - 0.8 K/UL Final    Basophils Absolute 09/20/2022 0.0  0.0 - 0.2 K/UL Final    Absolute Immature Granulocyte 09/20/2022 0.0  0.0 - 0.5 K/UL Final    Sodium 09/20/2022 136 (A) 138 - 145 mmol/L Final    Potassium 09/20/2022 3.9  3.5 - 5.1 mmol/L Final    Chloride 09/20/2022 103  101 - 110 mmol/L Final    CO2 09/20/2022 28  21 - 32 mmol/L Final    Anion Gap 09/20/2022 5  4 - 13 mmol/L Final    Glucose 09/20/2022 248 (A) 65 - 100 mg/dL Final    BUN 09/20/2022 27 (A) 8 - 23 MG/DL Final    Creatinine 09/20/2022 1.60 (A) 0.8 - 1.5 MG/DL Final    GFR  09/20/2022 54 (A) >60 ml/min/1.73m2 Final    GFR Non- 09/20/2022 45 (A) >60 ml/min/1.73m2 Final    Comment:      Estimated GFR is calculated using the Modification of Diet in Renal Disease (MDRD) Study equation, reported for both  Americans (GFRAA) and non- Americans (GFRNA), and normalized to 1.73m2 body surface area. The physician must decide which value applies to the patient. The MDRD study equation should only be used in individuals age 25 or older. It has not been validated for the following: pregnant women, patients with serious comorbid conditions,or on certain medications, or persons with extremes of body size, muscle mass, or nutritional status.       Calcium 09/20/2022 9.4  8.3 - 10.4 MG/DL Final    Total Bilirubin 09/20/2022 0.4  0.2 - 1.1 MG/DL Final    ALT 09/20/2022 22  12 - 65 U/L Final    AST 09/20/2022 14 (A) 15 - 37 U/L Final    Alk Phosphatase 09/20/2022 140 (A) 50 - 136 U/L Final    Total Protein 09/20/2022 7.6  6.3 - 8.2 g/dL Final    Albumin 09/20/2022 3.4  3.2 - 4.6 g/dL Final    Globulin 09/20/2022 4.2 (A) 2.3 - 3.5 g/dL Final    Albumin/Globulin Ratio 09/20/2022 0.8 (A) 1.2 - 3.5   Final    Troponin, High Sensitivity 09/20/2022 11.2  0 - 14 pg/mL Final    Lactic Acid, Plasma 09/20/2022 1.1  0.4 - 2.0 MMOL/L Final    NT Pro-BNP 09/20/2022 122  <450 PG/ML Final        Past Medical History:   Diagnosis Date    Aphonia     Back pain, chronic     Benign prostatic hyperplasia with incomplete bladder emptying     Candida laryngitis     Charcot foot due to diabetes mellitus ([facility] [address]     Chronic left-sided low back pain with left-sided sciatica     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 ([facility] [address]     Diabetic nephropathy ([facility] [address]     Diabetic peripheral neuropathy (HCC)     Dysphonia     Essential hypertension     GERD (gastroesophageal reflux disease)     Hypercholesterolemia     Insulin pump status     Left hip pain     Lumbar radiculopathy     Mild cognitive impairment     Moderate episode of recurrent major depressive disorder (HCC)     Obstructive sleep apnea     Orthostatic hypotension     Paraspinal muscle spasm     Polypharmacy     Primary hypothyroidism     Proliferative diabetic retinopathy associated with type 1 diabetes mellitus (HCC)     Seborrheic keratosis     Spinal cord stimulator status     Spondylolisthesis     Stage 3b chronic kidney disease ([facility] [address]     Stroke-like symptom 2021    Tachycardia     Thyroid disease     managed with medications    Trochanteric bursitis of left hip     Type 1 diabetes mellitus ([facility] [address]     Vocal cord atrophy        Family History   Problem Relation Age of Onset    Hypertension Brother     Diabetes Maternal Uncle         type 1    Diabetes Brother         type 2    Thyroid Disease Mother         treated with surgery    Heart Disease Father     Hypertension Mother         Social History     Socioeconomic History    Marital status:      Spouse name: Not on file    Number of children: Not on file    Years of education: Not on file    Highest education level: Not on file   Occupational History    Not on file   Tobacco Use    Smoking status: Former     Packs/day: 1.00     Years: 30.00     Pack years: 30.00     Types: Cigarettes     Quit date: 1996     Years since quittin.0    Smokeless tobacco: Former   Vaping Use    Vaping Use: Never used   Substance and Sexual Activity    Alcohol use: Not Currently    Drug use: Never    Sexual activity: Not on file   Other Topics Concern    Not on file   Social History Narrative    Not on file     Social Determinants of Health     Financial Resource Strain: Not on file   Food Insecurity: Not on file Transportation Needs: Not on file   Physical Activity: Inactive    Days of Exercise per Week: 0 days    Minutes of Exercise per Session: 0 min   Stress: Not on file   Social Connections: Not on file   Intimate Partner Violence: Not on file   Housing Stability: Not on file         Current Outpatient Medications:     clindamycin (CLEOCIN) 150 MG capsule, Take 2 capsules by mouth 3 times daily for 10 days, Disp: 60 capsule, Rfl: 0    levoFLOXacin (LEVAQUIN) 500 MG tablet, TAKE 1 TABLET BY MOUTH EVERY 24 HOURS, Disp: , Rfl:     LANTUS 100 UNIT/ML injection vial, 30 units in am, 25 units in pm, Disp: 40 mL, Rfl: 3    HUMALOG 100 UNIT/ML SOLN injection vial, 12 unit TIDAC plus a 1 per 25 greater than 150 correction AC/HS, 5 units AC snacks, max daily dose 60 units, Disp: 60 mL, Rfl: 3    B-D ULTRAFINE III SHORT PEN 31G X 8 MM MISC, Use with insulin up to 8 times daily, E10.65, Disp: 800 each, Rfl: 3    escitalopram (LEXAPRO) 5 MG tablet, , Disp: , Rfl:     OLANZapine (ZYPREXA) 2.5 MG tablet, , Disp: , Rfl:     INSULIN SYRINGE .5CC/29G ([employer] INS SYR .5CC/29G) 29G X 1/2\" 0.5 ML MISC, 1 each by Does not apply route daily, Disp: 100 each, Rfl: 0    DULoxetine (CYMBALTA) 60 MG extended release capsule, , Disp: , Rfl:     Blood Glucose Monitoring Suppl (CONTOUR MONITOR) ELLE, , Disp: , Rfl:     Glucose Blood (CONTOUR NEXT TEST VI), USE TO TEST THREE TIMES DAILY, Disp: , Rfl:     Continuous Blood Gluc  (DEXCOM G6 ) ELLE, by Other route, Disp: , Rfl:     Continuous Blood Gluc Sensor (DEXCOM G6 SENSOR) MISC, , Disp: , Rfl:     Handicap Placard MISC, , Disp: , Rfl:     Continuous Blood Gluc Transmit (DEXCOM G6 TRANSMITTER) MISC, , Disp: , Rfl:     morphine (MS CONTIN) 15 MG extended release tablet, Take 15 mg by mouth 2 times daily.  , Disp: , Rfl:     acetaminophen (TYLENOL) 500 MG tablet, Take 500 mg by mouth every 6 hours as needed, Disp: , Rfl:     vitamin D 25 MCG (1000 UT) CAPS, Take 1,000 Units by mouth in the morning., Disp: , Rfl:     finasteride (PROSCAR) 5 MG tablet, Take 5 mg by mouth in the morning., Disp: , Rfl:     fluticasone (CUTIVATE) 0.05 % cream, Apply 1 g topically 2 times daily, Disp: , Rfl:     fluticasone (FLONASE) 50 MCG/ACT nasal spray, 2 sprays by Nasal route daily as needed, Disp: , Rfl:     Glucagon (GVOKE PFS) 1 MG/0.2ML SOSY, Inject 1 each into the skin as needed, Disp: , Rfl:     hydrocortisone (WESTCORT) 0.2 % cream, Apply topically, Disp: , Rfl:     levothyroxine (SYNTHROID) 75 MCG tablet, Take 75 mcg by mouth every morning (before breakfast), Disp: , Rfl:     naloxone 4 MG/0.1ML LIQD nasal spray, 1 SPRAY BY INTRANASAL ROUTE ONCE AS NEEDED FOR UP TO 1 DOSE, Disp: , Rfl:     ondansetron (ZOFRAN-ODT) 4 MG disintegrating tablet, Take 4 mg by mouth every 6 hours as needed, Disp: , Rfl:     pantoprazole (PROTONIX) 40 MG tablet, Take 40 mg by mouth in the morning., Disp: , Rfl:     polyethylene glycol (GLYCOLAX) 17 GM/SCOOP powder, Take 17 g by mouth daily, Disp: , Rfl:     rosuvastatin (CRESTOR) 10 MG tablet, Take 10 mg by mouth, Disp: , Rfl:     sodium bicarbonate 650 MG tablet, Take 650 mg by mouth 2 times daily, Disp: , Rfl:     Allergies   Allergen Reactions    Drixoral Cough-Sore Throat [Acetaminophen-Dm] Other (See Comments) and Palpitations     Heart racing. Other Palpitations     Antihistamines  Antihistamines  Antihistamines  Antihistamines  Antihistamines  Antihistamines      Antihistamines, Loratadine-Type Palpitations         Review of Systems   Constitutional:  Negative for chills and fever. Skin:  Positive for color change and rash. Vitals:    09/21/22 1323   BP: 120/60   Pulse: (!) 110   SpO2: 99%           Physical Exam  Constitutional:       Appearance: He is not ill-appearing. Eyes:      Extraocular Movements: Extraocular movements intact. Pulmonary:      Effort: Pulmonary effort is normal. No respiratory distress.    Musculoskeletal:      Cervical back: Neck supple. Legs:    Neurological:      General: No focal deficit present. Mental Status: He is alert. Mental status is at baseline. Psychiatric:         Thought Content: Thought content normal.          Parveen Lynch was seen today for follow-up. Diagnoses and all orders for this visit:    Cellulitis and abscess of left leg  Comments:  applied some pressure wrap to assist healing.  should work +abx given ER .rtc prn    Lymphedema               Romi Rodriguez DO

## 2022-09-22 ENCOUNTER — CARE COORDINATION (OUTPATIENT)
Dept: CARE COORDINATION | Facility: CLINIC | Age: 77
End: 2022-09-22

## 2022-09-23 ENCOUNTER — CARE COORDINATION (OUTPATIENT)
Dept: CARE COORDINATION | Facility: CLINIC | Age: 77
End: 2022-09-23

## 2022-09-23 NOTE — CARE COORDINATION
Spoke w/ pt and wife. Pt f/u w/ PCP yesterday for cellulitis. Instructed to wrap lt leg for 2 days, then removed. ACM reviewed discharge instructions, medical action plan and red flag symptoms with the family who verbalized understanding. Family was given an opportunity to verbalize any questions and concerns and agrees to contact ACM or health care provider for questions related to their healthcare.

## 2022-10-06 ENCOUNTER — OFFICE VISIT (OUTPATIENT)
Dept: ENDOCRINOLOGY | Age: 77
End: 2022-10-06
Payer: MEDICARE

## 2022-10-06 VITALS
HEIGHT: 67 IN | WEIGHT: 231 LBS | HEART RATE: 101 BPM | BODY MASS INDEX: 36.26 KG/M2 | SYSTOLIC BLOOD PRESSURE: 140 MMHG | DIASTOLIC BLOOD PRESSURE: 80 MMHG | OXYGEN SATURATION: 97 %

## 2022-10-06 DIAGNOSIS — E10.8 DIABETES MELLITUS TYPE 1, WITH COMPLICATION, ON LONG TERM INSULIN PUMP (HCC): Primary | ICD-10-CM

## 2022-10-06 DIAGNOSIS — Z96.41 DIABETES MELLITUS TYPE 1, WITH COMPLICATION, ON LONG TERM INSULIN PUMP (HCC): Primary | ICD-10-CM

## 2022-10-06 DIAGNOSIS — I10 ESSENTIAL HYPERTENSION: ICD-10-CM

## 2022-10-06 DIAGNOSIS — E03.9 PRIMARY HYPOTHYROIDISM: ICD-10-CM

## 2022-10-06 PROCEDURE — 99214 OFFICE O/P EST MOD 30 MIN: CPT | Performed by: PHYSICIAN ASSISTANT

## 2022-10-06 PROCEDURE — 2022F DILAT RTA XM EVC RTNOPTHY: CPT | Performed by: PHYSICIAN ASSISTANT

## 2022-10-06 PROCEDURE — 1036F TOBACCO NON-USER: CPT | Performed by: PHYSICIAN ASSISTANT

## 2022-10-06 PROCEDURE — G8484 FLU IMMUNIZE NO ADMIN: HCPCS | Performed by: PHYSICIAN ASSISTANT

## 2022-10-06 PROCEDURE — 3046F HEMOGLOBIN A1C LEVEL >9.0%: CPT | Performed by: PHYSICIAN ASSISTANT

## 2022-10-06 PROCEDURE — 3017F COLORECTAL CA SCREEN DOC REV: CPT | Performed by: PHYSICIAN ASSISTANT

## 2022-10-06 PROCEDURE — G8417 CALC BMI ABV UP PARAM F/U: HCPCS | Performed by: PHYSICIAN ASSISTANT

## 2022-10-06 PROCEDURE — G8427 DOCREV CUR MEDS BY ELIG CLIN: HCPCS | Performed by: PHYSICIAN ASSISTANT

## 2022-10-06 PROCEDURE — 1123F ACP DISCUSS/DSCN MKR DOCD: CPT | Performed by: PHYSICIAN ASSISTANT

## 2022-10-06 NOTE — Clinical Note
Sure hopeful you can help. Wife is uninformed, pt is confused and hard of hearing.  Any help at all would be AMAZING

## 2022-10-06 NOTE — PROGRESS NOTES
[facility] ENDOCRINOLOGY   AND   THYROID NODULE CLINIC    Mateo Martin PA-C  [facility] Endocrinology and Thyroid Nodule Clinic  [address], [address]  [place], [address]  Phone [phone]          Kike Lynne. is a 78 y.o. male seen 10/6/2022 for follow up evaluation of type 1 diabetes        Assessment and Plan:    In office COVID-19 PPE worn and precautions taken    1. Diabetes mellitus type 1, with complication, on long term insulin pump (HCC)  Has wildly uncontrolled type 1 diabetes now on basal bolus insulin regimen and lieu of insulin pump when patient and wife are unable to manage insulin pump therapy. Unfortunately there is no blood glucose data available for review but glycemic control is suboptimal by review of A1c. Although wife reports good compliance later in the interview she admits to missing prandial insulin before lunch today, blood sugar 488 in office today. The importance of compliance was reviewed. Sequela associated with uncontrolled diabetes discussed. I am reticent to increase patient's prandial insulin without any blood glucose data as I am suspicious that compliance in injection sites are a major barrier to care. Patient was previously on insulin pump for many years used his abdomen for sites. He is routinely using his scarred abdomen as his injection sites which I believe is leading to irregular absorption. I have encouraged him to use virgin sites. If with site rotation and improved compliance patient continues to have postprandial hyperglycemia he may benefit from increase prandial dose however risk of hypoglycemia vastly outweighs benefit of tight glycemic control. However patient is wildly uncontrolled and risk factors were discussed    - [facility] - Care Coordination/Social Work - [facility] Care Management    2.  Primary hypothyroidism  Patient is biochemically euthyroid on 75 mcg of generic levothyroxine which is being taken correctly (most of the time)      3. Essential hypertension  BP Readings from Last 3 Encounters:   10/06/22 (!) 140/80   09/21/22 120/60   09/20/22 (!) 153/91               Ja Borges was seen today for diabetes. Diagnoses and all orders for this visit:    Diabetes mellitus type 1, with complication, on long term insulin pump ([address]  -     [facility]ination[facility]Social Work - [facility] Management    Primary hypothyroidism    Essential hypertension          History of Present Illness:    10/6/2022  To clinic for scheduled follow-up in care of wife. Unfortunately, they did not bring any blood glucose data. Although the wife admits to good compliance to his bolus insulin regimen she readily admits that they do not take any insulin for lunch today. It sounds like they are missing prandial insulin intermittently and upon inquiry patient admits that he puts his insulin injection in the area of his abdomen where he used to wear his insulin pump. We have previously discussed rotating sites due to scar tissue and lipodystrophy. Current Regimen: Lantus 30am, 25 pm, Humalog 12 plus 1/25>150 correction     7/13/2022  Patient returns clinic for scheduled follow-up, recent hospital mission for cellulitis of his foot after a local traumatic injury. Unable to manage the insulin pump that he was prescribed. Currently doing basal bolus insulin. Unable to effectively use CGM.        06/29/2022  Worked in for interim follow-up appointment. Old Paradigm Revel was changed to tandem T slim X to patient has been unable to utilize his Dexcom and unable to utilize the tandem pump. He has been in contact with our office and primary care regarding hyperglycemia off insulin pump. He has been taking basal bolus insulin with 22 units of Lantus in the morning and 19 units of Lantus in the evening +5 units of prandial insulin and a 2 per 50 greater than 150 correction scale.   Wife is routinely been giving patient 10 units of insulin before meals often 15 without any significant improvement of blood glucose           6/2/2022  History of type 1 diabetes returns to clinic for follow-up. He has an old Paradigm Revel 723 and a new Dexcom G6 that he has not been able to utilize appropriately. He does not enter information into his pump so he is not receiving any bolus insulin for correction or for his carbs               DIABETES MELLITUS/INSULIN PUMP THERAPY   AMIE Lira. is here for follow up evaluation and treatment of type 1 diabetes mellitus. This is currently treated with insulin delivered via  an insulin pump (Medtronic 723). He does not use a glucose sensor. He started on insulin pump therapy ~2010. This is currently managed by Dr. Kyle Alvares, endocrinologist with [facility]. Diabetes was previously  managed by Ngoc Flowers and Tang Mejias, also in that practice. Date of diagnosis: age 14      Current symptoms: See review of systems below      Diet: no particular diet      Exercise: minimal (limited by back pain)      Diabetes education: The patient has received formal diabetes education (many years ago). Pump issues:  None recently (last pump failure many years ago)      Infusion set change frequency: every 2 days      Insulin delivery: Average total daily dose 45.3 (43.6basal 96%, 1.7 bolus 4%)       Diabetic complications:                  Retinopathy: Last eye exam was 1/11/2022 and demonstrated proliferative diabetic retinopathy without macular edema [facility]. Eye care specialist  is Otelia Necessary. MD Y[place] with [facility]. Albuminuria/nephropathy:                          4/10/2019: Urine microalbumin to creatinine ratio less than 17 (-).                          2/2/2022: Serum creatinine 1.35 (GFR 51%).                           04/08/2022      Cr 1.49, GFR 49                 Neuropathy:  Known Charcot foot (right foot) with established bilateral peripheral neuropathy        Home blood glucose monitoring frequency: 3-4 times per day    By recall  Senthil Rodríguez are best in the morning if I get up and take his reading before he eats\"  Everytime we check he takes 21 units      Blood glucose levels are uncontrolled, by review of A1c        Hypoglycemia: rare. He has hypoglycemic awareness for blood glucose less than 60. Hemoglobin A1c:   9/11/2018: 8.4%. 12/13/2018: 7.4%. 3/14/2019: 7.3%. 6/14/2019: 8.3%. 9/13/2019: 8.3%.   12/13/2019: 8.5%. 10/20/2020: 7.7%. 1/25/2021: 10.0%. 6/2/2021: 8.6%   7/8/2021: 8.3%. 10/8/2021: 8.3%.   2/22/2022: 10.5%. 06/02/2022: 11.0%  09/10/2022:  11.3%      Other pertinent labs:              Lipids:                           4/27/2020: Total cholesterol 133, triglycerides 189, HDL 47, LDL 63.0                 Thyroid:                           See below                 Adrenal:                          4/10/2019: Cortisol 24.7         THYROID DYSFUNCTION   AMIE Dunaway is seen for follow up evaluation/management of hypothyroidism; this was diagnosed in his 64s. Current symptoms:  See review of systems below      Prior treatment: He has been on levothyroxine 75 mcg daily since diagnosis. Pertinent labs:   9/11/2018: TSH 2.752.   12/13/2019: TSH 1.949.   7/8/2021: TSH 0.882, free T4 1.1.  02/28/2022: TSH 3.630      Imaging:   none         Allergies & Medications:  Reviewed in chart. Review of Systems    Vital Signs:  BP (!) 140/80   Pulse (!) 101   Ht 5' 7\" (1.702 m)   Wt 231 lb (104.8 kg)   SpO2 97%   BMI 36.18 kg/m²       Physical Exam  Constitutional:       Appearance: Normal appearance. He is obese. HENT:      Ears:      Comments: Hearing aids in place  Neck:      Thyroid: No thyromegaly. Cardiovascular:      Rate and Rhythm: Normal rate and regular rhythm. Heart sounds: Murmur heard. Systolic murmur is present with a grade of 2/6.    Pulmonary:      Effort: Pulmonary effort is normal.      Breath sounds: Normal breath sounds. Abdominal:      Palpations: Abdomen is soft. Musculoskeletal:      Right foot: Charcot foot present. Comments: Ambulates with walker  Bilateral hand contracture secondary to neuropathy    Feet:      Right foot:      Protective Sensation: 4 sites tested. 0 sites sensed. Skin integrity: Callus and dry skin present. Left foot:      Protective Sensation: 4 sites tested. 0 sites sensed. Skin integrity: Skin breakdown, callus and dry skin present. Comments: Hammer toe of left second toe with erythema    Distal Right 2nd toe with abrasion/ulceration . no heat, erythema, purulence, or sign of infection    Healing ulceration to distal tip of amputated stump of right 3rd toe, right second toe with well healed amputation    Complicated by underlying charcot deformity of right foot    Left mid and distal foot wrapped in coban  Lymphadenopathy:      Cervical: No cervical adenopathy. Skin:     General: Skin is warm and dry. Neurological:      General: No focal deficit present. Mental Status: He is alert. Psychiatric:         Mood and Affect: Mood normal.         Behavior: Behavior normal.         Thought Content: Thought content normal.         Judgment: Judgment normal.           Return 6-8 weeks, for Diabetes DM2 Follow-Up. Portions of this note were generated with the assistance of voice recogniton software. As such, some errors in transcription may be present.

## 2022-10-07 ENCOUNTER — CARE COORDINATION (OUTPATIENT)
Dept: CARE COORDINATION | Facility: CLINIC | Age: 77
End: 2022-10-07

## 2022-10-07 NOTE — CARE COORDINATION
Ambulatory Care Coordination Note  10/7/2022    ACC: Alva Lewis RN    Referral received from endocrinologist. Ccm outreached to patient. Patient provided verbal consent to speak with his wife, Sridevi Clarke. Ccm spoke with wife Sridevi Clarke. She is agreeable to [facility] services. Wife states that patient is [facility] and has been non-compliant with DM diet. Ccm discussed with wife at length different foods to avoid and to begin counting carbs and avoiding high sugar foods. Wife states that patient wants to eat all the time. Discussed with wife to try sugar free candy for patient to have to hopefully avoid eating large amounts all throughout the day. Wife also states that patient does not rotate injection sites. Wife is understanding of importance of rotating sites. [facility] will provide handouts for wife and patient regarding diet, insulin administration, and other DM education. Wife agreeable. Wife states that she mainly does everything. Wife also states that patient has memory loss and that has been harder for patient to remember things he has been told to do. Per wife, patient also has multiple falls a month due to not wanting to use his walker. Wife states patient has diagnosis that causes him issues with ambulating. Discussed with wife importance of using walker and she is absolutely aware. Patient is non-compliant with walker. Discussed with wife if she would be open to referral for health  to sit with patient and discuss DM and provide education. Wife states she is agreeable. [facility] was sent to [facility] . Discussed with wife to try and keep a log this week of patient's blood sugars and we will review next week. Wife agreeable. Wife states no other questions or cocnerns. [facility] discussed that I would outreach next week. Wife agreeable. Offered patient enrollment in the Remote Patient Monitoring (RPM) program for in-home monitoring: NA.     Ambulatory Care Coordination Assessment    Care Coordination Protocol  Referral from Primary Care Provider: No  Week 1 - Initial Assessment     Do you have all of your prescriptions and are they filled?: Yes  Barriers to medication adherence: Complexity of regimen  Are you able to afford your medications?: Yes  How often do you have trouble taking your medications the way you have been told to take them?: Sometimes I take them as prescribed. Do you have Home O2 Therapy?: No      Ability to seek help/take action for Emergent Urgent situations i.e. fire, crime, inclement weather or health crisis.: Needs Assistance  Ability to ambulate to restroom: Needs Assistance  Ability handle personal hygeine needs (bathing/dressing/grooming): Needs Assistance  Ability to manage Medications: Needs Assistance  Ability to prepare Food Preparation: Needs Assistance  Ability to maintain home (clean home, laundry): Needs Assistance  Ability to drive and/or has transportation: Needs Assistance  Ability to do shopping: Needs Assistance  Ability to manage finances: Needs Assistance  Is patient able to live independently?: No     Current Housing: Private Residence        Per the Fall Risk Screening, did the patient have 2 or more falls or 1 fall with injury in the past year?: Yes  How often do you think you are about to fall and you do NOT fall?  For example, you grab something to stabilize yourself or hold onto a wall/furniture?: Occasionally  Use of a Mobility Aid: Yes  Difficulty walking/impaired gait: Yes  Issues with feet or shoes like numbness, edema, shoes not fitting: Yes  Changes in vision, poor vision or poor lighting in environment: No  Dizziness: No  Other Fall Risk: No     Frequent urination at night?: Yes  Do you use rails/bars?: Yes  Do you have a non-slip tub mat?: Yes     Are you experiencing loss of meaning?: No  Are you experiencing loss of hope and peace?: No     Thinking about your patient's physical health needs, are there any symptoms or problems (risk indicators) you are unsure about that require further investigation?: Moderate to severe symptoms or problems that impact on daily life   Are the patients physical health problems impacting on their mental well-being?: Mild impact on mental well-being e.g. \"\"feeling fed-up\"\", \"\"reduced enjoyment\"\"   Are there any problems with your patients lifestyle behaviors (alcohol, drugs, diet, exercise) that are impacting on physical or mental well-being?: Some mild concern of potential negative impact on well-being   Do you have any other concerns about your patients mental well-being? How would you rate their severity and impact on the patient?: Mild problems - don't interfere with function   How would you rate their home environment in terms of safety and stability (including domestic violence, insecure housing, neighbor harassment)?: Consistently safe, supportive, stable, no identified problems   How do daily activities impact on the patient's well-being? (include current or anticipated unemployment, work, caregiving, access to transportation or other): No identified problems or perceived positive benefits   How would you rate their social network (family, work, friends)?: Good participation with social networks   How would you rate their financial resources (including ability to afford all required medical care)?: Financially secure, resources adequate, no identified problems   How wells does the patient now understand their health and well-being (symptoms, signs or risk factors) and what they need to do to manage their health?: Little understanding which impacts on their ability to undertake better management   How well do you think your patient can engage in healthcare discussions?  (Barriers include language, deafness, aphasia, alcohol or drug problems, learning difficulties, concentration): Clear and open communication, no identified barriers   Do other services need to be involved to help this patient?: Other care/services in place but not sufficient   Are current services involved with this patient well-coordinated? (Include coordination with other services you are now recommendation): Required care/services in place with some coordination barriers   Suggested Interventions and Community Resources  Diabetes Education: In Process   Fall Risk Prevention: In Process Other Services or Interventions: referral to health  for DM management   Pharmacist: In Process   Zone Management Tools: In Process         Schedule an appointment with the patient's PCP, Set up/Review Goals, Set up/Review an Education Plan              Prior to Admission medications    Medication Sig Start Date End Date Taking? Authorizing Provider   levoFLOXacin (LEVAQUIN) 500 MG tablet TAKE 1 TABLET BY MOUTH EVERY 24 HOURS 8/18/22   Historical Provider, MD   LANTUS 100 UNIT/ML injection vial 30 units in am, 25 units in pm 8/4/22   Hilary Gonzalez PA-C   HUMALOG 100 UNIT/ML SOLN injection vial 12 unit TIDAC plus a 1 per 25 greater than 150 correction AC/HS, 5 units AC snacks, max daily dose 60 units 8/4/22   Surekha Gonzalez PA-C   B-D ULTRAFINE III SHORT PEN 31G X 8 MM MISC Use with insulin up to 8 times daily, E10.65 8/4/22   Winnie Gonzalez PA-C   escitalopram (LEXAPRO) 5 MG tablet  6/20/22   Historical Provider, MD   OLANZapine (ZYPREXA) 2.5 MG tablet  6/20/22   Historical Provider, MD   INSULIN SYRINGE .5CC/29G (Denbo Remak INS SYR .5CC/29G) 29G X 1/2\" 0.5 ML MISC 1 each by Does not apply route daily 6/11/22   Juan Francisco Fuchs MD   DULoxetine (CYMBALTA) 60 MG extended release capsule  5/2/22   Historical Provider, MD   Blood Glucose Monitoring Suppl (CONTOUR MONITOR) ELLE  2/22/22   Historical Provider, MD   Glucose Blood (CONTOUR NEXT TEST VI) USE TO TEST THREE TIMES DAILY 6/2/21   Historical Provider, MD   Handicap Placard [facility]  5/18/20   Historical Provider, MD   morphine (MS CONTIN) 15 MG extended release tablet Take 15 mg by mouth 2 times daily.   6/1/22   Historical Provider, MD   acetaminophen (TYLENOL) 500 MG tablet Take 500 mg by mouth every 6 hours as needed    Ar Automatic Reconciliation   vitamin D 25 MCG (1000 UT) CAPS Take 1,000 Units by mouth in the morning. Ar Automatic Reconciliation   finasteride (PROSCAR) 5 MG tablet Take 5 mg by mouth in the morning. Ar Automatic Reconciliation   fluticasone (CUTIVATE) 0.05 % cream Apply 1 g topically 2 times daily    Ar Automatic Reconciliation   fluticasone (FLONASE) 50 MCG/ACT nasal spray 2 sprays by Nasal route daily as needed    Ar Automatic Reconciliation   Glucagon (GVOKE PFS) 1 MG/0.2ML SOSY Inject 1 each into the skin as needed 2/22/22   Ar Automatic Reconciliation   hydrocortisone (WESTCORT) 0.2 % cream Apply topically    Ar Automatic Reconciliation   levothyroxine (SYNTHROID) 75 MCG tablet Take 75 mcg by mouth every morning (before breakfast) 2/22/22   Ar Automatic Reconciliation   naloxone 4 MG/0.1ML LIQD nasal spray 1 SPRAY BY INTRANASAL ROUTE ONCE AS NEEDED FOR UP TO 1 DOSE 8/23/21   Ar Automatic Reconciliation   ondansetron (ZOFRAN-ODT) 4 MG disintegrating tablet Take 4 mg by mouth every 6 hours as needed 11/16/20   Ar Automatic Reconciliation   pantoprazole (PROTONIX) 40 MG tablet Take 40 mg by mouth in the morning.  2/16/22   Ar Automatic Reconciliation   polyethylene glycol (GLYCOLAX) 17 GM/SCOOP powder Take 17 g by mouth daily 7/28/21   Ar Automatic Reconciliation   rosuvastatin (CRESTOR) 10 MG tablet Take 10 mg by mouth 2/22/22   Ar Automatic Reconciliation   sodium bicarbonate 650 MG tablet Take 650 mg by mouth 2 times daily    Ar Automatic Reconciliation       Future Appointments   Date Time Provider Joseph Moraes   11/10/2022  2:00 PM PEPE Dean [facility] AMB   1/10/2023  1:30 PM DO CATHY WilsonNI [facility] AMB   3/10/2023 10:15 AM [facility] RESOURCE TRIM [facility] AMB   3/16/2023  1:00 PM Levar Garland DO TRIM [facility] AMB

## 2022-10-10 ENCOUNTER — APPOINTMENT (OUTPATIENT)
Dept: GENERAL RADIOLOGY | Age: 77
DRG: 637 | End: 2022-10-10
Payer: MEDICARE

## 2022-10-10 ENCOUNTER — HOSPITAL ENCOUNTER (INPATIENT)
Age: 77
LOS: 9 days | Discharge: NURSING, REHAB OR LONG-TERM CARE | DRG: 637 | End: 2022-10-19
Admitting: FAMILY MEDICINE
Payer: MEDICARE

## 2022-10-10 ENCOUNTER — CARE COORDINATION (OUTPATIENT)
Dept: CARE COORDINATION | Facility: CLINIC | Age: 77
End: 2022-10-10

## 2022-10-10 ENCOUNTER — APPOINTMENT (OUTPATIENT)
Dept: ULTRASOUND IMAGING | Age: 77
DRG: 637 | End: 2022-10-10
Payer: MEDICARE

## 2022-10-10 DIAGNOSIS — E10.65 TYPE 1 DIABETES MELLITUS WITH HYPERGLYCEMIA (HCC): ICD-10-CM

## 2022-10-10 DIAGNOSIS — R73.9 HYPERGLYCEMIA: Primary | ICD-10-CM

## 2022-10-10 DIAGNOSIS — R00.0 TACHYCARDIA: ICD-10-CM

## 2022-10-10 DIAGNOSIS — E11.628 DIABETIC FOOT INFECTION (HCC): ICD-10-CM

## 2022-10-10 DIAGNOSIS — L08.9 DIABETIC FOOT INFECTION (HCC): ICD-10-CM

## 2022-10-10 PROBLEM — L97.509 TYPE 1 DIABETES MELLITUS WITH DIABETIC FOOT ULCER (HCC): Status: ACTIVE | Noted: 2022-10-10

## 2022-10-10 PROBLEM — E10.621 TYPE 1 DIABETES MELLITUS WITH DIABETIC FOOT ULCER (HCC): Status: ACTIVE | Noted: 2022-10-10

## 2022-10-10 PROBLEM — G93.41 METABOLIC ENCEPHALOPATHY: Status: ACTIVE | Noted: 2022-10-10

## 2022-10-10 PROBLEM — G47.33 OSA (OBSTRUCTIVE SLEEP APNEA): Status: ACTIVE | Noted: 2020-07-07

## 2022-10-10 PROBLEM — N18.31 STAGE 3A CHRONIC KIDNEY DISEASE (HCC): Status: ACTIVE | Noted: 2021-07-08

## 2022-10-10 LAB
ALBUMIN SERPL-MCNC: 2.9 G/DL (ref 3.2–4.6)
ALBUMIN/GLOB SERPL: 0.7 {RATIO} (ref 1.2–3.5)
ALP SERPL-CCNC: 124 U/L (ref 50–136)
ALT SERPL-CCNC: 21 U/L (ref 12–65)
ANION GAP SERPL CALC-SCNC: 5 MMOL/L (ref 4–13)
APPEARANCE UR: CLEAR
AST SERPL-CCNC: 28 U/L (ref 15–37)
BASOPHILS # BLD: 0 K/UL (ref 0–0.2)
BASOPHILS NFR BLD: 1 % (ref 0–2)
BILIRUB SERPL-MCNC: 0.4 MG/DL (ref 0.2–1.1)
BILIRUB UR QL: NEGATIVE
BUN SERPL-MCNC: 25 MG/DL (ref 8–23)
CALCIUM SERPL-MCNC: 8.4 MG/DL (ref 8.3–10.4)
CHLORIDE SERPL-SCNC: 104 MMOL/L (ref 101–110)
CHP ED QC CHECK: NORMAL
CO2 SERPL-SCNC: 24 MMOL/L (ref 21–32)
COLOR UR: NORMAL
CREAT SERPL-MCNC: 1.6 MG/DL (ref 0.8–1.5)
DIFFERENTIAL METHOD BLD: ABNORMAL
EKG ATRIAL RATE: 111 BPM
EKG DIAGNOSIS: NORMAL
EKG P AXIS: 80 DEGREES
EKG P-R INTERVAL: 186 MS
EKG Q-T INTERVAL: 331 MS
EKG QRS DURATION: 78 MS
EKG QTC CALCULATION (BAZETT): 448 MS
EKG R AXIS: 80 DEGREES
EKG T AXIS: 0 DEGREES
EKG VENTRICULAR RATE: 110 BPM
EOSINOPHIL # BLD: 0.1 K/UL (ref 0–0.8)
EOSINOPHIL NFR BLD: 2 % (ref 0.5–7.8)
ERYTHROCYTE [DISTWIDTH] IN BLOOD BY AUTOMATED COUNT: 13.7 % (ref 11.9–14.6)
GLOBULIN SER CALC-MCNC: 4.3 G/DL (ref 2.3–3.5)
GLUCOSE BLD STRIP.AUTO-MCNC: 312 MG/DL (ref 65–100)
GLUCOSE BLD STRIP.AUTO-MCNC: 326 MG/DL (ref 65–100)
GLUCOSE BLD STRIP.AUTO-MCNC: 359 MG/DL (ref 65–100)
GLUCOSE BLD STRIP.AUTO-MCNC: 362 MG/DL (ref 65–100)
GLUCOSE BLD STRIP.AUTO-MCNC: 372 MG/DL (ref 65–100)
GLUCOSE BLD-MCNC: 372 MG/DL
GLUCOSE SERPL-MCNC: 388 MG/DL (ref 65–100)
GLUCOSE UR STRIP.AUTO-MCNC: >1000 MG/DL
HCT VFR BLD AUTO: 39 % (ref 41.1–50.3)
HGB BLD-MCNC: 12.1 G/DL (ref 13.6–17.2)
HGB UR QL STRIP: NEGATIVE
IMM GRANULOCYTES # BLD AUTO: 0 K/UL (ref 0–0.5)
IMM GRANULOCYTES NFR BLD AUTO: 1 % (ref 0–5)
KETONES UR QL STRIP.AUTO: NEGATIVE MG/DL
LACTATE SERPL-SCNC: 1.3 MMOL/L (ref 0.4–2)
LEUKOCYTE ESTERASE UR QL STRIP.AUTO: NEGATIVE
LYMPHOCYTES # BLD: 1 K/UL (ref 0.5–4.6)
LYMPHOCYTES NFR BLD: 17 % (ref 13–44)
MCH RBC QN AUTO: 27.6 PG (ref 26.1–32.9)
MCHC RBC AUTO-ENTMCNC: 31 G/DL (ref 31.4–35)
MCV RBC AUTO: 89 FL (ref 79.6–97.8)
MONOCYTES # BLD: 0.5 K/UL (ref 0.1–1.3)
MONOCYTES NFR BLD: 8 % (ref 4–12)
NEUTS SEG # BLD: 4.3 K/UL (ref 1.7–8.2)
NEUTS SEG NFR BLD: 71 % (ref 43–78)
NITRITE UR QL STRIP.AUTO: NEGATIVE
NRBC # BLD: 0 K/UL (ref 0–0.2)
PH UR STRIP: 7 [PH] (ref 5–9)
PLATELET # BLD AUTO: 157 K/UL (ref 150–450)
PMV BLD AUTO: 11.3 FL (ref 9.4–12.3)
POTASSIUM SERPL-SCNC: 4.5 MMOL/L (ref 3.5–5.1)
PROT SERPL-MCNC: 7.2 G/DL (ref 6.3–8.2)
PROT UR STRIP-MCNC: NEGATIVE MG/DL
RBC # BLD AUTO: 4.38 M/UL (ref 4.23–5.6)
SERVICE CMNT-IMP: ABNORMAL
SODIUM SERPL-SCNC: 133 MMOL/L (ref 138–145)
SP GR UR REFRACTOMETRY: 1.02 (ref 1–1.02)
UROBILINOGEN UR QL STRIP.AUTO: 0.2 EU/DL (ref 0.2–1)
WBC # BLD AUTO: 5.9 K/UL (ref 4.3–11.1)

## 2022-10-10 PROCEDURE — 2580000003 HC RX 258

## 2022-10-10 PROCEDURE — 87040 BLOOD CULTURE FOR BACTERIA: CPT

## 2022-10-10 PROCEDURE — 6360000002 HC RX W HCPCS

## 2022-10-10 PROCEDURE — 81003 URINALYSIS AUTO W/O SCOPE: CPT

## 2022-10-10 PROCEDURE — 96375 TX/PRO/DX INJ NEW DRUG ADDON: CPT

## 2022-10-10 PROCEDURE — 6370000000 HC RX 637 (ALT 250 FOR IP): Performed by: FAMILY MEDICINE

## 2022-10-10 PROCEDURE — 73660 X-RAY EXAM OF TOE(S): CPT

## 2022-10-10 PROCEDURE — 96365 THER/PROPH/DIAG IV INF INIT: CPT

## 2022-10-10 PROCEDURE — 1100000000 HC RM PRIVATE

## 2022-10-10 PROCEDURE — 93922 UPR/L XTREMITY ART 2 LEVELS: CPT

## 2022-10-10 PROCEDURE — 83605 ASSAY OF LACTIC ACID: CPT

## 2022-10-10 PROCEDURE — 2500000003 HC RX 250 WO HCPCS

## 2022-10-10 PROCEDURE — 82962 GLUCOSE BLOOD TEST: CPT

## 2022-10-10 PROCEDURE — 93005 ELECTROCARDIOGRAM TRACING: CPT

## 2022-10-10 PROCEDURE — 80053 COMPREHEN METABOLIC PANEL: CPT

## 2022-10-10 PROCEDURE — 6360000002 HC RX W HCPCS: Performed by: FAMILY MEDICINE

## 2022-10-10 PROCEDURE — 85025 COMPLETE CBC W/AUTO DIFF WBC: CPT

## 2022-10-10 PROCEDURE — 99285 EMERGENCY DEPT VISIT HI MDM: CPT

## 2022-10-10 PROCEDURE — 96360 HYDRATION IV INFUSION INIT: CPT

## 2022-10-10 PROCEDURE — 6370000000 HC RX 637 (ALT 250 FOR IP)

## 2022-10-10 PROCEDURE — 2580000003 HC RX 258: Performed by: FAMILY MEDICINE

## 2022-10-10 RX ORDER — FINASTERIDE 5 MG/1
5 TABLET, FILM COATED ORAL DAILY
Status: DISCONTINUED | OUTPATIENT
Start: 2022-10-11 | End: 2022-10-19 | Stop reason: HOSPADM

## 2022-10-10 RX ORDER — ROSUVASTATIN CALCIUM 10 MG/1
10 TABLET, COATED ORAL NIGHTLY
Status: DISCONTINUED | OUTPATIENT
Start: 2022-10-10 | End: 2022-10-19 | Stop reason: HOSPADM

## 2022-10-10 RX ORDER — SODIUM CHLORIDE 0.9 % (FLUSH) 0.9 %
5-40 SYRINGE (ML) INJECTION PRN
Status: DISCONTINUED | OUTPATIENT
Start: 2022-10-10 | End: 2022-10-19 | Stop reason: HOSPADM

## 2022-10-10 RX ORDER — MORPHINE SULFATE 15 MG/1
15 TABLET, FILM COATED, EXTENDED RELEASE ORAL 2 TIMES DAILY
Status: DISCONTINUED | OUTPATIENT
Start: 2022-10-10 | End: 2022-10-19 | Stop reason: HOSPADM

## 2022-10-10 RX ORDER — SODIUM CHLORIDE 0.9 % (FLUSH) 0.9 %
5-40 SYRINGE (ML) INJECTION EVERY 12 HOURS SCHEDULED
Status: DISCONTINUED | OUTPATIENT
Start: 2022-10-10 | End: 2022-10-19 | Stop reason: HOSPADM

## 2022-10-10 RX ORDER — SODIUM CHLORIDE 9 MG/ML
INJECTION, SOLUTION INTRAVENOUS PRN
Status: DISCONTINUED | OUTPATIENT
Start: 2022-10-10 | End: 2022-10-19 | Stop reason: HOSPADM

## 2022-10-10 RX ORDER — 0.9 % SODIUM CHLORIDE 0.9 %
500 INTRAVENOUS SOLUTION INTRAVENOUS
Status: COMPLETED | OUTPATIENT
Start: 2022-10-10 | End: 2022-10-10

## 2022-10-10 RX ORDER — OLANZAPINE 2.5 MG/1
2.5 TABLET ORAL NIGHTLY
Status: DISCONTINUED | OUTPATIENT
Start: 2022-10-10 | End: 2022-10-10

## 2022-10-10 RX ORDER — PANTOPRAZOLE SODIUM 40 MG/1
40 TABLET, DELAYED RELEASE ORAL DAILY
Status: DISCONTINUED | OUTPATIENT
Start: 2022-10-10 | End: 2022-10-19 | Stop reason: HOSPADM

## 2022-10-10 RX ORDER — DEXTROSE MONOHYDRATE 100 MG/ML
INJECTION, SOLUTION INTRAVENOUS CONTINUOUS PRN
Status: DISCONTINUED | OUTPATIENT
Start: 2022-10-10 | End: 2022-10-19 | Stop reason: HOSPADM

## 2022-10-10 RX ORDER — SODIUM BICARBONATE 650 MG/1
650 TABLET ORAL 2 TIMES DAILY
Status: DISCONTINUED | OUTPATIENT
Start: 2022-10-10 | End: 2022-10-19 | Stop reason: HOSPADM

## 2022-10-10 RX ORDER — INSULIN LISPRO 100 [IU]/ML
0-4 INJECTION, SOLUTION INTRAVENOUS; SUBCUTANEOUS NIGHTLY
Status: DISCONTINUED | OUTPATIENT
Start: 2022-10-10 | End: 2022-10-19 | Stop reason: HOSPADM

## 2022-10-10 RX ORDER — VITAMIN B COMPLEX
1000 TABLET ORAL DAILY
Status: DISCONTINUED | OUTPATIENT
Start: 2022-10-10 | End: 2022-10-19 | Stop reason: HOSPADM

## 2022-10-10 RX ORDER — LEVOTHYROXINE SODIUM 0.07 MG/1
75 TABLET ORAL
Status: DISCONTINUED | OUTPATIENT
Start: 2022-10-11 | End: 2022-10-19 | Stop reason: HOSPADM

## 2022-10-10 RX ORDER — INSULIN LISPRO 100 [IU]/ML
0-8 INJECTION, SOLUTION INTRAVENOUS; SUBCUTANEOUS
Status: DISCONTINUED | OUTPATIENT
Start: 2022-10-10 | End: 2022-10-19 | Stop reason: HOSPADM

## 2022-10-10 RX ORDER — OLANZAPINE 5 MG/1
10 TABLET ORAL NIGHTLY
Status: DISCONTINUED | OUTPATIENT
Start: 2022-10-10 | End: 2022-10-19 | Stop reason: HOSPADM

## 2022-10-10 RX ORDER — INSULIN GLARGINE 100 [IU]/ML
25 INJECTION, SOLUTION SUBCUTANEOUS 2 TIMES DAILY
Status: DISCONTINUED | OUTPATIENT
Start: 2022-10-10 | End: 2022-10-12

## 2022-10-10 RX ORDER — DULOXETIN HYDROCHLORIDE 60 MG/1
60 CAPSULE, DELAYED RELEASE ORAL DAILY
Status: DISCONTINUED | OUTPATIENT
Start: 2022-10-11 | End: 2022-10-19

## 2022-10-10 RX ORDER — POLYETHYLENE GLYCOL 3350 17 G/17G
17 POWDER, FOR SOLUTION ORAL DAILY
Status: DISCONTINUED | OUTPATIENT
Start: 2022-10-10 | End: 2022-10-12 | Stop reason: SDUPTHER

## 2022-10-10 RX ADMIN — CEFEPIME 2000 MG: 2 INJECTION, POWDER, FOR SOLUTION INTRAVENOUS at 20:42

## 2022-10-10 RX ADMIN — SODIUM CHLORIDE 500 ML: 9 INJECTION, SOLUTION INTRAVENOUS at 11:22

## 2022-10-10 RX ADMIN — INSULIN LISPRO 4 UNITS: 100 INJECTION, SOLUTION INTRAVENOUS; SUBCUTANEOUS at 21:05

## 2022-10-10 RX ADMIN — SODIUM BICARBONATE 650 MG TABLET 650 MG: at 20:43

## 2022-10-10 RX ADMIN — OLANZAPINE 10 MG: 5 TABLET, FILM COATED ORAL at 20:42

## 2022-10-10 RX ADMIN — INSULIN GLARGINE 25 UNITS: 100 INJECTION, SOLUTION SUBCUTANEOUS at 21:04

## 2022-10-10 RX ADMIN — PIPERACILLIN AND TAZOBACTAM 4500 MG: 4; .5 INJECTION, POWDER, FOR SOLUTION INTRAVENOUS at 15:25

## 2022-10-10 RX ADMIN — MORPHINE SULFATE 15 MG: 15 TABLET, FILM COATED, EXTENDED RELEASE ORAL at 20:43

## 2022-10-10 RX ADMIN — ROSUVASTATIN CALCIUM 10 MG: 5 TABLET, FILM COATED ORAL at 20:43

## 2022-10-10 RX ADMIN — VITAMIN D, TAB 1000IU (100/BT) 1000 UNITS: 25 TAB at 18:20

## 2022-10-10 RX ADMIN — INSULIN LISPRO 8 UNITS: 100 INJECTION, SOLUTION INTRAVENOUS; SUBCUTANEOUS at 18:29

## 2022-10-10 RX ADMIN — TUBERCULIN PURIFIED PROTEIN DERIVATIVE 5 UNITS: 5 INJECTION, SOLUTION INTRADERMAL at 18:18

## 2022-10-10 RX ADMIN — PANTOPRAZOLE SODIUM 40 MG: 40 TABLET, DELAYED RELEASE ORAL at 18:20

## 2022-10-10 RX ADMIN — INSULIN HUMAN 6 UNITS: 100 INJECTION, SOLUTION PARENTERAL at 11:26

## 2022-10-10 RX ADMIN — Medication 2500 MG: at 15:59

## 2022-10-10 ASSESSMENT — ENCOUNTER SYMPTOMS
EYES NEGATIVE: 1
GASTROINTESTINAL NEGATIVE: 1
RESPIRATORY NEGATIVE: 1

## 2022-10-10 ASSESSMENT — PAIN DESCRIPTION - LOCATION: LOCATION: ARM

## 2022-10-10 ASSESSMENT — PAIN - FUNCTIONAL ASSESSMENT: PAIN_FUNCTIONAL_ASSESSMENT: 0-10

## 2022-10-10 ASSESSMENT — PAIN DESCRIPTION - ORIENTATION: ORIENTATION: RIGHT

## 2022-10-10 ASSESSMENT — PAIN DESCRIPTION - DESCRIPTORS: DESCRIPTORS: ACHING;SORE

## 2022-10-10 NOTE — ED TRIAGE NOTES
Pt comes from home s/p  fall getting out of bed this morning without LOC (-)head strike, blood thinners   Disoriented to time at baseline r/t dementia   BGL 545mg/dL  1L NS PTA   Taking levofloxacin for toe infection

## 2022-10-10 NOTE — H&P
Hospitalist Admission History and Physical         NAME:            Eric Rivers. Age:                78 y.o.    :               1945    MRN:              [id number]    PCP: Pola Kumar DO    Consulting MD:    Treatment Team: Attending Provider: Christel Fuentes DO; Registered Nurse: Yudelka Hill, RN; Registered Nurse: Ady Vega RN         Chief Complaint   Patient presents with    Fall   HPI:    Patient is a 78 y.o. male who presented to the ED for cc AMS and fall this AM. Denies hitting head. Poor historian and keeps saying \"ask my wife\" to whom is not here currently. Hx of aphonia, BPH, DM type I that is poorly controlled failing insulin pump therapy now on injections, HTN, GERD, HLD, CKD stage 3, and VATS procedure.   Glucose 326.      Left foot x ray pending  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)     Diabetic nephropathy (HCC)     Diabetic peripheral neuropathy (HCC)     Dysphonia     Essential hypertension     GERD (gastroesophageal reflux disease)     Hypercholesterolemia     Insulin pump status     Left hip pain     Lumbar radiculopathy     Mild cognitive impairment     Moderate episode of recurrent major depressive disorder (HCC)     Obstructive sleep apnea     Orthostatic hypotension     Paraspinal muscle spasm     Polypharmacy     Primary hypothyroidism     Proliferative diabetic retinopathy associated with type 1 diabetes mellitus (HCC)     Seborrheic keratosis     Spinal cord stimulator status     Spondylolisthesis     Stage 3b chronic kidney disease ([facility] [address]     Stroke-like symptom 2021    Tachycardia     Thyroid disease     managed with medications Trochanteric bursitis of left hip     Type 1 diabetes mellitus ([facility] [address]     Vocal cord atrophy             Past Surgical History:   Procedure Laterality Date    BUNIONECTOMY Right     CATARACT REMOVAL Bilateral     COLONOSCOPY  2016    COLONOSCOPY N/A 2021    COLONOS[facility]/ BMI 30 ROOM 902 [facility]  Bilateral     laser treatment for diabetic complication    LUMBAR FUSION      L4-5    LUMBAR LAMINECTOMY      L5-S1    OTHER SURGICAL HISTORY      spinal neurostimulator    SHOULDER ARTHROSCOPY Left     TOE AMPUTATION      distal portion of the 2nd toe of R, foot, distal part of 3rd toe of r foot    TONSILLECTOMY AND ADENOIDECTOMY  age 8    UPPER GASTROINTESTINAL ENDOSCOPY N/A 2022    EGD ESOPHAGOGASTRODUODENOSCOPY performed by Serafin Nunez MD at [facility] ENDOSCOPY            Family History   Problem Relation Age of Onset    Hypertension Brother     Diabetes Maternal Uncle         type 1    Diabetes Brother         type 2    Thyroid Disease Mother         treated with surgery    Heart Disease Father     Hypertension Mother        Family history reviewed and negative except as noted above. Social History     Social History Narrative    Not on file            Social History     Tobacco Use    Smoking status: Former     Packs/day: 1.00     Years: 30.00     Pack years: 30.00     Types: Cigarettes     Quit date: 1996     Years since quittin.1    Smokeless tobacco: Former   Substance Use Topics    Alcohol use: Not Currently            Social History     Substance and Sexual Activity   Drug Use Never                 Allergies   Allergen Reactions    Drixoral Cough-Sore Throat [Acetaminophen-Dm] Other (See Comments) and Palpitations     Heart racing.        Other Palpitations     Antihistamines  Antihistamines  Antihistamines  Antihistamines  Antihistamines  Antihistamines      Antihistamines, Loratadine-Type Palpitations            Prior to Admission medications    Medication Sig Start Date End Date Taking? Authorizing Provider   levoFLOXacin (LEVAQUIN) 500 MG tablet TAKE 1 TABLET BY MOUTH EVERY 24 HOURS 8/18/22   Historical Provider, MD   LANTUS 100 UNIT/ML injection vial 30 units in am, 25 units in pm 8/4/22   Dipika Leaver CLOVIS Gonzalez PA-C   HUMALOG 100 UNIT/ML SOLN injection vial 12 unit TIDAC plus a 1 per 25 greater than 150 correction AC/HS, 5 units AC snacks, max daily dose 60 units 8/4/22   Surekha Gonzalez PA-C   B-D ULTRAFINE III SHORT PEN 31G X 8 MM MISC Use with insulin up to 8 times daily, E10.65 8/4/22   Subhash Gonzalez PA-C   escitalopram (LEXAPRO) 5 MG tablet  6/20/22   Historical Provider, MD   OLANZapine (ZYPREXA) 2.5 MG tablet  6/20/22   Historical Provider, MD   INSULIN SYRINGE .5CC/29G (Raymondo Shlomo INS SYR .5CC/29G) 29G X 1/2\" 0.5 ML MISC 1 each by Does not apply route daily 6/11/22   Anila Cantu MD   DULoxetine (CYMBALTA) 60 MG extended release capsule  5/2/22   Historical Provider, MD   Blood Glucose Monitoring Suppl (CONTOUR MONITOR) ELLE  2/22/22   Historical Provider, MD   Glucose Blood (CONTOUR NEXT TEST VI) USE TO TEST THREE TIMES DAILY 6/2/21   Historical Provider, MD   Handicap Placard [address]  5/18/20   Historical Provider, MD   morphine (MS CONTIN) 15 MG extended release tablet Take 15 mg by mouth 2 times daily. 6/1/22   Historical Provider, MD   acetaminophen (TYLENOL) 500 MG tablet Take 500 mg by mouth every 6 hours as needed    Ar Automatic Reconciliation   vitamin D 25 MCG (1000 UT) CAPS Take 1,000 Units by mouth in the morning. Ar Automatic Reconciliation   finasteride (PROSCAR) 5 MG tablet Take 5 mg by mouth in the morning.     Ar Automatic Reconciliation   fluticasone (CUTIVATE) 0.05 % cream Apply 1 g topically 2 times daily    Ar Automatic Reconciliation   fluticasone (FLONASE) 50 MCG/ACT nasal spray 2 sprays by Nasal route daily as needed    Ar Automatic Reconciliation   Glucagon (GVOKE PFS) 1 MG/0.2ML SOSY Inject 1 each into the skin as needed 2/22/22   Ar Automatic Reconciliation   hydrocortisone (WESTCORT) 0.2 % cream Apply topically    Ar Automatic Reconciliation   levothyroxine (SYNTHROID) 75 MCG tablet Take 75 mcg by mouth every morning (before breakfast) 2/22/22   Ar Automatic Reconciliation   naloxone 4 MG/0.1ML LIQD nasal spray 1 SPRAY BY INTRANASAL ROUTE ONCE AS NEEDED FOR UP TO 1 DOSE 8/23/21   Ar Automatic Reconciliation   ondansetron (ZOFRAN-ODT) 4 MG disintegrating tablet Take 4 mg by mouth every 6 hours as needed 11/16/20   Ar Automatic Reconciliation   pantoprazole (PROTONIX) 40 MG tablet Take 40 mg by mouth in the morning. 2/16/22   Ar Automatic Reconciliation   polyethylene glycol (GLYCOLAX) 17 GM/SCOOP powder Take 17 g by mouth daily 7/28/21   Ar Automatic Reconciliation   rosuvastatin (CRESTOR) 10 MG tablet Take 10 mg by mouth 2/22/22   Ar Automatic Reconciliation   sodium bicarbonate 650 MG tablet Take 650 mg by mouth 2 times daily    Ar Automatic Reconciliation                      Review of Systems    Cannot obtain accurately due to his confusion         Objective:         Patient Vitals for the past 24 hrs:   Temp Pulse Resp BP SpO2   10/10/22 1520 -- (!) 107 -- (!) 153/95 --   10/10/22 1448 -- (!) 114 17 (!) 165/107 99 %   10/10/22 1445 -- (!) 111 -- (!) 159/103 --   10/10/22 1443 -- (!) 108 -- (!) 157/104 --   10/10/22 1419 -- (!) 112 -- -- 99 %   10/10/22 1416 -- -- -- (!) 166/110 --   10/10/22 1345 -- (!) 108 -- (!) 155/96 99 %   10/10/22 1315 -- (!) 113 18 (!) 164/110 99 %   10/10/22 1115 -- (!) 111 21 (!) 156/100 --   10/10/22 1040 98.4 °F (36.9 °C) (!) 110 17 (!) 166/114 98 %            10/10 0701 - 10/10 1900  In: 600   Out: -     No intake/output data recorded.          Data Review:   Recent Results (from the past 24 hour(s))   EKG 12 Lead    Collection Time: 10/10/22 10:51 AM   Result Value Ref Range    Ventricular Rate 110 BPM    Atrial Rate 111 BPM    P-R Interval 186 ms    QRS Duration 78 ms    Q-T Interval 331 ms    QTc Calculation (Bazett) 448 ms    P Axis 80 degrees    R Axis 80 degrees    T Axis 0 degrees    Diagnosis Sinus tachycardia    CBC with Auto Differential    Collection Time: 10/10/22 10:55 AM   Result Value Ref Range    WBC 5.9 4.3 - 11.1 K/uL    RBC 4.38 4.23 - 5.6 M/uL    Hemoglobin 12.1 (L) 13.6 - 17.2 g/dL    Hematocrit 39.0 (L) 41.1 - 50.3 %    MCV 89.0 79.6 - 97.8 FL    MCH 27.6 26.1 - 32.9 PG    MCHC 31.0 (L) 31.4 - 35.0 g/dL    RDW 13.7 11.9 - 14.6 %    Platelets 385 715 - 625 K/uL    MPV 11.3 9.4 - 12.3 FL    nRBC 0.00 0.0 - 0.2 K/uL    Differential Type AUTOMATED      Seg Neutrophils 71 43 - 78 %    Lymphocytes 17 13 - 44 %    Monocytes 8 4.0 - 12.0 %    Eosinophils % 2 0.5 - 7.8 %    Basophils 1 0.0 - 2.0 %    Immature Granulocytes 1 0.0 - 5.0 %    Segs Absolute 4.3 1.7 - 8.2 K/UL    Absolute Lymph # 1.0 0.5 - 4.6 K/UL    Absolute Mono # 0.5 0.1 - 1.3 K/UL    Absolute Eos # 0.1 0.0 - 0.8 K/UL    Basophils Absolute 0.0 0.0 - 0.2 K/UL    Absolute Immature Granulocyte 0.0 0.0 - 0.5 K/UL   Comprehensive Metabolic Panel    Collection Time: 10/10/22 10:55 AM   Result Value Ref Range    Sodium 133 (L) 138 - 145 mmol/L    Potassium 4.5 3.5 - 5.1 mmol/L    Chloride 104 101 - 110 mmol/L    CO2 24 21 - 32 mmol/L    Anion Gap 5 4 - 13 mmol/L    Glucose 388 (H) 65 - 100 mg/dL    BUN 25 (H) 8 - 23 MG/DL    Creatinine 1.60 (H) 0.8 - 1.5 MG/DL    Est, Glom Filt Rate 45 (L) >60 ml/min/1.73m2    Calcium 8.4 8.3 - 10.4 MG/DL    Total Bilirubin 0.4 0.2 - 1.1 MG/DL    ALT 21 12 - 65 U/L    AST 28 15 - 37 U/L    Alk Phosphatase 124 50 - 136 U/L    Total Protein 7.2 6.3 - 8.2 g/dL    Albumin 2.9 (L) 3.2 - 4.6 g/dL    Globulin 4.3 (H) 2.3 - 3.5 g/dL    Albumin/Globulin Ratio 0.7 (L) 1.2 - 3.5     POCT Glucose    Collection Time: 10/10/22 10:57 AM   Result Value Ref Range    Glucose 372 mg/dL    QC OK? pass    POCT Glucose    Collection Time: 10/10/22 10:57 AM   Result Value Ref Range    POC Glucose 372 (H) 65 - 100 mg/dL    Performed by: Kaela Crouch    POCT Glucose    Collection Time: 10/10/22 12:08 PM   Result Value Ref Range    POC Glucose 359 (H) 65 - 100 mg/dL    Performed by: Kaela Crouch    Urinalysis w rflx microscopic    Collection Time: 10/10/22  1:38 PM   Result Value Ref Range    Color, UA YELLOW/STRAW      Appearance CLEAR      Specific Gravity, UA 1.020 1.001 - 1.023      pH, Urine 7.0 5.0 - 9.0      Protein, UA Negative NEG mg/dL    Glucose, UA >1000 mg/dL    Ketones, Urine Negative NEG mg/dL    Bilirubin Urine Negative NEG      Blood, Urine Negative NEG      Urobilinogen, Urine 0.2 0.2 - 1.0 EU/dL    Nitrite, Urine Negative NEG      Leukocyte Esterase, Urine Negative NEG     POCT Glucose    Collection Time: 10/10/22  2:46 PM   Result Value Ref Range    POC Glucose 326 (H) 65 - 100 mg/dL    Performed by: Kaela Crouch             Physical Exam:         General:    Alert, cooperative, trying to move in bed with difficulty. No obvious head trauma    Eyes:    Conjunctivae/corneas clear. PERRL    Ears:    Normal     Nose:    Nares normal.     Mouth/Throat:    Lips, mucosa, and tongue normal.     Neck:    no JVD. Back:     deferred    Lungs:     Clear to auscultation bilaterally. Heart:    Regular rate and rhythm, S1, S2 normal    Abdomen:     Soft, non-tender. Bowel sounds normal.     Extremities:    2+ edema bilaterally, necrotic left great toe with ulceration to ventral side. Please see photo below. Slow DP pulse to left foot    Skin:    Lesions between all of his left toes, worse to great left toe    Neurologic:    Cannot tell me where he is located (says he is in [place]), who the president is (says we dont have one), or what year it is (1945). Hard of hearing.             Assessment and Plan         Principal Problem:    Type 1 diabetes mellitus with diabetic foot ulcer (HCC)  Active Problems:    Neuropathy    Hypothyroid    Chronic pain    Chronic renal disease, stage 3, moderately decreased glomerular filtration rate (GFR) between 30-59 mL/min/1.73 square meter (HCC)    Depression    Metabolic encephalopathy    Dysphonia    BRITTANY (obstructive sleep apnea)    Stage 3a chronic kidney disease (HCC)    History of amputation of toe (HCC)  Resolved Problems:    * No resolved hospital problems. *    Left necrotic toe - Consult vascular surgery. Order DARINEL. Poor wound care to left foot. Consult wound care. X ray pending to determine if any evidence of osteomyelitis. Blood cultures ordered. Vanc/Zosyn. Wound culture. Metabolic encephalopathy - From current infection. Tx as above. DM type 1 - lantus 25 units BID. SS. Has scheduled pre meal insulin but since will be eating differently while here will hold until we know how glucose levels are trending. Poor compliance with patient     BPH - proscar    Hypothyroidism - Synthroid    HLD - statin    Depression - Zyprexa, cymbalta    CKD stage 3 - stable. Na bicarb    Chronic pain - Morphine BID.      PPI    Made full code since no family in room and he is confused    DVT prophylaxis - SCDs    Signed By:    Gordon Marr DO    October 10, 2022

## 2022-10-10 NOTE — CARE COORDINATION
Pt. To be admitted at this time, pt may benefit from PT/OT therapy while in hospital. Orders placed for PPD just in case STR needed. CM staff will remain available to assist as needed. CM contacted pts son Latonya Caldera and notified him of pt pending admission to hospital and that orders will be placed for therapy to evaluate and treat for possible STR placement. Son verbalized understanding, pts spouse was not at bedside at this time.

## 2022-10-10 NOTE — ED PROVIDER NOTES
[facility] Emergency Department Provider Note                     PCP:                Edna Jo DO               Age: 78 y.o. Sex: male           ICD-10-CM    1. Hyperglycemia  R73.9       2. Tachycardia  R00.0       3. Diabetic foot infection ([address]  E11.628 Vascular duplex lower extremity arteries bilateral with DARINEL    L08.9 Vascular duplex lower extremity arteries bilateral with DARINEL          DISPOSITION Admitted 10/10/2022 04:17:07 PM       New Prescriptions    No medications on file       MDM  Number of Diagnoses or Management Options  Diabetic foot infection ([address]  Hyperglycemia  Tachycardia  Diagnosis management comments: No falls no injuries no pain however blood sugar found to be high was given a liter of fluid by EMS brought down from greater than 500 to around  390s. For fluids and insulin will be given further assessment will be made. Blood sugar seems to be trending down there is no signs of DKA patient has no complaints. Call to the room at family request spoke with son on the phone with wife in the room and patient aware of the conversation. They feel that he is unable to go home due to increasing weakness over weeks of onset. Family is concerned that the wife cannot take care of him. Explained that we are looking for acute emergencies at this time and if admission is not warranted by diagnosis and findings then we will have the social  evaluate for clearance for home or placement.        Amount and/or Complexity of Data Reviewed  Clinical lab tests: ordered and reviewed  Tests in the radiology section of CPT®: ordered and reviewed  Tests in the medicine section of CPT®: ordered and reviewed  Decide to obtain previous medical records or to obtain history from someone other than the patient: yes  Review and summarize past medical records: yes  Independent visualization of images, tracings, or specimens: yes    Risk of Complications, Morbidity, and/or Mortality  Presenting problems: high  Diagnostic procedures: high  Management options: high    Patient Progress  Patient progress: stable      Orders Placed This Encounter   Procedures    Culture, Blood 1    Culture, Blood 1    Culture, Wound Aerobic Only    Culture, Anaerobic    XR TOE LEFT (MIN 2 VIEWS)    CBC with Auto Differential    Comprehensive Metabolic Panel    Urinalysis w rflx microscopic    Lactic Acid    Comprehensive Metabolic Panel w/ Reflex to MG    CBC with Auto Differential    ADULT DIET; Regular; 3 carb choices (45 gm/meal); Low Fat/Low Chol/High Fiber/2 gm Na    Diet NPO    Cardiac Monitor - ED Only    Continuous Pulse Oximetry    Orthostatic blood pressure and pulse    Vital signs per unit routine    Up with assistance    Place intermittent pneumatic compression device    HYPOGLYCEMIA TREATMENT: blood glucose less than 70 mg/dL and patient ALERT and TOLERATING PO    HYPOGLYCEMIA TREATMENT: blood glucose less than 70 mg/dL and patient NOT ALERT or NPO    Full code    [address]    Inpatient consult to Vascular Surgery    Pharmacy to Dose: Vancomycin; Bone and Joint Infection, Skin and Soft Tissue Infection; 7 days    IP WOUND CARE NURSE CONSULT TO EVAL    OT eval and treat    PT eval and treat    Initiate Oxygen Therapy Protocol    POCT Glucose    POCT Glucose    POCT Glucose    POCT Glucose    PLEASE READ & DOCUMENT PPD TEST IN 24 HRS    PLEASE READ & DOCUMENT PPD TEST IN 48 HRS    PLEASE READ & DOCUMENT PPD TEST IN 72 HRS    POCT Glucose    EKG 12 Lead    Saline lock IV    ADMIT TO INPATIENT    Fall precautions    Vascular duplex lower extremity arteries bilateral with DARINEL        No follow-up provider specified. Yadiel Diamond is a 78 y.o. male who presents to the Emergency Department with chief complaint of    Chief Complaint   Patient presents with    Fall      44-year-old male brought in by EMS after getting out of bed states he got down on the floor to \"do something\".   After that he could not get back up. Patient is a diabetic he has diabetic neuropathy and recently had an infection of his toe which is currently on Levaquin. No fevers or chills no loss of consciousness no fall no injuries. The history is provided by the patient. Fall  The accident occurred 3 to 5 hours ago. Fall occurred: Got down on the floor and could not get up. Pertinent negatives include no fever. Review of Systems   Constitutional: Negative. Negative for activity change, appetite change, chills and fever. HENT: Negative. Eyes: Negative. Respiratory: Negative. Cardiovascular: Negative. Gastrointestinal: Negative. Endocrine: Negative. Musculoskeletal: Negative. Skin: Negative. Neurological: Negative. Hematological: Negative. Psychiatric/Behavioral: Negative. All other systems reviewed and are negative. All other systems reviewed and are negative.       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)     Diabetic nephropathy (HCC)     Diabetic peripheral neuropathy (HCC)     Dysphonia     Essential hypertension     GERD (gastroesophageal reflux disease)     Hypercholesterolemia     Insulin pump status     Left hip pain     Lumbar radiculopathy     Mild cognitive impairment     Moderate episode of recurrent major depressive disorder (HCC)     Obstructive sleep apnea     Orthostatic hypotension     Paraspinal muscle spasm     Polypharmacy     Primary hypothyroidism     Proliferative diabetic retinopathy associated with type 1 diabetes mellitus ([address]     Seborrheic keratosis     Spinal cord stimulator status     Spondylolisthesis     Stage 3b chronic kidney disease ([address] Stroke-like symptom 7/8/2021    Tachycardia     Thyroid disease     managed with medications    Trochanteric bursitis of left hip     Type 1 diabetes mellitus ([address]     Vocal cord atrophy         Past Surgical History:   Procedure Laterality Date    BUNIONECTOMY Right     CATARACT REMOVAL Bilateral     COLONOSCOPY  2016    COLONOSCOPY N/A 7/28/2021    COLONOSCOPY/ BMI 30 ROOM 902 performed by Timmy Urban MD at [address] [facility] Bilateral     laser treatment for diabetic complication    LUMBAR FUSION  2010    L4-5    LUMBAR LAMINECTOMY  2008    L5-S1    OTHER SURGICAL HISTORY      spinal neurostimulator    SHOULDER ARTHROSCOPY Left     TOE AMPUTATION      distal portion of the 2nd toe of R, foot, distal part of 3rd toe of r foot    TONSILLECTOMY AND ADENOIDECTOMY  age 8    UPPER GASTROINTESTINAL ENDOSCOPY N/A 8/2/2022    EGD ESOPHAGOGASTRODUODENOSCOPY performed by Nuha Garcia MD at [facility] ENDOSCOPY        Family History   Problem Relation Age of Onset    Hypertension Brother     Diabetes Maternal Uncle         type 1    Diabetes Brother         type 2    Thyroid Disease Mother         treated with surgery    Heart Disease Father     Hypertension Mother         Social Connections: Not on file        Allergies   Allergen Reactions    Drixoral Cough-Sore Throat [Acetaminophen-Dm] Other (See Comments) and Palpitations     Heart racing. Other Palpitations     Antihistamines  Antihistamines  Antihistamines  Antihistamines  Antihistamines  Antihistamines      Antihistamines, Loratadine-Type Palpitations        Vitals signs and nursing note reviewed.    Patient Vitals for the past 4 hrs:   Pulse Resp BP SpO2   10/10/22 1520 (!) 107 -- (!) 153/95 --   10/10/22 1448 (!) 114 17 (!) 165/107 99 %   10/10/22 1445 (!) 111 -- (!) 159/103 --   10/10/22 1443 (!) 108 -- (!) 157/104 --   10/10/22 1419 (!) 112 -- -- 99 %   10/10/22 1416 -- -- (!) 166/110 --   10/10/22 1345 (!) 108 -- (!) 155/96 99 %   10/10/22 1315 (!) 113 18 (!) 164/110 99 %          Physical Exam  Vitals and nursing note reviewed. Constitutional:       Appearance: Normal appearance. He is obese. HENT:      Head: Normocephalic. Right Ear: External ear normal.      Left Ear: External ear normal.      Nose: Nose normal. No congestion. Mouth/Throat:      Mouth: Mucous membranes are moist.      Pharynx: Oropharynx is clear. Eyes:      Extraocular Movements: Extraocular movements intact. Conjunctiva/sclera: Conjunctivae normal.      Pupils: Pupils are equal, round, and reactive to light. Cardiovascular:      Rate and Rhythm: Normal rate and regular rhythm. Pulses: Normal pulses. Heart sounds: Normal heart sounds. No murmur heard. Pulmonary:      Effort: Pulmonary effort is normal. No respiratory distress. Breath sounds: Normal breath sounds. Abdominal:      General: There is no distension. Palpations: Abdomen is soft. Tenderness: There is no abdominal tenderness. Musculoskeletal:         General: Normal range of motion. Cervical back: Normal range of motion and neck supple. No rigidity. Skin:     General: Skin is warm and dry. Capillary Refill: Capillary refill takes less than 2 seconds. Neurological:      General: No focal deficit present. Mental Status: He is alert. Cranial Nerves: No cranial nerve deficit. Motor: No weakness. Psychiatric:         Mood and Affect: Mood normal.         Behavior: Behavior normal.         Thought Content:  Thought content normal.         Judgment: Judgment normal.        Procedures    Labs Reviewed   CBC WITH AUTO DIFFERENTIAL - Abnormal; Notable for the following components:       Result Value    Hemoglobin 12.1 (*)     Hematocrit 39.0 (*)     MCHC 31.0 (*)     All other components within normal limits   COMPREHENSIVE METABOLIC PANEL - Abnormal; Notable for the following components:    Sodium 133 (*)     Glucose 388 (*)     BUN 25 (*)     Creatinine 1.60 (*)     Est, Glom Filt Rate 45 (*)     Albumin 2.9 (*)     Globulin 4.3 (*)     Albumin/Globulin Ratio 0.7 (*)     All other components within normal limits   POCT GLUCOSE - Abnormal; Notable for the following components:    POC Glucose 372 (*)     All other components within normal limits   POCT GLUCOSE - Abnormal; Notable for the following components:    POC Glucose 359 (*)     All other components within normal limits   POCT GLUCOSE - Abnormal; Notable for the following components:    POC Glucose 326 (*)     All other components within normal limits   POCT GLUCOSE - Normal   CULTURE, BLOOD 1   CULTURE, BLOOD 1   CULTURE, WOUND   CULTURE, ANAEROBIC   URINALYSIS   LACTIC ACID   PLEASE READ & DOCUMENT PPD TEST IN 24 HRS        XR TOE LEFT (MIN 2 VIEWS)   Final Result   1. Soft tissue ulcer about the distal aspect of the second left toe without   convincing evidence of osteomyelitis. If there is continued clinical concern   further evaluation with MRI can BE obtained. 2.  Similar extensive degenerative changes DIP joints of the imaged first   through third toes. Vascular duplex lower extremity arteries bilateral with DARINEL    (Results Pending)              Irineo Coma Scale  Eye Opening: Spontaneous  Best Verbal Response: Oriented  Best Motor Response: Obeys commands  Irineo Coma Scale Score: 15                     CIWA Assessment  BP: (!) 153/95  Heart Rate: (!) 107                       Voice dictation software was used during the making of this note. This software is not perfect and grammatical and other typographical errors may be present. This note has not been completely proofread for errors.         Tomy Silveira MD  10/10/22 5938

## 2022-10-10 NOTE — PROGRESS NOTES
VANCO DAILY FOLLOW UP NOTE  [address] Pharmacokinetic Monitoring Service - Vancomycin    Consulting Provider: Renate Patricio DO   Indication: SSTI  Target Concentration: Goal trough of 10-15 mg/L and AUC/FLORI <500 mg*hr/L  Day of Therapy: 1  Additional Antimicrobials: cefepime    Pertinent Laboratory Values: Wt Readings from Last 1 Encounters:   10/10/22 232 lb (105.2 kg)     Temp Readings from Last 1 Encounters:   10/10/22 98.6 °F (37 °C) (Oral)     Recent Labs     10/10/22  1055 10/10/22  1515   BUN 25*  --    CREATININE 1.60*  --    WBC 5.9  --    LACACIDPL  --  1.3     Estimated Creatinine Clearance: 46 mL/min (A) (based on SCr of 1.6 mg/dL (H)). No results found for: Deandra Navarro    MRSA Nasal Swab: N/A. Non-respiratory infection.       Assessment:  Date/Time Dose Concentration AUC    1250 mg q24h     Note: Serum concentrations collected for AUC dosing may appear elevated if collected in close proximity to the dose administered, this is not necessarily an indication of toxicity    Plan:  Dosing recommendations based on Bayesian software  Start vancomycin with 2500 mg x 1 dose and then 1250 mg q24h  Anticipated AUC of 493 and trough concentration of 15.7 at steady state  Renal labs as indicated   Vancomycin concentrations will be ordered as clinically appropriate   Pharmacy will continue to monitor patient and adjust therapy as indicated    Thank you for the consult,  Curtis Veras, PharmD, BCOP  Clinical Pharmacist  Contact [employer]

## 2022-10-10 NOTE — CARE COORDINATION
Chart review complete, CM met with pt and spouse Jose Alejandro May at bedside, pt found laying on stretcher alert and oriented x3, per spouse pt has been falling a good bit at home and she is having difficulty assisting him up off the floor or out of his chair. CM discussed in home caregivers and spouse states they have someone that comes in the home and helps with care of pt on Monday and Wednesday from 1pm til 5pm but states after that she has no one to help, both children live out of state. CM also spoke with son Marlee Yuen [phone] who lives in [place] and states they are looking into moving pt to [place] or [address] [place] [address] closer to one of the children. Son Marlee Yuen is agreeable for referral to be sent to Awilda Salas with [facility] for assistance. CM has made both son and spouse aware that at this time per MD pt does not have an admitable dx for admission today and that with his insurance Medicare A & B pt would need a qualifying inpt stay to go to [facility] at this time. However if they would be interested CM could order home care for PT/OT, Nursing services to assist with medication management with DM, Nursing Assistance and SW to aid with community services. Family agreeable for referral to [facility] [place], referral/order completed with services listed above. Referral was also made to [facility] as well with primary contact per of the son Marlee Yuen. Demographics, insurance and PCP confirmed-- per spouse pt has follow up appointment with PCP tomorrow.        10/10/22 5884   Service Assessment   Patient Orientation Alert and Oriented   Cognition Alert   History Provided By Patient   Primary Caregiver Family   Accompanied By/Relationship spouse [id number] [place] is: Legal Next of Jeetliz 71   PCP Verified by CM Yes  Eugene Lee MD last visit several weeks ago, has appointment tomorrow)   Last Visit to PCP Within last 3 months   Prior Functional Level Assistance with the following:;Dressing   Current Functional Level Assistance with the following:;Dressing   Can patient return to prior living arrangement Yes   Ability to make needs known: Good   Family able to assist with home care needs: Yes   Would you like for me to discuss the discharge plan with any other family members/significant others, and if so, who? No   Community Resources   ([facility] aware and following pt)   Social/Functional History   Lives With Spouse   Type of [address] One level   Bathroom Toilet [address], standard; [address] Help From Family   ADL Assistance Needs assistance   Toileting Independent   Ambulation Assistance Needs assistance   Transfer Assistance Needs assistance   Active  No   Patient's  Info spouse   Mode of Transportation Car   Occupation Retired   Discharge Planning   Type of [facility] Spouse/Significant Other   Current Services Prior To Admission [address]   DME Ordered? No   Potential Assistance Purchasing Medications No   Type of Home Care Services OT;PT;Aide Services;Nursing Services   Patient expects to be discharged to: House   One/Two Story Residence One story   History of falls? 1   Services At/After Discharge   Transition of Care Consult (CM Consult) [facility] Discharge Home Health;Nursing services;OT;PT;Aide services   Condition of Participation: Discharge Planning   The Plan for Transition of Care is related to the following treatment goals: in home home care for mobility, nursing to follow medications management   The Patient and/or Patient Representative was provided with a Choice of Provider? Patient   The Patient and/Or Patient Representative agree with the Discharge Plan?  Yes   Freedom of Choice list was provided with basic dialogue that supports the patient's individualized plan of care/goals, treatment preferences, and shares the quality data associated with the providers?   Yes

## 2022-10-10 NOTE — CARE COORDINATION
Health  Outreach    Referral from Mikayla Read RN ACM for Diabetes Education with  patient in home. Contact the residence by phone for scheduling. The patients wife advised patient had experienced a partial fall from bed this morning and EMS was called to assist to bed. EMS found BGL of greater than 600 and the patient was transported to [facility] ER for evaluation. Stated event occurred within the hour. HC will follow via chart review and will reach out again once patient is home for scheduling of home visit.

## 2022-10-11 ENCOUNTER — HOME HEALTH ADMISSION (OUTPATIENT)
Dept: HOME HEALTH SERVICES | Facility: HOME HEALTH | Age: 77
End: 2022-10-11

## 2022-10-11 PROBLEM — Z96.41 DIABETES MELLITUS TYPE 1, WITH COMPLICATION, ON LONG TERM INSULIN PUMP (HCC): Status: RESOLVED | Noted: 2021-11-10 | Resolved: 2022-10-11

## 2022-10-11 PROBLEM — E10.65 TYPE 1 DIABETES MELLITUS WITH HYPERGLYCEMIA (HCC): Status: ACTIVE | Noted: 2019-03-08

## 2022-10-11 PROBLEM — E10.8 DIABETES MELLITUS TYPE 1, WITH COMPLICATION, ON LONG TERM INSULIN PUMP (HCC): Status: RESOLVED | Noted: 2021-11-10 | Resolved: 2022-10-11

## 2022-10-11 PROBLEM — E10.10 DKA, TYPE 1, NOT AT GOAL (HCC): Status: RESOLVED | Noted: 2022-06-09 | Resolved: 2022-10-11

## 2022-10-11 PROBLEM — E10.42 TYPE 1 DIABETES MELLITUS WITH DIABETIC POLYNEUROPATHY (HCC): Status: ACTIVE | Noted: 2019-03-08

## 2022-10-11 LAB
ALBUMIN SERPL-MCNC: 2.8 G/DL (ref 3.2–4.6)
ALBUMIN/GLOB SERPL: 0.7 {RATIO} (ref 0.4–1.6)
ALP SERPL-CCNC: 114 U/L (ref 50–136)
ALT SERPL-CCNC: 22 U/L (ref 12–65)
ANION GAP SERPL CALC-SCNC: 5 MMOL/L (ref 2–11)
AST SERPL-CCNC: 5 U/L (ref 15–37)
BASOPHILS # BLD: 0 K/UL (ref 0–0.2)
BASOPHILS NFR BLD: 1 % (ref 0–2)
BILIRUB SERPL-MCNC: 0.5 MG/DL (ref 0.2–1.1)
BUN SERPL-MCNC: 20 MG/DL (ref 8–23)
CALCIUM SERPL-MCNC: 9 MG/DL (ref 8.3–10.4)
CHLORIDE SERPL-SCNC: 106 MMOL/L (ref 101–110)
CO2 SERPL-SCNC: 27 MMOL/L (ref 21–32)
CREAT SERPL-MCNC: 1.4 MG/DL (ref 0.8–1.5)
DIFFERENTIAL METHOD BLD: ABNORMAL
EOSINOPHIL # BLD: 0.1 K/UL (ref 0–0.8)
EOSINOPHIL NFR BLD: 3 % (ref 0.5–7.8)
ERYTHROCYTE [DISTWIDTH] IN BLOOD BY AUTOMATED COUNT: 13.7 % (ref 11.9–14.6)
GLOBULIN SER CALC-MCNC: 3.8 G/DL (ref 2.8–4.5)
GLUCOSE BLD STRIP.AUTO-MCNC: 151 MG/DL (ref 65–100)
GLUCOSE BLD STRIP.AUTO-MCNC: 165 MG/DL (ref 65–100)
GLUCOSE BLD STRIP.AUTO-MCNC: 226 MG/DL (ref 65–100)
GLUCOSE BLD STRIP.AUTO-MCNC: 332 MG/DL (ref 65–100)
GLUCOSE BLD STRIP.AUTO-MCNC: 366 MG/DL (ref 65–100)
GLUCOSE SERPL-MCNC: 167 MG/DL (ref 65–100)
HCT VFR BLD AUTO: 36.7 % (ref 41.1–50.3)
HGB BLD-MCNC: 11.5 G/DL (ref 13.6–17.2)
IMM GRANULOCYTES # BLD AUTO: 0 K/UL (ref 0–0.5)
IMM GRANULOCYTES NFR BLD AUTO: 0 % (ref 0–5)
LYMPHOCYTES # BLD: 1.1 K/UL (ref 0.5–4.6)
LYMPHOCYTES NFR BLD: 24 % (ref 13–44)
MAGNESIUM SERPL-MCNC: 2 MG/DL (ref 1.8–2.4)
MCH RBC QN AUTO: 27 PG (ref 26.1–32.9)
MCHC RBC AUTO-ENTMCNC: 31.3 G/DL (ref 31.4–35)
MCV RBC AUTO: 86.2 FL (ref 82–102)
MM INDURATION, POC: 0 MM (ref 0–5)
MONOCYTES # BLD: 0.5 K/UL (ref 0.1–1.3)
MONOCYTES NFR BLD: 11 % (ref 4–12)
NEUTS SEG # BLD: 2.9 K/UL (ref 1.7–8.2)
NEUTS SEG NFR BLD: 61 % (ref 43–78)
NRBC # BLD: 0 K/UL (ref 0–0.2)
PLATELET # BLD AUTO: 155 K/UL (ref 150–450)
PMV BLD AUTO: 10.7 FL (ref 9.4–12.3)
POTASSIUM SERPL-SCNC: 3.3 MMOL/L (ref 3.5–5.1)
PPD, POC: NEGATIVE
PROT SERPL-MCNC: 6.6 G/DL (ref 6.3–8.2)
RBC # BLD AUTO: 4.26 M/UL (ref 4.23–5.6)
SERVICE CMNT-IMP: ABNORMAL
SODIUM SERPL-SCNC: 138 MMOL/L (ref 133–143)
WBC # BLD AUTO: 4.7 K/UL (ref 4.3–11.1)

## 2022-10-11 PROCEDURE — 6360000002 HC RX W HCPCS: Performed by: FAMILY MEDICINE

## 2022-10-11 PROCEDURE — 83735 ASSAY OF MAGNESIUM: CPT

## 2022-10-11 PROCEDURE — 6370000000 HC RX 637 (ALT 250 FOR IP): Performed by: FAMILY MEDICINE

## 2022-10-11 PROCEDURE — 97166 OT EVAL MOD COMPLEX 45 MIN: CPT

## 2022-10-11 PROCEDURE — 87077 CULTURE AEROBIC IDENTIFY: CPT

## 2022-10-11 PROCEDURE — 82962 GLUCOSE BLOOD TEST: CPT

## 2022-10-11 PROCEDURE — 87075 CULTR BACTERIA EXCEPT BLOOD: CPT

## 2022-10-11 PROCEDURE — 2580000003 HC RX 258: Performed by: FAMILY MEDICINE

## 2022-10-11 PROCEDURE — 0HBNXZZ EXCISION OF LEFT FOOT SKIN, EXTERNAL APPROACH: ICD-10-PCS | Performed by: STUDENT IN AN ORGANIZED HEALTH CARE EDUCATION/TRAINING PROGRAM

## 2022-10-11 PROCEDURE — 80053 COMPREHEN METABOLIC PANEL: CPT

## 2022-10-11 PROCEDURE — 97112 NEUROMUSCULAR REEDUCATION: CPT

## 2022-10-11 PROCEDURE — 97535 SELF CARE MNGMENT TRAINING: CPT

## 2022-10-11 PROCEDURE — 87186 SC STD MICRODIL/AGAR DIL: CPT

## 2022-10-11 PROCEDURE — 85025 COMPLETE CBC W/AUTO DIFF WBC: CPT

## 2022-10-11 PROCEDURE — 87205 SMEAR GRAM STAIN: CPT

## 2022-10-11 PROCEDURE — 36415 COLL VENOUS BLD VENIPUNCTURE: CPT

## 2022-10-11 PROCEDURE — 1100000000 HC RM PRIVATE

## 2022-10-11 PROCEDURE — 97162 PT EVAL MOD COMPLEX 30 MIN: CPT

## 2022-10-11 RX ORDER — HEPARIN SODIUM 5000 [USP'U]/ML
5000 INJECTION, SOLUTION INTRAVENOUS; SUBCUTANEOUS EVERY 8 HOURS SCHEDULED
Status: DISCONTINUED | OUTPATIENT
Start: 2022-10-11 | End: 2022-10-19 | Stop reason: HOSPADM

## 2022-10-11 RX ORDER — MAGNESIUM SULFATE IN WATER 40 MG/ML
2000 INJECTION, SOLUTION INTRAVENOUS PRN
Status: DISCONTINUED | OUTPATIENT
Start: 2022-10-11 | End: 2022-10-19 | Stop reason: HOSPADM

## 2022-10-11 RX ORDER — POTASSIUM CHLORIDE 7.45 MG/ML
10 INJECTION INTRAVENOUS PRN
Status: DISCONTINUED | OUTPATIENT
Start: 2022-10-11 | End: 2022-10-19 | Stop reason: HOSPADM

## 2022-10-11 RX ORDER — POTASSIUM CHLORIDE 20 MEQ/1
40 TABLET, EXTENDED RELEASE ORAL PRN
Status: DISCONTINUED | OUTPATIENT
Start: 2022-10-11 | End: 2022-10-19 | Stop reason: HOSPADM

## 2022-10-11 RX ADMIN — HEPARIN SODIUM 5000 UNITS: 5000 INJECTION INTRAVENOUS; SUBCUTANEOUS at 22:00

## 2022-10-11 RX ADMIN — INSULIN LISPRO 2 UNITS: 100 INJECTION, SOLUTION INTRAVENOUS; SUBCUTANEOUS at 12:20

## 2022-10-11 RX ADMIN — SODIUM BICARBONATE 650 MG TABLET 650 MG: at 08:08

## 2022-10-11 RX ADMIN — INSULIN LISPRO 6 UNITS: 100 INJECTION, SOLUTION INTRAVENOUS; SUBCUTANEOUS at 16:26

## 2022-10-11 RX ADMIN — DULOXETINE HYDROCHLORIDE 60 MG: 60 CAPSULE, DELAYED RELEASE ORAL at 08:06

## 2022-10-11 RX ADMIN — INSULIN GLARGINE 25 UNITS: 100 INJECTION, SOLUTION SUBCUTANEOUS at 20:53

## 2022-10-11 RX ADMIN — CEFEPIME 2000 MG: 2 INJECTION, POWDER, FOR SOLUTION INTRAVENOUS at 20:53

## 2022-10-11 RX ADMIN — MORPHINE SULFATE 15 MG: 15 TABLET, FILM COATED, EXTENDED RELEASE ORAL at 20:53

## 2022-10-11 RX ADMIN — VITAMIN D, TAB 1000IU (100/BT) 1000 UNITS: 25 TAB at 08:06

## 2022-10-11 RX ADMIN — LEVOTHYROXINE SODIUM 75 MCG: 75 TABLET ORAL at 08:07

## 2022-10-11 RX ADMIN — OLANZAPINE 10 MG: 5 TABLET, FILM COATED ORAL at 20:54

## 2022-10-11 RX ADMIN — PANTOPRAZOLE SODIUM 40 MG: 40 TABLET, DELAYED RELEASE ORAL at 08:07

## 2022-10-11 RX ADMIN — HEPARIN SODIUM 5000 UNITS: 5000 INJECTION INTRAVENOUS; SUBCUTANEOUS at 14:15

## 2022-10-11 RX ADMIN — SODIUM CHLORIDE, PRESERVATIVE FREE 10 ML: 5 INJECTION INTRAVENOUS at 20:54

## 2022-10-11 RX ADMIN — MORPHINE SULFATE 15 MG: 15 TABLET, FILM COATED, EXTENDED RELEASE ORAL at 08:07

## 2022-10-11 RX ADMIN — CEFEPIME 2000 MG: 2 INJECTION, POWDER, FOR SOLUTION INTRAVENOUS at 08:40

## 2022-10-11 RX ADMIN — INSULIN LISPRO 4 UNITS: 100 INJECTION, SOLUTION INTRAVENOUS; SUBCUTANEOUS at 20:53

## 2022-10-11 RX ADMIN — SODIUM CHLORIDE, PRESERVATIVE FREE 10 ML: 5 INJECTION INTRAVENOUS at 08:09

## 2022-10-11 RX ADMIN — FINASTERIDE 5 MG: 5 TABLET, FILM COATED ORAL at 08:06

## 2022-10-11 RX ADMIN — SODIUM BICARBONATE 650 MG TABLET 650 MG: at 20:53

## 2022-10-11 RX ADMIN — VANCOMYCIN HYDROCHLORIDE 1000 MG: 1 INJECTION, POWDER, LYOPHILIZED, FOR SOLUTION INTRAVENOUS at 12:16

## 2022-10-11 RX ADMIN — INSULIN GLARGINE 25 UNITS: 100 INJECTION, SOLUTION SUBCUTANEOUS at 08:08

## 2022-10-11 RX ADMIN — ROSUVASTATIN CALCIUM 10 MG: 5 TABLET, FILM COATED ORAL at 20:53

## 2022-10-11 ASSESSMENT — PAIN DESCRIPTION - ORIENTATION
ORIENTATION: LEFT
ORIENTATION: LEFT

## 2022-10-11 ASSESSMENT — PAIN SCALES - GENERAL
PAINLEVEL_OUTOF10: 0
PAINLEVEL_OUTOF10: 0
PAINLEVEL_OUTOF10: 5
PAINLEVEL_OUTOF10: 7

## 2022-10-11 ASSESSMENT — PAIN DESCRIPTION - LOCATION
LOCATION: FOOT
LOCATION: FOOT

## 2022-10-11 ASSESSMENT — PAIN DESCRIPTION - DESCRIPTORS
DESCRIPTORS: BURNING
DESCRIPTORS: SORE

## 2022-10-11 NOTE — PROGRESS NOTES
ACUTE OCCUPATIONAL THERAPY GOALS:   (Developed with and agreed upon by patient and/or caregiver.)  Patient will attempt standing with LLE off loading shoe. Patient will complete grooming with setup. Patient will complete lower body bathing and dressing with minimal assistance. Patient will tolerate 30 minutes of OT treatment with as needed rest breaks to increase activity tolerance for ADLs. Timeframe: 7 visits     OCCUPATIONAL THERAPY Initial Assessment and Daily Note       OT Visit Days: 1  Acknowledge Orders  Time  OT Charge Capture  Rehab Caseload Tracker      Azul Daniels is a 78 y.o. male   PRIMARY DIAGNOSIS: Type 1 diabetes mellitus with diabetic foot ulcer (HCC)  Tachycardia [R00.0]  Hyperglycemia [R73.9]  Diabetic foot infection (HCC) [M58.140, L08.9]  Type 1 diabetes mellitus with diabetic foot ulcer ([facility] [address] [E10.621, L97.509]       Reason for Referral: History of falling (Z91.81)  Localized edema (R60.1)  Inpatient: Payor: Janie Ann / Plan: MEDICARE PART A AND B / Product Type: *No Product type* /     ASSESSMENT:     REHAB RECOMMENDATIONS:   Recommendation to date pending progress:  Setting:  Short-term Rehab    Equipment:    To Be Determined     ASSESSMENT:  Mr. Shadi Gerard is a 79 y/o M admitted with hyperglycemia after fall out of bed. Underwent I&D of L foot wound this morning & needs off-loading shoe, per vascular. Per patient he was independent with ADLs. Per chart, he had an aide for ~ 8 hours/ week. Patient admits to multiple falls. Today reports no pain, appears alert & oriented. Sat edge of bed with CGA to participate in ADLs. Did not attempt standing today without off-loading shoe. He is functioning below baseline and would benefit from continued OT. Will follow.       [address] [facility] [address] [id number] AM-PAC 6 Clicks Daily Activity Inpatient Short Form:    AM-PAC Daily Activity Inpatient   How much help for putting on and taking off regular lower body clothing?: A Lot  How much help for Bathing?: A Lot  How much help for Toileting?: A Lot  How much help for putting on and taking off regular upper body clothing?: A Little  How much help for taking care of personal grooming?: A Little  How much help for eating meals?: A Little  AM-PAC Inpatient Daily Activity Raw Score: 15  AM-PAC Inpatient ADL T-Scale Score : 34.69  ADL Inpatient CMS 0-100% Score: 56.46  ADL Inpatient CMS G-Code Modifier : CK           SUBJECTIVE:     Mr. Janet Solano states, \"I love to read. \"     Social/Functional Lives With: Spouse  Type of Home: House  Home Layout: One level  Bathroom Toilet: Standard  Home Equipment: Lex Alstrom, standard, [employer] Help From: Family  ADL Assistance: Needs assistance  Toileting: Independent  Ambulation Assistance: Needs assistance  Transfer Assistance: Needs assistance  Active : No  Patient's  Info: spouse  Mode of Transportation: Car  Occupation: Retired    OBJECTIVE:     LINES / Lorella Roles / AIRWAY: IV and Purewick    RESTRICTIONS/PRECAUTIONS:  Restrictions/Precautions: Fall RiskNeeds offloading shoe for LLE, per vascular.      PAIN: VITALS / O2:   Pre Treatment:   Pain Assessment: 0-10  Pain Level: 0      Post Treatment: 0       Vitals          Oxygen            GROSS EVALUATION: INTACT IMPAIRED   (See Comments)   UE AROM [] []Generally decreased WFL    UE PROM [] []   Strength []  Generally decreased WFL      Posture / Balance [] Posture: Fair  Sitting - Static: Good  Sitting - Dynamic: Good  Standing - Static:  (NT)  Standing - Dynamic:  (NT)   Sensation []  Absent B feet    Coordination [x]       Tone []    NT   Edema [] L foot    Activity Tolerance [x]       Hand Dominance R [x] L []      COGNITION/  PERCEPTION: INTACT IMPAIRED   (See Comments)   Orientation [x]     Vision []  Reading glasses    Hearing []  [place] hearing aids   Cognition  []     Perception []       MOBILITY: I Mod I S SBA CGA Min Mod Max Total  NT x2 Comments:   Bed Mobility    Rolling [] [] [] [] [] [] [] [] [] [] []    Supine to Sit [] [] [] [] [x] [] [] [] [] [] []    Scooting [] [] [] [] [x] [] [] [] [] [] []    Sit to Supine [] [] [] [] [x] [] [] [] [] [] []    Transfers    Sit to Stand [] [] [] [] [] [] [] [] [] [x] []    Bed to Chair [] [] [] [] [] [] [] [] [] [x] []    Stand to Sit [] [] [] [] [] [] [] [] [] [x] []    Tub/Shower [] [] [] [] [] [] [] [] [] [x] []     Toilet [] [] [] [] [] [] [] [] [] [x] []      [] [] [] [] [] [] [] [] [] [] []    I=Independent, Mod I=Modified Independent, S=Supervision/Setup, SBA=Standby Assistance, CGA=Contact Guard Assistance, Min=Minimal Assistance, Mod=Moderate Assistance, Max=Maximal Assistance, Total=Total Assistance, NT=Not Tested    ACTIVITIES OF DAILY LIVING: I Mod I S SBA CGA Min Mod Max Total NT Comments   BASIC ADLs:              Upper Body Bathing  [] [] [] [] [] [] [] [] [] []    Lower Body Bathing [] [] [] [] [] [] [] [] [] []    Toileting [] [] [] [] [] [] [] [] [x] []    Upper Body Dressing [] [] [] [] [] [] [] [] [] []    Lower Body Dressing [] [] [] [] [] [] [] [] [] []    Feeding [] [] [x] [] [] [] [] [] [] []    Grooming [] [] [x] [] [] [] [] [] [] [] Washing face, brushing teeth while seated EOB   Personal Device Care [x] [] [] [] [] [] [] [] [] []    Functional Mobility [] [] [] [] [x] [] [] [] [] []    I=Independent, Mod I=Modified Independent, S=Supervision/Setup, SBA=Standby Assistance, CGA=Contact Guard Assistance, Min=Minimal Assistance, Mod=Moderate Assistance, Max=Maximal Assistance, Total=Total Assistance, NT=Not Tested    PLAN:   FREQUENCY/DURATION   OT Plan of Care: 3 times/week for duration of hospital stay or until stated goals are met, whichever comes first.    PROBLEM LIST:   (Skilled intervention is medically necessary to address:)  Decreased ADL/Functional Activities  Decreased Activity Tolerance  Decreased AROM/PROM  Decreased Balance  Decreased Cognition  Decreased Gait Ability  Decreased Strength  Decreased Transfer Abilities INTERVENTIONS PLANNED:  (Benefits and precautions of occupational therapy have been discussed with the patient.)  Self Care Training  Therapeutic Activity  Therapeutic Exercise/HEP  Neuromuscular Re-education  Manual Therapy  Education         TREATMENT:     EVALUATION: MODERATE COMPLEXITY: (Untimed Charge)    TREATMENT:   Co-Treatment PT/OT necessary due to patient's decreased overall endurance/tolerance levels, as well as need for high level skilled assistance to complete functional transfers/mobility and functional tasks  Self Care (8 minutes): Patient participated in self feeding, personal device care, and grooming ADLs in unsupported sitting with minimal verbal cueing to increase independence. Patient also participated in bed mobility training to increase independence.      TREATMENT GRID:  N/A    AFTER TREATMENT PRECAUTIONS: Bed, Bed/Chair Locked, Call light within reach, Needs within reach, and RN notified    INTERDISCIPLINARY COLLABORATION:  RN/ PCT, PT/ PTA, and OT/ BOWDEN    EDUCATION:  Education Given To: Patient  Education Provided: Role of Therapy;Plan of Care  Education Outcome: Verbalized understanding    TOTAL TREATMENT DURATION AND TIME:  Time In: 1142  Time Out: [address]  Minutes: [address], [place]

## 2022-10-11 NOTE — PROGRESS NOTES
Bedside verbal report received from [facility]. Pt resting in bed, foot dressed with betadine gauze and wrapped per offgoing RN. No visible signs of distress, will call Vasc Surg consult when office opens at 0800. Wound care consult in place.

## 2022-10-11 NOTE — PROGRESS NOTES
Physical Therapy Note:    Clarified with Vascular Surgery that patient needs forefoot off-loading shoe L LE. These are not stocked in materials. Order placed in chart for orthotist consult. Consult called to Sha Silveira at [facility] [phone]. They will address this PM or first thing tomorrow AM.    ADDENDUM: Call received back from Sha Silveira at 5:15PM, off-loading shoe delivered to patient's room.     Thank you,  Carl Agudelo, PT, DPT

## 2022-10-11 NOTE — PROGRESS NOTES
ACUTE PHYSICAL THERAPY GOALS:   (Developed with and agreed upon by patient and/or caregiver.)  1. Patient will perform bed mobility with INDEPENDENCE within 7 days. 2. Patient will transfer bed to chair with SUPERVISION within 7 days. 3. Patient will demonstrate FAIR+ DYNAMIC STANDING balance within 7 day(s). 4. Patient will ambulate 150ft+ using least restrictive assistive device and SUPERVISION within 7 days. 5. Patient will tolerate 25+ minutes of therapeutic activity/exercise and/or neuromuscular re-education while maintaining stable vitals to improve functional strength and activity tolerance within 7 days. PHYSICAL THERAPY Initial Assessment, Daily Note, and AM  (Link to Caseload Tracking: PT Visit Days : 1  Acknowledge Orders  Time In/Out  PT Charge Capture  Rehab Caseload Tracker    Off-loading shoe ERIKA Amezquita is a 78 y.o. male   PRIMARY DIAGNOSIS: Type 1 diabetes mellitus with diabetic foot ulcer (HCC)  Tachycardia [R00.0]  Hyperglycemia [R73.9]  Diabetic foot infection ([facility]) [D20.155, L08.9]  Type 1 diabetes mellitus with diabetic foot ulcer ([facility] [address] [D81.455, L97.509]       Reason for Referral: Difficulty in walking, Not elsewhere classified (R26.2)  Repeated Falls (R29.6)  Inpatient: Payor: MEDICARE / Plan: MEDICARE PART A AND B / Product Type: *No Product type* /     ASSESSMENT:     REHAB RECOMMENDATIONS:   Recommendation to date pending progress:  Setting:  Short-term Rehab    Equipment:    To Be Determined     ASSESSMENT:  Mr. Shravan Briceno is a pleasant 78year old male admitted with left diabetic foot infection. Patient is s/p debridement of plantar aspect of left great and second toe. Attempting to clarify weightbearing status with vascular surgery; Perfect Serve message send. Patient is seen this AM for initial PT evaluation. At baseline, patient lives with his spouse is ambulatory with use of a rollator and has \"daily\" falls.  Today, patient presents with decreased functional activity tolerance and mobility status from baseline. CGA-Min A with EOB mobility, sitting balance, and scooting with facilitation and multimodal cues. Kept NWB until clarified with Vascular. See detailed assessment below. Recommend STR at discharge. Will follow with stated plan of care during acute stay. ADDENDUM: Vascular surgery recommends off-loading shoe L LE. Order placed in the chart. [address] [facility] [address] [address] AM-PAC 6 Clicks Basic Mobility Inpatient Short Form  AM-PAC Mobility Inpatient   How much difficulty turning over in bed?: A Little  How much difficulty sitting down on / standing up from a chair with arms?: A Lot  How much difficulty moving from lying on back to sitting on side of bed?: A Little  How much help from another person moving to and from a bed to a chair?: A Lot  How much help from another person needed to walk in hospital room?: A Lot  How much help from another person for climbing 3-5 steps with a railing?: A Lot  AM-PAC Inpatient Mobility Raw Score : 14  AM-PAC Inpatient T-Scale Score : 38.1  Mobility Inpatient CMS 0-100% Score: 61.29  Mobility Inpatient CMS G-Code Modifier : CL    SUBJECTIVE:   Mr. Cj Herndon states, \"My wife is amazing. \"     Social/Functional Lives With: Spouse  Type of Home: House  Home Layout: One level  Bathroom Toilet: Standard  Home Equipment: Gilda Faisal, standard, [address] Help From: Family  ADL Assistance: Needs assistance  Toileting: Independent  Ambulation Assistance: Needs assistance  Transfer Assistance: Needs assistance  Active : No  Patient's  Info: spouse  Mode of Transportation: Car  Occupation: Retired    OBJECTIVE:     PAIN: VITALS / O2: Torsten Mings / Linton Tiffanie / DRAINS:   Pre Treatment:   Pain Assessment: 0-10  Pain Level: 5  Pain Location: Foot  Pain Orientation: Left  Pain Descriptors: Sore  Non-Pharmaceutical Pain Intervention(s): Emotional support;Repositioned; Rest    Post Treatment: Reports comfort in supine, pain unchanged, positioning and elevation  Vitals        Oxygen      IV and Dressing L foot    RESTRICTIONS/PRECAUTIONS:  Restrictions/Precautions: Fall Risk                 GROSS EVALUATION: Intact Impaired (Comments):   AROM []  WFL B LE   PROM []    Strength []  Generalized B LE, non-focal   Balance []  Good dynamically in sitting, not assessed in standing   Posture [] Rounded Shoulders   Sensation []  Absent light touch distal B LE   Coordination []      Tone []     Edema []    Activity Tolerance []  Impaired from baseline secondary to acute illness    []      COGNITION/  PERCEPTION: Intact Impaired (Comments):   Orientation []  Oriented x3   Vision []     Hearing []     Cognition  []  History significant for dementia per chart, able to follow multistep commands     MOBILITY: I Mod I S SBA CGA Min Mod Max Total  NT x2 Comments:   Bed Mobility    Rolling [] [] [] [] [] [] [] [] [] [x] []    Supine to Sit [] [] [] [] [x] [] [] [] [] [] []    Scooting [] [] [] [] [x] [] [] [] [] [] []    Sit to Supine [] [] [] [] [] [x] [] [] [] [] []    Transfers    Sit to Stand [] [] [] [] [] [] [] [] [] [x] [] Not tested secondary to attempt to clarify weightbearing status   Bed to Chair [] [] [] [] [] [] [] [] [] [x] []    Stand to Sit [] [] [] [] [] [] [] [] [] [x] []     [] [] [] [] [] [] [] [] [] [] []    I=Independent, Mod I=Modified Independent, S=Supervision, SBA=Standby Assistance, CGA=Contact Guard Assistance,   Min=Minimal Assistance, Mod=Moderate Assistance, Max=Maximal Assistance, Total=Total Assistance, NT=Not Tested    GAIT: I Mod I S SBA CGA Min Mod Max Total  NT x2 Comments:   Level of Assistance [] [] [] [] [] [] [] [] [] [x] [] Not tested secondary to attempt to clarify weightbearing status   Distance   feet    DME N/A    Gait Quality N/A    Weightbearing Status Restrictions/Precautions  Restrictions/Precautions: Fall Risk    Stairs      I=Independent, Mod I=Modified Independent, S=Supervision, SBA=Standby Assistance, CGA=Contact Wenceslao Schwab,   Min=Minimal Assistance, Mod=Moderate Assistance, Max=Maximal Assistance, Total=Total Assistance, NT=Not Tested    PLAN:   FREQUENCY AND DURATION: 3 times/week for duration of hospital stay or until stated goals are met, whichever comes first.    THERAPY PROGNOSIS: Good    PROBLEM LIST:   (Skilled intervention is medically necessary to address:)  Decreased ADL/Functional Activities  Decreased Activity Tolerance  Decreased Balance  Decreased Gait Ability  Decreased Safety Awareness  Decreased Strength  Decreased Transfer Abilities  Increased Pain INTERVENTIONS PLANNED:   (Benefits and precautions of physical therapy have been discussed with the patient.)  Therapeutic Activity  Therapeutic Exercise/HEP  Neuromuscular Re-education  Gait Training  Education       TREATMENT:   EVALUATION: MODERATE COMPLEXITY: (Untimed Charge)    TREATMENT:   Neuromuscular Re-education (10 Minutes): Neuromuscular Re-education included Balance Training, Functional mobility with facilitation, Postural training, and Sitting balance training to improve Balance and Functional Mobility.     TREATMENT GRID:  N/A    AFTER TREATMENT PRECAUTIONS: Alarm Activated, Bed, Bed/Chair Locked, Call light within reach, Needs within reach, RN notified, Side rails x2, and Patient needs met and L LE elevated on pillow    INTERDISCIPLINARY COLLABORATION:  RN/ PCT, PT/ PTA, and OT/ BOWDEN    EDUCATION: Education Given To: Patient  Education Provided: Role of Therapy;Plan of Care  Education Method: Demonstration;Verbal  Barriers to Learning: Cognition  Education Outcome: Verbalized understanding;Demonstrated understanding;Continued education needed    TIME IN/OUT:  Time In: 1140  Time Out: [address]  Minutes: 18    James Crain PT

## 2022-10-11 NOTE — ASSESSMENT & PLAN NOTE
L great toe, no osteo on XR, previously managed by podiatry  -consult vascular surgery  -doppler, DARINEL  -vancomycin, cefepime

## 2022-10-11 NOTE — PROGRESS NOTES
VANCO DAILY FOLLOW UP NOTE  [address] [facility] Pharmacokinetic Monitoring Service - Vancomycin    Consulting Provider: Negra Guajardo DO   Indication: SSTI  Target Concentration: Goal trough of 10-15 mg/L and AUC/FLORI <500 mg*hr/L  Day of Therapy: 2  Additional Antimicrobials: cefepime    Pertinent Laboratory Values: Wt Readings from Last 1 Encounters:   10/11/22 224 lb 10.4 oz (101.9 kg)     Temp Readings from Last 1 Encounters:   10/11/22 98.1 °F (36.7 °C)     Recent Labs     10/10/22  1055 10/10/22  1515 10/11/22  0607   BUN 25*  --  20   CREATININE 1.60*  --  1.40   WBC 5.9  --  4.7   LACACIDPL  --  1.3  --      Estimated Creatinine Clearance: 52 mL/min (based on SCr of 1.4 mg/dL). No results found for: Jethro Camargo    MRSA Nasal Swab: N/A. Non-respiratory infection.       Assessment:  Date/Time Dose Concentration AUC         Note: Serum concentrations collected for AUC dosing may appear elevated if collected in close proximity to the dose administered, this is not necessarily an indication of toxicity    Plan:  Dosing recommendations based on Bayesian software  Empirically adjust the dose to 1000 mg every 18 hours for now, anticipated AUC/Tr of 466/15.4  Renal labs as indicated   Vancomycin concentration ordered for 10/12 at 0200   Pharmacy will continue to monitor patient and adjust therapy as indicated    Thank you for the consult,  Jeremi Griffith [facility]

## 2022-10-11 NOTE — ASSESSMENT & PLAN NOTE
-Sliding scale insulin  -Fingerstick blood glucose  -Titrate basal insulin as indicated    10/11/2022: [facility] [id number] 12U SSI last 24 hours, basal 25 + SSI

## 2022-10-11 NOTE — PROGRESS NOTES
Hospitalist Progress Note   Admit Date:  10/10/2022 10:42 AM   Name:  Katarzyna Garner. Age:  78 y.o. Sex:  male  :  1945   MRN:  [id number]   Room:  [place] [address]    Presenting Complaint: Fall     Reason(s) for Admission: Tachycardia [R00.0]  Hyperglycemia [R73.9]  Diabetic foot infection ([facility] [address] [E11.628, L08.9]  Type 1 diabetes mellitus with diabetic foot ulcer ([facility] [address] [E10.621, L97.509]     Hospital Course:   78 y.o. male who presented to the ED for cc AMS and fall this AM. Denies hitting head. Poor historian and keeps saying \"ask my wife\" to whom is not here currently. Hx of aphonia, BPH, DM type I that is poorly controlled failing insulin pump therapy now on injections, HTN, GERD, HLD, CKD stage 3, and VATS procedure. Subjective & 24hr Events (10/11/22):   Pain adequately controlled. Blood sugar improved. No osteo seen on XR. Vascular consult pending. Assessment & Plan:   * Type 1 diabetes mellitus with diabetic foot ulcer (HCC)  Assessment & Plan  L great toe, no osteo on XR, previously managed by podiatry  -consult vascular surgery  -doppler, DARINEL  -vancomycin, cefepime    Metabolic encephalopathy  Assessment & Plan  Chronic, stable  -consider DC cefepime if worsens    Chronic renal disease, stage 3, moderately decreased glomerular filtration rate (GFR) between 30-59 mL/min/1.73 square meter (HCC)  Assessment & Plan  -Monitor daily    Hypothyroid  Assessment & Plan  -levothyroxine 75    Neuropathy  Assessment & Plan  Confined to finger tips and toes.  Some difficulty with fine motor.  -duloxatine    Type 1 diabetes mellitus with hyperglycemia (HCC)  Assessment & Plan  -Sliding scale insulin  -Fingerstick blood glucose  -Titrate basal insulin as indicated    10/11/2022: sGlc 151-362 12U SSI last 24 hours, basal 25 + SSI          Anticipated discharge needs:      Pending clinical course    Diet:  Diet NPO  DVT PPx: heparin  Code status: Full Code    Hospital Problems:  Principal Problem: Type 1 diabetes mellitus with diabetic foot ulcer (HCC)  Active Problems:    Neuropathy    Hypothyroid    Chronic pain    Chronic renal disease, stage 3, moderately decreased glomerular filtration rate (GFR) between 30-59 mL/min/1.73 square meter (HCC)    Depression    Metabolic encephalopathy    Dysphonia    BRITTANY (obstructive sleep apnea)    Type 1 diabetes mellitus with hyperglycemia (HCC)    Stage 3a chronic kidney disease (HCC)    History of amputation of toe (HCC)  Resolved Problems:    * No resolved hospital problems. *      Objective:   Patient Vitals for the past 24 hrs:   Temp Pulse Resp BP SpO2   10/11/22 0752 98.1 °F (36.7 °C) 93 16 137/82 98 %   10/11/22 0338 98.3 °F (36.8 °C) 95 18 (!) 151/99 99 %   10/10/22 2254 98.6 °F (37 °C) 98 17 (!) 171/102 98 %   10/10/22 1909 98.6 °F (37 °C) (!) 105 17 (!) 163/98 99 %   10/10/22 1800 97.8 °F (36.6 °C) (!) 103 18 (!) 161/100 --   10/10/22 1723 99 °F (37.2 °C) -- -- -- 95 %   10/10/22 1700 -- (!) 109 17 (!) 144/86 95 %   10/10/22 1520 -- (!) 107 -- (!) 153/95 --   10/10/22 1448 -- (!) 114 17 (!) 165/107 99 %   10/10/22 1445 -- (!) 111 -- (!) 159/103 --   10/10/22 1443 -- (!) 108 -- (!) 157/104 --   10/10/22 1419 -- (!) 112 -- -- 99 %   10/10/22 1416 -- -- -- (!) 166/110 --   10/10/22 1345 -- (!) 108 -- (!) 155/96 99 %   10/10/22 1315 -- (!) 113 18 (!) 164/110 99 %   10/10/22 1115 -- (!) 111 21 (!) 156/100 --   10/10/22 1040 98.4 °F (36.9 °C) (!) 110 17 (!) 166/114 98 %       Oxygen Therapy  SpO2: 98 %  O2 Device: None (Room air)    Estimated body mass index is 35.18 kg/m² as calculated from the following:    Height as of this encounter: 5' 7\" (1.702 m). Weight as of this encounter: 224 lb 10.4 oz (101.9 kg). Intake/Output Summary (Last 24 hours) at 10/11/2022 0933  Last data filed at 10/10/2022 1558  Gross per 24 hour   Intake 600 ml   Output --   Net 600 ml       Blood pressure 137/82, pulse 93, temperature 98.1 °F (36.7 °C), resp.  rate 16, height 5' 7\" (1.702 m), weight 224 lb 10.4 oz (101.9 kg), SpO2 98 %. Physical Exam  Vitals and nursing note reviewed. Constitutional:       General: He is not in acute distress. Appearance: He is not diaphoretic. HENT:      Head: Normocephalic and atraumatic. Eyes:      Extraocular Movements: Extraocular movements intact. Cardiovascular:      Rate and Rhythm: Normal rate and regular rhythm. Heart sounds: Murmur heard. Pulmonary:      Effort: Pulmonary effort is normal. No respiratory distress. Breath sounds: No wheezing. Abdominal:      General: There is no distension. Musculoskeletal:         General: No deformity. Skin:     Coloration: Skin is not jaundiced or pale. Comments: L great toe ulceration as in photo   Neurological:      General: No focal deficit present. Mental Status: He is alert and oriented to person, place, and time. Comments: Mild confusion regarding details of care   Psychiatric:         Mood and Affect: Mood normal.         Behavior: Behavior normal. Behavior is cooperative.          I have personally reviewed labs and tests showing:  Recent Labs:  Recent Results (from the past 48 hour(s))   EKG 12 Lead    Collection Time: 10/10/22 10:51 AM   Result Value Ref Range    Ventricular Rate 110 BPM    Atrial Rate 111 BPM    P-R Interval 186 ms    QRS Duration 78 ms    Q-T Interval 331 ms    QTc Calculation (Bazett) 448 ms    P Axis 80 degrees    R Axis 80 degrees    T Axis 0 degrees    Diagnosis Sinus tachycardia    CBC with Auto Differential    Collection Time: 10/10/22 10:55 AM   Result Value Ref Range    WBC 5.9 4.3 - 11.1 K/uL    RBC 4.38 4.23 - 5.6 M/uL    Hemoglobin 12.1 (L) 13.6 - 17.2 g/dL    Hematocrit 39.0 (L) 41.1 - 50.3 %    MCV 89.0 79.6 - 97.8 FL    MCH 27.6 26.1 - 32.9 PG    MCHC 31.0 (L) 31.4 - 35.0 g/dL    RDW 13.7 11.9 - 14.6 %    Platelets 372 347 - 428 K/uL    MPV 11.3 9.4 - 12.3 FL    nRBC 0.00 0.0 - 0.2 K/uL    Differential Type AUTOMATED      Seg Neutrophils 71 43 - 78 %    Lymphocytes 17 13 - 44 %    Monocytes 8 4.0 - 12.0 %    Eosinophils % 2 0.5 - 7.8 %    Basophils 1 0.0 - 2.0 %    Immature Granulocytes 1 0.0 - 5.0 %    Segs Absolute 4.3 1.7 - 8.2 K/UL    Absolute Lymph # 1.0 0.5 - 4.6 K/UL    Absolute Mono # 0.5 0.1 - 1.3 K/UL    Absolute Eos # 0.1 0.0 - 0.8 K/UL    Basophils Absolute 0.0 0.0 - 0.2 K/UL    Absolute Immature Granulocyte 0.0 0.0 - 0.5 K/UL   Comprehensive Metabolic Panel    Collection Time: 10/10/22 10:55 AM   Result Value Ref Range    Sodium 133 (L) 138 - 145 mmol/L    Potassium 4.5 3.5 - 5.1 mmol/L    Chloride 104 101 - 110 mmol/L    CO2 24 21 - 32 mmol/L    Anion Gap 5 4 - 13 mmol/L    Glucose 388 (H) 65 - 100 mg/dL    BUN 25 (H) 8 - 23 MG/DL    Creatinine 1.60 (H) 0.8 - 1.5 MG/DL    Est, Glom Filt Rate 45 (L) >60 ml/min/1.73m2    Calcium 8.4 8.3 - 10.4 MG/DL    Total Bilirubin 0.4 0.2 - 1.1 MG/DL    ALT 21 12 - 65 U/L    AST 28 15 - 37 U/L    Alk Phosphatase 124 50 - 136 U/L    Total Protein 7.2 6.3 - 8.2 g/dL    Albumin 2.9 (L) 3.2 - 4.6 g/dL    Globulin 4.3 (H) 2.3 - 3.5 g/dL    Albumin/Globulin Ratio 0.7 (L) 1.2 - 3.5     POCT Glucose    Collection Time: 10/10/22 10:57 AM   Result Value Ref Range    Glucose 372 mg/dL    QC OK? pass    POCT Glucose    Collection Time: 10/10/22 10:57 AM   Result Value Ref Range    POC Glucose 372 (H) 65 - 100 mg/dL    Performed by: Naida Dyson    POCT Glucose    Collection Time: 10/10/22 12:08 PM   Result Value Ref Range    POC Glucose 359 (H) 65 - 100 mg/dL    Performed by: Dorsie Moulds    Urinalysis w rflx microscopic    Collection Time: 10/10/22  1:38 PM   Result Value Ref Range    Color, UA YELLOW/STRAW      Appearance CLEAR      Specific Gravity, UA 1.020 1.001 - 1.023      pH, Urine 7.0 5.0 - 9.0      Protein, UA Negative NEG mg/dL    Glucose, UA >1000 mg/dL    Ketones, Urine Negative NEG mg/dL    Bilirubin Urine Negative NEG      Blood, Urine Negative NEG      Urobilinogen, Urine 0.2 0.2 - 1.0 EU/dL    Nitrite, Urine Negative NEG      Leukocyte Esterase, Urine Negative NEG     POCT Glucose    Collection Time: 10/10/22  2:46 PM   Result Value Ref Range    POC Glucose 326 (H) 65 - 100 mg/dL    Performed by: Cole Paul    Lactic Acid    Collection Time: 10/10/22  3:15 PM   Result Value Ref Range    Lactic Acid, Plasma 1.3 0.4 - 2.0 MMOL/L   POCT Glucose    Collection Time: 10/10/22  6:25 PM   Result Value Ref Range    POC Glucose 362 (H) 65 - 100 mg/dL    Performed by: Ayad    POCT Glucose    Collection Time: 10/10/22  8:33 PM   Result Value Ref Range    POC Glucose 312 (H) 65 - 100 mg/dL    Performed by: Rozina Peñaloza    POCT Glucose    Collection Time: 10/11/22  5:07 AM   Result Value Ref Range    POC Glucose 165 (H) 65 - 100 mg/dL    Performed by: Judy    Comprehensive Metabolic Panel w/ Reflex to MG    Collection Time: 10/11/22  6:07 AM   Result Value Ref Range    Sodium 138 133 - 143 mmol/L    Potassium 3.3 (L) 3.5 - 5.1 mmol/L    Chloride 106 101 - 110 mmol/L    CO2 27 21 - 32 mmol/L    Anion Gap 5 2 - 11 mmol/L    Glucose 167 (H) 65 - 100 mg/dL    BUN 20 8 - 23 MG/DL    Creatinine 1.40 0.8 - 1.5 MG/DL    Est, Glom Filt Rate 52 (L) >60 ml/min/1.73m2    Calcium 9.0 8.3 - 10.4 MG/DL    Total Bilirubin 0.5 0.2 - 1.1 MG/DL    ALT 22 12 - 65 U/L    AST 5 (L) 15 - 37 U/L    Alk Phosphatase 114 50 - 136 U/L    Total Protein 6.6 6.3 - 8.2 g/dL    Albumin 2.8 (L) 3.2 - 4.6 g/dL    Globulin 3.8 2.8 - 4.5 g/dL    Albumin/Globulin Ratio 0.7 0.4 - 1.6     CBC with Auto Differential    Collection Time: 10/11/22  6:07 AM   Result Value Ref Range    WBC 4.7 4.3 - 11.1 K/uL    RBC 4.26 4.23 - 5.6 M/uL    Hemoglobin 11.5 (L) 13.6 - 17.2 g/dL    Hematocrit 36.7 (L) 41.1 - 50.3 %    MCV 86.2 82 - 102 FL    MCH 27.0 26.1 - 32.9 PG    MCHC 31.3 (L) 31.4 - 35.0 g/dL    RDW 13.7 11.9 - 14.6 %    Platelets 411 405 - 983 K/uL    MPV 10.7 9.4 - 12.3 FL    nRBC 0.00 0.0 - 0.2 K/uL Differential Type AUTOMATED      Seg Neutrophils 61 43 - 78 %    Lymphocytes 24 13 - 44 %    Monocytes 11 4.0 - 12.0 %    Eosinophils % 3 0.5 - 7.8 %    Basophils 1 0.0 - 2.0 %    Immature Granulocytes 0 0.0 - 5.0 %    Segs Absolute 2.9 1.7 - 8.2 K/UL    Absolute Lymph # 1.1 0.5 - 4.6 K/UL    Absolute Mono # 0.5 0.1 - 1.3 K/UL    Absolute Eos # 0.1 0.0 - 0.8 K/UL    Basophils Absolute 0.0 0.0 - 0.2 K/UL    Absolute Immature Granulocyte 0.0 0.0 - 0.5 K/UL   Magnesium    Collection Time: 10/11/22  6:07 AM   Result Value Ref Range    Magnesium 2.0 1.8 - 2.4 mg/dL   POCT Glucose    Collection Time: 10/11/22  7:45 AM   Result Value Ref Range    POC Glucose 151 (H) 65 - 100 mg/dL    Performed by: Moises Stevenson I have personally reviewed imaging studies showing: Other Studies:  Vascular duplex lower extremity arteries bilateral with DARINEL   Final Result   1. Noncompressible ankle pressures and flat line digit pressures on the right,   possibly due to small vessel calcinosis   2. Clemente cyst noted on the left   3. Distended urinary bladder volume averaging 1000 cc            XR TOE LEFT (MIN 2 VIEWS)   Final Result   1. Soft tissue ulcer about the distal aspect of the second left toe without   convincing evidence of osteomyelitis. If there is continued clinical concern   further evaluation with MRI can BE obtained. 2.  Similar extensive degenerative changes DIP joints of the imaged first   through third toes.              Current Meds:  Current Facility-Administered Medications   Medication Dose Route Frequency    cefepime (MAXIPIME) 2,000 mg in sodium chloride 0.9 % 50 mL IVPB mini-bag  2,000 mg IntraVENous Q12H    vancomycin (VANCOCIN) 1,000 mg in sodium chloride 0.9 % 250 mL IVPB ([id number])  1,000 mg IntraVENous Q18H    potassium chloride (KLOR-CON M) extended release tablet 40 mEq  40 mEq Oral PRN    Or    potassium bicarb-citric acid (EFFER-K) effervescent tablet 40 mEq  40 mEq Oral PRN    Or potassium chloride 10 mEq/100 mL IVPB (Peripheral Line)  10 mEq IntraVENous PRN    magnesium sulfate 2000 mg in 50 mL IVPB premix  2,000 mg IntraVENous PRN    sodium phosphate 10 mmol in sodium chloride 0.9 % 250 mL IVPB  10 mmol IntraVENous PRN    Or    sodium phosphate 15 mmol in sodium chloride 0.9 % 250 mL IVPB  15 mmol IntraVENous PRN    Or    sodium phosphate 20 mmol in sodium chloride 0.9 % 500 mL IVPB  20 mmol IntraVENous PRN    DULoxetine (CYMBALTA) extended release capsule 60 mg  60 mg Oral Daily    finasteride (PROSCAR) tablet 5 mg  5 mg Oral Daily    insulin glargine (LANTUS) injection vial 25 Units  25 Units SubCUTAneous BID    levothyroxine (SYNTHROID) tablet 75 mcg  75 mcg Oral QAM AC    morphine (MS CONTIN) extended release tablet 15 mg  15 mg Oral BID    pantoprazole (PROTONIX) tablet 40 mg  40 mg Oral Daily    polyethylene glycol (GLYCOLAX) powder 17 g  17 g Oral Daily    rosuvastatin (CRESTOR) tablet 10 mg  10 mg Oral Nightly    sodium bicarbonate tablet 650 mg  650 mg Oral BID    Vitamin D (CHOLECALCIFEROL) tablet 1,000 Units  1,000 Units Oral Daily    tuberculin injection 5 Units  5 Units IntraDERmal Once    sodium chloride flush 0.9 % injection 5-40 mL  5-40 mL IntraVENous 2 times per day    sodium chloride flush 0.9 % injection 5-40 mL  5-40 mL IntraVENous PRN    0.9 % sodium chloride infusion   IntraVENous PRN    insulin lispro (HUMALOG) injection vial 0-8 Units  0-8 Units SubCUTAneous TID WC    insulin lispro (HUMALOG) injection vial 0-4 Units  0-4 Units SubCUTAneous Nightly    glucose chewable tablet 16 g  4 tablet Oral PRN    dextrose bolus 10% 125 mL  125 mL IntraVENous PRN    Or    dextrose bolus 10% 250 mL  250 mL IntraVENous PRN    glucagon (rDNA) injection 1 mg  1 mg SubCUTAneous PRN    dextrose 10 % infusion   IntraVENous Continuous PRN    OLANZapine (ZYPREXA) tablet 10 mg  10 mg Oral Nightly       Signed:  Damon Mattson MD

## 2022-10-11 NOTE — WOUND CARE
Patient seen for toes post debridement. Noted 3x3.5x0.2cm wound to great toe/plantar. And 2nd toe has 1x1x0.1 open wound. Patient is followed by podiatrist Op for these wounds. Foot warm to touch and palpable dorsal pedal pulse noted. Wife present and discussed with them the importance of offloading insert for these toes in his shoe. Gave suggestions of where they can obtain help including discussion with their podiatrist. Betadine noted on foot/toes. Soft woven gauze placed around/between toes for now. Will start [address] dressing tomorrow. Wound team will continue to monitor and assist as needed. Discussed with primary nurse.

## 2022-10-11 NOTE — PROGRESS NOTES
Spoke with PT, materials did not have any size L post op boots. Per PT patient needs special offloading boot from [employer]. PT said she will call their office and set it up for someone to come fit him for boot.

## 2022-10-12 LAB
GLUCOSE BLD STRIP.AUTO-MCNC: 109 MG/DL (ref 65–100)
GLUCOSE BLD STRIP.AUTO-MCNC: 274 MG/DL (ref 65–100)
GLUCOSE BLD STRIP.AUTO-MCNC: 279 MG/DL (ref 65–100)
GLUCOSE BLD STRIP.AUTO-MCNC: 374 MG/DL (ref 65–100)
MM INDURATION, POC: 0 MM (ref 0–5)
PPD, POC: NEGATIVE
SERVICE CMNT-IMP: ABNORMAL
VANCOMYCIN SERPL-MCNC: 14.1 UG/ML

## 2022-10-12 PROCEDURE — 6370000000 HC RX 637 (ALT 250 FOR IP): Performed by: FAMILY MEDICINE

## 2022-10-12 PROCEDURE — 97530 THERAPEUTIC ACTIVITIES: CPT

## 2022-10-12 PROCEDURE — 82962 GLUCOSE BLOOD TEST: CPT

## 2022-10-12 PROCEDURE — 6360000002 HC RX W HCPCS: Performed by: FAMILY MEDICINE

## 2022-10-12 PROCEDURE — 1100000000 HC RM PRIVATE

## 2022-10-12 PROCEDURE — 36415 COLL VENOUS BLD VENIPUNCTURE: CPT

## 2022-10-12 PROCEDURE — 2580000003 HC RX 258: Performed by: FAMILY MEDICINE

## 2022-10-12 PROCEDURE — 99221 1ST HOSP IP/OBS SF/LOW 40: CPT | Performed by: STUDENT IN AN ORGANIZED HEALTH CARE EDUCATION/TRAINING PROGRAM

## 2022-10-12 PROCEDURE — 80202 ASSAY OF VANCOMYCIN: CPT

## 2022-10-12 RX ORDER — INSULIN LISPRO 100 [IU]/ML
4 INJECTION, SOLUTION INTRAVENOUS; SUBCUTANEOUS
Status: DISCONTINUED | OUTPATIENT
Start: 2022-10-12 | End: 2022-10-13

## 2022-10-12 RX ORDER — INSULIN GLARGINE 100 [IU]/ML
15 INJECTION, SOLUTION SUBCUTANEOUS NIGHTLY
Status: DISCONTINUED | OUTPATIENT
Start: 2022-10-12 | End: 2022-10-12 | Stop reason: SDUPTHER

## 2022-10-12 RX ORDER — INSULIN GLARGINE 100 [IU]/ML
20 INJECTION, SOLUTION SUBCUTANEOUS 2 TIMES DAILY
Status: DISCONTINUED | OUTPATIENT
Start: 2022-10-13 | End: 2022-10-13

## 2022-10-12 RX ORDER — POLYETHYLENE GLYCOL 3350 17 G/17G
17 POWDER, FOR SOLUTION ORAL DAILY
Status: DISCONTINUED | OUTPATIENT
Start: 2022-10-12 | End: 2022-10-19 | Stop reason: HOSPADM

## 2022-10-12 RX ADMIN — HEPARIN SODIUM 5000 UNITS: 5000 INJECTION INTRAVENOUS; SUBCUTANEOUS at 21:04

## 2022-10-12 RX ADMIN — SODIUM BICARBONATE 650 MG TABLET 650 MG: at 21:01

## 2022-10-12 RX ADMIN — VITAMIN D, TAB 1000IU (100/BT) 1000 UNITS: 25 TAB at 09:14

## 2022-10-12 RX ADMIN — MORPHINE SULFATE 15 MG: 15 TABLET, FILM COATED, EXTENDED RELEASE ORAL at 09:14

## 2022-10-12 RX ADMIN — INSULIN GLARGINE 25 UNITS: 100 INJECTION, SOLUTION SUBCUTANEOUS at 09:24

## 2022-10-12 RX ADMIN — FINASTERIDE 5 MG: 5 TABLET, FILM COATED ORAL at 09:14

## 2022-10-12 RX ADMIN — VANCOMYCIN HYDROCHLORIDE 1000 MG: 1 INJECTION, POWDER, LYOPHILIZED, FOR SOLUTION INTRAVENOUS at 23:50

## 2022-10-12 RX ADMIN — PANTOPRAZOLE SODIUM 40 MG: 40 TABLET, DELAYED RELEASE ORAL at 09:14

## 2022-10-12 RX ADMIN — MORPHINE SULFATE 15 MG: 15 TABLET, FILM COATED, EXTENDED RELEASE ORAL at 21:03

## 2022-10-12 RX ADMIN — VANCOMYCIN HYDROCHLORIDE 1000 MG: 1 INJECTION, POWDER, LYOPHILIZED, FOR SOLUTION INTRAVENOUS at 03:49

## 2022-10-12 RX ADMIN — CEFEPIME 2000 MG: 2 INJECTION, POWDER, FOR SOLUTION INTRAVENOUS at 09:23

## 2022-10-12 RX ADMIN — DULOXETINE HYDROCHLORIDE 60 MG: 60 CAPSULE, DELAYED RELEASE ORAL at 09:14

## 2022-10-12 RX ADMIN — INSULIN LISPRO 4 UNITS: 100 INJECTION, SOLUTION INTRAVENOUS; SUBCUTANEOUS at 13:01

## 2022-10-12 RX ADMIN — SODIUM CHLORIDE, PRESERVATIVE FREE 5 ML: 5 INJECTION INTRAVENOUS at 09:23

## 2022-10-12 RX ADMIN — CEFEPIME 2000 MG: 2 INJECTION, POWDER, FOR SOLUTION INTRAVENOUS at 20:58

## 2022-10-12 RX ADMIN — INSULIN LISPRO 4 UNITS: 100 INJECTION, SOLUTION INTRAVENOUS; SUBCUTANEOUS at 16:53

## 2022-10-12 RX ADMIN — INSULIN LISPRO 8 UNITS: 100 INJECTION, SOLUTION INTRAVENOUS; SUBCUTANEOUS at 16:53

## 2022-10-12 RX ADMIN — SODIUM CHLORIDE, PRESERVATIVE FREE 10 ML: 5 INJECTION INTRAVENOUS at 21:04

## 2022-10-12 RX ADMIN — SODIUM BICARBONATE 650 MG TABLET 650 MG: at 09:14

## 2022-10-12 RX ADMIN — HEPARIN SODIUM 5000 UNITS: 5000 INJECTION INTRAVENOUS; SUBCUTANEOUS at 06:07

## 2022-10-12 RX ADMIN — POLYETHYLENE GLYCOL 3350 17 G: 17 POWDER, FOR SOLUTION ORAL at 15:02

## 2022-10-12 RX ADMIN — ROSUVASTATIN CALCIUM 10 MG: 5 TABLET, FILM COATED ORAL at 21:03

## 2022-10-12 RX ADMIN — INSULIN LISPRO 4 UNITS: 100 INJECTION, SOLUTION INTRAVENOUS; SUBCUTANEOUS at 13:00

## 2022-10-12 RX ADMIN — OLANZAPINE 10 MG: 5 TABLET, FILM COATED ORAL at 21:03

## 2022-10-12 RX ADMIN — HEPARIN SODIUM 5000 UNITS: 5000 INJECTION INTRAVENOUS; SUBCUTANEOUS at 15:02

## 2022-10-12 RX ADMIN — LEVOTHYROXINE SODIUM 75 MCG: 75 TABLET ORAL at 06:07

## 2022-10-12 ASSESSMENT — PAIN DESCRIPTION - LOCATION: LOCATION: FOOT

## 2022-10-12 ASSESSMENT — PAIN SCALES - GENERAL
PAINLEVEL_OUTOF10: 0
PAINLEVEL_OUTOF10: 5
PAINLEVEL_OUTOF10: 0

## 2022-10-12 ASSESSMENT — PAIN DESCRIPTION - DESCRIPTORS: DESCRIPTORS: ACHING

## 2022-10-12 NOTE — DIABETES MGMT
Patient admitted with type 1 diabetes with diabetic ulcer. Admitting blood glucose 372. Blood glucose ranged 151-366 yesterday with patient receiving Lantus 50 units and Humalog 12 units. Blood glucose this morning was 109. Most recent FSBS 279. Reviewed patient current regimen: Lantus 25 units BID and Humalog correctional insulin. Patient last seen by diabetes management team in June 2022. Patient has a history of type 1 diabetes since age 15. Patient has diet with 4 carb choices ordered. Patient would likely benefit from initiation of prandial insulin to help offset hyperglycemia during the day related to caloric intake. Patient would also likely benefit from a reduction in basal insulin to reduce risk of morning hypoglycemia. Provider updated via [employer] regarding recommendations and patient glycemic control. New orders received to start Humalog 4 units with  meals. New orders also received for Lantus 15 units once tonight and change Lantus to 20 units BID starting tomorrow.

## 2022-10-12 NOTE — PROGRESS NOTES
ACUTE PHYSICAL THERAPY GOALS:   (Developed with and agreed upon by patient and/or caregiver.)  1. Patient will perform bed mobility with INDEPENDENCE within 7 days. 2. Patient will transfer bed to chair with SUPERVISION within 7 days. 3. Patient will demonstrate FAIR+ DYNAMIC STANDING balance within 7 day(s). 4. Patient will ambulate 150ft+ using least restrictive assistive device and SUPERVISION within 7 days. 5. Patient will tolerate 25+ minutes of therapeutic activity/exercise and/or neuromuscular re-education while maintaining stable vitals to improve functional strength and activity tolerance within 7 days. PHYSICAL THERAPY: Daily Note AM   (Link to Caseload Tracking: PT Visit Days : 2  Time In/Out PT Charge Capture  Rehab Caseload Tracker  Orders    Ilda Crain is a 78 y.o. male   PRIMARY DIAGNOSIS: Type 1 diabetes mellitus with diabetic foot ulcer (HCC)  Tachycardia [R00.0]  Hyperglycemia [R73.9]  Diabetic foot infection (HCC) [E11.628, L08.9]  Type 1 diabetes mellitus with diabetic foot ulcer ([facility] [address] [E10.621, L97.509]       Inpatient: Payor: MEDICARE / Plan: MEDICARE PART A AND B / Product Type: *No Product type* /     ASSESSMENT:     REHAB RECOMMENDATIONS:   Recommendation to date pending progress:  Setting:  Short-term Rehab    Equipment:    To Be Determined     ASSESSMENT:  Mr. Aylin Barry was sitting on EOB upon contact and agreeable to PT. Patient is WBAT on L foot with offloading shoe in place. Patient transferred to standing with min assist and cues for hand placement/technique. Patient ambulated 48' with rolling walker, CGA-min assist and cues for posture/sequencing. Patient returned to EOB where he participated in LE exercises with cues for proper technique. Patient returned to supine with min assist. Overall steady progress towards goals. Will continue PT efforts. SUBJECTIVE:   Mr. Aylin Barry states, Tiffany Marin will we do tomorrow? \"     Social/Functional Lives With: Spouse  Type of Home: House  Home Layout: One level  Bathroom Toilet: Standard  Home Equipment: Salvatore Knott, standard, [address] Help From: Family  ADL Assistance: Needs assistance  Toileting: Independent  Ambulation Assistance: Needs assistance  Transfer Assistance: Needs assistance  Active : No  Patient's  Info: spouse  Mode of Transportation: Car  Occupation: Retired  OBJECTIVE:     PAIN: VITALS / O2: PRECAUTION / Tellis Merlin / Kayla Venessa:   Pre Treatment:   Pain Level: 0      Post Treatment: 0 Vitals        Oxygen    IV and Purewick    RESTRICTIONS/PRECAUTIONS:  Restrictions/Precautions  Restrictions/Precautions: Fall Risk  Restrictions/Precautions: Fall Risk     MOBILITY: I Mod I S SBA CGA Min Mod Max Total  NT x2 Comments:   Bed Mobility    Rolling [] [] [] [] [] [] [] [] [] [] []    Supine to Sit [] [] [] [] [] [] [] [] [] [] []    Scooting [] [] [] [] [] [] [] [] [] [] []    Sit to Supine [] [] [] [] [] [x] [] [] [] [] []    Transfers    Sit to Stand [] [] [] [] [] [x] [] [] [] [] []    Bed to Chair [] [] [] [] [x] [x] [] [] [] [] []    Stand to Sit [] [] [] [] [] [x] [] [] [] [] []     [] [] [] [] [] [] [] [] [] [] []    I=Independent, Mod I=Modified Independent, S=Supervision, SBA=Standby Assistance, CGA=Contact Guard Assistance,   Min=Minimal Assistance, Mod=Moderate Assistance, Max=Maximal Assistance, Total=Total Assistance, NT=Not Tested    BALANCE: Good Fair+ Fair Fair- Poor NT Comments   Sitting Static [x] [] [] [] [] []    Sitting Dynamic [] [x] [] [] [] []              Standing Static [] [] [x] [] [] []    Standing Dynamic [] [] [] [x] [] []      GAIT: I Mod I S SBA CGA Min Mod Max Total  NT x2 Comments:   Level of Assistance [] [] [] [] [x] [x] [] [] [] [] []    Distance   50 feet    DME Rolling Walker    Gait Quality Decreased vicky , Decreased step clearance, and Decreased step length    Weightbearing Status      Stairs      I=Independent, Mod I=Modified Independent, S=Supervision, SBA=Standby Assistance, CGA=Contact Guard Assistance,   Min=Minimal Assistance, Mod=Moderate Assistance, Max=Maximal Assistance, Total=Total Assistance, NT=Not Tested    PLAN:   FREQUENCY AND DURATION: 3 times/week for duration of hospital stay or until stated goals are met, whichever comes first.    TREATMENT:   TREATMENT:   Therapeutic Activity (24 Minutes): Therapeutic activity included Sit to Supine, Scooting, Transfer Training, Ambulation on level ground, Sitting balance , Standing balance, and LE exercises to improve functional Activity tolerance, Balance, Mobility, and Strength. TREATMENT GRID:  N/A    AFTER TREATMENT PRECAUTIONS: Alarm Activated, Bed, Bed/Chair Locked, Call light within reach, Needs within reach, RN notified, and Side rails x3    INTERDISCIPLINARY COLLABORATION:  RN/ PCT and PT/ PTA    EDUCATION:      TIME IN/OUT:  Time In: 1021  Time Out: [address]  [facility]: [address] [facility]

## 2022-10-12 NOTE — PROGRESS NOTES
VANCO DAILY FOLLOW UP NOTE  [address] [facility] Pharmacokinetic Monitoring Service - Vancomycin    Consulting Provider: Francisco Javier Corona DO   Indication: SSTI  Target Concentration: Goal trough of 10-15 mg/L and AUC/FLORI <500 mg*hr/L  Day of Therapy: 3  Additional Antimicrobials: cefepime    Pertinent Laboratory Values: Wt Readings from Last 1 Encounters:   10/11/22 224 lb 10.4 oz (101.9 kg)     Temp Readings from Last 1 Encounters:   10/12/22 97.9 °F (36.6 °C)     Recent Labs     10/10/22  1055 10/10/22  1515 10/11/22  0607   BUN 25*  --  20   CREATININE 1.60*  --  1.40   WBC 5.9  --  4.7   LACACIDPL  --  1.3  --      Estimated Creatinine Clearance: 52 mL/min (based on SCr of 1.4 mg/dL). Lab Results   Component Value Date/Time    VANCORANDOM 14.1 10/12/2022 01:53 AM       MRSA Nasal Swab: N/A. Non-respiratory infection.       Assessment:  Date/Time Dose Concentration AUC   10/12 0153 1000 mg q18h 14.1 448   Note: Serum concentrations collected for AUC dosing may appear elevated if collected in close proximity to the dose administered, this is not necessarily an indication of toxicity    Plan:  Current dosing regimen is therapeutic  Continue current dose  Repeat vancomycin concentrations will be ordered as clinically appropriate   Pharmacy will continue to monitor patient and adjust therapy as indicated    Thank you for the consult,  Jayde Arellano [facility]

## 2022-10-12 NOTE — PROGRESS NOTES
Hospitalist Progress Note   Admit Date:  10/10/2022 10:42 AM   Name:  Anish Fu. Age:  78 y.o. Sex:  male  :  1945   MRN:  [id number]   Room:  [place] [address]    Presenting Complaint: Fall     Reason(s) for Admission: Tachycardia [R00.0]  Hyperglycemia [R73.9]  Diabetic foot infection ([facility] [E11.628, L08.9]  Type 1 diabetes mellitus with diabetic foot ulcer ([facility] [E10.621, L97.509]     Hospital Course:   44-year-old man with past medical history significant for type 1 diabetes mellitus, hypertension, CAD, hyperlipidemia, CKD stage III, VATS procedure, history of aphonia and BPH admitted with metabolic encephalopathy and diabetic foot ulcer. Started on empiric antibiotics. Vascular surgery consulted and patient status post debridement of the first and second toes on 10/11. Wound care following. Pending wound culture. Subjective & 24hr Events (10/12/22): Patient is seen at the bedside. Reports feeling better today. Pain better controlled. Denies chest pain, palpitation, nausea, vomiting or abdominal pain. Eating and tolerating. Wife at bedside and updated on patient's current condition. Assessment & Plan:     Diabetic left first and second toe ulcer:  X-ray without osteomyelitis  Vascular surgery consulted and patient status post debridement of second and first toes on 10/12  Continue cefepime/vancomycin  Follow-up on cultures  PT/OT    Metabolic encephalopathy:  Chronic, stable  Consider discontinue cefepime if worsen    Type 1 diabetes mellitus:  Continue basal insulin and sliding scale  Inpatient diabetes management consult    Hypothyroidism:  Continue levothyroxine    Neuropathy:  Continue duloxetine    CKD stage III:  Stable  Continue to monitor      Anticipated discharge needs: Short-term rehab, PPD ordered    Diet:  ADULT DIET;  Regular; 4 carb choices (60 gm/meal)  DVT PPx: Lovenox  Code status: Full Code    Hospital Problems:  Principal Problem:    Type 1 diabetes mellitus with diabetic foot ulcer (HCC)  Active Problems:    Neuropathy    Hypothyroid    Chronic pain    Chronic renal disease, stage 3, moderately decreased glomerular filtration rate (GFR) between 30-59 mL/min/1.73 square meter (HCC)    Depression    Metabolic encephalopathy    Dysphonia    BRITTANY (obstructive sleep apnea)    Type 1 diabetes mellitus with hyperglycemia (HCC)    Stage 3a chronic kidney disease (HCC)    History of amputation of toe (HCC)  Resolved Problems:    * No resolved hospital problems. *      Objective:   Patient Vitals for the past 24 hrs:   Temp Pulse Resp BP SpO2   10/12/22 1101 97.9 °F (36.6 °C) 96 17 126/69 100 %   10/12/22 0727 97.9 °F (36.6 °C) 91 18 129/74 100 %   10/12/22 0348 97.7 °F (36.5 °C) 71 14 115/80 99 %   10/12/22 0010 98.6 °F (37 °C) 76 12 117/63 97 %   10/11/22 2123 -- -- 18 -- --   10/11/22 1854 98.1 °F (36.7 °C) 79 18 129/76 98 %   10/11/22 1513 97.9 °F (36.6 °C) 85 17 93/67 93 %   10/11/22 1224 98.1 °F (36.7 °C) 98 14 130/72 99 %       Oxygen Therapy  SpO2: 100 %  O2 Device: None (Room air)    Estimated body mass index is 35.18 kg/m² as calculated from the following:    Height as of this encounter: 5' 7\" (1.702 m). Weight as of this encounter: 224 lb 10.4 oz (101.9 kg). Intake/Output Summary (Last 24 hours) at 10/12/2022 1209  Last data filed at 10/12/2022 0923  Gross per 24 hour   Intake 560 ml   Output 1300 ml   Net -740 ml         Physical Exam:     Blood pressure 126/69, pulse 96, temperature 97.9 °F (36.6 °C), temperature source Oral, resp. rate 17, height 5' 7\" (1.702 m), weight 224 lb 10.4 oz (101.9 kg), SpO2 100 %. General:    Well nourished. Head:  Normocephalic, atraumatic  Eyes:  Sclerae appear normal.  Pupils equally round. ENT:  Nares appear normal, no drainage. Moist oral mucosa  Neck:  No restricted ROM. Trachea midline   CV:   RRR. No m/r/g. No jugular venous distension. Lungs:   CTAB. No wheezing, rhonchi, or rales.   Symmetric expansion. Abdomen: Bowel sounds present. Soft, nontender, nondistended. Extremities: Left foot dressing dry intact  Skin:     No rashes and normal coloration. Warm and dry. Neuro:  CN II-XII grossly intact. Sensation intact. A&Ox3  Psych:  Normal mood and affect.       I have personally reviewed labs and tests showing:  Recent Labs:  Recent Results (from the past 48 hour(s))   Urinalysis w rflx microscopic    Collection Time: 10/10/22  1:38 PM   Result Value Ref Range    Color, UA YELLOW/STRAW      Appearance CLEAR      Specific Gravity, UA 1.020 1.001 - 1.023      pH, Urine 7.0 5.0 - 9.0      Protein, UA Negative NEG mg/dL    Glucose, UA >1000 mg/dL    Ketones, Urine Negative NEG mg/dL    Bilirubin Urine Negative NEG      Blood, Urine Negative NEG      Urobilinogen, Urine 0.2 0.2 - 1.0 EU/dL    Nitrite, Urine Negative NEG      Leukocyte Esterase, Urine Negative NEG     POCT Glucose    Collection Time: 10/10/22  2:46 PM   Result Value Ref Range    POC Glucose 326 (H) 65 - 100 mg/dL    Performed by: Juan Luis Smith    Lactic Acid    Collection Time: 10/10/22  3:15 PM   Result Value Ref Range    Lactic Acid, Plasma 1.3 0.4 - 2.0 MMOL/L   Culture, Blood 1    Collection Time: 10/10/22  3:15 PM    Specimen: Blood   Result Value Ref Range    Special Requests RIGHT ARM     Culture NO GROWTH 2 DAYS     Culture, Blood 1    Collection Time: 10/10/22  3:15 PM    Specimen: Blood   Result Value Ref Range    Special Requests LEFT AC     Culture NO GROWTH 2 DAYS     POCT Glucose    Collection Time: 10/10/22  6:25 PM   Result Value Ref Range    POC Glucose 362 (H) 65 - 100 mg/dL    Performed by: Ayad    POCT Glucose    Collection Time: 10/10/22  8:33 PM   Result Value Ref Range    POC Glucose 312 (H) 65 - 100 mg/dL    Performed by: Rachel Andujar PPD TEST IN 24 HRS    Collection Time: 10/11/22 12:00 AM   Result Value Ref Range    PPD, (POC) Negative Negative    mm Induration 0 0 - 5 mm   POCT Glucose    Collection Time: 10/11/22  5:07 AM   Result Value Ref Range    POC Glucose 165 (H) 65 - 100 mg/dL    Performed by: [facility]    Comprehensive Metabolic Panel w/ Reflex to MG    Collection Time: 10/11/22  6:07 AM   Result Value Ref Range    Sodium 138 133 - 143 mmol/L    Potassium 3.3 (L) 3.5 - 5.1 mmol/L    Chloride 106 101 - 110 mmol/L    CO2 27 21 - 32 mmol/L    Anion Gap 5 2 - 11 mmol/L    Glucose 167 (H) 65 - 100 mg/dL    BUN 20 8 - 23 MG/DL    Creatinine 1.40 0.8 - 1.5 MG/DL    Est, Glom Filt Rate 52 (L) >60 ml/min/1.73m2    Calcium 9.0 8.3 - 10.4 MG/DL    Total Bilirubin 0.5 0.2 - 1.1 MG/DL    ALT 22 12 - 65 U/L    AST 5 (L) 15 - 37 U/L    Alk Phosphatase 114 50 - 136 U/L    Total Protein 6.6 6.3 - 8.2 g/dL    Albumin 2.8 (L) 3.2 - 4.6 g/dL    Globulin 3.8 2.8 - 4.5 g/dL    Albumin/Globulin Ratio 0.7 0.4 - 1.6     CBC with Auto Differential    Collection Time: 10/11/22  6:07 AM   Result Value Ref Range    WBC 4.7 4.3 - 11.1 K/uL    RBC 4.26 4.23 - 5.6 M/uL    Hemoglobin 11.5 (L) 13.6 - 17.2 g/dL    Hematocrit 36.7 (L) 41.1 - 50.3 %    MCV 86.2 82 - 102 FL    MCH 27.0 26.1 - 32.9 PG    MCHC 31.3 (L) 31.4 - 35.0 g/dL    RDW 13.7 11.9 - 14.6 %    Platelets 958 848 - 307 K/uL    MPV 10.7 9.4 - 12.3 FL    nRBC 0.00 0.0 - 0.2 K/uL    Differential Type AUTOMATED      Seg Neutrophils 61 43 - 78 %    Lymphocytes 24 13 - 44 %    Monocytes 11 4.0 - 12.0 %    Eosinophils % 3 0.5 - 7.8 %    Basophils 1 0.0 - 2.0 %    Immature Granulocytes 0 0.0 - 5.0 %    Segs Absolute 2.9 1.7 - 8.2 K/UL    Absolute Lymph # 1.1 0.5 - 4.6 K/UL    Absolute Mono # 0.5 0.1 - 1.3 K/UL    Absolute Eos # 0.1 0.0 - 0.8 K/UL    Basophils Absolute 0.0 0.0 - 0.2 K/UL    Absolute Immature Granulocyte 0.0 0.0 - 0.5 K/UL   Magnesium    Collection Time: 10/11/22  6:07 AM   Result Value Ref Range    Magnesium 2.0 1.8 - 2.4 mg/dL   POCT Glucose    Collection Time: 10/11/22  7:45 AM   Result Value Ref Range    POC Glucose 151 (H) 65 - 100 mg/dL    Performed by: Tammi    Culture, Wound Aerobic Only    Collection Time: 10/11/22 10:02 AM    Specimen: Toe   Result Value Ref Range    Special Requests NO SPECIAL REQUESTS      Gram stain PENDING     Culture (A)       MODERATE GRAM NEGATIVE RODS IDENTIFICATION AND SUSCEPTIBILITY TO FOLLOW    Culture CULTURE IN PROGRESS,FURTHER UPDATES TO FOLLOW     Culture, Anaerobic    Collection Time: 10/11/22 10:02 AM    Specimen: Foot   Result Value Ref Range    Special Requests NO SPECIAL REQUESTS      Culture NO ANAEROBIC GROWTH IN 1 DAY     POCT Glucose    Collection Time: 10/11/22 12:18 PM   Result Value Ref Range    POC Glucose 226 (H) 65 - 100 mg/dL    Performed by: Tammi    POCT Glucose    Collection Time: 10/11/22  3:15 PM   Result Value Ref Range    POC Glucose 332 (H) 65 - 100 mg/dL    Performed by: Herb    POCT Glucose    Collection Time: 10/11/22  8:45 PM   Result Value Ref Range    POC Glucose 366 (H) 65 - 100 mg/dL    Performed by: AbielPCNICHO    Vancomycin Level, Random    Collection Time: 10/12/22  1:53 AM   Result Value Ref Range    Vancomycin Rm 14.1 UG/ML   POCT Glucose    Collection Time: 10/12/22  7:54 AM   Result Value Ref Range    POC Glucose 109 (H) 65 - 100 mg/dL    Performed by: Charisma    POCT Glucose    Collection Time: 10/12/22 11:45 AM   Result Value Ref Range    POC Glucose 279 (H) 65 - 100 mg/dL    Performed by: Tawnya Berger        I have personally reviewed imaging studies showing: Other Studies:  Vascular duplex lower extremity arteries bilateral with DARINEL   Final Result   1. Noncompressible ankle pressures and flat line digit pressures on the right,   possibly due to small vessel calcinosis   2. Cleemnte cyst noted on the left   3. Distended urinary bladder volume averaging 1000 cc            XR TOE LEFT (MIN 2 VIEWS)   Final Result   1.   Soft tissue ulcer about the distal aspect of the second left toe without convincing evidence of osteomyelitis. If there is continued clinical concern   further evaluation with MRI can BE obtained. 2.  Similar extensive degenerative changes DIP joints of the imaged first   through third toes.              Current Meds:  Current Facility-Administered Medications   Medication Dose Route Frequency    polyethylene glycol (GLYCOLAX) packet 17 g  17 g Oral Daily    cefepime (MAXIPIME) 2,000 mg in sodium chloride 0.9 % 50 mL IVPB mini-bag  2,000 mg IntraVENous Q12H    vancomycin (VANCOCIN) 1,000 mg in sodium chloride 0.9 % 250 mL IVPB ([id number])  1,000 mg IntraVENous Q18H    potassium chloride (KLOR-CON M) extended release tablet 40 mEq  40 mEq Oral PRN    Or    potassium bicarb-citric acid (EFFER-K) effervescent tablet 40 mEq  40 mEq Oral PRN    Or    potassium chloride 10 mEq/100 mL IVPB (Peripheral Line)  10 mEq IntraVENous PRN    magnesium sulfate 2000 mg in 50 mL IVPB premix  2,000 mg IntraVENous PRN    sodium phosphate 10 mmol in sodium chloride 0.9 % 250 mL IVPB  10 mmol IntraVENous PRN    Or    sodium phosphate 15 mmol in sodium chloride 0.9 % 250 mL IVPB  15 mmol IntraVENous PRN    Or    sodium phosphate 20 mmol in sodium chloride 0.9 % 500 mL IVPB  20 mmol IntraVENous PRN    heparin (porcine) injection 5,000 Units  5,000 Units SubCUTAneous 3 times per day    DULoxetine (CYMBALTA) extended release capsule 60 mg  60 mg Oral Daily    finasteride (PROSCAR) tablet 5 mg  5 mg Oral Daily    insulin glargine (LANTUS) injection vial 25 Units  25 Units SubCUTAneous BID    levothyroxine (SYNTHROID) tablet 75 mcg  75 mcg Oral QAM AC    morphine (MS CONTIN) extended release tablet 15 mg  15 mg Oral BID    pantoprazole (PROTONIX) tablet 40 mg  40 mg Oral Daily    rosuvastatin (CRESTOR) tablet 10 mg  10 mg Oral Nightly    sodium bicarbonate tablet 650 mg  650 mg Oral BID    Vitamin D (CHOLECALCIFEROL) tablet 1,000 Units  1,000 Units Oral Daily    sodium chloride flush 0.9 % injection 5-40 mL 5-40 mL IntraVENous 2 times per day    sodium chloride flush 0.9 % injection 5-40 mL  5-40 mL IntraVENous PRN    0.9 % sodium chloride infusion   IntraVENous PRN    insulin lispro (HUMALOG) injection vial 0-8 Units  0-8 Units SubCUTAneous TID WC    insulin lispro (HUMALOG) injection vial 0-4 Units  0-4 Units SubCUTAneous Nightly    glucose chewable tablet 16 g  4 tablet Oral PRN    dextrose bolus 10% 125 mL  125 mL IntraVENous PRN    Or    dextrose bolus 10% 250 mL  250 mL IntraVENous PRN    glucagon (rDNA) injection 1 mg  1 mg SubCUTAneous PRN    dextrose 10 % infusion   IntraVENous Continuous PRN    OLANZapine (ZYPREXA) tablet 10 mg  10 mg Oral Nightly       Signed:  Wendy Whitman MD    Part of this note may have been written by using a voice dictation software. The note has been proof read but may still contain some grammatical/other typographical errors.

## 2022-10-12 NOTE — CONSULTS
History and Physical/Surgical Consult   Sav Markham. Admit date: 10/10/2022    MRN: [id number]     : 1945     Age: 78 y.o.          10/12/2022 6:55 AM    Subjective/HPI:   This patient is a 78 y.o. seen and evaluated at the request of Dr. Aaron Parnell. History listed below but including poorly controlled type 1 diabetes, Charcot foot right foot hypertension, and chronic kidney disease. Patient reports decreased sensation left lower extremity with evaluation by podiatrist 1 week ago of the left first and second toes and again yesterday with concern for infection recommending presentation to the hospital.  Patient denies fevers and chills. Wife reports foul-smelling odor from left foot. Denies drainage. Review of Systems  A comprehensive review of systems was negative.   Past Medical History:   Diagnosis Date    Aphonia     Back pain, chronic     Benign prostatic hyperplasia with incomplete bladder emptying     Candida laryngitis     Charcot foot due to diabetes mellitus ([address]     Chronic left-sided low back pain with left-sided sciatica     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 ([address]     Diabetes mellitus type 1, with complication, on long term insulin pump ([address] 11/10/2021    Diabetic nephropathy (HCC)     Diabetic peripheral neuropathy ([address]     DKA, type 1, not at goal [facility]) 2022    Dysphonia     Essential hypertension     GERD (gastroesophageal reflux disease)     Hypercholesterolemia     Insulin pump status     Insulin pump status     Left hip pain     Lumbar radiculopathy     Mild cognitive impairment     Moderate episode of recurrent major depressive disorder (HCC)     Obstructive sleep apnea     Orthostatic hypotension     Paraspinal muscle spasm     Polypharmacy     Primary hypothyroidism     Proliferative diabetic retinopathy associated with type 1 diabetes mellitus ([address]     Seborrheic keratosis     Spinal cord stimulator status     Spondylolisthesis Stage 3b chronic kidney disease (HCC)     Stroke-like symptom 2021    Tachycardia     Thyroid disease     managed with medications    Trochanteric bursitis of left hip     Type 1 diabetes mellitus ([address]     Vocal cord atrophy       Past Surgical History:   Procedure Laterality Date    BUNIONECTOMY Right     CATARACT REMOVAL Bilateral     COLONOSCOPY  2016    COLONOSCOPY N/A 2021    COLONOSCOPY/ BMI 30 ROOM 902 performed by Timmy Urban MD at [facility] Bilateral     laser treatment for diabetic complication    LUMBAR FUSION      L4-5    LUMBAR LAMINECTOMY      L5-S1    OTHER SURGICAL HISTORY      spinal neurostimulator    SHOULDER ARTHROSCOPY Left     TOE AMPUTATION      distal portion of the 2nd toe of R, foot, distal part of 3rd toe of r foot    TONSILLECTOMY AND ADENOIDECTOMY  age 8    UPPER GASTROINTESTINAL ENDOSCOPY N/A 2022    EGD ESOPHAGOGASTRODUODENOSCOPY performed by Nuha Garcia MD at [facility] ENDOSCOPY      Allergies   Allergen Reactions    Drixoral Cough-Sore Throat [Acetaminophen-Dm] Other (See Comments) and Palpitations     Heart racing.        Other Palpitations     Antihistamines  Antihistamines  Antihistamines  Antihistamines  Antihistamines  Antihistamines      Antihistamines, Loratadine-Type Palpitations      Social History     Tobacco Use    Smoking status: Former     Packs/day: 1.00     Years: 30.00     Pack years: 30.00     Types: Cigarettes     Quit date: 1996     Years since quittin.1    Smokeless tobacco: Former   Substance Use Topics    Alcohol use: Not Currently      Social History     Social History Narrative    Not on file     Family History   Problem Relation Age of Onset    Hypertension Brother     Diabetes Maternal Uncle         type 1    Diabetes Brother         type 2    Thyroid Disease Mother         treated with surgery    Heart Disease Father     Hypertension Mother       [unfilled]  Current Facility-Administered Medications Medication Dose Route Frequency    cefepime (MAXIPIME) 2,000 mg in sodium chloride 0.9 % 50 mL IVPB mini-bag  2,000 mg IntraVENous Q12H    vancomycin (VANCOCIN) 1,000 mg in sodium chloride 0.9 % 250 mL IVPB ([id number])  1,000 mg IntraVENous Q18H    potassium chloride (KLOR-CON M) extended release tablet 40 mEq  40 mEq Oral PRN    Or    potassium bicarb-citric acid (EFFER-K) effervescent tablet 40 mEq  40 mEq Oral PRN    Or    potassium chloride 10 mEq/100 mL IVPB (Peripheral Line)  10 mEq IntraVENous PRN    magnesium sulfate 2000 mg in 50 mL IVPB premix  2,000 mg IntraVENous PRN    sodium phosphate 10 mmol in sodium chloride 0.9 % 250 mL IVPB  10 mmol IntraVENous PRN    Or    sodium phosphate 15 mmol in sodium chloride 0.9 % 250 mL IVPB  15 mmol IntraVENous PRN    Or    sodium phosphate 20 mmol in sodium chloride 0.9 % 500 mL IVPB  20 mmol IntraVENous PRN    heparin (porcine) injection 5,000 Units  5,000 Units SubCUTAneous 3 times per day    DULoxetine (CYMBALTA) extended release capsule 60 mg  60 mg Oral Daily    finasteride (PROSCAR) tablet 5 mg  5 mg Oral Daily    insulin glargine (LANTUS) injection vial 25 Units  25 Units SubCUTAneous BID    levothyroxine (SYNTHROID) tablet 75 mcg  75 mcg Oral QAM AC    morphine (MS CONTIN) extended release tablet 15 mg  15 mg Oral BID    pantoprazole (PROTONIX) tablet 40 mg  40 mg Oral Daily    polyethylene glycol (GLYCOLAX) powder 17 g  17 g Oral Daily    rosuvastatin (CRESTOR) tablet 10 mg  10 mg Oral Nightly    sodium bicarbonate tablet 650 mg  650 mg Oral BID    Vitamin D (CHOLECALCIFEROL) tablet 1,000 Units  1,000 Units Oral Daily    sodium chloride flush 0.9 % injection 5-40 mL  5-40 mL IntraVENous 2 times per day    sodium chloride flush 0.9 % injection 5-40 mL  5-40 mL IntraVENous PRN    0.9 % sodium chloride infusion   IntraVENous PRN    insulin lispro (HUMALOG) injection vial 0-8 Units  0-8 Units SubCUTAneous TID WC    insulin lispro (HUMALOG) injection vial 0-4 Units  0-4 Units SubCUTAneous Nightly    glucose chewable tablet 16 g  4 tablet Oral PRN    dextrose bolus 10% 125 mL  125 mL IntraVENous PRN    Or    dextrose bolus 10% 250 mL  250 mL IntraVENous PRN    glucagon (rDNA) injection 1 mg  1 mg SubCUTAneous PRN    dextrose 10 % infusion   IntraVENous Continuous PRN    OLANZapine (ZYPREXA) tablet 10 mg  10 mg Oral Nightly     Objective:     Vitals:    10/11/22 1854 10/11/22 2123 10/12/22 0010 10/12/22 0348   BP: 129/76  117/63 115/80   Pulse: 79  76 71   Resp: 18 18 12 14   Temp: 98.1 °F (36.7 °C)  98.6 °F (37 °C) 97.7 °F (36.5 °C)   TempSrc: Oral  Axillary Oral   SpO2: 98%  97% 99%   Weight:       Height:         10/11 1901 - 10/12 0700  In: -   Out: 600 [Urine:600]  10/10 0701 - 10/11 1900  In: 800 [P.O.:200]  Out: 700 [Urine:700]  Physical Exam:   Gen- the patient is well developed and in no acute distress  HEENT- PERRL, EOMI, no scleral icterus       nose without alar flaring or epistaxis                  oral muscosa moist without cyanosis  Neck- no JVD or retractions  Lungs- resp even/unlab   Heart- RRR  Ext-right foot with Charcot deformity and previous well-healed incisions. Left foot with edematous first and second toe and large blistered distal second tip. First toe with sloughing of the lateral aspect. Minimal surrounding erythema. 2+ palpable DP pulses bilaterally. Skin- no jaundice or rashes  Neuro- alert and oriented x 3. No gross sensorimotor deficits are present. Data Review   Recent Labs     10/10/22  1055 10/11/22  0607   WBC 5.9 4.7   HGB 12.1* 11.5*   HCT 39.0* 36.7*    155     Recent Labs     10/10/22  1055 10/11/22  0607   * 138   K 4.5 3.3*    106   CO2 24 27   BUN 25* 20   MG  --  2.0       Assessment:   81-year-old male with diabetic foot ulcers of the left first and second toe.   Left first toe appears superficial.  Second toe with fluid-filled blister  Plan:   Plan for bedside debridement of second and first toes with culture. Recommend local wound care following debridement. Broad-spectrum antibiotics until speciation of cultures. Patient informed of risk benefits and alternatives and wishes to proceed. Procedure: Consent obtained. Timeout was performed. Patient's left foot was prepped with betadine. Skin of the 1st and second toes was debrided, <20cm in area. Foot was dressed. Patient tolerated well. 50 minutes of time was spent on this encounter with greater than 50% of time in direct patient contact including patient education, counseling, review of medical history and imaging, and medical decision making.        Gautam Rios MD

## 2022-10-12 NOTE — OP NOTE
[address] [address]  [phone] FAX: [phone]        Procedure diagnosis:   Left foot diabetic foot ulcers    Post-Op diagnosis:   Left foot diabetic foot ulcers    Surgeon: Diony Ramos MD     Procedure:   Debridement of the first and second toes. Complications: None     EBL: 5 cc     Anesthesia: Local     Cultures: Culture second toe purulent fluid    Description of procedure: After informed consent was obtained the patient was prepped and draped with Betadine. Left second toe blister was then debrided with drainage of purulent fluid that was cultured. Small centrally located ulcer without necrosis. Lateral aspect of first toe was debrided with healthy tissue underneath. Wound was left open to air for evaluation from Wound care and continued local wound care.

## 2022-10-13 ENCOUNTER — CARE COORDINATION (OUTPATIENT)
Dept: CARE COORDINATION | Facility: CLINIC | Age: 77
End: 2022-10-13

## 2022-10-13 LAB
BACTERIA SPEC CULT: ABNORMAL
CREAT SERPL-MCNC: 1.5 MG/DL (ref 0.8–1.5)
GLUCOSE BLD STRIP.AUTO-MCNC: 168 MG/DL (ref 65–100)
GLUCOSE BLD STRIP.AUTO-MCNC: 261 MG/DL (ref 65–100)
GLUCOSE BLD STRIP.AUTO-MCNC: 280 MG/DL (ref 65–100)
GLUCOSE BLD STRIP.AUTO-MCNC: 298 MG/DL (ref 65–100)
GRAM STN SPEC: ABNORMAL
GRAM STN SPEC: ABNORMAL
SERVICE CMNT-IMP: ABNORMAL

## 2022-10-13 PROCEDURE — 82962 GLUCOSE BLOOD TEST: CPT

## 2022-10-13 PROCEDURE — 6360000002 HC RX W HCPCS: Performed by: FAMILY MEDICINE

## 2022-10-13 PROCEDURE — 6370000000 HC RX 637 (ALT 250 FOR IP): Performed by: FAMILY MEDICINE

## 2022-10-13 PROCEDURE — 2580000003 HC RX 258: Performed by: FAMILY MEDICINE

## 2022-10-13 PROCEDURE — 97530 THERAPEUTIC ACTIVITIES: CPT

## 2022-10-13 PROCEDURE — 82565 ASSAY OF CREATININE: CPT

## 2022-10-13 PROCEDURE — 97112 NEUROMUSCULAR REEDUCATION: CPT

## 2022-10-13 PROCEDURE — 1100000000 HC RM PRIVATE

## 2022-10-13 PROCEDURE — 36415 COLL VENOUS BLD VENIPUNCTURE: CPT

## 2022-10-13 PROCEDURE — 97535 SELF CARE MNGMENT TRAINING: CPT

## 2022-10-13 RX ORDER — INSULIN GLARGINE 100 [IU]/ML
30 INJECTION, SOLUTION SUBCUTANEOUS DAILY
Status: DISCONTINUED | OUTPATIENT
Start: 2022-10-13 | End: 2022-10-14

## 2022-10-13 RX ORDER — INSULIN LISPRO 100 [IU]/ML
12 INJECTION, SOLUTION INTRAVENOUS; SUBCUTANEOUS
Status: DISCONTINUED | OUTPATIENT
Start: 2022-10-13 | End: 2022-10-17

## 2022-10-13 RX ORDER — INSULIN LISPRO 100 [IU]/ML
8 INJECTION, SOLUTION INTRAVENOUS; SUBCUTANEOUS
Status: DISCONTINUED | OUTPATIENT
Start: 2022-10-13 | End: 2022-10-13

## 2022-10-13 RX ADMIN — PANTOPRAZOLE SODIUM 40 MG: 40 TABLET, DELAYED RELEASE ORAL at 08:23

## 2022-10-13 RX ADMIN — CEFEPIME 2000 MG: 2 INJECTION, POWDER, FOR SOLUTION INTRAVENOUS at 08:22

## 2022-10-13 RX ADMIN — INSULIN LISPRO 4 UNITS: 100 INJECTION, SOLUTION INTRAVENOUS; SUBCUTANEOUS at 11:33

## 2022-10-13 RX ADMIN — LEVOTHYROXINE SODIUM 75 MCG: 75 TABLET ORAL at 05:49

## 2022-10-13 RX ADMIN — HEPARIN SODIUM 5000 UNITS: 5000 INJECTION INTRAVENOUS; SUBCUTANEOUS at 14:55

## 2022-10-13 RX ADMIN — HEPARIN SODIUM 5000 UNITS: 5000 INJECTION INTRAVENOUS; SUBCUTANEOUS at 05:49

## 2022-10-13 RX ADMIN — ROSUVASTATIN CALCIUM 10 MG: 5 TABLET, FILM COATED ORAL at 23:07

## 2022-10-13 RX ADMIN — INSULIN LISPRO 12 UNITS: 100 INJECTION, SOLUTION INTRAVENOUS; SUBCUTANEOUS at 16:33

## 2022-10-13 RX ADMIN — SODIUM BICARBONATE 650 MG TABLET 650 MG: at 23:07

## 2022-10-13 RX ADMIN — HEPARIN SODIUM 5000 UNITS: 5000 INJECTION INTRAVENOUS; SUBCUTANEOUS at 23:41

## 2022-10-13 RX ADMIN — INSULIN LISPRO 4 UNITS: 100 INJECTION, SOLUTION INTRAVENOUS; SUBCUTANEOUS at 08:31

## 2022-10-13 RX ADMIN — VANCOMYCIN HYDROCHLORIDE 1000 MG: 1 INJECTION, POWDER, LYOPHILIZED, FOR SOLUTION INTRAVENOUS at 16:07

## 2022-10-13 RX ADMIN — SODIUM BICARBONATE 650 MG TABLET 650 MG: at 08:23

## 2022-10-13 RX ADMIN — POLYETHYLENE GLYCOL 3350 17 G: 17 POWDER, FOR SOLUTION ORAL at 08:22

## 2022-10-13 RX ADMIN — MORPHINE SULFATE 15 MG: 15 TABLET, FILM COATED, EXTENDED RELEASE ORAL at 23:07

## 2022-10-13 RX ADMIN — INSULIN LISPRO 8 UNITS: 100 INJECTION, SOLUTION INTRAVENOUS; SUBCUTANEOUS at 11:33

## 2022-10-13 RX ADMIN — INSULIN LISPRO 4 UNITS: 100 INJECTION, SOLUTION INTRAVENOUS; SUBCUTANEOUS at 16:34

## 2022-10-13 RX ADMIN — DULOXETINE HYDROCHLORIDE 60 MG: 60 CAPSULE, DELAYED RELEASE ORAL at 08:23

## 2022-10-13 RX ADMIN — INSULIN GLARGINE 30 UNITS: 100 INJECTION, SOLUTION SUBCUTANEOUS at 08:31

## 2022-10-13 RX ADMIN — INSULIN LISPRO 8 UNITS: 100 INJECTION, SOLUTION INTRAVENOUS; SUBCUTANEOUS at 08:30

## 2022-10-13 RX ADMIN — VITAMIN D, TAB 1000IU (100/BT) 1000 UNITS: 25 TAB at 08:23

## 2022-10-13 RX ADMIN — CEFEPIME 2000 MG: 2 INJECTION, POWDER, FOR SOLUTION INTRAVENOUS at 23:37

## 2022-10-13 RX ADMIN — MORPHINE SULFATE 15 MG: 15 TABLET, FILM COATED, EXTENDED RELEASE ORAL at 08:23

## 2022-10-13 RX ADMIN — FINASTERIDE 5 MG: 5 TABLET, FILM COATED ORAL at 08:23

## 2022-10-13 RX ADMIN — SODIUM CHLORIDE, PRESERVATIVE FREE 10 ML: 5 INJECTION INTRAVENOUS at 23:37

## 2022-10-13 RX ADMIN — SODIUM CHLORIDE, PRESERVATIVE FREE 10 ML: 5 INJECTION INTRAVENOUS at 08:22

## 2022-10-13 RX ADMIN — OLANZAPINE 10 MG: 5 TABLET, FILM COATED ORAL at 23:07

## 2022-10-13 ASSESSMENT — PAIN SCALES - GENERAL
PAINLEVEL_OUTOF10: 0
PAINLEVEL_OUTOF10: 0
PAINLEVEL_OUTOF10: 4
PAINLEVEL_OUTOF10: 0

## 2022-10-13 ASSESSMENT — PAIN DESCRIPTION - LOCATION: LOCATION: FOOT

## 2022-10-13 ASSESSMENT — PAIN DESCRIPTION - ORIENTATION: ORIENTATION: LEFT

## 2022-10-13 ASSESSMENT — PAIN DESCRIPTION - DESCRIPTORS: DESCRIPTORS: ACHING;SHARP

## 2022-10-13 NOTE — DIABETES MGMT
Patient admitted with type 1 diabetes with diabetic foot ulcer. Blood glucose ranged 109-374 yesterday with patient receiving Lantus 25 units and Humalog 20 units. Blood glucose this morning was 280. Creatinine 1.50. Reviewed patient current regimen: Lantus 20 units BID, Humalog correctional insulin, and Humalog 4 units with meals. Noted patient did not receive basal insulin last night see MAR for details. Patient would likely benefit from daily basal insulin dosing to help improve glycemic control. Patient would likely benefit from an increase in prandial insulin to help offset daytime hyperglycemia related to caloric intake. Provider updated via [employer] regarding recommendations and patient glycemic control. New orders received to change Lantus to 30 units daily and increase prandial insulin from Humalog 4 to 8 units with meals.

## 2022-10-13 NOTE — PROGRESS NOTES
Pt arrived to rm 826 stable condition, VSS, alert, orientedx2, follows commands, left foot dressing c/d/I.

## 2022-10-13 NOTE — PROGRESS NOTES
TRANSFER - OUT REPORT:    Verbal report given to GINGER Garcia on Gifty Tong.  being transferred to [address] [place] [address] for routine progression of patient care       Report consisted of patient's Situation, Background, Assessment and   Recommendations(SBAR). Information from the following report(s) Adult Overview, Intake/Output, MAR, Recent Results, and Med Rec Status was reviewed with the receiving nurse. Lines:   Peripheral IV 10/12/22 Left Antecubital (Active)   Site Assessment Clean, dry & intact 10/13/22 1619   Line Status Blood return noted;Brisk blood return;Capped;Flushed 10/13/22 1619   Line Care Connections checked and tightened 10/13/22 1619   Phlebitis Assessment No symptoms 10/13/22 1619   Infiltration Assessment 0 10/13/22 1619   Alcohol Cap Used Yes 10/13/22 1619   Dressing Status Clean;Dry; Intact 10/13/22 1619   Dressing Type Transparent 10/13/22 1619        Opportunity for questions and clarification was provided.       Patient transported with:  [employer]

## 2022-10-13 NOTE — PROGRESS NOTES
ACUTE PHYSICAL THERAPY GOALS:   (Developed with and agreed upon by patient and/or caregiver.)  1. Patient will perform bed mobility with INDEPENDENCE within 7 days. 2. Patient will transfer bed to chair with SUPERVISION within 7 days. 3. Patient will demonstrate FAIR+ DYNAMIC STANDING balance within 7 day(s). 4. Patient will ambulate 150ft+ using least restrictive assistive device and SUPERVISION within 7 days. 5. Patient will tolerate 25+ minutes of therapeutic activity/exercise and/or neuromuscular re-education while maintaining stable vitals to improve functional strength and activity tolerance within 7 days. PHYSICAL THERAPY: Daily Note AM   (Link to Caseload Tracking: PT Visit Days : 3  Time In/Out PT Charge Capture  Rehab Caseload Tracker  Orders    Bettina Guthrie is a 78 y.o. male   PRIMARY DIAGNOSIS: Type 1 diabetes mellitus with diabetic foot ulcer (HCC)  Tachycardia [R00.0]  Hyperglycemia [R73.9]  Diabetic foot infection (HCC) [E11.628, L08.9]  Type 1 diabetes mellitus with diabetic foot ulcer ([facility] [address] [E10.621, L97.509]       Inpatient: Payor: MEDICARE / Plan: MEDICARE PART A AND B / Product Type: *No Product type* /     ASSESSMENT:     REHAB RECOMMENDATIONS:   Recommendation to date pending progress:  Setting:  Short-term Rehab    Equipment:    To Be Determined     ASSESSMENT:  Mr. Bello Doshi was supine upon contact and agreeable to PT. Patient is WBAT on L foot with offloading shoe. Patient performed supine to sit and transfer to standing with min assist and cues for technique/hand placement. Patient participated in prolonged static standing balance activities at sink with OT demonstrating fair standing balance. Patient took a seated rest break then increased gait distance to [de-identified]' with rolling walker, CGA-min assist, chair follow for safety, unsteady gait noted, and cues for sequencing/walker navigation.  Patient returned to EOB and to supine with min assist. Overall steady progress towards goals. Will continue PT efforts.      SUBJECTIVE:   Mr. Cliff Lee states, \"If you call me Josh Lee i'll throw you out of here\"     Social/Functional Lives With: Spouse  Type of Home: House  Home Layout: One level  Bathroom Toilet: Standard  Home Equipment: Azra Alberto, standard, [address] Help From: Family  ADL Assistance: Needs assistance  Toileting: Independent  Ambulation Assistance: Needs assistance  Transfer Assistance: Needs assistance  Active : No  Patient's  Info: spouse  Mode of Transportation: Car  Occupation: Retired  OBJECTIVE:     PAIN: VITALS / O2: PRECAUTION / Josefine New / DRAINS:   Pre Treatment:   Pain Level: 0      Post Treatment: 0 Vitals        Oxygen    [place]    RESTRICTIONS/PRECAUTIONS:  Restrictions/Precautions  Restrictions/Precautions: Fall Risk  Restrictions/Precautions: Fall Risk     MOBILITY: I Mod I S SBA CGA Min Mod Max Total  NT x2 Comments:   Bed Mobility    Rolling [] [] [] [] [] [] [] [] [] [] []    Supine to Sit [] [] [] [] [] [x] [] [] [] [] []    Scooting [] [] [] [] [] [] [] [] [] [] []    Sit to Supine [] [] [] [] [] [x] [] [] [] [] []    Transfers    Sit to Stand [] [] [] [] [] [x] [] [] [] [] []    Bed to Chair [] [] [] [] [x] [x] [] [] [] [] []    Stand to Sit [] [] [] [] [] [x] [] [] [] [] []     [] [] [] [] [] [] [] [] [] [] []    I=Independent, Mod I=Modified Independent, S=Supervision, SBA=Standby Assistance, CGA=Contact Guard Assistance,   Min=Minimal Assistance, Mod=Moderate Assistance, Max=Maximal Assistance, Total=Total Assistance, NT=Not Tested    BALANCE: Good Fair+ Fair Fair- Poor NT Comments   Sitting Static [x] [] [] [] [] []    Sitting Dynamic [] [x] [] [] [] []              Standing Static [] [] [x] [] [] []    Standing Dynamic [] [] [] [x] [] []      GAIT: I Mod I S SBA CGA Min Mod Max Total  NT x2 Comments:   Level of Assistance [] [] [] [] [x] [x] [] [] [] [] [] Chair follow   Distance   80 feet    DME Rolling Walker    Gait Quality Decreased vicky , Decreased step clearance, and Decreased step length    Weightbearing Status      Stairs      I=Independent, Mod I=Modified Independent, S=Supervision, SBA=Standby Assistance, CGA=Contact Guard Assistance,   Min=Minimal Assistance, Mod=Moderate Assistance, Max=Maximal Assistance, Total=Total Assistance, NT=Not Tested    PLAN:   FREQUENCY AND DURATION: 3 times/week for duration of hospital stay or until stated goals are met, whichever comes first.    TREATMENT:   TREATMENT:   Co-Treatment PT/OT necessary due to patient's decreased overall endurance/tolerance levels, as well as need for high level skilled assistance to complete functional transfers/mobility and functional tasks  Therapeutic Activity (23 Minutes): Therapeutic activity included Supine to Sit, Sit to Supine, Scooting, Transfer Training, Ambulation on level ground, Sitting balance , and Standing balance to improve functional Activity tolerance, Balance, Mobility, and Strength. TREATMENT GRID:  N/A    AFTER TREATMENT PRECAUTIONS: Alarm Activated, Bed, Bed/Chair Locked, Call light within reach, Needs within reach, RN notified, Side rails x3, and Visitors at bedside    INTERDISCIPLINARY COLLABORATION:  RN/ PCT, PT/ PTA, and OT/ BOWDEN    EDUCATION:      TIME IN/OUT:  Time In: 1424  Time Out: [address]  Minutes: [address] [place] [address] KELLY Vizcarra PTA

## 2022-10-13 NOTE — PROGRESS NOTES
Hospitalist Progress Note   Admit Date:  10/10/2022 10:42 AM   Name:  Ilda Conway. Age:  78 y.o. Sex:  male  :  1945   MRN:  [id number]   Room:  [place] [address]    Presenting Complaint: Fall     Reason(s) for Admission: Tachycardia [R00.0]  Hyperglycemia [R73.9]  Diabetic foot infection ([facility] [address] [E11.628, L08.9]  Type 1 diabetes mellitus with diabetic foot ulcer ([facility] [E10.621, L97.509]     Hospital Course:   72-year-old man with past medical history significant for type 1 diabetes mellitus, hypertension, CAD, hyperlipidemia, CKD stage III, VATS procedure, history of aphonia and BPH admitted with metabolic encephalopathy and diabetic foot ulcer. Started on empiric antibiotics. Vascular surgery consulted and patient status post debridement of the first and second toes on 10/11. Wound care following. Pending wound culture. Subjective & 24hr Events (10/13/22): Patient is seen at the bedside. Reports feeling a lot better today. Denies chest pain, palpitation, nausea, vomiting or abdominal pain. Pain optimally controlled. Eating and tolerating. Blood glucose elevated, will adjust insulin. Need rehab placement. Assessment & Plan:     Diabetic left first and second toe ulcer:  X-ray without osteomyelitis  Vascular surgery consulted and patient status post debridement of second and first toes on 10/12  Continue cefepime/vancomycin  Wound culture E. coli  PT/OT    Metabolic encephalopathy:  Chronic, stable  Consider discontinue cefepime if worsen    Type 1 diabetes mellitus:  Increase Lantus to 30 units daily and prandial insulin from Humalog 4 to 8 units with meals.   Continue sliding scale and will continue to adjust insulin accordingly  Inpatient diabetes management consult    Hypothyroidism:  Continue levothyroxine    Neuropathy:  Continue duloxetine    CKD stage III:  Stable  Continue to monitor      Anticipated discharge needs: Short-term rehab, PPD ordered    Diet:  ADULT DIET; Regular; 4 carb choices (60 gm/meal)  DVT PPx: Lovenox  Code status: Full Code    Hospital Problems:  Principal Problem:    Type 1 diabetes mellitus with diabetic foot ulcer (HCC)  Active Problems:    Neuropathy    Hypothyroid    Chronic pain    Chronic renal disease, stage 3, moderately decreased glomerular filtration rate (GFR) between 30-59 mL/min/1.73 square meter (HCC)    Depression    Metabolic encephalopathy    Dysphonia    BRITTANY (obstructive sleep apnea)    Type 1 diabetes mellitus with hyperglycemia (HCC)    Stage 3a chronic kidney disease (HCC)    History of amputation of toe ([facility])  Resolved Problems:    * No resolved hospital problems. *      Objective:   Patient Vitals for the past 24 hrs:   Temp Pulse Resp BP SpO2   10/13/22 1115 97.8 °F (36.6 °C) 95 18 117/67 99 %   10/13/22 0823 -- -- 17 -- --   10/13/22 0715 97.9 °F (36.6 °C) 100 18 (!) 112/47 --   10/13/22 0327 98.4 °F (36.9 °C) 81 17 (!) 141/78 --   10/12/22 2241 98.7 °F (37.1 °C) 92 17 (!) 143/89 --   10/12/22 1933 97.8 °F (36.6 °C) 85 17 112/61 --   10/12/22 1456 98.2 °F (36.8 °C) (!) 105 17 135/78 100 %         Oxygen Therapy  SpO2: 99 %  O2 Device: None (Room air)    Estimated body mass index is 35.18 kg/m² as calculated from the following:    Height as of this encounter: 5' 7\" (1.702 m). Weight as of this encounter: 224 lb 10.4 oz (101.9 kg). Intake/Output Summary (Last 24 hours) at 10/13/2022 1256  Last data filed at 10/12/2022 2312  Gross per 24 hour   Intake 240 ml   Output 1200 ml   Net -960 ml           Physical Exam:     Blood pressure 117/67, pulse 95, temperature 97.8 °F (36.6 °C), temperature source Oral, resp. rate 18, height 5' 7\" (1.702 m), weight 224 lb 10.4 oz (101.9 kg), SpO2 99 %. General:    Well nourished. Head:  Normocephalic, atraumatic  Eyes:  Sclerae appear normal.  Pupils equally round. ENT:  Nares appear normal, no drainage. Moist oral mucosa  Neck:  No restricted ROM. Trachea midline   CV:   RRR. No m/r/g. No jugular venous distension. Lungs:   CTAB. No wheezing, rhonchi, or rales. Symmetric expansion. Abdomen: Bowel sounds present. Soft, nontender, nondistended. Extremities: Left foot dressing dry intact  Skin:     No rashes and normal coloration. Warm and dry. Neuro:  CN II-XII grossly intact. Sensation intact. A&Ox3  Psych:  Normal mood and affect. I have personally reviewed labs and tests showing:  Recent Labs:  Recent Results (from the past 48 hour(s))   POCT Glucose    Collection Time: 10/11/22  3:15 PM   Result Value Ref Range    POC Glucose 332 (H) 65 - 100 mg/dL    Performed by: Herb    POCT Glucose    Collection Time: 10/11/22  8:45 PM   Result Value Ref Range    POC Glucose 366 (H) 65 - 100 mg/dL    Performed by: Valery    PLEASE READ & DOCUMENT PPD TEST IN 48 HRS    Collection Time: 10/12/22 12:00 AM   Result Value Ref Range    PPD, (POC) Negative Negative    mm Induration 0 0 - 5 mm   Vancomycin Level, Random    Collection Time: 10/12/22  1:53 AM   Result Value Ref Range    Vancomycin Rm 14.1 UG/ML   POCT Glucose    Collection Time: 10/12/22  7:54 AM   Result Value Ref Range    POC Glucose 109 (H) 65 - 100 mg/dL    Performed by: Charisma    POCT Glucose    Collection Time: 10/12/22 11:45 AM   Result Value Ref Range    POC Glucose 279 (H) 65 - 100 mg/dL    Performed by: Martha San    POCT Glucose    Collection Time: 10/12/22  4:00 PM   Result Value Ref Range    POC Glucose 374 (H) 65 - 100 mg/dL    Performed by: Charisma    POCT Glucose    Collection Time: 10/12/22  9:15 PM   Result Value Ref Range    POC Glucose 274 (H) 65 - 100 mg/dL    Performed by:  Kim    POCT Glucose    Collection Time: 10/13/22  5:46 AM   Result Value Ref Range    POC Glucose 280 (H) 65 - 100 mg/dL    Performed by: Anu Dias    Creatinine    Collection Time: 10/13/22  5:52 AM   Result Value Ref Range    Creatinine 1.50 0.8 - 1.5 MG/DL   POCT Glucose    Collection Time: 10/13/22 11:32 AM   Result Value Ref Range    POC Glucose 298 (H) 65 - 100 mg/dL    Performed by: Darlyn Canales        I have personally reviewed imaging studies showing: Other Studies:  Vascular duplex lower extremity arteries bilateral with DARINEL   Final Result   1. Noncompressible ankle pressures and flat line digit pressures on the right,   possibly due to small vessel calcinosis   2. Clemente cyst noted on the left   3. Distended urinary bladder volume averaging 1000 cc            XR TOE LEFT (MIN 2 VIEWS)   Final Result   1. Soft tissue ulcer about the distal aspect of the second left toe without   convincing evidence of osteomyelitis. If there is continued clinical concern   further evaluation with MRI can BE obtained. 2.  Similar extensive degenerative changes DIP joints of the imaged first   through third toes.              Current Meds:  Current Facility-Administered Medications   Medication Dose Route Frequency    insulin lispro (HUMALOG) injection vial 8 Units  8 Units SubCUTAneous TID WC    insulin glargine (LANTUS) injection vial 30 Units  30 Units SubCUTAneous Daily    polyethylene glycol (GLYCOLAX) packet 17 g  17 g Oral Daily    cefepime (MAXIPIME) 2,000 mg in sodium chloride 0.9 % 50 mL IVPB mini-bag  2,000 mg IntraVENous Q12H    vancomycin (VANCOCIN) 1,000 mg in sodium chloride 0.9 % 250 mL IVPB (Dmdv7Cuk)  1,000 mg IntraVENous Q18H    potassium chloride (KLOR-CON M) extended release tablet 40 mEq  40 mEq Oral PRN    Or    potassium bicarb-citric acid (EFFER-K) effervescent tablet 40 mEq  40 mEq Oral PRN    Or    potassium chloride 10 mEq/100 mL IVPB (Peripheral Line)  10 mEq IntraVENous PRN    magnesium sulfate 2000 mg in 50 mL IVPB premix  2,000 mg IntraVENous PRN    sodium phosphate 10 mmol in sodium chloride 0.9 % 250 mL IVPB  10 mmol IntraVENous PRN    Or    sodium phosphate 15 mmol in sodium chloride 0.9 % 250 mL IVPB  15 mmol IntraVENous PRN    Or    sodium phosphate 20 mmol in sodium chloride 0.9 % 500 mL IVPB  20 mmol IntraVENous PRN    heparin (porcine) injection 5,000 Units  5,000 Units SubCUTAneous 3 times per day    DULoxetine (CYMBALTA) extended release capsule 60 mg  60 mg Oral Daily    finasteride (PROSCAR) tablet 5 mg  5 mg Oral Daily    levothyroxine (SYNTHROID) tablet 75 mcg  75 mcg Oral QAM AC    morphine (MS CONTIN) extended release tablet 15 mg  15 mg Oral BID    pantoprazole (PROTONIX) tablet 40 mg  40 mg Oral Daily    rosuvastatin (CRESTOR) tablet 10 mg  10 mg Oral Nightly    sodium bicarbonate tablet 650 mg  650 mg Oral BID    Vitamin D (CHOLECALCIFEROL) tablet 1,000 Units  1,000 Units Oral Daily    sodium chloride flush 0.9 % injection 5-40 mL  5-40 mL IntraVENous 2 times per day    sodium chloride flush 0.9 % injection 5-40 mL  5-40 mL IntraVENous PRN    0.9 % sodium chloride infusion   IntraVENous PRN    insulin lispro (HUMALOG) injection vial 0-8 Units  0-8 Units SubCUTAneous TID WC    insulin lispro (HUMALOG) injection vial 0-4 Units  0-4 Units SubCUTAneous Nightly    glucose chewable tablet 16 g  4 tablet Oral PRN    dextrose bolus 10% 125 mL  125 mL IntraVENous PRN    Or    dextrose bolus 10% 250 mL  250 mL IntraVENous PRN    glucagon (rDNA) injection 1 mg  1 mg SubCUTAneous PRN    dextrose 10 % infusion   IntraVENous Continuous PRN    OLANZapine (ZYPREXA) tablet 10 mg  10 mg Oral Nightly       Signed:  Lady Florian MD    Part of this note may have been written by using a voice dictation software. The note has been proof read but may still contain some grammatical/other typographical errors.

## 2022-10-13 NOTE — PROGRESS NOTES
ACUTE OCCUPATIONAL THERAPY GOALS:   (Developed with and agreed upon by patient and/or caregiver.)  Patient will attempt standing with LLE off loading shoe. Patient will complete grooming with setup. Patient will complete lower body bathing and dressing with minimal assistance. Patient will tolerate 30 minutes of OT treatment with as needed rest breaks to increase activity tolerance for ADLs. Timeframe: 7 visits     OCCUPATIONAL THERAPY: Daily Note PM   OT Visit Days: 2   Time  OT Charge Capture  Rehab Caseload Tracker  OT Orders    Kike Zuluaga is a 78 y.o. male   PRIMARY DIAGNOSIS: Type 1 diabetes mellitus with diabetic foot ulcer (HCC)  Tachycardia [R00.0]  Hyperglycemia [R73.9]  Diabetic foot infection ([facility]) [E11.628, L08.9]  Type 1 diabetes mellitus with [facility] ulcer [facility] [E10.621, L97.509]       Inpatient: Payor: MEDICARE / Plan: MEDICARE PART A AND B / Product Type: *No Product type* /     ASSESSMENT:     REHAB RECOMMENDATIONS: CURRENT LEVEL OF FUNCTION:  (Most Recently Demonstrated)   Recommendation to date pending progress:  Setting:  Short-term Rehab    Equipment:    To Be Determined--pt has RW and SC at home Bathing:  Not Tested  Dressing:  Minimal Assist  Feeding/Grooming:  Contact Guard Assist  Toileting:  Not Tested  Functional Mobility:  Minimal Assist RW     ASSESSMENT:  Mr. Lewis Avalos is a 79 y/o M admitted with hyperglycemia after fall out of bed. Underwent I&D of L foot wound this morning & needs off-loading shoe, per vascular. Today pt presents with decreased activity tolerance, balance, strength and mobility impacting ADLs. Pt overall min A for bed mobility, sit<>stand and mobility of household distances with RW. Pt required increased time for all transfers and mobility and mod verbal cues for direction due to [facility]. Pt able to tolerate prolonged standing balance to complete grooming ADLs at sink with CGA.  Pt required rest breaks during treatment today but increased activity tolerance. Pt is making progress toward goals, continue POC.         SUBJECTIVE:     Mr. Tatum Escobedo states, \"I like to be called Al\"     Social/Functional Lives With: Spouse  Type of Home: House  Home Layout: One level  Bathroom Toilet: Standard  Home Equipment: Criss Roca, standard, [address] Help From: Family  ADL Assistance: Needs assistance  Toileting: Independent  Ambulation Assistance: Needs assistance  Transfer Assistance: Needs assistance  Active : No  Patient's  Info: spouse  Mode of Transportation: Car  Occupation: Retired    OBJECTIVE:     Curt Rodriguez / Dashawn Corral / Samuel Skipper: Tony Best    RESTRICTIONS/PRECAUTIONS:  Restrictions/Precautions  Restrictions/Precautions: Fall Risk        PAIN: VITALS / O2:   Pre Treatment:            Post Treatment: no c/o pain, resting comfortably in supine Vitals          Oxygen        MOBILITY: I Mod I S SBA CGA Min Mod Max Total  NT x2 Comments:   Bed Mobility    Rolling [] [] [] [] [] [] [] [] [] [x] []    Supine to Sit [] [] [] [] [] [x] [] [] [] [] []    Scooting [] [] [] [] [x] [] [] [] [] [] []    Sit to Supine [] [] [] [] [] [x] [] [] [] [] []    Transfers    Sit to Stand [] [] [] [] [] [x] [] [] [] [] [] RW   Bed to Chair [] [] [] [] [] [x] [] [] [] [] [] RW   Stand to Sit [] [] [] [] [] [x] [] [] [] [] [] RW   Tub/Shower [] [] [] [] [] [] [] [] [] [x] []     Toilet [] [] [] [] [] [] [] [] [] [x] []      [] [] [] [] [] [] [] [] [] [x] []    I=Independent, Mod I=Modified Independent, S=Supervision/Setup, SBA=Standby Assistance, CGA=Contact Guard Assistance, Min=Minimal Assistance, Mod=Moderate Assistance, Max=Maximal Assistance, Total=Total Assistance, NT=Not Tested    ACTIVITIES OF DAILY LIVING: I Mod I S SBA CGA Min Mod Max Total NT Comments   BASIC ADLs:              Upper Body   Bathing [] [] [] [] [] [] [] [] [] [x]    Lower Body Bathing [] [] [] [] [] [] [] [] [] [x]    Toileting [] [] [] [] [] [] [] [] [] [x]    Upper Body Dressing [] [] [] [] [] [x] [] [] [] [] Donning/doffing gown and housecoat in sitting/standing   Lower Body Dressing [] [] [] [] [] [] [] [x] [] [] Donning/doffing shoe   Feeding [] [] [] [] [] [] [] [] [] [x]    Grooming [] [] [] [] [x] [] [] [] [] [] Standing at sink brushing teeth RW   Personal Device Care [] [] [] [] [] [] [] [] [] [x]    Functional Mobility [] [] [] [] [] [x] [] [] [] [] RW   I=Independent, Mod I=Modified Independent, S=Supervision/Setup, SBA=Standby Assistance, CGA=Contact Guard Assistance, Min=Minimal Assistance, Mod=Moderate Assistance, Max=Maximal Assistance, Total=Total Assistance, NT=Not Tested    BALANCE: Good Fair+ Fair Fair- Poor NT Comments   Sitting Static [x] [] [] [] [] []    Sitting Dynamic [] [x] [] [] [] []              Standing Static [] [] [x] [] [] []    Standing Dynamic [] [] [x] [] [] []        PLAN:     FREQUENCY/DURATION   OT Plan of Care: 3 times/week for duration of hospital stay or until stated goals are met, whichever comes first.    TREATMENT:     TREATMENT:   Neuromuscular Re-education (13 Minutes): Neuromuscular Re-education included Balance Training, Functional mobility with facilitation, Postural training, Sitting balance training, and Standing balance training to improve Balance, Functional Mobility, and Postural Control. Self Care (10 minutes): Patient participated in upper body dressing, lower body dressing, and grooming ADLs in unsupported sitting and standing with minimal verbal, manual, and tactile cueing to increase independence, decrease assistance required, increase activity tolerance, and increase safety awareness. Patient also participated in functional mobility, functional transfer, and adaptive equipment training to increase independence, decrease assistance required, increase activity tolerance, and increase safety awareness.      TREATMENT GRID:  N/A    AFTER TREATMENT PRECAUTIONS: Bed, Call light within reach, Needs within reach, RN notified, and Visitors at bedside    INTERDISCIPLINARY COLLABORATION:  RN/ PCT, PT/ PTA, and OT/ BOWDEN    EDUCATION:   N/A this treatment    TOTAL TREATMENT DURATION AND TIME:  Time In: 1424  Time Out: [address]  Minutes: [address], OT

## 2022-10-13 NOTE — CARE COORDINATION
10/13/22 1649   Discharge Planning   Type of [address] [place] [address]   Patient expects to be discharged to: [address] [place], [place] [address] At/After Discharge   Transition of Care Consult (CM Consult) SNF   Mode of Transport at Discharge BLS   Condition of Participation: Discharge Planning   The Plan for Transition of Care is related to the following treatment goals: SNF   The Patient and/or Patient Representative was provided with a Choice of Provider? Patient;Patient Representative   Name of the Patient Representative who was provided with the Choice of Provider and agrees with the Discharge Plan? Spouse   The Patient and/Or Patient Representative agree with the Discharge Plan? Yes   Freedom of Choice list was provided with basic dialogue that supports the patient's individualized plan of care/goals, treatment preferences, and shares the quality data associated with the providers? Yes       CM met with patient and spouse. Referrals sent to [facility] and [address] [place] [address] to continue to follow.

## 2022-10-13 NOTE — CARE COORDINATION
Wife called [facility] stating patient is in [address] [place] [address]. Wife states at this time discharge plan is for possible snf. Ccm discussed that I would outreach next week to check in. Wife agreeable.

## 2022-10-14 ENCOUNTER — TELEPHONE (OUTPATIENT)
Dept: ENDOCRINOLOGY | Age: 77
End: 2022-10-14

## 2022-10-14 LAB
GLUCOSE BLD STRIP.AUTO-MCNC: 174 MG/DL (ref 65–100)
GLUCOSE BLD STRIP.AUTO-MCNC: 240 MG/DL (ref 65–100)
GLUCOSE BLD STRIP.AUTO-MCNC: 301 MG/DL (ref 65–100)
GLUCOSE BLD STRIP.AUTO-MCNC: 90 MG/DL (ref 65–100)
SERVICE CMNT-IMP: ABNORMAL
SERVICE CMNT-IMP: NORMAL
VANCOMYCIN SERPL-MCNC: 16 UG/ML

## 2022-10-14 PROCEDURE — 6360000002 HC RX W HCPCS: Performed by: FAMILY MEDICINE

## 2022-10-14 PROCEDURE — 2580000003 HC RX 258: Performed by: FAMILY MEDICINE

## 2022-10-14 PROCEDURE — 36415 COLL VENOUS BLD VENIPUNCTURE: CPT

## 2022-10-14 PROCEDURE — 6360000002 HC RX W HCPCS: Performed by: INTERNAL MEDICINE

## 2022-10-14 PROCEDURE — 1100000000 HC RM PRIVATE

## 2022-10-14 PROCEDURE — 6370000000 HC RX 637 (ALT 250 FOR IP): Performed by: FAMILY MEDICINE

## 2022-10-14 PROCEDURE — 2580000003 HC RX 258: Performed by: INTERNAL MEDICINE

## 2022-10-14 PROCEDURE — 82962 GLUCOSE BLOOD TEST: CPT

## 2022-10-14 PROCEDURE — U0005 INFEC AGEN DETEC AMPLI PROBE: HCPCS

## 2022-10-14 PROCEDURE — 80202 ASSAY OF VANCOMYCIN: CPT

## 2022-10-14 RX ORDER — INSULIN GLARGINE 100 [IU]/ML
34 INJECTION, SOLUTION SUBCUTANEOUS DAILY
Status: DISCONTINUED | OUTPATIENT
Start: 2022-10-15 | End: 2022-10-19

## 2022-10-14 RX ADMIN — INSULIN LISPRO 12 UNITS: 100 INJECTION, SOLUTION INTRAVENOUS; SUBCUTANEOUS at 17:37

## 2022-10-14 RX ADMIN — PANTOPRAZOLE SODIUM 40 MG: 40 TABLET, DELAYED RELEASE ORAL at 08:27

## 2022-10-14 RX ADMIN — CEFTRIAXONE 2000 MG: 2 INJECTION, POWDER, FOR SOLUTION INTRAMUSCULAR; INTRAVENOUS at 20:47

## 2022-10-14 RX ADMIN — SODIUM BICARBONATE 650 MG TABLET 650 MG: at 20:48

## 2022-10-14 RX ADMIN — POLYETHYLENE GLYCOL 3350 17 G: 17 POWDER, FOR SOLUTION ORAL at 08:27

## 2022-10-14 RX ADMIN — LEVOTHYROXINE SODIUM 75 MCG: 75 TABLET ORAL at 06:39

## 2022-10-14 RX ADMIN — MORPHINE SULFATE 15 MG: 15 TABLET, FILM COATED, EXTENDED RELEASE ORAL at 20:48

## 2022-10-14 RX ADMIN — SODIUM CHLORIDE, PRESERVATIVE FREE 10 ML: 5 INJECTION INTRAVENOUS at 20:58

## 2022-10-14 RX ADMIN — HEPARIN SODIUM 5000 UNITS: 5000 INJECTION INTRAVENOUS; SUBCUTANEOUS at 13:52

## 2022-10-14 RX ADMIN — ROSUVASTATIN CALCIUM 10 MG: 5 TABLET, FILM COATED ORAL at 20:48

## 2022-10-14 RX ADMIN — OLANZAPINE 10 MG: 5 TABLET, FILM COATED ORAL at 20:48

## 2022-10-14 RX ADMIN — MORPHINE SULFATE 15 MG: 15 TABLET, FILM COATED, EXTENDED RELEASE ORAL at 08:45

## 2022-10-14 RX ADMIN — VANCOMYCIN HYDROCHLORIDE 1000 MG: 1 INJECTION, POWDER, LYOPHILIZED, FOR SOLUTION INTRAVENOUS at 09:39

## 2022-10-14 RX ADMIN — INSULIN LISPRO 2 UNITS: 100 INJECTION, SOLUTION INTRAVENOUS; SUBCUTANEOUS at 08:30

## 2022-10-14 RX ADMIN — CEFEPIME 2000 MG: 2 INJECTION, POWDER, FOR SOLUTION INTRAVENOUS at 08:27

## 2022-10-14 RX ADMIN — INSULIN GLARGINE 30 UNITS: 100 INJECTION, SOLUTION SUBCUTANEOUS at 08:29

## 2022-10-14 RX ADMIN — INSULIN LISPRO 12 UNITS: 100 INJECTION, SOLUTION INTRAVENOUS; SUBCUTANEOUS at 08:30

## 2022-10-14 RX ADMIN — SODIUM CHLORIDE, PRESERVATIVE FREE 10 ML: 5 INJECTION INTRAVENOUS at 13:56

## 2022-10-14 RX ADMIN — VITAMIN D, TAB 1000IU (100/BT) 1000 UNITS: 25 TAB at 08:27

## 2022-10-14 RX ADMIN — FINASTERIDE 5 MG: 5 TABLET, FILM COATED ORAL at 08:27

## 2022-10-14 RX ADMIN — HEPARIN SODIUM 5000 UNITS: 5000 INJECTION INTRAVENOUS; SUBCUTANEOUS at 06:39

## 2022-10-14 RX ADMIN — INSULIN LISPRO 6 UNITS: 100 INJECTION, SOLUTION INTRAVENOUS; SUBCUTANEOUS at 13:52

## 2022-10-14 RX ADMIN — INSULIN LISPRO 12 UNITS: 100 INJECTION, SOLUTION INTRAVENOUS; SUBCUTANEOUS at 13:52

## 2022-10-14 RX ADMIN — HEPARIN SODIUM 5000 UNITS: 5000 INJECTION INTRAVENOUS; SUBCUTANEOUS at 22:04

## 2022-10-14 RX ADMIN — SODIUM BICARBONATE 650 MG TABLET 650 MG: at 08:27

## 2022-10-14 RX ADMIN — DULOXETINE HYDROCHLORIDE 60 MG: 60 CAPSULE, DELAYED RELEASE ORAL at 08:27

## 2022-10-14 ASSESSMENT — PAIN DESCRIPTION - LOCATION: LOCATION: FOOT

## 2022-10-14 ASSESSMENT — PAIN SCALES - GENERAL
PAINLEVEL_OUTOF10: 0
PAINLEVEL_OUTOF10: 3

## 2022-10-14 NOTE — PROGRESS NOTES
Napping at present  wife just left  bed alarm activated  patient is very confused  left toe wound cleaned with skin cleanser  actigal ag placed on wound and covered with gauze and wrapped with albert

## 2022-10-14 NOTE — DIABETES MGMT
Patient admitted with type 1 diabetes mellitus with diabetic foot ulcer. Blood glucose ranged 168-298 yesterday with patient receiving Lantus 30 units and Humalog 40 units. Blood glucose this morning was 240, most recent 301. Reviewed patient current regimen: Lantus 30 units daily, Humalog 12 units prandial, and Humalog correctional insulin. Patient would likely benefit from an increase in basal insulin as fasting glucose is not at goal.  Provider updated via [employer]â„¢ Audio regarding recommendation and glycemic control. New order received for Lantus 34 units daily.

## 2022-10-14 NOTE — PROGRESS NOTES
completed initial visit with patient. Patient was confused and was unable to report any needs.  provided pastoral presence, prayer and empathetic listening in accordance with patient's listed [address].    Signed by  Caren Jaime M.Div.

## 2022-10-14 NOTE — PROGRESS NOTES
Progress Note    Patient: Sonja Joel. MRN: [id number]  SSN: [id number]    YOB: 1945  Age: 78 y.o. Sex: male      Admit Date: 10/10/2022    LOS: 4 days     Assessment and Plan:   79-year-old male with a past medical history of hyperlipidemia, GERD, CKD stage III, diabetes, pretension, presented in the setting of diabetic foot ulcer infection    1. Diabetic left first and second toe ulcer infection  -X-ray without signs of osteomyelitis  -Vascular surgery recommendations appreciated, status post debridement  -Wound culture positive for E. coli  -Continue cefepime and vancomycin  -PT and OT    2. Diabetes mellitus  -Insulin sliding scale  -Blood sugar checks AC at bedtime    3. Hypothyroidism  -Continue levothyroxine    4. Neuropathy  -Continue duloxetine    5. CKD stage III  -Monitor renal function    DVT prophylaxis Lovenox      Subjective:   79-year-old male with a past medical history of hyperlipidemia, GERD, CKD stage III, diabetes, pretension, presented in the setting of diabetic foot ulcer infection. Patient seen and examined at bedside. This morning still presenting with some pain on his foot. Denies any chest pain, no abdominal pain. Objective:     Vitals:    10/14/22 0251 10/14/22 0720 10/14/22 0845 10/14/22 1123   BP: (!) 158/98 (!) 167/95  128/82   Pulse: (!) 106 92  95   Resp: 17 20 20 20   Temp: 98.8 °F (37.1 °C) 98.2 °F (36.8 °C)     TempSrc: Oral Oral  Oral   SpO2: 97% 97%  95%   Weight:       Height:            Intake and Output:  Current Shift: No intake/output data recorded.   Last three shifts: 10/12 1901 - 10/14 0700  In: 10 [I.V.:10]  Out: 1400 [Urine:1400]    ROS  10 ROS negative except from stated on subjective    Physical Exam:   General: Alert, NAD  HEENT: NC/AT, EOM are intact  Neck: supple, no JVD  Cardiovascular: RRR, S1, S2, no murmurs  Respiratory: Lungs are clear, no wheezes or rales  Abdomen: Soft, NT, ND  Back: No CVA tenderness, no paraspinal tenderness  Extremities: Lower extremity with clean dressing  Neuro: A&O, CN are intact, no focal deficits  Skin: no rash or ulcers  Psych: good mood and affect    Lab/Data Review:  I have personally reviewed patients laboratory data showing  Recent Results (from the past 24 hour(s))   POCT Glucose    Collection Time: 10/13/22  4:33 PM   Result Value Ref Range    POC Glucose 261 (H) 65 - 100 mg/dL    Performed by: Candace    POCT Glucose    Collection Time: 10/13/22  8:36 PM   Result Value Ref Range    POC Glucose 168 (H) 65 - 100 mg/dL    Performed by: Adeel    Vancomycin Level, Random    Collection Time: 10/14/22  5:58 AM   Result Value Ref Range    Vancomycin Rm 16.0 UG/ML   POCT Glucose    Collection Time: 10/14/22  7:21 AM   Result Value Ref Range    POC Glucose 240 (H) 65 - 100 mg/dL    Performed by: Stephanie    POCT Glucose    Collection Time: 10/14/22 11:25 AM   Result Value Ref Range    POC Glucose 301 (H) 65 - 100 mg/dL    Performed by: EFRAÍN [address]       [unfilled]     Image:  I have personally reviewed patients imaging showing  Vascular duplex lower extremity arteries bilateral with DARINEL   Final Result   1. Noncompressible ankle pressures and flat line digit pressures on the right,   possibly due to small vessel calcinosis   2. Clemente cyst noted on the left   3. Distended urinary bladder volume averaging 1000 cc            XR TOE LEFT (MIN 2 VIEWS)   Final Result   1. Soft tissue ulcer about the distal aspect of the second left toe without   convincing evidence of osteomyelitis. If there is continued clinical concern   further evaluation with MRI can BE obtained. 2.  Similar extensive degenerative changes DIP joints of the imaged first   through third toes.               Current Facility-Administered Medications   Medication Dose Route Frequency Provider Last Rate Last Admin    [START ON 10/15/2022] insulin glargine (LANTUS) injection vial 34 Units  34 Units SubCUTAneous Daily Titi Garcia MD        insulin lispro (HUMALOG) injection vial 12 Units  12 Units SubCUTAneous TID  Sherly Keen MD   12 Units at 10/14/22 1352    polyethylene glycol (GLYCOLAX) packet 17 g  17 g Oral Daily Sherly Keen MD   17 g at 10/14/22 0827    cefepime (MAXIPIME) 2,000 mg in sodium chloride 0.9 % 50 mL IVPB mini-bag  2,000 mg IntraVENous Q12H Michael Arellano MD   Stopped at 10/14/22 1227    vancomycin (VANCOCIN) 1,000 mg in sodium chloride 0.9 % 250 mL IVPB ([id number])  1,000 mg IntraVENous Q18H Titi Garcia MD   Stopped at 10/14/22 1039    potassium chloride (KLOR-CON M) extended release tablet 40 mEq  40 mEq Oral PRN Michael Arellano MD        Or    potassium bicarb-citric acid (EFFER-K) effervescent tablet 40 mEq  40 mEq Oral PRN Michael Arellano MD        Or    potassium chloride 10 mEq/100 mL IVPB (Peripheral Line)  10 mEq IntraVENous PRN Michael Arellano MD        magnesium sulfate 2000 mg in 50 mL IVPB premix  2,000 mg IntraVENous PRN Michael Arellano MD        sodium phosphate 10 mmol in sodium chloride 0.9 % 250 mL IVPB  10 mmol IntraVENous PRN Michael Arellano MD        Or    sodium phosphate 15 mmol in sodium chloride 0.9 % 250 mL IVPB  15 mmol IntraVENous PRN Michael Arellano MD        Or    sodium phosphate 20 mmol in sodium chloride 0.9 % 500 mL IVPB  20 mmol IntraVENous PRN Michael Arellano MD        heparin (porcine) injection 5,000 Units  5,000 Units SubCUTAneous 3 times per day Michael Arellano MD   5,000 Units at 10/14/22 1352    DULoxetine (CYMBALTA) extended release capsule 60 mg  60 mg Oral Daily Lenoria Born, DO   60 mg at 10/14/22 0827    finasteride (PROSCAR) tablet 5 mg  5 mg Oral Daily Lenoria Born, DO   5 mg at 10/14/22 0827    levothyroxine (SYNTHROID) tablet 75 mcg  75 mcg Oral QAM AC Lenoria Born, DO   75 mcg at 10/14/22 9479    morphine (MS CONTIN) extended release tablet 15 mg  15 mg Oral BID Lenoria Born, DO   15 mg at 10/14/22 6259 pantoprazole (PROTONIX) tablet 40 mg  40 mg Oral Daily Samule [place], DO   40 mg at 10/14/22 0827    rosuvastatin (CRESTOR) tablet 10 mg  10 mg Oral Nightly Samule [place], DO   10 mg at 10/13/22 2307    sodium bicarbonate tablet 650 mg  650 mg Oral BID Samule [place], DO   650 mg at 10/14/22 0827    Vitamin D (CHOLECALCIFEROL) tablet 1,000 Units  1,000 Units Oral Daily Samule [place], DO   1,000 Units at 10/14/22 0827    sodium chloride flush 0.9 % injection 5-40 mL  5-40 mL IntraVENous 2 times per day Samule [place], DO   10 mL at 10/13/22 2337    sodium chloride flush 0.9 % injection 5-40 mL  5-40 mL IntraVENous PRN Samule [place], DO   10 mL at 10/14/22 1356    0.9 % sodium chloride infusion   IntraVENous PRN Samule [place], DO        insulin lispro (HUMALOG) injection vial 0-8 Units  0-8 Units SubCUTAneous TID WC Samule Alam[place], DO   6 Units at 10/14/22 1352    insulin lispro (HUMALOG) injection vial 0-4 Units  0-4 Units SubCUTAneous Nightly Samule Alam[place], DO   4 Units at 10/11/22 2053    glucose chewable tablet 16 g  4 tablet Oral PRN Samule Alam[place], DO        dextrose bolus 10% 125 mL  125 mL IntraVENous PRN Samule [place], DO        Or    dextrose bolus 10% 250 mL  250 mL IntraVENous PRN Samule [place], DO        glucagon (rDNA) injection 1 mg  1 mg SubCUTAneous PRN Samule [place], DO        dextrose 10 % infusion   IntraVENous Continuous PRN Samule Al[place], DO        OLANZapine (ZYPREXA) tablet 10 mg  10 mg Oral Nightly Samule [place]am[place], DO   10 mg at 10/13/22 2307        Hospital problems     Patient Active Problem List   Diagnosis    Proliferative diabetic retinopathy associated with type 1 diabetes mellitus (HCC)    Pain management contract agreement    Chronic orthostatic hypotension    Toenail deformity    History of tobacco abuse    Mild cognitive impairment with memory loss    S/P lumbar fusion    Dysphonia    Edema of both lower extremities due to peripheral venous insufficiency    Gastroesophageal reflux disease without esophagitis    Vocal cord atrophy    Back pain, chronic    BRITTANY (obstructive sleep apnea)    Mild episode of recurrent major depressive disorder (HCC)    Seborrheic keratosis    Benign prostatic hyperplasia with incomplete bladder emptying    Orthostatic hypotension    Essential hypertension    Neuropathy    Chronic left-sided low back pain with left-sided sciatica    Constipation due to opioid therapy    Weakness    Diabetic peripheral neuropathy (HCC)    Fibrothorax    Alcohol abuse, in remission    Hemothorax on right    Trapped lung    Acquired hypothyroidism    Type 1 diabetes mellitus with hyperglycemia ([facility])    Chronic pain of both shoulders    Trochanteric bursitis, left hip    Aphonia    Primary hypothyroidism    Hypercholesterolemia    Insulin pump in place    Charcot foot due to diabetes mellitus ([address]    History of hypertension    Candida laryngitis    Type 1 diabetes mellitus with stage 3a chronic kidney disease (HCC)    Vitreous hemorrhage due to type 1 diabetes mellitus ([facility])    Polypharmacy    Type 1 diabetes mellitus with retinopathy ([facility])    Luetscher's syndrome    Stage 3a chronic kidney disease ([address]    Stage 3b chronic kidney disease ([address]    Diabetic nephropathy ([address]    Spinal cord stimulator status    Class 2 severe obesity due to excess calories with serious comorbidity and body mass index (BMI) of 35.0 to 35.9 in adult [facility])    At high risk for falls    COVID-19 vaccine series completed    Chronic pain    Pleural effusion    Lumbar radiculopathy    Aortic heart murmur    Polyp of colon    Alzheimer's disease, unspecified    Neuropathy    Hypothyroid    Chronic pain    Chronic renal disease, stage 3, moderately decreased glomerular filtration rate (GFR) between 30-59 mL/min/1.73 square meter (HCC)    Dysphagia    Depression    History of amputation of toe (HCC)    Type 1 diabetes mellitus with diabetic foot ulcer ([address]    Metabolic encephalopathy        I have reviewed, updated, and verified this note's content and spent 36 minutes of my 40 minutes visit performing counseling and coordination of care regarding medical management.        Signed By: Syl Huitron MD     October 14, 2022

## 2022-10-14 NOTE — PROGRESS NOTES
Patient resting in bed on room air. A&Ox3 with period of orientation/disorientation. Respirations even and unlabored. Purewick patent and draining clear yellow urine. Patient denies pain and is in no acute distress at this time. No acute events overnight. Call bell within reach, patient instructed to call if assistance is needed. Preparing to give report to oncoming RN.

## 2022-10-14 NOTE — PROGRESS NOTES
[address] FAX: [phone]         VASCULAR SURGERY FLOOR PROGRESS NOTE    Admit Date: 10/10/2022  POD: * No surgery found *    Procedure:      Subjective:     Patient has no new complaints. Objective:     Vitals:  Blood pressure (!) 167/95, pulse 92, temperature 98.2 °F (36.8 °C), temperature source Oral, resp. rate 20, height 5' 7\" (1.702 m), weight 224 lb 10.4 oz (101.9 kg), SpO2 97 %. Temp (24hrs), Av.5 °F (36.9 °C), Min:97.8 °F (36.6 °C), Max:99.3 °F (37.4 °C)      Intake / Output:    Intake/Output Summary (Last 24 hours) at 10/14/2022 0781  Last data filed at 10/14/2022 0142  Gross per 24 hour   Intake 10 ml   Output 1000 ml   Net -990 ml       Physical Exam:    Constitutional: he appears well-developed. No distress. HENT:   Head: Atraumatic. Eyes: Pupils are equal, round, and reactive to light. Neck: Normal range of motion. Cardiovascular: Regular rhythm. Pulmonary/Chest: Effort normal and breath sounds normal. No respiratory distress. Abdominal: Soft. Bowel sounds are normal. he exhibits no distension. There is no tenderness. There is no guarding. No hernia. Musculoskeletal: Normal range of motion. Neurological: He is alert. CN II- XII grossly intact  Vascular: Palpable pedal pulses superficial left toe ulcer    Labs: No results for input(s): HGB, WBC, K, GLU in the last 72 hours.     Invalid input(s):  CREA    Data Review   Assessment:     Patient Active Problem List    Diagnosis Date Noted    Type 1 diabetes mellitus with diabetic foot ulcer ([facility]     Metabolic encephalopathy     Neuropathy 2022    Hypothyroid 2022    Chronic pain 2022    Chronic renal disease, stage 3, moderately decreased glomerular filtration rate (GFR) between 30-59 mL/min/1.73 square meter ([facility] 2022    Dysphagia 2022    Depression 2022    Alzheimer's disease, unspecified 2022 History of amputation of toe ([facility] 07/11/2022    Proliferative diabetic retinopathy associated with type 1 diabetes mellitus ([facility]     Orthostatic hypotension     Essential hypertension     Diabetic peripheral neuropathy ([facility]     Primary hypothyroidism     Diabetic nephropathy (HCC)     Pain management contract agreement 11/10/2021    At high risk for falls 08/22/2021    COVID-19 vaccine series completed 08/22/2021    Weakness 08/11/2021    Hypercholesterolemia 07/27/2021    History of hypertension 07/27/2021    Stage 3b chronic kidney disease ([facility] 07/27/2021    Neuropathy 07/08/2021    Stage 3a chronic kidney disease ([facility] 07/08/2021    Chronic pain 07/08/2021    Polyp of colon 06/24/2021    Constipation due to opioid therapy 02/12/2021    Edema of both lower extremities due to peripheral venous insufficiency 01/08/2021    Chronic pain of both shoulders 01/08/2021    Aortic heart murmur 12/23/2020    Fibrothorax 11/12/2020    Class 2 severe obesity due to excess calories with serious comorbidity and body mass index (BMI) of 35.0 to 35.9 in adult ([facility] 10/22/2020    Hemothorax on right 10/14/2020    Trapped lung 10/14/2020    Chronic orthostatic hypotension 10/04/2020    History of tobacco abuse 10/04/2020    Alcohol abuse, in remission 10/04/2020    Luetscher's syndrome 10/04/2020    Pleural effusion 10/04/2020    Mild cognitive impairment with memory loss 08/03/2020    Gastroesophageal reflux disease without esophagitis 08/03/2020    Back pain, chronic 08/03/2020    Charcot foot due to diabetes mellitus ([address] 08/03/2020    Type 1 diabetes mellitus with stage 3a chronic kidney disease ([facility] 08/03/2020    Vitreous hemorrhage due to type 1 diabetes mellitus ([address] 08/03/2020    Type 1 diabetes mellitus with retinopathy ([facility] 08/03/2020    BRITTANY (obstructive sleep apnea) 07/07/2020    Seborrheic keratosis 07/07/2020    Toenail deformity 05/05/2020    Trochanteric bursitis, left hip 09/24/2019    Type 1 diabetes mellitus with hyperglycemia ([facility] 03/08/2019    S/P lumbar fusion 12/28/2018    Chronic left-sided low back pain with left-sided sciatica 12/28/2018    Lumbar radiculopathy 12/28/2018    Acquired hypothyroidism 12/14/2018    Mild episode of recurrent major depressive disorder ([facility] 10/18/2018    Benign prostatic hyperplasia with incomplete bladder emptying 10/18/2018    Polypharmacy 10/18/2018    Spinal cord stimulator status 10/15/2018    Insulin pump in place 09/11/2018    Dysphonia 08/07/2014    Vocal cord atrophy 07/22/2014    Aphonia 07/22/2014    Candida laryngitis 07/22/2014       Plan/Recommendations/Medical Decision Making:     Superficial abrasion of the left foot vascular intact, continue wound care  -No activity restrictions vascular standpoint  -No follow-up as needed vascular will sign off please call with questions or concerns. Elements of this note have been dictated using speech recognition software. As a result, errors of speech recognition may have occurred.

## 2022-10-14 NOTE — PROGRESS NOTES
Patient resting in bed on room air. Respirations present and unlabored. A&Ox3 with period of orientation/disorientation. No needs at this time. Call bell within reach and encouraged to call with needs.

## 2022-10-14 NOTE — CARE COORDINATION
CM received call from [facility] director to follow up on referrals that were sent to SNFs. Noted that previous CM sent referrals to 4 facilities, all of whom declined secondary to no bed availability. CM director has spoken to admissions liaison, Amy Toro, at [facility] who requests that referral be resent as he may now have bed availability. Referral resent at this time. PPD is completed and PCR Covid testing ordered at this time.

## 2022-10-14 NOTE — PROGRESS NOTES
Pt alert and oriented x 3 with intermittent confusion. Respirations present. Pt removed IV on his home. Was attempting to take primo fit off upon this RN entering the room. Bed locked and low. No signs of distress at this time.

## 2022-10-14 NOTE — PROGRESS NOTES
VANCO DAILY FOLLOW UP NOTE  [address] Pharmacokinetic Monitoring Service - Vancomycin    Consulting Provider: Aide Avery DO   Indication: SSTI  Target Concentration: Goal trough of 10-15 mg/L and AUC/FLORI <500 mg*hr/L  Day of Therapy: 5  Additional Antimicrobials: cefepime    Pertinent Laboratory Values: Wt Readings from Last 1 Encounters:   10/11/22 224 lb 10.4 oz (101.9 kg)     Temp Readings from Last 1 Encounters:   10/14/22 98.2 °F (36.8 °C) (Oral)     Recent Labs     10/13/22  0552   CREATININE 1.50     Estimated Creatinine Clearance: 48 mL/min (based on SCr of 1.5 mg/dL). Lab Results   Component Value Date/Time    VANCORANDOM 16.0 10/14/2022 05:58 AM       MRSA Nasal Swab: N/A. Non-respiratory infection.       Assessment:  Date/Time Dose Concentration AUC   10/12 0153 1000 mg q18h 14.1 448   10/14 0558 1000 mg q18h 16.0 455   Note: Serum concentrations collected for AUC dosing may appear elevated if collected in close proximity to the dose administered, this is not necessarily an indication of toxicity    Plan:  Current dosing regimen is therapeutic  Continue current dose  Repeat vancomycin concentrations will be ordered as clinically appropriate   Pharmacy will continue to monitor patient and adjust therapy as indicated    Thank you for the consult,  Sammie Potts, [address] [facility]

## 2022-10-14 NOTE — TELEPHONE ENCOUNTER
Patient's wife called stating that he is currently admitted due to hyperglycemia. He will be going to rehab once discharged. Just wanted Zenia Fawn to know.

## 2022-10-15 LAB
ANION GAP SERPL CALC-SCNC: 2 MMOL/L (ref 2–11)
BACTERIA SPEC CULT: NORMAL
BACTERIA SPEC CULT: NORMAL
BUN SERPL-MCNC: 33 MG/DL (ref 8–23)
CALCIUM SERPL-MCNC: 8.9 MG/DL (ref 8.3–10.4)
CHLORIDE SERPL-SCNC: 109 MMOL/L (ref 101–110)
CO2 SERPL-SCNC: 26 MMOL/L (ref 21–32)
CREAT SERPL-MCNC: 1.5 MG/DL (ref 0.8–1.5)
ERYTHROCYTE [DISTWIDTH] IN BLOOD BY AUTOMATED COUNT: 14.1 % (ref 11.9–14.6)
GLUCOSE BLD STRIP.AUTO-MCNC: 133 MG/DL (ref 65–100)
GLUCOSE BLD STRIP.AUTO-MCNC: 151 MG/DL (ref 65–100)
GLUCOSE BLD STRIP.AUTO-MCNC: 156 MG/DL (ref 65–100)
GLUCOSE BLD STRIP.AUTO-MCNC: 87 MG/DL (ref 65–100)
GLUCOSE SERPL-MCNC: 197 MG/DL (ref 65–100)
HCT VFR BLD AUTO: 38 % (ref 41.1–50.3)
HGB BLD-MCNC: 11.7 G/DL (ref 13.6–17.2)
MCH RBC QN AUTO: 27.3 PG (ref 26.1–32.9)
MCHC RBC AUTO-ENTMCNC: 30.8 G/DL (ref 31.4–35)
MCV RBC AUTO: 88.6 FL (ref 82–102)
NRBC # BLD: 0 K/UL (ref 0–0.2)
PLATELET # BLD AUTO: 148 K/UL (ref 150–450)
PMV BLD AUTO: 10.9 FL (ref 9.4–12.3)
POTASSIUM SERPL-SCNC: 4 MMOL/L (ref 3.5–5.1)
RBC # BLD AUTO: 4.29 M/UL (ref 4.23–5.6)
SARS-COV-2 RNA RESP QL NAA+PROBE: NOT DETECTED
SARS-COV-2: NORMAL
SERVICE CMNT-IMP: ABNORMAL
SERVICE CMNT-IMP: NORMAL
SODIUM SERPL-SCNC: 137 MMOL/L (ref 133–143)
SOURCE: NORMAL
WBC # BLD AUTO: 4.6 K/UL (ref 4.3–11.1)

## 2022-10-15 PROCEDURE — 6370000000 HC RX 637 (ALT 250 FOR IP): Performed by: INTERNAL MEDICINE

## 2022-10-15 PROCEDURE — 6360000002 HC RX W HCPCS: Performed by: INTERNAL MEDICINE

## 2022-10-15 PROCEDURE — 6370000000 HC RX 637 (ALT 250 FOR IP): Performed by: FAMILY MEDICINE

## 2022-10-15 PROCEDURE — 85027 COMPLETE CBC AUTOMATED: CPT

## 2022-10-15 PROCEDURE — 1100000000 HC RM PRIVATE

## 2022-10-15 PROCEDURE — 2580000003 HC RX 258: Performed by: FAMILY MEDICINE

## 2022-10-15 PROCEDURE — 80048 BASIC METABOLIC PNL TOTAL CA: CPT

## 2022-10-15 PROCEDURE — 2580000003 HC RX 258: Performed by: INTERNAL MEDICINE

## 2022-10-15 PROCEDURE — 6360000002 HC RX W HCPCS: Performed by: FAMILY MEDICINE

## 2022-10-15 PROCEDURE — 36415 COLL VENOUS BLD VENIPUNCTURE: CPT

## 2022-10-15 PROCEDURE — 82962 GLUCOSE BLOOD TEST: CPT

## 2022-10-15 RX ORDER — KETOROLAC TROMETHAMINE 30 MG/ML
15 INJECTION, SOLUTION INTRAMUSCULAR; INTRAVENOUS EVERY 8 HOURS
Status: COMPLETED | OUTPATIENT
Start: 2022-10-15 | End: 2022-10-16

## 2022-10-15 RX ADMIN — INSULIN GLARGINE 34 UNITS: 100 INJECTION, SOLUTION SUBCUTANEOUS at 08:36

## 2022-10-15 RX ADMIN — INSULIN LISPRO 12 UNITS: 100 INJECTION, SOLUTION INTRAVENOUS; SUBCUTANEOUS at 12:23

## 2022-10-15 RX ADMIN — DULOXETINE HYDROCHLORIDE 60 MG: 60 CAPSULE, DELAYED RELEASE ORAL at 08:37

## 2022-10-15 RX ADMIN — MORPHINE SULFATE 15 MG: 15 TABLET, FILM COATED, EXTENDED RELEASE ORAL at 21:18

## 2022-10-15 RX ADMIN — PANTOPRAZOLE SODIUM 40 MG: 40 TABLET, DELAYED RELEASE ORAL at 08:36

## 2022-10-15 RX ADMIN — INSULIN LISPRO 12 UNITS: 100 INJECTION, SOLUTION INTRAVENOUS; SUBCUTANEOUS at 08:36

## 2022-10-15 RX ADMIN — SODIUM BICARBONATE 650 MG TABLET 650 MG: at 08:37

## 2022-10-15 RX ADMIN — FINASTERIDE 5 MG: 5 TABLET, FILM COATED ORAL at 08:37

## 2022-10-15 RX ADMIN — MORPHINE SULFATE 15 MG: 15 TABLET, FILM COATED, EXTENDED RELEASE ORAL at 08:37

## 2022-10-15 RX ADMIN — KETOROLAC TROMETHAMINE 15 MG: 30 INJECTION, SOLUTION INTRAMUSCULAR at 12:19

## 2022-10-15 RX ADMIN — HEPARIN SODIUM 5000 UNITS: 5000 INJECTION INTRAVENOUS; SUBCUTANEOUS at 15:32

## 2022-10-15 RX ADMIN — ROSUVASTATIN CALCIUM 10 MG: 5 TABLET, FILM COATED ORAL at 21:17

## 2022-10-15 RX ADMIN — VITAMIN D, TAB 1000IU (100/BT) 1000 UNITS: 25 TAB at 08:37

## 2022-10-15 RX ADMIN — SODIUM CHLORIDE, PRESERVATIVE FREE 5 ML: 5 INJECTION INTRAVENOUS at 08:38

## 2022-10-15 RX ADMIN — SODIUM CHLORIDE, PRESERVATIVE FREE 10 ML: 5 INJECTION INTRAVENOUS at 22:52

## 2022-10-15 RX ADMIN — POLYETHYLENE GLYCOL 3350 17 G: 17 POWDER, FOR SOLUTION ORAL at 08:37

## 2022-10-15 RX ADMIN — SODIUM CHLORIDE, PRESERVATIVE FREE 5 ML: 5 INJECTION INTRAVENOUS at 12:26

## 2022-10-15 RX ADMIN — CEFTRIAXONE 2000 MG: 2 INJECTION, POWDER, FOR SOLUTION INTRAMUSCULAR; INTRAVENOUS at 21:18

## 2022-10-15 RX ADMIN — HEPARIN SODIUM 5000 UNITS: 5000 INJECTION INTRAVENOUS; SUBCUTANEOUS at 21:18

## 2022-10-15 RX ADMIN — KETOROLAC TROMETHAMINE 15 MG: 30 INJECTION, SOLUTION INTRAMUSCULAR at 20:11

## 2022-10-15 RX ADMIN — SODIUM BICARBONATE 650 MG TABLET 650 MG: at 21:17

## 2022-10-15 ASSESSMENT — PAIN SCALES - GENERAL
PAINLEVEL_OUTOF10: 0
PAINLEVEL_OUTOF10: 2
PAINLEVEL_OUTOF10: 0

## 2022-10-15 ASSESSMENT — PAIN DESCRIPTION - ORIENTATION
ORIENTATION: RIGHT
ORIENTATION: LEFT

## 2022-10-15 ASSESSMENT — PAIN DESCRIPTION - DESCRIPTORS: DESCRIPTORS: ACHING

## 2022-10-15 ASSESSMENT — PAIN DESCRIPTION - LOCATION
LOCATION: TOE (COMMENT WHICH ONE)
LOCATION: TOE (COMMENT WHICH ONE)

## 2022-10-15 NOTE — PROGRESS NOTES
Progress Note    Patient: Narciso Morillo. MRN: [id number]  SSN: [id number]    YOB: 1945  Age: 78 y.o. Sex: male      Admit Date: 10/10/2022    LOS: 5 days     Assessment and Plan:   61-year-old male with a past medical history of hyperlipidemia, GERD, CKD stage III, diabetes, pretension, presented in the setting of diabetic foot ulcer infection    1. Diabetic left first and second toe ulcer infection  -X-ray without signs of osteomyelitis  -Vascular surgery recommendations appreciated, status post debridement  -Wound culture positive for E. coli  -Continue ceftriaxone  -PT and OT    2. Diabetes mellitus  -Insulin sliding scale  -Blood sugar checks AC at bedtime    3. Hypothyroidism  -Continue levothyroxine    4. Neuropathy  -Continue duloxetine    5. CKD stage III  -Monitor renal function    DVT prophylaxis Lovenox      Subjective:   61-year-old male with a past medical history of hyperlipidemia, GERD, CKD stage III, diabetes, pretension, presented in the setting of diabetic foot ulcer infection. Patient seen and examined at bedside. This morning resting comfortably. No complains. Objective:     Vitals:    10/14/22 2301 10/15/22 0345 10/15/22 0737 10/15/22 1117   BP: (!) 104/55 108/62 (!) 144/86 (!) 143/94   Pulse: 96 95 (!) 103 (!) 113   Resp: 20 20 20 20   Temp: 98.8 °F (37.1 °C) 98.6 °F (37 °C) 98.2 °F (36.8 °C) 97.9 °F (36.6 °C)   TempSrc: Axillary Axillary Oral Axillary   SpO2: 95% 95% 97% 97%   Weight:       Height:            Intake and Output:  Current Shift: No intake/output data recorded.   Last three shifts: 10/13 1901 - 10/15 0700  In: 10 [I.V.:10]  Out: 1700 [Urine:1700]    ROS  10 ROS negative except from stated on subjective    Physical Exam:   General: Alert, NAD  HEENT: NC/AT, EOM are intact  Neck: supple, no JVD  Cardiovascular: RRR, S1, S2, no murmurs  Respiratory: Lungs are clear, no wheezes or rales  Abdomen: Soft, NT, ND  Back: No CVA tenderness, no paraspinal tenderness  Extremities: Lower extremity with clean dressing  Neuro: A&O, CN are intact, no focal deficits  Skin: no rash or ulcers  Psych: good mood and affect    Lab/Data Review:  I have personally reviewed patients laboratory data showing  Recent Results (from the past 24 hour(s))   POCT Glucose    Collection Time: 10/14/22 11:25 AM   Result Value Ref Range    POC Glucose 301 (H) 65 - 100 mg/dL    Performed by: EFRAÍN Blue    COVID-19    Collection Time: 10/14/22  3:30 PM    Specimen: Nasopharyngeal Swab   Result Value Ref Range    SARS-CoV-2 Please find results under separate order     POCT Glucose    Collection Time: 10/14/22  4:35 PM   Result Value Ref Range    POC Glucose 174 (H) 65 - 100 mg/dL    Performed by: EFRAÍN Blue    POCT Glucose    Collection Time: 10/14/22  8:50 PM   Result Value Ref Range    POC Glucose 90 65 - 100 mg/dL    Performed by: Carmela    POCT Glucose    Collection Time: 10/15/22  5:03 AM   Result Value Ref Range    POC Glucose 133 (H) 65 - 100 mg/dL    Performed by: Tricia    POCT Glucose    Collection Time: 10/15/22  7:38 AM   Result Value Ref Range    POC Glucose 151 (H) 65 - 100 mg/dL    Performed by:  EFRAÍN Blue    CBC    Collection Time: 10/15/22  9:45 AM   Result Value Ref Range    WBC 4.6 4.3 - 11.1 K/uL    RBC 4.29 4.23 - 5.6 M/uL    Hemoglobin 11.7 (L) 13.6 - 17.2 g/dL    Hematocrit 38.0 (L) 41.1 - 50.3 %    MCV 88.6 82 - 102 FL    MCH 27.3 26.1 - 32.9 PG    MCHC 30.8 (L) 31.4 - 35.0 g/dL    RDW 14.1 11.9 - 14.6 %    Platelets 005 (L) 326 - 450 K/uL    MPV 10.9 9.4 - 12.3 FL    nRBC 0.00 0.0 - 0.2 K/uL   Basic Metabolic Panel    Collection Time: 10/15/22  9:45 AM   Result Value Ref Range    Sodium 137 133 - 143 mmol/L    Potassium 4.0 3.5 - 5.1 mmol/L    Chloride 109 101 - 110 mmol/L    CO2 26 21 - 32 mmol/L    Anion Gap 2 2 - 11 mmol/L    Glucose 197 (H) 65 - 100 mg/dL    BUN 33 (H) 8 - 23 MG/DL    Creatinine 1.50 0.8 - 1.5 MG/DL    Est, Glom Filt Rate 48 (L) >60 ml/min/1.73m2    Calcium 8.9 8.3 - 10.4 MG/DL      [unfilled]     Image:  I have personally reviewed patients imaging showing  Vascular duplex lower extremity arteries bilateral with DARINEL   Final Result   1. Noncompressible ankle pressures and flat line digit pressures on the right,   possibly due to small vessel calcinosis   2. Clemente cyst noted on the left   3. Distended urinary bladder volume averaging 1000 cc            XR TOE LEFT (MIN 2 VIEWS)   Final Result   1. Soft tissue ulcer about the distal aspect of the second left toe without   convincing evidence of osteomyelitis. If there is continued clinical concern   further evaluation with MRI can BE obtained. 2.  Similar extensive degenerative changes DIP joints of the imaged first   through third toes.               Current Facility-Administered Medications   Medication Dose Route Frequency Provider Last Rate Last Admin    insulin glargine (LANTUS) injection vial 34 Units  34 Units SubCUTAneous Daily Lesly Hernández MD   34 Units at 10/15/22 0836    cefTRIAXone (ROCEPHIN) 2,000 mg in sodium chloride 0.9 % 50 mL IVPB mini-bag  2,000 mg IntraVENous Q24H Lesly Hernández MD   Stopped at 10/14/22 2117    insulin lispro (HUMALOG) injection vial 12 Units  12 Units SubCUTAneous TID WC Ori Quijano MD   12 Units at 10/15/22 0836    polyethylene glycol (GLYCOLAX) packet 17 g  17 g Oral Daily Ori Quijano MD   17 g at 10/15/22 0837    potassium chloride (KLOR-CON M) extended release tablet 40 mEq  40 mEq Oral PRN Cyndi Stephenson MD        Or    potassium bicarb-citric acid (EFFER-K) effervescent tablet 40 mEq  40 mEq Oral PRN Cyndi Stephenson MD        Or    potassium chloride 10 mEq/100 mL IVPB (Peripheral Line)  10 mEq IntraVENous PRN Cyndi Stephenson MD        magnesium sulfate 2000 mg in 50 mL IVPB premix  2,000 mg IntraVENous PRN Cyndi Stephenson MD        sodium phosphate 10 mmol in sodium chloride 0.9 % 250 mL IVPB  10 mmol IntraVENous PRN Phil Hsieh MD        Or    sodium phosphate 15 mmol in sodium chloride 0.9 % 250 mL IVPB  15 mmol IntraVENous PRN Phil Hsieh MD        Or    sodium phosphate 20 mmol in sodium chloride 0.9 % 500 mL IVPB  20 mmol IntraVENous PRN Phil Hsieh MD        heparin (porcine) injection 5,000 Units  5,000 Units SubCUTAneous 3 times per day Phil Hsieh MD   5,000 Units at 10/14/22 2204    DULoxetine (CYMBALTA) extended release capsule 60 mg  60 mg Oral Daily Leopold Coombes, DO   60 mg at 10/15/22 0837    finasteride (PROSCAR) tablet 5 mg  5 mg Oral Daily Leopold Coombes, DO   5 mg at 10/15/22 6592    levothyroxine (SYNTHROID) tablet 75 mcg  75 mcg Oral QAM AC Leopold Coombes, DO   75 mcg at 10/14/22 7051    morphine (MS CONTIN) extended release tablet 15 mg  15 mg Oral BID Leopold Coombes, DO   15 mg at 10/15/22 0837    pantoprazole (PROTONIX) tablet 40 mg  40 mg Oral Daily Leopold Coombes, DO   40 mg at 10/15/22 0836    rosuvastatin (CRESTOR) tablet 10 mg  10 mg Oral Nightly Leopold Coombes, DO   10 mg at 10/14/22 2048    sodium bicarbonate tablet 650 mg  650 mg Oral BID Leopold Coombes, DO   650 mg at 10/15/22 6470    Vitamin D (CHOLECALCIFEROL) tablet 1,000 Units  1,000 Units Oral Daily Leopold Coombes, DO   1,000 Units at 10/15/22 3316    sodium chloride flush 0.9 % injection 5-40 mL  5-40 mL IntraVENous 2 times per day Leopold Coombes, DO   5 mL at 10/15/22 9211    sodium chloride flush 0.9 % injection 5-40 mL  5-40 mL IntraVENous PRN Leopold Coombes, DO   10 mL at 10/14/22 1356    0.9 % sodium chloride infusion   IntraVENous PRN Leopold Coombes, DO        insulin lispro (HUMALOG) injection vial 0-8 Units  0-8 Units SubCUTAneous TID WC Leopold Coombes, DO   6 Units at 10/14/22 1352    insulin lispro (HUMALOG) injection vial 0-4 Units  0-4 Units SubCUTAneous Nightly Leopold Coombes, DO   4 Units at 10/11/22 2053    glucose chewable tablet 16 g  4 tablet Oral PRN Trinda Gonzalez, DO        dextrose bolus 10% 125 mL  125 mL IntraVENous PRN Trinda Gonzalez, DO        Or    dextrose bolus 10% 250 mL  250 mL IntraVENous PRN Trinda Gonzalez, DO        glucagon (rDNA) injection 1 mg  1 mg SubCUTAneous PRN Trinda Gonzalez, DO        dextrose 10 % infusion   IntraVENous Continuous PRN Trinda Gonzalez, DO        OLANZapine (ZYPREXA) tablet 10 mg  10 mg Oral Nightly Trinda Gonzalez, DO   10 mg at 10/14/22 2048        Hospital problems     Patient Active Problem List   Diagnosis    Proliferative diabetic retinopathy associated with type 1 diabetes mellitus ([address]    Pain management contract agreement    Chronic orthostatic hypotension    Toenail deformity    History of tobacco abuse    Mild cognitive impairment with memory loss    S/P lumbar fusion    Dysphonia    Edema of both lower extremities due to peripheral venous insufficiency    Gastroesophageal reflux disease without esophagitis    Vocal cord atrophy    Back pain, chronic    BRITTANY (obstructive sleep apnea)    Mild episode of recurrent major depressive disorder (HCC)    Seborrheic keratosis    Benign prostatic hyperplasia with incomplete bladder emptying    Orthostatic hypotension    Essential hypertension    Neuropathy    Chronic left-sided low back pain with left-sided sciatica    Constipation due to opioid therapy    Weakness    Diabetic peripheral neuropathy (HCC)    Fibrothorax    Alcohol abuse, in remission    Hemothorax on right    Trapped lung    Acquired hypothyroidism    Type 1 diabetes mellitus with hyperglycemia (HCC)    Chronic pain of both shoulders    Trochanteric bursitis, left hip    Aphonia    Primary hypothyroidism    Hypercholesterolemia    Insulin pump in place    Charcot foot due to diabetes mellitus ([address]    History of hypertension    Candida laryngitis    Type 1 diabetes mellitus with stage 3a chronic kidney disease ([address]    Vitreous hemorrhage due to type 1 diabetes mellitus ([address]    Polypharmacy Type 1 diabetes mellitus with retinopathy (HCC)    Luetscher's syndrome    Stage 3a chronic kidney disease (HCC)    Stage 3b chronic kidney disease [address]    Diabetic nephropathy ([facility]    Spinal cord stimulator status    Class 2 severe obesity due to excess calories with serious comorbidity and body mass index (BMI) of 35.0 to 35.9 in [facility])    At high risk for falls    COVID-19 vaccine series completed    Chronic pain    Pleural effusion    Lumbar radiculopathy    Aortic heart murmur    Polyp of colon    Alzheimer's disease, unspecified    Neuropathy    Hypothyroid    Chronic pain    Chronic renal disease, stage 3, moderately decreased glomerular filtration rate (GFR) between 30-59 mL/min/1.73 square meter (HCC)    Dysphagia    Depression    History of amputation of toe (HCC)    Type 1 diabetes mellitus with diabetic foot ulcer ([facility]    Metabolic encephalopathy      Signed By: Saira Strong MD     October 15, 2022

## 2022-10-15 NOTE — PROGRESS NOTES
Pt resting in bed awake. Alert and oriented to person only at this time. On RA. No s/sx of distress noted. No evidence of pain. Encouraged to call for assistance as needed. Call light within reach. Will continue to monitor.

## 2022-10-15 NOTE — PROGRESS NOTES
Pt in bed resting at this time. Alert and oriented to self at this time and presenting with agitation. Primo fit in place and connected to suction. No acute events overnight and no signs of distress. Bed is locked and low with bed alarm active. Preparing to give report to oncoming RN.

## 2022-10-15 NOTE — PROGRESS NOTES
Pt resting in bed with eyes closed at this time. Alert and oriented to person only. Respirations even and unlabored on room air. Bed locked and low. Call light active. No signs of distress. Report received from [employer].

## 2022-10-15 NOTE — PROGRESS NOTES
Patient refused morning dose of levothyroxine and heparin. Would not respond when he asked if he wanted to take his medications and swatted at this RN and the pt care assistant. See MAR for details. Pt removed primo fit and would not let staff change his gown. Currently agitated.

## 2022-10-16 LAB
GLUCOSE BLD STRIP.AUTO-MCNC: 122 MG/DL (ref 65–100)
GLUCOSE BLD STRIP.AUTO-MCNC: 135 MG/DL (ref 65–100)
GLUCOSE BLD STRIP.AUTO-MCNC: 162 MG/DL (ref 65–100)
GLUCOSE BLD STRIP.AUTO-MCNC: 91 MG/DL (ref 65–100)
SERVICE CMNT-IMP: ABNORMAL
SERVICE CMNT-IMP: NORMAL

## 2022-10-16 PROCEDURE — 6370000000 HC RX 637 (ALT 250 FOR IP): Performed by: FAMILY MEDICINE

## 2022-10-16 PROCEDURE — 6360000002 HC RX W HCPCS: Performed by: FAMILY MEDICINE

## 2022-10-16 PROCEDURE — 82962 GLUCOSE BLOOD TEST: CPT

## 2022-10-16 PROCEDURE — 6370000000 HC RX 637 (ALT 250 FOR IP): Performed by: INTERNAL MEDICINE

## 2022-10-16 PROCEDURE — 6360000002 HC RX W HCPCS: Performed by: INTERNAL MEDICINE

## 2022-10-16 PROCEDURE — 1100000000 HC RM PRIVATE

## 2022-10-16 PROCEDURE — 2580000003 HC RX 258: Performed by: INTERNAL MEDICINE

## 2022-10-16 PROCEDURE — 2580000003 HC RX 258: Performed by: FAMILY MEDICINE

## 2022-10-16 RX ADMIN — MORPHINE SULFATE 15 MG: 15 TABLET, FILM COATED, EXTENDED RELEASE ORAL at 08:04

## 2022-10-16 RX ADMIN — PANTOPRAZOLE SODIUM 40 MG: 40 TABLET, DELAYED RELEASE ORAL at 09:40

## 2022-10-16 RX ADMIN — LEVOTHYROXINE SODIUM 75 MCG: 75 TABLET ORAL at 06:06

## 2022-10-16 RX ADMIN — INSULIN LISPRO 12 UNITS: 100 INJECTION, SOLUTION INTRAVENOUS; SUBCUTANEOUS at 08:30

## 2022-10-16 RX ADMIN — DULOXETINE HYDROCHLORIDE 60 MG: 60 CAPSULE, DELAYED RELEASE ORAL at 08:04

## 2022-10-16 RX ADMIN — FINASTERIDE 5 MG: 5 TABLET, FILM COATED ORAL at 09:40

## 2022-10-16 RX ADMIN — INSULIN LISPRO 12 UNITS: 100 INJECTION, SOLUTION INTRAVENOUS; SUBCUTANEOUS at 12:00

## 2022-10-16 RX ADMIN — OLANZAPINE 10 MG: 5 TABLET, FILM COATED ORAL at 08:04

## 2022-10-16 RX ADMIN — INSULIN GLARGINE 34 UNITS: 100 INJECTION, SOLUTION SUBCUTANEOUS at 08:40

## 2022-10-16 RX ADMIN — KETOROLAC TROMETHAMINE 15 MG: 30 INJECTION, SOLUTION INTRAMUSCULAR at 02:47

## 2022-10-16 RX ADMIN — ROSUVASTATIN CALCIUM 10 MG: 5 TABLET, FILM COATED ORAL at 21:10

## 2022-10-16 RX ADMIN — CEFTRIAXONE 2000 MG: 2 INJECTION, POWDER, FOR SOLUTION INTRAMUSCULAR; INTRAVENOUS at 21:10

## 2022-10-16 RX ADMIN — SODIUM CHLORIDE, PRESERVATIVE FREE 10 ML: 5 INJECTION INTRAVENOUS at 21:10

## 2022-10-16 RX ADMIN — OLANZAPINE 10 MG: 5 TABLET, FILM COATED ORAL at 21:09

## 2022-10-16 RX ADMIN — MORPHINE SULFATE 15 MG: 15 TABLET, FILM COATED, EXTENDED RELEASE ORAL at 21:10

## 2022-10-16 RX ADMIN — HEPARIN SODIUM 5000 UNITS: 5000 INJECTION INTRAVENOUS; SUBCUTANEOUS at 06:06

## 2022-10-16 RX ADMIN — SODIUM BICARBONATE 650 MG TABLET 650 MG: at 09:40

## 2022-10-16 RX ADMIN — POLYETHYLENE GLYCOL 3350 17 G: 17 POWDER, FOR SOLUTION ORAL at 09:40

## 2022-10-16 RX ADMIN — VITAMIN D, TAB 1000IU (100/BT) 1000 UNITS: 25 TAB at 09:40

## 2022-10-16 RX ADMIN — SODIUM CHLORIDE, PRESERVATIVE FREE 10 ML: 5 INJECTION INTRAVENOUS at 09:40

## 2022-10-16 RX ADMIN — HEPARIN SODIUM 5000 UNITS: 5000 INJECTION INTRAVENOUS; SUBCUTANEOUS at 21:11

## 2022-10-16 RX ADMIN — HEPARIN SODIUM 5000 UNITS: 5000 INJECTION INTRAVENOUS; SUBCUTANEOUS at 14:39

## 2022-10-16 RX ADMIN — SODIUM BICARBONATE 650 MG TABLET 650 MG: at 21:10

## 2022-10-16 ASSESSMENT — PAIN SCALES - GENERAL: PAINLEVEL_OUTOF10: 8

## 2022-10-16 ASSESSMENT — PAIN DESCRIPTION - LOCATION
LOCATION: BACK;BUTTOCKS
LOCATION: TOE (COMMENT WHICH ONE)

## 2022-10-16 ASSESSMENT — PAIN DESCRIPTION - ORIENTATION
ORIENTATION: RIGHT
ORIENTATION: LOWER;LEFT

## 2022-10-16 ASSESSMENT — PAIN DESCRIPTION - DESCRIPTORS: DESCRIPTORS: ACHING

## 2022-10-16 NOTE — PROGRESS NOTES
Pt resting in bed at this time. Oriented to self only. Respirations even and unlabored on RA. Bed is locked and low. Bed alarm is active. No signs of distress. Report received from Kevin Miller [facility].

## 2022-10-16 NOTE — PROGRESS NOTES
Pt in bed resting at this time. Oriented to self only. Respirations even and unlabored on RA. Bed is locked and low. Bed alarm is active. No acute events overnight. No signs of distress. Preparing to give report to oncoming RN.

## 2022-10-16 NOTE — PROGRESS NOTES
Pt refused blood sugar check. When asked why he insisted that he talk to his wife, Precious Birch. His wife was called and he would not speak. Pt continued to refuse BS check after being encouraged to comply. No insulin administered. Pt refusing to speak at this time. No signs of distress. Will continue to monitor.

## 2022-10-16 NOTE — PROGRESS NOTES
Progress Note    Patient: Mamie Doll MRN: [id number]  SSN: [id number]    YOB: 1945  Age: 78 y.o. Sex: male      Admit Date: 10/10/2022    LOS: 6 days     Assessment and Plan:   72-year-old male with a past medical history of hyperlipidemia, GERD, CKD stage III, diabetes, pretension, presented in the setting of diabetic foot ulcer infection    1. Diabetic left first and second toe ulcer infection  -X-ray without signs of osteomyelitis  -Vascular surgery recommendations appreciated, status post debridement  -Wound culture positive for E. coli  -Continue ceftriaxone  -PT and OT    2. Diabetes mellitus  -Insulin sliding scale  -Blood sugar checks AC at bedtime    3. Hypothyroidism  -Continue levothyroxine    4. Neuropathy  -Continue duloxetine    5. CKD stage III  -Monitor renal function    DVT prophylaxis Lovenox      Subjective:   72-year-old male with a past medical history of hyperlipidemia, GERD, CKD stage III, diabetes, pretension, presented in the setting of diabetic foot ulcer infection. Patient seen and examined at bedside. This morning awake feeling better. Denies any chest pain, no abdominal pain, no nausea or vomiting.     Objective:     Vitals:    10/16/22 0303 10/16/22 0804 10/16/22 0806 10/16/22 1100   BP: 132/75  (!) 146/89 118/70   Pulse: 97  99 84   Resp: 18 20 19 18   Temp: 98.5 °F (36.9 °C)  97.9 °F (36.6 °C) 97.9 °F (36.6 °C)   TempSrc: Oral  Oral Oral   SpO2:   97% 96%   Weight:       Height:            Intake and Output:  Current Shift: 10/16 0701 - 10/16 1900  In: 300 [P.O.:300]  Out: -   Last three shifts: 10/14 1901 - 10/16 0700  In: -   Out: 1300 [Urine:1300]    ROS  10 ROS negative except from stated on subjective    Physical Exam:   General: Alert, NAD  HEENT: NC/AT, EOM are intact  Neck: supple, no JVD  Cardiovascular: RRR, S1, S2, no murmurs  Respiratory: Lungs are clear, no wheezes or rales  Abdomen: Soft, NT, ND  Back: No CVA tenderness, no paraspinal tenderness  Extremities: Lower extremity with clean dressing  Neuro: A&O, CN are intact, no focal deficits  Skin: no rash or ulcers  Psych: good mood and affect    Lab/Data Review:  I have personally reviewed patients laboratory data showing  Recent Results (from the past 24 hour(s))   POCT Glucose    Collection Time: 10/15/22  4:51 PM   Result Value Ref Range    POC Glucose 87 65 - 100 mg/dL    Performed by: Demarcus    POCT Glucose    Collection Time: 10/16/22  8:07 AM   Result Value Ref Range    POC Glucose 135 (H) 65 - 100 mg/dL    Performed by: Doron    POCT Glucose    Collection Time: 10/16/22 11:01 AM   Result Value Ref Range    POC Glucose 122 (H) 65 - 100 mg/dL    Performed by: Doron       @[employer]@     Image:  I have personally reviewed patients imaging showing  Vascular duplex lower extremity arteries bilateral with DARINEL   Final Result   1. Noncompressible ankle pressures and flat line digit pressures on the right,   possibly due to small vessel calcinosis   2. Clemente cyst noted on the left   3. Distended urinary bladder volume averaging 1000 cc            XR TOE LEFT (MIN 2 VIEWS)   Final Result   1. Soft tissue ulcer about the distal aspect of the second left toe without   convincing evidence of osteomyelitis. If there is continued clinical concern   further evaluation with MRI can BE obtained. 2.  Similar extensive degenerative changes DIP joints of the imaged first   through third toes.               Current Facility-Administered Medications   Medication Dose Route Frequency Provider Last Rate Last Admin    insulin glargine (LANTUS) injection vial 34 Units  34 Units SubCUTAneous Daily Jacquelyn Chapin MD   34 Units at 10/16/22 0840    cefTRIAXone (ROCEPHIN) 2,000 mg in sodium chloride 0.9 % 50 mL IVPB mini-bag  2,000 mg IntraVENous Q24H Jacquelyn Chapin MD   Stopped at 10/15/22 2144    insulin lispro (HUMALOG) injection vial 12 Units  12 Units SubCUTAneous TID  Brandon Mora MD   12 Units at 10/16/22 0830    polyethylene glycol (GLYCOLAX) packet 17 g  17 g Oral Daily Brandon Mora MD   17 g at 10/16/22 0940    potassium chloride (KLOR-CON M) extended release tablet 40 mEq  40 mEq Oral PRN Nancy Vickers MD        Or    potassium bicarb-citric acid (EFFER-K) effervescent tablet 40 mEq  40 mEq Oral PRN Nancy Vickers MD        Or    potassium chloride 10 mEq/100 mL IVPB (Peripheral Line)  10 mEq IntraVENous PRN Nancy Vickers MD        magnesium sulfate 2000 mg in 50 mL IVPB premix  2,000 mg IntraVENous PRN Nancy Vickers MD        sodium phosphate 10 mmol in sodium chloride 0.9 % 250 mL IVPB  10 mmol IntraVENous PRN Nancy Vickers MD        Or    sodium phosphate 15 mmol in sodium chloride 0.9 % 250 mL IVPB  15 mmol IntraVENous PRN Nancy Vickers MD        Or    sodium phosphate 20 mmol in sodium chloride 0.9 % 500 mL IVPB  20 mmol IntraVENous PRN Nancy Vickers MD        heparin (porcine) injection 5,000 Units  5,000 Units SubCUTAneous 3 times per day Nancy Vickers MD   5,000 Units at 10/16/22 0606    DULoxetine (CYMBALTA) extended release capsule 60 mg  60 mg Oral Daily Michael Prow, DO   60 mg at 10/16/22 0804    finasteride (PROSCAR) tablet 5 mg  5 mg Oral Daily Michael Prow, DO   5 mg at 10/16/22 0940    levothyroxine (SYNTHROID) tablet 75 mcg  75 mcg Oral QAM AC Michael Prow, DO   75 mcg at 10/16/22 0606    morphine (MS CONTIN) extended release tablet 15 mg  15 mg Oral BID Michael Prow, DO   15 mg at 10/16/22 0804    pantoprazole (PROTONIX) tablet 40 mg  40 mg Oral Daily Michael Prow, DO   40 mg at 10/16/22 0940    rosuvastatin (CRESTOR) tablet 10 mg  10 mg Oral Nightly Michael Prow, DO   10 mg at 10/15/22 2117    sodium bicarbonate tablet 650 mg  650 mg Oral BID Michael Prow, DO   650 mg at 10/16/22 0940    Vitamin D (CHOLECALCIFEROL) tablet 1,000 Units  1,000 Units Oral Daily Michael Prow, DO 1,000 Units at 10/16/22 0940    sodium chloride flush 0.9 % injection 5-40 mL  5-40 mL IntraVENous 2 times per day Karolynn Oas, DO   10 mL at 10/16/22 0940    sodium chloride flush 0.9 % injection 5-40 mL  5-40 mL IntraVENous PRN Karolynn Oas, DO   5 mL at 10/15/22 1226    0.9 % sodium chloride infusion   IntraVENous PRN Karolynn Oas, DO        insulin lispro (HUMALOG) injection vial 0-8 Units  0-8 Units SubCUTAneous TID WC Karolynn Oas, DO   6 Units at 10/14/22 1352    insulin lispro (HUMALOG) injection vial 0-4 Units  0-4 Units SubCUTAneous Nightly Karolynn Oas, DO   4 Units at 10/11/22 2053    glucose chewable tablet 16 g  4 tablet Oral PRN Karolynn Oas, DO        dextrose bolus 10% 125 mL  125 mL IntraVENous PRN Karolynn Oas, DO        Or    dextrose bolus 10% 250 mL  250 mL IntraVENous PRN Karolynn Oas, DO        glucagon (rDNA) injection 1 mg  1 mg SubCUTAneous PRN Karolynn Oas, DO        dextrose 10 % infusion   IntraVENous Continuous PRN Karolynn Oas, DO        OLANZapine (ZYPREXA) tablet 10 mg  10 mg Oral Nightly Karolynn Oas, DO   10 mg [facility]16/22 [facility]     Patient Active Problem List   Diagnosis    Proliferative diabetic retinopathy associated with type 1 diabetes mellitus (HCC)    Pain management contract agreement    Chronic orthostatic hypotension    Toenail deformity    History of tobacco abuse    Mild cognitive impairment with memory loss    S/P lumbar fusion    Dysphonia    Edema of both lower extremities due to peripheral venous insufficiency    Gastroesophageal reflux disease without esophagitis    Vocal cord atrophy    Back pain, chronic    BRITTANY (obstructive sleep apnea)    Mild episode of recurrent major depressive disorder (HCC)    Seborrheic keratosis    Benign prostatic hyperplasia with incomplete bladder emptying    Orthostatic hypotension    Essential hypertension    Neuropathy    Chronic left-sided low back pain with left-sided sciatica    Constipation due to opioid therapy    Weakness    Diabetic peripheral neuropathy (HCC)    Fibrothorax    Alcohol abuse, in remission    Hemothorax on right    Trapped lung    Acquired hypothyroidism    Type 1 diabetes mellitus with hyperglycemia (HCC)    Chronic pain of both shoulders    Trochanteric bursitis, left hip    Aphonia    Primary hypothyroidism    Hypercholesterolemia    Insulin pump in place    Charcot foot due to diabetes mellitus ([address]    History of hypertension    Candida laryngitis    Type 1 diabetes mellitus with stage 3a chronic kidney disease (HCC)    Vitreous hemorrhage due to type 1 diabetes mellitus (HCC)    Polypharmacy    Type 1 diabetes mellitus with retinopathy (HCC)    Luetscher's syndrome    Stage 3a chronic kidney disease (HCC)    Stage 3b chronic kidney disease ([address]    Diabetic nephropathy ([address]    Spinal cord stimulator status    Class 2 severe obesity due to excess calories with serious comorbidity and body mass index (BMI) of 35.0 to 35.9 in adult [facility])    At high risk for falls    COVID-19 vaccine series completed    Chronic pain    Pleural effusion    Lumbar radiculopathy    Aortic heart murmur    Polyp of colon    Alzheimer's disease, unspecified    Neuropathy    Hypothyroid    Chronic pain    Chronic renal disease, stage 3, moderately decreased glomerular filtration rate (GFR) between 30-59 mL/min/1.73 square meter (HCC)    Dysphagia    Depression    History of amputation of toe ([address]    Type 1 diabetes mellitus with diabetic foot ulcer ([address]    Metabolic encephalopathy      Signed By: Maddy Persaud MD     October 16, 2022

## 2022-10-16 NOTE — PROGRESS NOTES
No distress noted         Calm        Drinking a cup of coffee    Nice man        Will continue to assess how to best serve this family

## 2022-10-17 LAB
GLUCOSE BLD STRIP.AUTO-MCNC: 159 MG/DL (ref 65–100)
GLUCOSE BLD STRIP.AUTO-MCNC: 177 MG/DL (ref 65–100)
GLUCOSE BLD STRIP.AUTO-MCNC: 260 MG/DL (ref 65–100)
SERVICE CMNT-IMP: ABNORMAL

## 2022-10-17 PROCEDURE — 6370000000 HC RX 637 (ALT 250 FOR IP): Performed by: INTERNAL MEDICINE

## 2022-10-17 PROCEDURE — 97530 THERAPEUTIC ACTIVITIES: CPT

## 2022-10-17 PROCEDURE — 82962 GLUCOSE BLOOD TEST: CPT

## 2022-10-17 PROCEDURE — 2580000003 HC RX 258: Performed by: INTERNAL MEDICINE

## 2022-10-17 PROCEDURE — 1100000000 HC RM PRIVATE

## 2022-10-17 PROCEDURE — 6370000000 HC RX 637 (ALT 250 FOR IP): Performed by: FAMILY MEDICINE

## 2022-10-17 PROCEDURE — 2580000003 HC RX 258: Performed by: FAMILY MEDICINE

## 2022-10-17 PROCEDURE — 6360000002 HC RX W HCPCS: Performed by: FAMILY MEDICINE

## 2022-10-17 PROCEDURE — 97535 SELF CARE MNGMENT TRAINING: CPT

## 2022-10-17 PROCEDURE — 6360000002 HC RX W HCPCS: Performed by: INTERNAL MEDICINE

## 2022-10-17 RX ORDER — INSULIN LISPRO 100 [IU]/ML
9 INJECTION, SOLUTION INTRAVENOUS; SUBCUTANEOUS
Status: DISCONTINUED | OUTPATIENT
Start: 2022-10-17 | End: 2022-10-19 | Stop reason: HOSPADM

## 2022-10-17 RX ADMIN — FINASTERIDE 5 MG: 5 TABLET, FILM COATED ORAL at 08:39

## 2022-10-17 RX ADMIN — MORPHINE SULFATE 15 MG: 15 TABLET, FILM COATED, EXTENDED RELEASE ORAL at 22:52

## 2022-10-17 RX ADMIN — SODIUM CHLORIDE, PRESERVATIVE FREE 10 ML: 5 INJECTION INTRAVENOUS at 22:53

## 2022-10-17 RX ADMIN — PANTOPRAZOLE SODIUM 40 MG: 40 TABLET, DELAYED RELEASE ORAL at 08:39

## 2022-10-17 RX ADMIN — INSULIN LISPRO 4 UNITS: 100 INJECTION, SOLUTION INTRAVENOUS; SUBCUTANEOUS at 17:04

## 2022-10-17 RX ADMIN — LEVOTHYROXINE SODIUM 75 MCG: 75 TABLET ORAL at 05:52

## 2022-10-17 RX ADMIN — SODIUM BICARBONATE 650 MG TABLET 650 MG: at 08:39

## 2022-10-17 RX ADMIN — MORPHINE SULFATE 15 MG: 15 TABLET, FILM COATED, EXTENDED RELEASE ORAL at 08:38

## 2022-10-17 RX ADMIN — OLANZAPINE 10 MG: 5 TABLET, FILM COATED ORAL at 22:52

## 2022-10-17 RX ADMIN — SODIUM CHLORIDE, PRESERVATIVE FREE 10 ML: 5 INJECTION INTRAVENOUS at 08:52

## 2022-10-17 RX ADMIN — HEPARIN SODIUM 5000 UNITS: 5000 INJECTION INTRAVENOUS; SUBCUTANEOUS at 05:53

## 2022-10-17 RX ADMIN — POLYETHYLENE GLYCOL 3350 17 G: 17 POWDER, FOR SOLUTION ORAL at 08:38

## 2022-10-17 RX ADMIN — DULOXETINE HYDROCHLORIDE 60 MG: 60 CAPSULE, DELAYED RELEASE ORAL at 08:38

## 2022-10-17 RX ADMIN — INSULIN GLARGINE 34 UNITS: 100 INJECTION, SOLUTION SUBCUTANEOUS at 08:42

## 2022-10-17 RX ADMIN — SODIUM BICARBONATE 650 MG TABLET 650 MG: at 22:52

## 2022-10-17 RX ADMIN — INSULIN LISPRO 9 UNITS: 100 INJECTION, SOLUTION INTRAVENOUS; SUBCUTANEOUS at 17:03

## 2022-10-17 RX ADMIN — ROSUVASTATIN CALCIUM 10 MG: 5 TABLET, FILM COATED ORAL at 22:52

## 2022-10-17 RX ADMIN — HEPARIN SODIUM 5000 UNITS: 5000 INJECTION INTRAVENOUS; SUBCUTANEOUS at 22:52

## 2022-10-17 RX ADMIN — CEFTRIAXONE 2000 MG: 2 INJECTION, POWDER, FOR SOLUTION INTRAMUSCULAR; INTRAVENOUS at 22:51

## 2022-10-17 RX ADMIN — INSULIN LISPRO 12 UNITS: 100 INJECTION, SOLUTION INTRAVENOUS; SUBCUTANEOUS at 08:51

## 2022-10-17 RX ADMIN — VITAMIN D, TAB 1000IU (100/BT) 1000 UNITS: 25 TAB at 08:39

## 2022-10-17 RX ADMIN — INSULIN LISPRO 9 UNITS: 100 INJECTION, SOLUTION INTRAVENOUS; SUBCUTANEOUS at 12:38

## 2022-10-17 RX ADMIN — HEPARIN SODIUM 5000 UNITS: 5000 INJECTION INTRAVENOUS; SUBCUTANEOUS at 13:26

## 2022-10-17 ASSESSMENT — PAIN DESCRIPTION - DESCRIPTORS: DESCRIPTORS: ACHING

## 2022-10-17 ASSESSMENT — PAIN SCALES - GENERAL
PAINLEVEL_OUTOF10: 1
PAINLEVEL_OUTOF10: 0
PAINLEVEL_OUTOF10: 3

## 2022-10-17 ASSESSMENT — PAIN DESCRIPTION - LOCATION: LOCATION: HIP

## 2022-10-17 ASSESSMENT — PAIN DESCRIPTION - ORIENTATION: ORIENTATION: LEFT

## 2022-10-17 NOTE — PROGRESS NOTES
ACUTE OCCUPATIONAL THERAPY GOALS:   (Developed with and agreed upon by patient and/or caregiver.)  Patient will attempt standing with LLE off loading shoe. Patient will complete grooming with setup. Patient will complete lower body bathing and dressing with minimal assistance. Patient will tolerate 30 minutes of OT treatment with as needed rest breaks to increase activity tolerance for ADLs. Timeframe: 7 visits     OCCUPATIONAL THERAPY: Daily Note AM   OT Visit Days: 3   Time  OT Charge Capture  Rehab Caseload Tracker  OT Orders    Nga Pay. is a 78 y.o. male   PRIMARY DIAGNOSIS: Type 1 diabetes mellitus with diabetic foot ulcer (HCC)  Tachycardia [R00.0]  Hyperglycemia [R73.9]  Diabetic foot infection ([facility]) [E11.628, L08.9]  Type 1 diabetes mellitus with diabetic foot ulcer ([facility] [E10.621, L97.509]       Inpatient: Payor: MEDICARE / Plan: MEDICARE PART A AND B / Product Type: *No Product type* /     ASSESSMENT:     REHAB RECOMMENDATIONS: CURRENT LEVEL OF FUNCTION:  (Most Recently Demonstrated)   Recommendation to date pending progress:  Setting:  Short-term Rehab    Equipment:    To Be Determined--pt has RW and SC at home Bathing:  Stand by Assist  Dressing:  Minimal Assist for boot  Feeding/Grooming:  Supervision/Setup  Toileting:  Not Tested  Functional Mobility:  Minimal Assist with RW     ASSESSMENT:  Mr. Jennifer Suarez is doing well today. Pt presents supine upon arrival. Pt performed bed mobility with only supervision. Fair sitting balance at EOB while donning boot. Pt required min A to do so. Pt was able to stand with CGA/min A. Was able to take a couple steps over to the sink. Pt performed ADLs at sink with supervision but cueing for sequencing. Pt transferred over to chair and left with all needs in reach. Wife in room. Good session for patient. Pt making good progress with goals. Will continue to benefit from skilled OT during stay.        SUBJECTIVE:     Mr. Jennifer Suarez states, \"Where do you want me to go\"     Social/Functional Lives With: Spouse  Type of Home: House  Home Layout: One level  Bathroom Toilet: Standard  Home Equipment: Salvatore Knott, standard, [employer] Help From: Family  ADL Assistance: Needs assistance  Toileting: Independent  Ambulation Assistance: Needs assistance  Transfer Assistance: Needs assistance  Active : No  Patient's  Info: spouse  Mode of Transportation: Car  Occupation: Retired    OBJECTIVE:     Tellis Merlin / Kayla Clifford / Jackie Law: NA    RESTRICTIONS/PRECAUTIONS:  Restrictions/Precautions  Restrictions/Precautions: Fall Risk        PAIN: VITALS / O2:   Pre Treatment:            Post Treatment: no c/o pain, resting comfortably in supine Vitals          Oxygen        MOBILITY: I Mod I S SBA CGA Min Mod Max Total  NT x2 Comments:   Bed Mobility    Rolling [] [] [] [] [] [] [] [] [] [x] []    Supine to Sit [] [] [x] [] [] [] [] [] [] [] []    Scooting [] [] [] [x] [] [] [] [] [] [] []    Sit to Supine [] [] [] [] [] [] [] [] [] [x] []    Transfers    Sit to Stand [] [] [] [] [] [x] [] [] [] [] [] RW   Bed to Chair [] [] [] [] [] [x] [] [] [] [] [] RW   Stand to Sit [] [] [] [] [] [x] [] [] [] [] [] RW   Tub/Shower [] [] [] [] [] [] [] [] [] [x] []     Toilet [] [] [] [] [] [] [] [] [] [x] []      [] [] [] [] [] [] [] [] [] [x] []    I=Independent, Mod I=Modified Independent, S=Supervision/Setup, SBA=Standby Assistance, CGA=Contact Guard Assistance, Min=Minimal Assistance, Mod=Moderate Assistance, Max=Maximal Assistance, Total=Total Assistance, NT=Not Tested    ACTIVITIES OF DAILY LIVING: I Mod I S SBA CGA Min Mod Max Total NT Comments   BASIC ADLs:              Upper Body   Bathing [] [] [] [x] [] [] [] [] [] []    Lower Body Bathing [] [] [] [x] [] [] [] [] [] []    Toileting [] [] [] [] [] [] [] [] [] [x]    Upper Body Dressing [] [] [x] [] [] [] [] [] [] []    Lower Body Dressing [] [] [] [] [] [x] [] [] [] []    Feeding [] [] [] [] [] [] [] [] [] [x] Grooming [] [] [x] [] [] [] [] [] [] []    Personal Device Care [] [] [] [] [] [] [] [] [] [x]    Functional Mobility [] [] [] [] [] [x] [] [] [] []    I=Independent, Mod I=Modified Independent, S=Supervision/Setup, SBA=Standby Assistance, CGA=Contact Guard Assistance, Min=Minimal Assistance, Mod=Moderate Assistance, Max=Maximal Assistance, Total=Total Assistance, NT=Not Tested    BALANCE: Good Fair+ Fair Fair- Poor NT Comments   Sitting Static [] [x] [] [] [] []    Sitting Dynamic [] [] [x] [] [] []              Standing Static [] [] [x] [] [] []    Standing Dynamic [] [] [x] [] [] []        PLAN:     FREQUENCY/DURATION   OT Plan of Care: 3 times/week for duration of hospital stay or until stated goals are met, whichever comes first.    TREATMENT:     TREATMENT:   Therapeutic Activity (15 Minutes): Therapeutic activity included Supine to Sit, Scooting, Transfer Training, Ambulation on level ground, Sitting balance , and Standing balance to improve functional Activity tolerance, Balance, Coordination, Mobility, and Strength. Self Care (10 minutes): Patient participated in upper body bathing, lower body bathing, upper body dressing, lower body dressing, and grooming ADLs in supported sitting and standing with minimal verbal and tactile cueing to increase independence and decrease assistance required. Patient also participated in functional transfer training to increase independence and decrease assistance required.      TREATMENT GRID:  N/A    AFTER TREATMENT PRECAUTIONS: Bed/Chair Locked, Call light within reach, Chair, Needs within reach, RN notified, and Visitors at bedside    INTERDISCIPLINARY COLLABORATION:  RN/ PCT and OT/ BOWDEN    EDUCATION:   N/A this treatment    TOTAL TREATMENT DURATION AND TIME:  Time In: 1020  Time Out: [id number] Rashmi Crowell  Minutes: TOMAS Lee

## 2022-10-17 NOTE — PROGRESS NOTES
Periodic confusion. Attempting to get out of look for his wife. Reoriented. Made aware that wife has gone home for a little whiled. Verbalizes understanding. Assisted back in bed. Rails up. Bed exit alarms set. Aware not to get up w/o help. Verbalizes understanding. Door open. Will continue to monitor closely.

## 2022-10-17 NOTE — PROGRESS NOTES
Pts /199,  at 0302. [address] notified. No new orders of this time. No signs of distress. Will continue to monitor. Bp 165/101 as of 0314.

## 2022-10-17 NOTE — CARE COORDINATION
RNCM reviewed chart for discharge planning. Recommendation is for [facility] previously submitted  [address]: No beds 10/14 ( resubmitted on 10/17 for bed update status)  [facility]: No beds 10/14 ( resubmitted on 10/17 for bed update status)  [place]: No beds 10/14 ( resubmitted on 10/17 for bed update status)    [place]: Declined related to patient acuity. RNCM will discuss with family that additional choices will need to be made if beds availability remains full at facilities listed  above. Patient  ins is Medicare and will not require a precert. Discharge plan is [facility] pending facility placement of patient choice.   Will continue to follow for discharge planning needs  Please consult  if any new issues arise

## 2022-10-17 NOTE — PROGRESS NOTES
PM assessment done. No changes noted. Respiration even and unlabored 20/min at rest. No s/s of pain noted at present. Wife at bedside. Encouraged to call with needs.

## 2022-10-17 NOTE — PROGRESS NOTES
Found by another nursing staff to walk in hallway pushing a chair looking for family. Re-oriented. Assisted back to bed. \"I just can't understand why she left me here\". Requesting to speak with his wife Cheyanne Choi [phone], wife phoned and updated. Asked if someone can come and sit with him and pt asking to speak with her. Phone call transferred to pt's room. Pt in bed. Rails up. Bed exit alarms set. Will continue to monitor. Dr. Yelena Lorenzo made aware of wife wanting to speak with him regarding discharge. No orders at present.

## 2022-10-17 NOTE — PROGRESS NOTES
Progress Note    Patient: Katarzyna Garner. MRN: [id number]  SSN: [id number]    YOB: 1945  Age: 78 y.o. Sex: male      Admit Date: 10/10/2022    LOS: 7 days     Assessment and Plan:   30-year-old male with a past medical history of hyperlipidemia, GERD, CKD stage III, diabetes, pretension, presented in the setting of diabetic foot ulcer infection    1. Diabetic left first and second toe ulcer infection  -X-ray without signs of osteomyelitis  -Vascular surgery recommendations appreciated, status post debridement  -Wound culture positive for E. coli  -Continue ceftriaxone  -PT and OT    2. Diabetes mellitus  -Insulin sliding scale  -Blood sugar checks AC at bedtime    3. Hypothyroidism  -Continue levothyroxine    4. Neuropathy  -Continue duloxetine    5. CKD stage III  -Monitor renal function    DVT prophylaxis Lovenox      Subjective:   30-year-old male with a past medical history of hyperlipidemia, GERD, CKD stage III, diabetes, pretension, presented in the setting of diabetic foot ulcer infection. Patient seen and examined at bedside. This morning feeling well. Denies any chest pain, no abdominal pain, no nausea or vomiting.     Objective:     Vitals:    10/17/22 0302 10/17/22 0314 10/17/22 0729 10/17/22 1106   BP: (!) 166/119 (!) 165/101 (!) 178/98 117/73   Pulse: (!) 102 98 100 88   Resp: 17 18 16   Temp: 98.1 °F (36.7 °C)  98.1 °F (36.7 °C) 98.1 °F (36.7 °C)   TempSrc:   Oral Oral   SpO2: 96%  99% 100%   Weight:       Height:            Intake and Output:  Current Shift: 10/17 0701 - 10/17 1900  In: -   Out: 1   Last three shifts: 10/15 1901 - 10/17 0700  In: 300 [P.O.:300]  Out: -     ROS  10 ROS negative except from stated on subjective    Physical Exam:   General: Alert, NAD  HEENT: NC/AT, EOM are intact  Neck: supple, no JVD  Cardiovascular: RRR, S1, S2, no murmurs  Respiratory: Lungs are clear, no wheezes or rales  Abdomen: Soft, NT, ND  Back: No CVA tenderness, no paraspinal tenderness  Extremities: Lower extremity with clean dressing  Neuro: A&O, CN are intact, no focal deficits  Skin: no rash or ulcers  Psych: good mood and affect    Lab/Data Review:  I have personally reviewed patients laboratory data showing  Recent Results (from the past 24 hour(s))   POCT Glucose    Collection Time: 10/16/22  4:55 PM   Result Value Ref Range    POC Glucose 91 65 - 100 mg/dL    Performed by: Doron    POCT Glucose    Collection Time: 10/16/22  8:24 PM   Result Value Ref Range    POC Glucose 162 (H) 65 - 100 mg/dL    Performed by: Cyndie    POCT Glucose    Collection Time: 10/17/22  8:11 AM   Result Value Ref Range    POC Glucose 177 (H) 65 - 100 mg/dL    Performed by: Blake       @[facility]@     Image:  I have personally reviewed patients imaging showing  Vascular duplex lower extremity arteries bilateral with DARINEL   Final Result   1. Noncompressible ankle pressures and flat line digit pressures on the right,   possibly due to small vessel calcinosis   2. Clemente cyst noted on the left   3. Distended urinary bladder volume averaging 1000 cc            XR TOE LEFT (MIN 2 VIEWS)   Final Result   1. Soft tissue ulcer about the distal aspect of the second left toe without   convincing evidence of osteomyelitis. If there is continued clinical concern   further evaluation with MRI can BE obtained. 2.  Similar extensive degenerative changes DIP joints of the imaged first   through third toes.               Current Facility-Administered Medications   Medication Dose Route Frequency Provider Last Rate Last Admin    insulin lispro (HUMALOG) injection vial 9 Units  9 Units SubCUTAneous TID  Maddy Persaud MD   9 Units at 10/17/22 1238    insulin glargine (LANTUS) injection vial 34 Units  34 Units SubCUTAneous Daily Maddy Persaud MD   34 Units at 10/17/22 0842    cefTRIAXone (ROCEPHIN) 2,000 mg in sodium chloride 0.9 % 50 mL IVPB mini-bag  2,000 mg IntraVENous Q24H Saira Strong MD   Stopped at 10/16/22 2140    polyethylene glycol (GLYCOLAX) packet 17 g  17 g Oral Daily Grazyna Madrigal MD   17 g at 10/17/22 0838    potassium chloride (KLOR-CON M) extended release tablet 40 mEq  40 mEq Oral PRN Estephania Andrade MD        Or    potassium bicarb-citric acid (EFFER-K) effervescent tablet 40 mEq  40 mEq Oral PRN Estephania Andrade MD        Or    potassium chloride 10 mEq/100 mL IVPB (Peripheral Line)  10 mEq IntraVENous PRN Estephania Andrade MD        magnesium sulfate 2000 mg in 50 mL IVPB premix  2,000 mg IntraVENous PRN Estephania Andrade MD        sodium phosphate 10 mmol in sodium chloride 0.9 % 250 mL IVPB  10 mmol IntraVENous PRN Estephania Andrade MD        Or    sodium phosphate 15 mmol in sodium chloride 0.9 % 250 mL IVPB  15 mmol IntraVENous PRN Estephania Andrade MD        Or    sodium phosphate 20 mmol in sodium chloride 0.9 % 500 mL IVPB  20 mmol IntraVENous PRN Alfred REDMOND MD        heparin (porcine) injection 5,000 Units  5,000 Units SubCUTAneous 3 times per day Estephania Andrade MD   5,000 Units at 10/17/22 0553    DULoxetine (CYMBALTA) extended release capsule 60 mg  60 mg Oral Daily Sandhya Baig, DO   60 mg at 10/17/22 7067    finasteride (PROSCAR) tablet 5 mg  5 mg Oral Daily Sandhya Baig, DO   5 mg at 10/17/22 9707    levothyroxine (SYNTHROID) tablet 75 mcg  75 mcg Oral QAM AC Sandhya Baig, DO   75 mcg at 10/17/22 3154    morphine (MS CONTIN) extended release tablet 15 mg  15 mg Oral BID Sandhya Baig, DO   15 mg at 10/17/22 0838    pantoprazole (PROTONIX) tablet 40 mg  40 mg Oral Daily Sandhya Baig, DO   40 mg at 10/17/22 0839    rosuvastatin (CRESTOR) tablet 10 mg  10 mg Oral Nightly Sandhya Baig, DO   10 mg at 10/16/22 2110    sodium bicarbonate tablet 650 mg  650 mg Oral BID Sandhya Baig, DO   650 mg at 10/17/22 4875    Vitamin D (CHOLECALCIFEROL) tablet 1,000 Units  1,000 Units Oral Daily Kristi Levels, DO   1,000 Units at 10/17/22 8174    sodium chloride flush 0.9 % injection 5-40 mL  5-40 mL IntraVENous 2 times per day Kristi Levels, DO   10 mL at 10/17/22 5308    sodium chloride flush 0.9 % injection 5-40 mL  5-40 mL IntraVENous PRN Kristi Levels, DO   5 mL at 10/15/22 1226    0.9 % sodium chloride infusion   IntraVENous PRN Kristi Levels, DO        insulin lispro (HUMALOG) injection vial 0-8 Units  0-8 Units SubCUTAneous TID WC Kristi Levels, DO   6 Units at 10/14/22 1352    insulin lispro (HUMALOG) injection vial 0-4 Units  0-4 Units SubCUTAneous Nightly Kristi Levels, DO   4 Units at 10/11/22 2053    glucose chewable tablet 16 g  4 tablet Oral PRN Kristi Levels, DO        dextrose bolus 10% 125 mL  125 mL IntraVENous PRN Kristi Levels, DO        Or    dextrose bolus 10% 250 mL  250 mL IntraVENous PRN Kristi Levels, DO        glucagon (rDNA) injection 1 mg  1 mg SubCUTAneous PRN Kristi Levels, DO        dextrose 10 % infusion   IntraVENous Continuous PRN Kristi Levels, DO        OLANZapine (ZYPREXA) tablet 10 mg  10 mg Oral Nightly Kristi Levels, DO   10 mg at 10/16/22 2109        Hospital problems     Patient Active Problem List   Diagnosis    Proliferative diabetic retinopathy associated with type 1 diabetes mellitus (HCC)    Pain management contract agreement    Chronic orthostatic hypotension    Toenail deformity    History of tobacco abuse    Mild cognitive impairment with memory loss    S/P lumbar fusion    Dysphonia    Edema of both lower extremities due to peripheral venous insufficiency    Gastroesophageal reflux disease without esophagitis    Vocal cord atrophy    Back pain, chronic    BRITTANY (obstructive sleep apnea)    Mild episode of recurrent major depressive disorder (HCC)    Seborrheic keratosis    Benign prostatic hyperplasia with incomplete bladder emptying    Orthostatic hypotension    Essential hypertension    Neuropathy    Chronic left-sided low back pain with left-sided sciatica    Constipation due to opioid therapy    Weakness    Diabetic peripheral neuropathy (HCC)    Fibrothorax    Alcohol abuse, in remission    Hemothorax on right    Trapped lung    Acquired hypothyroidism    Type 1 diabetes mellitus with hyperglycemia (HCC)    Chronic pain of both shoulders    Trochanteric bursitis, left hip    Aphonia    Primary hypothyroidism    Hypercholesterolemia    Insulin pump in place    Charcot foot due to diabetes mellitus ([address]    History of hypertension    Candida laryngitis    Type 1 diabetes mellitus with stage 3a chronic kidney disease (HCC)    Vitreous hemorrhage due to type 1 diabetes mellitus (HCC)    Polypharmacy    Type 1 diabetes mellitus with retinopathy (HCC)    Luetscher's syndrome    Stage 3a chronic kidney disease (HCC)    Stage 3b chronic kidney disease ([address]    Diabetic nephropathy ([address]    Spinal cord stimulator status    Class 2 severe obesity due to excess calories with serious comorbidity and body mass index (BMI) of 35.0 to 35.9 in adult [facility])    At high risk for falls    COVID-19 vaccine series completed    Chronic pain    Pleural effusion    Lumbar radiculopathy    Aortic heart murmur    Polyp of colon    Alzheimer's disease, unspecified    Neuropathy    Hypothyroid    Chronic pain    Chronic renal disease, stage 3, moderately decreased glomerular filtration rate (GFR) between 30-59 mL/min/1.73 square meter (HCC)    Dysphagia    Depression    History of amputation of toe ([address]    Type 1 diabetes mellitus with diabetic foot ulcer ([address]    Metabolic encephalopathy      Signed By: Antony Benítez MD     October 17, 2022

## 2022-10-17 NOTE — DIABETES MGMT
Patient admitted with type 1 diabetes with diabetic foot ulcer. Blood glucose ranged  yesterday with patient receiving Lantus 34 units and Humalog 24 units. Blood glucose this morning was 177. Reviewed patient current regimen: Lantus 34 units daily, Lantus 12 units with meals, and Humalog correctional insulin. Patient has diet with 4 carb choices ordered. Given patient orientation and daytime glycemic control, patient would likely benefit from a decrease in prandial insulin to reduce risk of hypoglycemia. Provider updated via [employer] regarding recommendations and patient glycemic control. New orders received to decrease Humalog to 9 units with meals.

## 2022-10-17 NOTE — PROGRESS NOTES
Pt resting in bed with eyes open at this time. Oriented to self only. Respirations even and unlabored on RA. Bed is locked and low. Bed alarm active with call light in reach. No acute events overnight. No signs of distress. Preparing to give report to oncoming RN.

## 2022-10-17 NOTE — PROGRESS NOTES
Bedside report received from night nurse Cindy Weber. Assessment done as noted  Respiration even and unlabored 20/min; denies pain or nausea at present. Encouraged to call with needs.

## 2022-10-18 LAB
GLUCOSE BLD STRIP.AUTO-MCNC: 136 MG/DL (ref 65–100)
GLUCOSE BLD STRIP.AUTO-MCNC: 149 MG/DL (ref 65–100)
GLUCOSE BLD STRIP.AUTO-MCNC: 179 MG/DL (ref 65–100)
GLUCOSE BLD STRIP.AUTO-MCNC: 224 MG/DL (ref 65–100)
SERVICE CMNT-IMP: ABNORMAL

## 2022-10-18 PROCEDURE — 6360000002 HC RX W HCPCS: Performed by: INTERNAL MEDICINE

## 2022-10-18 PROCEDURE — 82962 GLUCOSE BLOOD TEST: CPT

## 2022-10-18 PROCEDURE — 2580000003 HC RX 258: Performed by: FAMILY MEDICINE

## 2022-10-18 PROCEDURE — 6370000000 HC RX 637 (ALT 250 FOR IP): Performed by: FAMILY MEDICINE

## 2022-10-18 PROCEDURE — 6360000002 HC RX W HCPCS: Performed by: FAMILY MEDICINE

## 2022-10-18 PROCEDURE — 2580000003 HC RX 258: Performed by: INTERNAL MEDICINE

## 2022-10-18 PROCEDURE — 1100000000 HC RM PRIVATE

## 2022-10-18 PROCEDURE — 97530 THERAPEUTIC ACTIVITIES: CPT

## 2022-10-18 PROCEDURE — 6370000000 HC RX 637 (ALT 250 FOR IP): Performed by: INTERNAL MEDICINE

## 2022-10-18 RX ADMIN — CEFTRIAXONE 2000 MG: 2 INJECTION, POWDER, FOR SOLUTION INTRAMUSCULAR; INTRAVENOUS at 21:44

## 2022-10-18 RX ADMIN — INSULIN LISPRO 9 UNITS: 100 INJECTION, SOLUTION INTRAVENOUS; SUBCUTANEOUS at 12:15

## 2022-10-18 RX ADMIN — ROSUVASTATIN CALCIUM 10 MG: 5 TABLET, FILM COATED ORAL at 21:43

## 2022-10-18 RX ADMIN — SODIUM BICARBONATE 650 MG TABLET 650 MG: at 21:43

## 2022-10-18 RX ADMIN — DULOXETINE HYDROCHLORIDE 60 MG: 60 CAPSULE, DELAYED RELEASE ORAL at 09:38

## 2022-10-18 RX ADMIN — SODIUM CHLORIDE, PRESERVATIVE FREE 10 ML: 5 INJECTION INTRAVENOUS at 21:45

## 2022-10-18 RX ADMIN — PANTOPRAZOLE SODIUM 40 MG: 40 TABLET, DELAYED RELEASE ORAL at 09:44

## 2022-10-18 RX ADMIN — HEPARIN SODIUM 5000 UNITS: 5000 INJECTION INTRAVENOUS; SUBCUTANEOUS at 21:43

## 2022-10-18 RX ADMIN — FINASTERIDE 5 MG: 5 TABLET, FILM COATED ORAL at 09:38

## 2022-10-18 RX ADMIN — INSULIN GLARGINE 34 UNITS: 100 INJECTION, SOLUTION SUBCUTANEOUS at 07:55

## 2022-10-18 RX ADMIN — INSULIN LISPRO 9 UNITS: 100 INJECTION, SOLUTION INTRAVENOUS; SUBCUTANEOUS at 17:24

## 2022-10-18 RX ADMIN — MORPHINE SULFATE 15 MG: 15 TABLET, FILM COATED, EXTENDED RELEASE ORAL at 09:38

## 2022-10-18 RX ADMIN — INSULIN LISPRO 9 UNITS: 100 INJECTION, SOLUTION INTRAVENOUS; SUBCUTANEOUS at 08:00

## 2022-10-18 RX ADMIN — HEPARIN SODIUM 5000 UNITS: 5000 INJECTION INTRAVENOUS; SUBCUTANEOUS at 13:22

## 2022-10-18 RX ADMIN — SODIUM BICARBONATE 650 MG TABLET 650 MG: at 09:38

## 2022-10-18 RX ADMIN — VITAMIN D, TAB 1000IU (100/BT) 1000 UNITS: 25 TAB at 09:38

## 2022-10-18 RX ADMIN — LEVOTHYROXINE SODIUM 75 MCG: 75 TABLET ORAL at 06:36

## 2022-10-18 RX ADMIN — HEPARIN SODIUM 5000 UNITS: 5000 INJECTION INTRAVENOUS; SUBCUTANEOUS at 06:36

## 2022-10-18 RX ADMIN — SODIUM CHLORIDE, PRESERVATIVE FREE 10 ML: 5 INJECTION INTRAVENOUS at 09:42

## 2022-10-18 RX ADMIN — MORPHINE SULFATE 15 MG: 15 TABLET, FILM COATED, EXTENDED RELEASE ORAL at 21:43

## 2022-10-18 RX ADMIN — OLANZAPINE 10 MG: 5 TABLET, FILM COATED ORAL at 21:57

## 2022-10-18 ASSESSMENT — PAIN DESCRIPTION - DESCRIPTORS
DESCRIPTORS: ACHING
DESCRIPTORS: DULL;THROBBING

## 2022-10-18 ASSESSMENT — PAIN DESCRIPTION - ORIENTATION
ORIENTATION: LEFT
ORIENTATION: POSTERIOR

## 2022-10-18 ASSESSMENT — PAIN SCALES - GENERAL
PAINLEVEL_OUTOF10: 3
PAINLEVEL_OUTOF10: 6
PAINLEVEL_OUTOF10: 0
PAINLEVEL_OUTOF10: 5
PAINLEVEL_OUTOF10: 0

## 2022-10-18 ASSESSMENT — PAIN DESCRIPTION - LOCATION
LOCATION: BACK
LOCATION: HIP

## 2022-10-18 NOTE — DIABETES MGMT
Patient admitted with type 1 diabetes with diabetic foot ulcer. Blood glucose ranged 159-260 yesterday with patient receiving Lantus 34 units and Humalog 34 units. Blood glucose this morning was 149. Reviewed patient current regimen: Lantus 34 units daily, Humalog 9 units with meals, and Humalog correctional insulin. Given patient comorbidities risk of hypoglycemia may outweigh benefits of strict glycemic control. Will follow along.

## 2022-10-18 NOTE — PROGRESS NOTES
Progress Note    Patient: Digna Ferguson. MRN: [id number]  SSN: [id number]    YOB: 1945  Age: 78 y.o. Sex: male      Admit Date: 10/10/2022    LOS: 8 days     Assessment and Plan:   71-year-old male with a past medical history of hyperlipidemia, GERD, CKD stage III, diabetes, pretension, presented in the setting of diabetic foot ulcer infection    1. Diabetic left first and second toe ulcer infection  -X-ray without signs of osteomyelitis  -Vascular surgery recommendations appreciated, status post debridement  -Wound culture positive for E. coli  -Continue ceftriaxone  -PT and OT    2. Diabetes mellitus  -Insulin sliding scale  -Blood sugar checks AC at bedtime    3. Hypothyroidism  -Continue levothyroxine    4. Neuropathy  -Continue duloxetine    5. CKD stage III  -Monitor renal function    DVT prophylaxis Lovenox      Subjective:   71-year-old male with a past medical history of hyperlipidemia, GERD, CKD stage III, diabetes, pretension, presented in the setting of diabetic foot ulcer infection. Patient seen and examined at bedside. This morning feeling well. Denies any chest pain, no abdominal pain, no nausea or vomiting. Objective:     Vitals:    10/17/22 2339 10/18/22 0339 10/18/22 0725 10/18/22 1206   BP: (!) 162/92 (!) 146/73 (!) 181/93 (!) 151/98   Pulse: (!) 104 91 82 (!) 109   Resp: 18 18 20 18   Temp: 98.2 °F (36.8 °C) 97.9 °F (36.6 °C) 97.5 °F (36.4 °C) 97.9 °F (36.6 °C)   TempSrc:   Axillary Oral   SpO2: 98% 98% 95% 100%   Weight:       Height:            Intake and Output:  Current Shift: No intake/output data recorded.   Last three shifts: 10/16 1901 - 10/18 0700  In: 840 [P.O.:840]  Out: 5     ROS  10 ROS negative except from stated on subjective    Physical Exam:   General: Alert, NAD  HEENT: NC/AT, EOM are intact  Neck: supple, no JVD  Cardiovascular: RRR, S1, S2, no murmurs  Respiratory: Lungs are clear, no wheezes or rales  Abdomen: Soft, NT, ND  Back: No CVA tenderness, no paraspinal tenderness  Extremities: Lower extremity with clean dressing  Neuro: A&O, CN are intact, no focal deficits  Skin: no rash or ulcers  Psych: good mood and affect    Lab/Data Review:  I have personally reviewed patients laboratory data showing  Recent Results (from the past 24 hour(s))   POCT Glucose    Collection Time: 10/17/22  4:30 PM   Result Value Ref Range    POC Glucose 260 (H) 65 - 100 mg/dL    Performed by: Blake    POCT Glucose    Collection Time: 10/17/22  9:24 PM   Result Value Ref Range    POC Glucose 159 (H) 65 - 100 mg/dL    Performed by: Tiffanie    POCT Glucose    Collection Time: 10/18/22  7:25 AM   Result Value Ref Range    POC Glucose 149 (H) 65 - 100 mg/dL    Performed by: Diego    POCT Glucose    Collection Time: 10/18/22 12:08 PM   Result Value Ref Range    POC Glucose 136 (H) 65 - 100 mg/dL    Performed by: Diego       @[employer]@     Image:  I have personally reviewed patients imaging showing  Vascular duplex lower extremity arteries bilateral with DARINEL   Final Result   1. Noncompressible ankle pressures and flat line digit pressures on the right,   possibly due to small vessel calcinosis   2. Clemente cyst noted on the left   3. Distended urinary bladder volume averaging 1000 cc            XR TOE LEFT (MIN 2 VIEWS)   Final Result   1. Soft tissue ulcer about the distal aspect of the second left toe without   convincing evidence of osteomyelitis. If there is continued clinical concern   further evaluation with MRI can BE obtained. 2.  Similar extensive degenerative changes DIP joints of the imaged first   through third toes.               Current Facility-Administered Medications   Medication Dose Route Frequency Provider Last Rate Last Admin    insulin lispro (HUMALOG) injection vial 9 Units  9 Units SubCUTAneous TID  Stephen Lopez MD   9 Units at 10/18/22 1215    insulin glargine (LANTUS) injection vial 34 Units  34 Units SubCUTAneous Daily Nevin Hylton MD   34 Units at 10/18/22 0755    cefTRIAXone (ROCEPHIN) 2,000 mg in sodium chloride 0.9 % 50 mL IVPB mini-bag  2,000 mg IntraVENous Q24H Nevin Hylton MD   Stopped at 10/17/22 2346    polyethylene glycol (GLYCOLAX) packet 17 g  17 g Oral Daily Blaze Bocanegra MD   17 g at 10/17/22 0838    potassium chloride (KLOR-CON M) extended release tablet 40 mEq  40 mEq Oral PRN Jessica Puri MD        Or    potassium bicarb-citric acid (EFFER-K) effervescent tablet 40 mEq  40 mEq Oral PRN Jessica Puri MD        Or    potassium chloride 10 mEq/100 mL IVPB (Peripheral Line)  10 mEq IntraVENous PRN Jessica Puri MD        magnesium sulfate 2000 mg in 50 mL IVPB premix  2,000 mg IntraVENous PRN Jessica Puri MD        sodium phosphate 10 mmol in sodium chloride 0.9 % 250 mL IVPB  10 mmol IntraVENous PRN Jessica Puri MD        Or    sodium phosphate 15 mmol in sodium chloride 0.9 % 250 mL IVPB  15 mmol IntraVENous PRN Jessica Puri MD        Or    sodium phosphate 20 mmol in sodium chloride 0.9 % 500 mL IVPB  20 mmol IntraVENous PRN Olayinka REDMOND MD        heparin (porcine) injection 5,000 Units  5,000 Units SubCUTAneous 3 times per day Jesscia Puri MD   5,000 Units at 10/18/22 1322    DULoxetine (CYMBALTA) extended release capsule 60 mg  60 mg Oral Daily Vero Kicks, DO   60 mg at 10/18/22 8151    finasteride (PROSCAR) tablet 5 mg  5 mg Oral Daily Vero Kicks, DO   5 mg at 10/18/22 5373    levothyroxine (SYNTHROID) tablet 75 mcg  75 mcg Oral QAM AC Vero Kicks, DO   75 mcg at 10/18/22 0636    morphine (MS CONTIN) extended release tablet 15 mg  15 mg Oral BID Vero Kicks, DO   15 mg at 10/18/22 0938    pantoprazole (PROTONIX) tablet 40 mg  40 mg Oral Daily Vero Kicks, DO   40 mg at 10/18/22 0944    rosuvastatin (CRESTOR) tablet 10 mg  10 mg Oral Nightly Vero Kicks, DO   10 mg at 10/17/22 2252    sodium bicarbonate tablet 650 mg  650 mg Oral BID Christel Rend, DO   650 mg at 10/18/22 4201    Vitamin D (CHOLECALCIFEROL) tablet 1,000 Units  1,000 Units Oral Daily Christel Rend, DO   1,000 Units at 10/18/22 8026    sodium chloride flush 0.9 % injection 5-40 mL  5-40 mL IntraVENous 2 times per day Christel Rend, DO   10 mL at 10/18/22 0942    sodium chloride flush 0.9 % injection 5-40 mL  5-40 mL IntraVENous PRN Christel Rend, DO   5 mL at 10/15/22 1226    0.9 % sodium chloride infusion   IntraVENous PRN Christle Rend, DO        insulin lispro (HUMALOG) injection vial 0-8 Units  0-8 Units SubCUTAneous TID WC Christel Rend, DO   4 Units at 10/17/22 1704    insulin lispro (HUMALOG) injection vial 0-4 Units  0-4 Units SubCUTAneous Nightly Christel Rend, DO   4 Units at 10/11/22 2053    glucose chewable tablet 16 g  4 tablet Oral PRN Christel Rend, DO        dextrose bolus 10% 125 mL  125 mL IntraVENous PRN Christel Rend, DO        Or    dextrose bolus 10% 250 mL  250 mL IntraVENous PRN Christel Rend, DO        glucagon (rDNA) injection 1 mg  1 mg SubCUTAneous PRN Christel Rend, DO        dextrose 10 % infusion   IntraVENous Continuous PRN Christel Rend, DO        OLANZapine (ZYPREXA) tablet 10 mg  10 mg Oral Nightly Christel Rend, DO   10 mg at 10/17/22 2252        Hospital problems     Patient Active Problem List   Diagnosis    Proliferative diabetic retinopathy associated with type 1 diabetes mellitus (HCC)    Pain management contract agreement    Chronic orthostatic hypotension    Toenail deformity    History of tobacco abuse    Mild cognitive impairment with memory loss    S/P lumbar fusion    Dysphonia    Edema of both lower extremities due to peripheral venous insufficiency    Gastroesophageal reflux disease without esophagitis    Vocal cord atrophy    Back pain, chronic    BRITTANY (obstructive sleep apnea)    Mild episode of recurrent major depressive disorder ([facility] [address] Seborrheic keratosis    Benign prostatic hyperplasia with incomplete bladder emptying    Orthostatic hypotension    Essential hypertension    Neuropathy    Chronic left-sided low back pain with left-sided sciatica    Constipation due to opioid therapy    Weakness    Diabetic peripheral neuropathy (HCC)    Fibrothorax    Alcohol abuse, in remission    Hemothorax on right    Trapped lung    Acquired hypothyroidism    Type 1 diabetes mellitus with hyperglycemia (HCC)    Chronic pain of both shoulders    Trochanteric bursitis, left hip    Aphonia    Primary hypothyroidism    Hypercholesterolemia    Insulin pump in place    Charcot foot due to diabetes mellitus ([address]    History of hypertension    Candida laryngitis    Type 1 diabetes mellitus with stage 3a chronic kidney disease (HCC)    Vitreous hemorrhage due to type 1 diabetes mellitus (HCC)    Polypharmacy    Type 1 diabetes mellitus with retinopathy (HCC)    Luetscher's syndrome    Stage 3a chronic kidney disease (HCC)    Stage 3b chronic kidney disease ([address]    Diabetic nephropathy ([facility] [address]    Spinal cord stimulator status    Class 2 severe obesity due to excess calories with serious comorbidity and body mass index (BMI) of 35.0 to 35.9 in adult [facility])    At high risk for falls    COVID-19 vaccine series completed    Chronic pain    Pleural effusion    Lumbar radiculopathy    Aortic heart murmur    Polyp of colon    Alzheimer's disease, unspecified    Neuropathy    Hypothyroid    Chronic pain    Chronic renal disease, stage 3, moderately decreased glomerular filtration rate (GFR) between 30-59 mL/min/1.73 square meter (HCC)    Dysphagia    Depression    History of amputation of toe ([address]    Type 1 diabetes mellitus with diabetic foot ulcer ([address]    Metabolic encephalopathy      Signed By: Yoko Schwarz MD     October 18, 2022

## 2022-10-18 NOTE — PROGRESS NOTES
Left great toe dressing changed per orders. Optocel AG and rolled gauzed used. Pt tolerated well. Will continue to monitor.

## 2022-10-18 NOTE — PROGRESS NOTES
Pt resting comfortably in bed at this time. Respirations are even and unlabored on RA. Pt is alert and oriented to person and time. No complaints of pain at this time. Call light in reach, safety measures in place, bed alarm on. Will continue to monitor.

## 2022-10-18 NOTE — CARE COORDINATION
CM met with pt's spouse Tamera Sever at the bedside to discuss d/c planning. Pt slept during the conversation. Kevin Kiser stated [facility] will not accept pt being d/c to a SNF with less than a 4 star rating. \"  She is aware that [address] have declined pt's referral.  Referral was re-submitted on 10/17 to [facility] and [facility] to inquire if these facilities have any bed availability. Spouse inquired about [address]  and CM explained that CM spoke to the liaison yesterday and he stated he will not have any availability until next week. CM has reached out to the liaisons with [facility] and [address] [place] [address] and is awaiting a response. Sheree with [facility] stated that she does not have a private room available which pt needs due to osteomyelitis  [facility] does not have any availability. CM sent a referral to [facility] and it was accepted. CM called pt's spouse to update her her on the referrals. She requested to speak to her son and stated she will call CM back. Pt is medically stable for d/c and Dr. Elyse Rivera has sent CM several messages via [employer] asking if he has a STR bed. Pt's spouse is agreeable for pt to d/c to [facility] for [employer].   Per the SNF liaison there will not be a room available until Thursday, Oct. 20.

## 2022-10-18 NOTE — PROGRESS NOTES
ACUTE PHYSICAL THERAPY GOALS:   (Developed with and agreed upon by patient and/or caregiver.)  1. Patient will perform bed mobility with INDEPENDENCE within 7 days. 2. Patient will transfer bed to chair with SUPERVISION within 7 days. 3. Patient will demonstrate FAIR+ DYNAMIC STANDING balance within 7 day(s). 4. Patient will ambulate 150ft+ using least restrictive assistive device and SUPERVISION within 7 days. 5. Patient will tolerate 25+ minutes of therapeutic activity/exercise and/or neuromuscular re-education while maintaining stable vitals to improve functional strength and activity tolerance within 7 days. PHYSICAL THERAPY: Daily Note AM   (Link to Caseload Tracking: PT Visit Days : 4  Time In/Out PT Charge Capture  Rehab Caseload Tracker  Orders    Jeanne Leroy. is a 78 y.o. male   PRIMARY DIAGNOSIS: Type 1 diabetes mellitus with diabetic foot ulcer (HCC)  Tachycardia [R00.0]  Hyperglycemia [R73.9]  Diabetic foot infection (HCC) [E11.628, L08.9]  Type 1 diabetes mellitus with diabetic foot ulcer ([facility] [address] [E10.621, L97.509]       Inpatient: Payor: MEDICARE / Plan: MEDICARE PART A AND B / Product Type: *No Product type* /     ASSESSMENT:     REHAB RECOMMENDATIONS:   Recommendation to date pending progress:  Setting:  Short-term Rehab    Equipment:    To Be Determined     ASSESSMENT:  Mr. Ophelia Chow presents sound asleep today and seems drowsy/lethargic throughout session which wife states is new for him today. Pt is WBAT on L foot with offloading shoe. He does arouse to verbal and tactile stimuli and opens eyes; follows commands with extra time and cueing. Transfers to sitting with min-mod assist. Worked on seated mobility, balance, and functional activities at edge of bed. Assisted with donning left offloading shoe. Mod assist for initial STS transfer to RW. Practiced standing activities to address balance with pt needing constant support.  On second standing attempt, took steps from bed to chair with min-mod assist and RW, cues for walker management, gait mechanics, and safety as pt continues to be drowsy. Positioned comfortably in chair, further activity deferred as pt falling asleep. Left comfortable in recliner sitting in the sunshine, wife at bedside. Limited by alertness today however has been making steady progress towards goals. Will need rehab at discharge.         SUBJECTIVE:   Mr. Eva Liriano states, \"ok\"     Social/Functional Lives With: Spouse  Type of Home: House  Home Layout: One level  Bathroom Toilet: Standard  Home Equipment: [employer], standard, [address] Help From: Family  ADL Assistance: Needs assistance  Toileting: Independent  Ambulation Assistance: Needs assistance  Transfer Assistance: Needs assistance  Active : No  Patient's  Info: spouse  Mode of Transportation: Car  Occupation: Retired  OBJECTIVE:     PAIN: VITALS / O2: PRECAUTION / Dariana Pick / Efrain Hien:   Pre Treatment:          Post Treatment: 0/10 Vitals        Oxygen on RA    P[employer]wick    RESTRICTIONS/PRECAUTIONS:  Restrictions/Precautions  Restrictions/Precautions: Fall Risk  Restrictions/Precautions: Fall Risk     MOBILITY: I Mod I S SBA CGA Min Mod Max Total  NT x2 Comments:   Bed Mobility    Rolling [] [] [] [] [] [] [] [] [] [] []    Supine to Sit [] [] [] [] [] [] [x] [] [] [] []    Scooting [] [] [] [] [] [] [x] [] [] [] []    Sit to Supine [] [] [] [] [] [] [] [] [] [] []    Transfers    Sit to Stand [] [] [] [] [] [x] [x] [] [] [] []    Bed to Chair [] [] [] [] [] [x] [x] [] [] [] []    Stand to Sit [] [] [] [] [] [x] [x] [] [] [] []     [] [] [] [] [] [] [] [] [] [] []    I=Independent, Mod I=Modified Independent, S=Supervision, SBA=Standby Assistance, CGA=Contact Guard Assistance,   Min=Minimal Assistance, Mod=Moderate Assistance, Max=Maximal Assistance, Total=Total Assistance, NT=Not Tested    BALANCE: Good Fair+ Fair Fair- Poor NT Comments   Sitting Static [] [x] [] [] [] []    Sitting Dynamic [] [] [x] [] [] []              Standing Static [] [] [] [x] [] []    Standing Dynamic [] [] [] [x] [] []      GAIT: I Mod I S SBA CGA Min Mod Max Total  NT x2 Comments:   Level of Assistance [] [] [] [] [] [x] [x] [] [] [] [] Chair follow   Distance   3 feet    DME Rolling Walker    Gait Quality Decreased vicky , Decreased step clearance, and Decreased step length    Weightbearing Status      Stairs      I=Independent, Mod I=Modified Independent, S=Supervision, SBA=Standby Assistance, CGA=Contact Guard Assistance,   Min=Minimal Assistance, Mod=Moderate Assistance, Max=Maximal Assistance, Total=Total Assistance, NT=Not Tested    PLAN:   FREQUENCY AND DURATION: 3 times/week for duration of hospital stay or until stated goals are met, whichever comes first.    TREATMENT:   TREATMENT:   Therapeutic Activity (17 Minutes): Therapeutic activity included Rolling, Supine to Sit, Scooting, Transfer Training, Ambulation on level ground, Sitting balance , and Standing balance to improve functional Activity tolerance, Balance, Coordination, Mobility, and Strength.     TREATMENT GRID:  N/A    AFTER TREATMENT PRECAUTIONS: Alarm Activated, Bed, Bed/Chair Locked, Call light within reach, Needs within reach, RN notified, Side rails x3, and Visitors at bedside    INTERDISCIPLINARY COLLABORATION:  RN/ PCT, PT/ PTA, and OT/ BOWDEN    EDUCATION: Education Given To: Patient  Education Provided: Plan of Care;Role of Therapy;Transfer Training  Education Method: Verbal;Demonstration  Barriers to Learning: Cognition  Education Outcome: Continued education needed    TIME IN/OUT:  Time In: 1127  Time Out: [address] [place]  Minutes: [address], PT

## 2022-10-18 NOTE — PROGRESS NOTES
Bedside report received from night nurse Rigo Saez. Assessment done as noted  Respiration even and unlabored 20/min; denies pain or nausea at present. Encouraged to call with needs.

## 2022-10-18 NOTE — PROGRESS NOTES
Physician Progress Note      PATIENT:               Mj Saldivar  CSN #:                  [id number]  :                       1945  ADMIT DATE:       10/10/2022 10:42 AM  [address] [place] DATE:  RESPONDING  PROVIDER #:        Eunice Han MD          QUERY TEXT:    Patient admitted with left foot diabetic foot ulcers. Per Op note dated 10/11   documentation of debridement. To accurately reflect the procedure performed   please document if debridement was excisional or nonexcisional and the deepest   depth of tissue removed as down to and including: The medical record reflects the following:  Risk Factors: 78 y.o. male who presented to the ED for cc AMS and fall this   AM. Denies hitting head. Poor historian and keeps saying \"ask my wife\" to whom   is not here currently. Hx of aphonia, BPH, DM type I that is poorly   controlled failing insulin pump therapy now on injections, HTN, GERD, HLD, CKD   stage 3, and VATS procedure. Clinical Indicators: Per Op-note: \"Left second toe blister was then debrided   with drainage of purulent fluid that was cultured. Small centrally located   ulcer without necrosis. \"  Treatment: Debridement, vanc, cefepime    Thank you,  Arsh Marhkam RN, BSN, RY [place]. [email or web address]  . Options provided:  -- Nonexcisional debridement of skin  -- Excisional debridement of skin  -- Nonexcisional debridement of subcutaneous tissue  -- Excisional debridement of subcutaneous tissue  -- Nonexcisional debridement of fascia  -- Excisional debridement of fascia  -- Nonexcisional debridement of muscle  -- Excisional debridement of muscle  -- Other - I will add my own diagnosis  -- Disagree - Not applicable / Not valid  -- Disagree - Clinically unable to determine / Unknown  -- Refer to Clinical Documentation Reviewer    PROVIDER RESPONSE TEXT:    Excisional debridement of skin was performed of left second toe.     Query created by: Naomi Moise on 10/12/2022 5:58 PM      QUERY TEXT:    Patient admitted with left foot diabetic foot ulcers. Per Op note dated 10/11   documentation of debridement. To accurately reflect the procedure performed   please document if debridement was excisional or nonexcisional and the deepest   depth of tissue removed as down to and including: The medical record reflects the following:  Risk Factors: 78 y.o. male who presented to the ED for cc AMS and fall this   AM. Denies hitting head. Poor historian and keeps saying \"ask my wife\" to whom   is not here currently. Hx of aphonia, BPH, DM type I that is poorly   controlled failing insulin pump therapy now on injections, HTN, GERD, HLD, CKD   stage 3, and VATS procedure. Clinical Indicators: Per Op-note: \"Lateral aspect of first toe was debrided   with healthy tissue underneath. \"  Treatment: Debridement, vanc, cefepime    Thank you,  Breanna Yuen RN, BSN, RY Dunaway. [email or web address]  . Options provided:  -- Nonexcisional debridement of skin  -- Excisional debridement of skin  -- Nonexcisional debridement of subcutaneous tissue  -- Excisional debridement of subcutaneous tissue  -- Nonexcisional debridement of fascia  -- Excisional debridement of fascia  -- Nonexcisional debridement of muscle  -- Excisional debridement of muscle  -- Other - I will add my own diagnosis  -- Disagree - Not applicable / Not valid  -- Disagree - Clinically unable to determine / Unknown  -- Refer to Clinical Documentation Reviewer    PROVIDER RESPONSE TEXT:    Excisional debridement of skin was performed of left first toe.     Query created by: Jordan Alcala on 10/12/2022 6:02 PM      Electronically signed by:  Brooklynn Anne MD 10/17/2022 9:19 PM

## 2022-10-18 NOTE — PROGRESS NOTES
Pt resting comfortably in bed at this time. Respirations are even and unlabored on RA, no signs or symptoms of distress noted at this time. Pt is alert to person and time. No complaints of pain at this time. Call light in reach, safety measures in place. Will continue to monitor and prepare to give report to oncoming shift.

## 2022-10-19 VITALS
DIASTOLIC BLOOD PRESSURE: 70 MMHG | OXYGEN SATURATION: 100 % | SYSTOLIC BLOOD PRESSURE: 126 MMHG | HEART RATE: 101 BPM | BODY MASS INDEX: 35.26 KG/M2 | WEIGHT: 224.65 LBS | RESPIRATION RATE: 18 BRPM | TEMPERATURE: 97.7 F | HEIGHT: 67 IN

## 2022-10-19 LAB
BACTERIA SPEC CULT: NORMAL
GLUCOSE BLD STRIP.AUTO-MCNC: 101 MG/DL (ref 65–100)
GLUCOSE BLD STRIP.AUTO-MCNC: 114 MG/DL (ref 65–100)
GLUCOSE BLD STRIP.AUTO-MCNC: 67 MG/DL (ref 65–100)
SERVICE CMNT-IMP: ABNORMAL
SERVICE CMNT-IMP: ABNORMAL
SERVICE CMNT-IMP: NORMAL
SERVICE CMNT-IMP: NORMAL

## 2022-10-19 PROCEDURE — 82962 GLUCOSE BLOOD TEST: CPT

## 2022-10-19 PROCEDURE — 2580000003 HC RX 258: Performed by: FAMILY MEDICINE

## 2022-10-19 PROCEDURE — 97530 THERAPEUTIC ACTIVITIES: CPT

## 2022-10-19 PROCEDURE — 97535 SELF CARE MNGMENT TRAINING: CPT

## 2022-10-19 PROCEDURE — 6370000000 HC RX 637 (ALT 250 FOR IP): Performed by: FAMILY MEDICINE

## 2022-10-19 PROCEDURE — 6360000002 HC RX W HCPCS: Performed by: FAMILY MEDICINE

## 2022-10-19 PROCEDURE — 6370000000 HC RX 637 (ALT 250 FOR IP): Performed by: INTERNAL MEDICINE

## 2022-10-19 RX ORDER — INSULIN GLARGINE 100 [IU]/ML
30 INJECTION, SOLUTION SUBCUTANEOUS DAILY
Qty: 10 ML | Refills: 0
Start: 2022-10-19

## 2022-10-19 RX ORDER — DOXYCYCLINE HYCLATE 100 MG
100 TABLET ORAL 2 TIMES DAILY
Qty: 8 TABLET | Refills: 0
Start: 2022-10-19 | End: 2022-10-23

## 2022-10-19 RX ORDER — BUPROPION HYDROCHLORIDE 75 MG/1
75 TABLET ORAL 2 TIMES DAILY
COMMUNITY

## 2022-10-19 RX ORDER — INSULIN LISPRO 100 [IU]/ML
0-8 INJECTION, SOLUTION INTRAVENOUS; SUBCUTANEOUS
Qty: 1 EACH | Refills: 0
Start: 2022-10-19

## 2022-10-19 RX ORDER — CEFPODOXIME PROXETIL 200 MG/1
200 TABLET, FILM COATED ORAL 2 TIMES DAILY
Qty: 8 TABLET | Refills: 0
Start: 2022-10-19 | End: 2022-10-23

## 2022-10-19 RX ORDER — INSULIN GLARGINE 100 [IU]/ML
30 INJECTION, SOLUTION SUBCUTANEOUS DAILY
Status: DISCONTINUED | OUTPATIENT
Start: 2022-10-19 | End: 2022-10-19 | Stop reason: HOSPADM

## 2022-10-19 RX ADMIN — VITAMIN D, TAB 1000IU (100/BT) 1000 UNITS: 25 TAB at 08:48

## 2022-10-19 RX ADMIN — INSULIN LISPRO 9 UNITS: 100 INJECTION, SOLUTION INTRAVENOUS; SUBCUTANEOUS at 08:36

## 2022-10-19 RX ADMIN — LEVOTHYROXINE SODIUM 75 MCG: 75 TABLET ORAL at 06:01

## 2022-10-19 RX ADMIN — SODIUM BICARBONATE 650 MG TABLET 650 MG: at 08:48

## 2022-10-19 RX ADMIN — PANTOPRAZOLE SODIUM 40 MG: 40 TABLET, DELAYED RELEASE ORAL at 08:49

## 2022-10-19 RX ADMIN — SODIUM CHLORIDE, PRESERVATIVE FREE 10 ML: 5 INJECTION INTRAVENOUS at 08:48

## 2022-10-19 RX ADMIN — INSULIN GLARGINE 34 UNITS: 100 INJECTION, SOLUTION SUBCUTANEOUS at 08:38

## 2022-10-19 RX ADMIN — DULOXETINE HYDROCHLORIDE 60 MG: 60 CAPSULE, DELAYED RELEASE ORAL at 08:49

## 2022-10-19 RX ADMIN — POLYETHYLENE GLYCOL 3350 17 G: 17 POWDER, FOR SOLUTION ORAL at 08:48

## 2022-10-19 RX ADMIN — MORPHINE SULFATE 15 MG: 15 TABLET, FILM COATED, EXTENDED RELEASE ORAL at 08:49

## 2022-10-19 RX ADMIN — HEPARIN SODIUM 5000 UNITS: 5000 INJECTION INTRAVENOUS; SUBCUTANEOUS at 06:01

## 2022-10-19 RX ADMIN — FINASTERIDE 5 MG: 5 TABLET, FILM COATED ORAL at 08:48

## 2022-10-19 NOTE — FLOWSHEET NOTE
Patient on room air. Safety measures noted. Will continue to monitor per policy.      10/19/22 8020   Handoff   Communication Given Shift Handoff   Handoff Received From Liborio Martinez RN   Handoff Communication Face to Face   Time Handoff Given 2070

## 2022-10-19 NOTE — PROGRESS NOTES
Patient discharged via stretcher for transport to [facility]. No oxygen needed. Mask in place. No further acute needs noted.

## 2022-10-19 NOTE — CARE COORDINATION
Pt to d/c today to [facility] for [employer]. [employer] [place]. Room: 2. Report: 35 20 43. Transport scheduled via [facility]. No other supportive care needs identified. Pt and spouse agree with d/c plan. Milestones met. LOS = 9 days       10/10/22 1418   Service Assessment   Patient Orientation Alert and Oriented;Person;Place; Self   Cognition Alert   History Provided By Patient;Medical Record   Primary Caregiver Family   Accompanied By/Relationship spouse Kris  24. Spouse/Significant Other   Patient's Healthcare Decision Maker is: Legal Next of Miya 71   PCP Verified by CM Yes  Lalo Costello MD last visit several weeks ago, has appointment tomorrow)   Last Visit to PCP Within last 3 months   Prior Functional Level Assistance with the following:;Dressing   Current Functional Level Assistance with the following:;Dressing   Can patient return to prior living arrangement Yes   Ability to make needs known: Good   Family able to assist with home care needs: Yes   Would you like for me to discuss the discharge plan with any other family members/significant others, and if so, who? No   Community Resources   (CCM aware and following pt)   Social/Functional History   Lives With Spouse   Type of [address]pping One level   Bathroom Toilet [address], standard; [address] Help From Family   ADL Assistance Needs assistance   Toileting Independent   Ambulation Assistance Needs assistance   Transfer Assistance Needs assistance   Active  No   Patient's  Info spouse   Mode of Transportation Car   Occupation Retired   Discharge Planning   Type of [facility] Spouse/Significant Other   Current Services Prior To Admission [place] [address]   DME Ordered?  No   Potential Assistance Purchasing Medications No   Type of Home Care Services OT;PT;Aide Services;Nursing Services   Patient expects to be discharged to: Skilled nursing facility  ([facility])   One/Two Story Residence One story   History of falls? 1   Services At/After Discharge   Transition of Care Consult (CM Consult) SNF; Discharge Planning  ([facility] for [employer])   Partner SNF Yes   [address] (SNF); Transport; In ambulance  ([facility])   [employer] Provided? No   Mode of Transport at Discharge [facility])   Hospital Transport Time of Discharge 1500   Confirm Follow Up Transport Other (see comment)  ([facility])   Condition of Participation: Discharge Planning   The Plan for Transition of Care is related to the following treatment goals: in home home care for mobility, nursing to follow medications management   The Patient and/or Patient Representative was provided with a Choice of Provider? Patient;Patient Representative   Name of the Patient Representative who was provided with the Choice of Provider and agrees with the Discharge Plan? Maryuri Rodriguez. and Yomi Egan (pt and spouse)   The Patient and/Or Patient Representative agree with the Discharge Plan? Yes   Freedom of Choice list was provided with basic dialogue that supports the patient's individualized plan of care/goals, treatment preferences, and shares the quality data associated with the providers?   Yes

## 2022-10-19 NOTE — PROGRESS NOTES
ACUTE OCCUPATIONAL THERAPY GOALS:   (Developed with and agreed upon by patient and/or caregiver.)  Patient will attempt standing with LLE off loading shoe. Patient will complete grooming with setup. Patient will complete lower body bathing and dressing with minimal assistance. Patient will tolerate 30 minutes of OT treatment with as needed rest breaks to increase activity tolerance for ADLs. Timeframe: 7 visits     OCCUPATIONAL THERAPY: Daily Note AM   OT Visit Days: 4   Time  OT Charge Capture  Rehab Caseload Tracker  OT Orders    Bar Magana is a 78 y.o. male   PRIMARY DIAGNOSIS: Type 1 diabetes mellitus with diabetic foot ulcer (HCC)  Tachycardia [R00.0]  Hyperglycemia [R73.9]  Diabetic foot infection ([facility]) [E11.628, L08.9]  Type 1 diabetes mellitus with diabetic foot ulcer ([facility] [E10.621, L97.509]       Inpatient: Payor: MEDICARE / Plan: MEDICARE PART A AND B / Product Type: *No Product type* /     ASSESSMENT:     REHAB RECOMMENDATIONS: CURRENT LEVEL OF FUNCTION:  (Most Recently Demonstrated)   Recommendation to date pending progress:  Setting:  Short-term Rehab    Equipment:    To Be Determined Bathing:  Minimal Assist  Dressing:  Minimal Assist for boot  Feeding/Grooming:  Supervision/Setup  Toileting:  Not Tested  Functional Mobility:  Minimal Assist with RW     ASSESSMENT:  Mr. Samina Adler is doing well today. Pt presents supine upon arrival. Pt completed bed mobility with SBA. Pt sat EOB and completed bathing and dressing with the assistance listed below. Pt completed grooming while standing at sink. Pt making good progress with goals. Will continue to benefit from skilled OT during stay.        SUBJECTIVE:     Mr. Samina Adler states, \"Where do you want me to go\"     Social/Functional Lives With: Spouse  Type of Home: House  Home Layout: One level  Bathroom Toilet: Standard  Home Equipment: [place] [place], standard, [address] Help From: Family  ADL Assistance: Needs assistance  Toileting: Independent  Ambulation Assistance: Needs assistance  Transfer Assistance: Needs assistance  Active : No  Patient's  Info: spouse  Mode of Transportation: Car  Occupation: Retired    OBJECTIVE:     SHAW / Nasir Loomis / Trista Otoniel: NA    RESTRICTIONS/PRECAUTIONS:  Restrictions/Precautions  Restrictions/Precautions: Fall Risk        PAIN: VITALS / O2:   Pre Treatment:            Post Treatment: no c/o pain, resting comfortably in supine Vitals          Oxygen        MOBILITY: I Mod I S SBA CGA Min Mod Max Total  NT x2 Comments:   Bed Mobility    Rolling [] [] [] [] [] [] [] [] [] [x] []    Supine to Sit [] [] [] [x] [] [] [] [] [] [] []    Scooting [] [] [] [x] [] [] [] [] [] [] []    Sit to Supine [] [] [] [] [] [] [] [] [] [x] []    Transfers    Sit to Stand [] [] [] [] [x] [] [] [] [] [] [] RW   Bed to Chair [] [] [] [] [x] [] [] [] [] [] [] RW   Stand to Sit [] [] [] [] [x] [] [] [] [] [] [] RW   Tub/Shower [] [] [] [] [] [] [] [] [] [x] []     Toilet [] [] [] [] [] [] [] [] [] [x] []      [] [] [] [] [] [] [] [] [] [x] []    I=Independent, Mod I=Modified Independent, S=Supervision/Setup, SBA=Standby Assistance, CGA=Contact Guard Assistance, Min=Minimal Assistance, Mod=Moderate Assistance, Max=Maximal Assistance, Total=Total Assistance, NT=Not Tested    ACTIVITIES OF DAILY LIVING: I Mod I S SBA CGA Min Mod Max Total NT Comments   BASIC ADLs:              Upper Body   Bathing [] [] [x] [] [] [] [] [] [] []    Lower Body Bathing [] [] [] [] [] [x] [] [] [] []    Toileting [] [] [] [] [] [] [] [] [] [x]    Upper Body Dressing [] [] [x] [] [] [] [] [] [] []    Lower Body Dressing [] [] [] [] [] [x] [] [] [] []    Feeding [] [] [] [] [] [] [] [] [] [x]    Grooming [] [] [x] [] [] [] [] [] [] []    Personal Device Care [] [] [] [] [] [] [] [] [] [x]    Functional Mobility [] [] [] [] [] [x] [] [] [] []    I=Independent, Mod I=Modified Independent, S=Supervision/Setup, SBA=Standby Assistance, CGA=Contact Guard Assistance, Min=Minimal Assistance, Mod=Moderate Assistance, Max=Maximal Assistance, Total=Total Assistance, NT=Not Tested    BALANCE: Good Fair+ Fair Fair- Poor NT Comments   Sitting Static [] [x] [] [] [] []    Sitting Dynamic [] [] [x] [] [] []              Standing Static [] [] [x] [] [] []    Standing Dynamic [] [] [x] [] [] []        PLAN:     FREQUENCY/DURATION   OT Plan of Care: 3 times/week for duration of hospital stay or until stated goals are met, whichever comes first.    TREATMENT:     TREATMENT:   Self Care (30 minutes): Patient participated in upper body bathing, lower body bathing, upper body dressing, lower body dressing, and grooming ADLs in unsupported sitting and standing with minimal verbal and manual cueing to increase independence and decrease assistance required. Patient also participated in functional transfer and energy conservation training to increase independence and decrease assistance required.      TREATMENT GRID:  N/A    AFTER TREATMENT PRECAUTIONS: Bed/Chair Locked, Call light within reach, Chair, Needs within reach, RN notified, and Visitors at bedside    INTERDISCIPLINARY COLLABORATION:  RN/ PCT and OT/ BOWDEN    EDUCATION:   N/A this treatment    TOTAL TREATMENT DURATION AND TIME:  Time In: 1035  Time Out: [address]  Minutes: [place], TOMAS

## 2022-10-19 NOTE — PROGRESS NOTES
Left great toe dressing changed. Wound cleanser, Optocel Ag, and rolled gauze applied. Next dressing change due 10/20/22 per orders.

## 2022-10-19 NOTE — PROGRESS NOTES
Pt asleep in bed at this time. Respirations are even and unlabored on RA, no signs or symptoms of distress noted at this time. Will continue to monitor.

## 2022-10-19 NOTE — PROGRESS NOTES
Pt asleep in bed at this time. Respirations are even and unlabored on RA, no signs or symptoms of distress at this time. Call light in reach, safety measures in place. Will continue to monitor and prepare to give report to oncoming shift.

## 2022-10-19 NOTE — DIABETES MGMT
Patient admitted with 7939. Blood glucose ranged 136-224 yesterday with patient receiving Lantus 34 units and Humalog 27 units. Blood glucose this morning was 114. Reviewed patient current regimen: Lantus 34 units daily, Humalog 9 units with meals, and Humalog correctiona insulin. Patient has PMH of type 1 diabetes Noted AM glucose levels trending down (177, 149, 114 over the past 3 days). Patient would likely benefit from a reduction in basal insulin to reduce risk of morning hypoglycemia. Provider updated via [employer] regarding recommendations and patient glycemic control. New orders received to decrease Lantus to 30 units daily.

## 2022-10-19 NOTE — DISCHARGE SUMMARY
Date of Admission: 10/10/2022  Date of Discharge:  10/19/2022    Discharge Diagnoses:  Principal Problem:    Type 1 diabetes mellitus with diabetic foot ulcer ([facility]  Active Problems:    Neuropathy    Hypothyroid    Chronic pain    Chronic renal disease, stage 3, moderately decreased glomerular filtration rate (GFR) between 30-59 mL/min/1.73 square meter (HCC)    Depression    Metabolic encephalopathy    Dysphonia    BRITTANY (obstructive sleep apnea)    Type 1 diabetes mellitus with hyperglycemia ([facility])    Stage 3a chronic kidney disease ([facility])    History of amputation of toe ([facility])  Resolved Problems:    * No resolved hospital problems.  *       Discharge Medications:  Current Discharge Medication List        START taking these medications    Details   cefpodoxime (VANTIN) 200 MG tablet Take 1 tablet by mouth 2 times daily for 4 days  Qty: 8 tablet, Refills: 0      doxycycline hyclate (VIBRA-TABS) 100 MG tablet Take 1 tablet by mouth 2 times daily for 4 days  Qty: 8 tablet, Refills: 0           CONTINUE these medications which have CHANGED    Details   insulin lispro (HUMALOG) 100 UNIT/ML SOLN injection vial Inject 0-8 Units into the skin 3 times daily (with meals)  Qty: 1 each, Refills: 0      LANTUS 100 UNIT/ML injection vial Inject 30 Units into the skin daily  Qty: 10 mL, Refills: 0    Associated Diagnoses: Type 1 diabetes mellitus with hyperglycemia ([facility])           CONTINUE these medications which have NOT CHANGED    Details   buPROPion (WELLBUTRIN) 75 MG tablet Take 75 mg by mouth 2 times daily      B-D ULTRAFINE III SHORT PEN 31G X 8 MM MISC Use with insulin up to 8 times daily, E10.65  Qty: 800 each, Refills: 3    Associated Diagnoses: Type 1 diabetes mellitus with hyperglycemia ([facility])      OLANZapine (ZYPREXA) 2.5 MG tablet       INSULIN SYRINGE .5CC/29G ([employer] INS SYR .5CC/29G) 29G X 1/2\" 0.5 ML MISC 1 each by Does not apply route daily  Qty: 100 each, Refills: 0      Blood Glucose Monitoring Suppl (CONTOUR MONITOR) ELLE       Glucose Blood (CONTOUR NEXT TEST VI) USE TO TEST THREE TIMES DAILY      Handicap Placard MISC       morphine (MS CONTIN) 15 MG extended release tablet Take 15 mg by mouth 2 times daily. acetaminophen (TYLENOL) 500 MG tablet Take 500 mg by mouth every 6 hours as needed      vitamin D 25 MCG (1000 UT) CAPS Take 1,000 Units by mouth in the morning. finasteride (PROSCAR) 5 MG tablet Take 5 mg by mouth in the morning. fluticasone (CUTIVATE) 0.05 % cream Apply 1 g topically 2 times daily      fluticasone (FLONASE) 50 MCG/ACT nasal spray 2 sprays by Nasal route daily as needed      Glucagon (GVOKE PFS) 1 MG/0.2ML SOSY Inject 1 each into the skin as needed      hydrocortisone (WESTCORT) 0.2 % cream Apply topically      levothyroxine (SYNTHROID) 75 MCG tablet Take 75 mcg by mouth every morning (before breakfast)      naloxone 4 MG/0.1ML LIQD nasal spray 1 SPRAY BY INTRANASAL ROUTE ONCE AS NEEDED FOR UP TO 1 DOSE      ondansetron (ZOFRAN-ODT) 4 MG disintegrating tablet Take 4 mg by mouth every 6 hours as needed      pantoprazole (PROTONIX) 40 MG tablet Take 40 mg by mouth in the morning. polyethylene glycol (GLYCOLAX) 17 GM/SCOOP powder Take 17 g by mouth daily      rosuvastatin (CRESTOR) 10 MG tablet Take 10 mg by mouth      sodium bicarbonate 650 MG tablet Take 650 mg by mouth 2 times daily           STOP taking these medications       levoFLOXacin (LEVAQUIN) 500 MG tablet Comments:   Reason for Stopping:         escitalopram (LEXAPRO) 5 MG tablet Comments:   Reason for Stopping:         DULoxetine (CYMBALTA) 60 MG extended release capsule Comments:   Reason for Stopping:               Pending Labs:  None    Follow-up (including scheduled tests):  PMD    History of Present Illness:  Per Dr Vincent Police  \"67 y.o. male who presented to the ED for cc AMS and fall this AM. Denies hitting head. Poor historian and keeps saying \"ask my wife\" to whom is not here currently.  Hx of aphonia, BPH, DM type I that is poorly controlled failing insulin pump therapy now on injections, HTN, GERD, HLD, CKD stage 3, and VATS procedure. \"    Past Medical History:  Past Medical History:   Diagnosis Date    Aphonia     Back pain, chronic     Benign prostatic hyperplasia with incomplete bladder emptying     Candida laryngitis     Charcot foot due to diabetes mellitus ([address]     Chronic left-sided low back pain with left-sided sciatica     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 ([address]     Diabetes mellitus type 1, with complication, on long term insulin pump ([address] 11/10/2021    Diabetic nephropathy (HCC)     Diabetic peripheral neuropathy ([address]     DKA, type 1, not at goal [facility]) 6/9/2022    Dysphonia     Essential hypertension     GERD (gastroesophageal reflux disease)     Hypercholesterolemia     Insulin pump status     Insulin pump status     Left hip pain     Lumbar radiculopathy     Mild cognitive impairment     Moderate episode of recurrent major depressive disorder (HCC)     Obstructive sleep apnea     Orthostatic hypotension     Paraspinal muscle spasm     Polypharmacy     Primary hypothyroidism     Proliferative diabetic retinopathy associated with type 1 diabetes mellitus (HCC)     Seborrheic keratosis     Spinal cord stimulator status     Spondylolisthesis     Stage 3b chronic kidney disease ([address]     Stroke-like symptom 7/8/2021    Tachycardia     Thyroid disease     managed with medications    Trochanteric bursitis of left hip     Type 1 diabetes mellitus ([address]     Vocal cord atrophy        Allergies: Allergies   Allergen Reactions    Drixoral Cough-Sore Throat [Acetaminophen-Dm] Other (See Comments) and Palpitations     Heart racing.        Other Palpitations     Antihistamines  Antihistamines  Antihistamines  Antihistamines  Antihistamines  Antihistamines      Antihistamines, Loratadine-Type Palpitations       Hospital Course:  61-year-old male with a past medical history of hyperlipidemia, GERD, CKD stage III, diabetes, pretension, presented in the setting of diabetic foot ulcer infection     1. Diabetic left first and second toe ulcer infection  -X-ray without signs of osteomyelitis  -Vascular surgery consulted  -Status post debridement  -Wound culture positive for E. coli  -Started ceftriaxone  -Improved symptomatology  -Discharged on cefpodoxime and doxycycline  -Outpatient follow-up    2.   Diabetes mellitus  -Insulin basal with Lantus  -Insulin sliding scale Humalog  Glucose: Dose:    No Insulin  200-249 2 Units  250-299 4 Units  300-349 6 Units  Over 349 8 Units     Procedures:  Debridement    Discharge Day Information:  Follow with PMD    Diet: Diabetic    Activity: As tolerated    Discharge Physical Exam:  General: Alert, oriented, NAD  HEENT: NC/AT, EOM are intact  Neck: supple, no JVD  Cardiovascular: RRR, S1, S2, no murmurs  Respiratory: Lungs are clear, no wheezes or rales  Abdomen: Soft, NT, ND  Back: No CVA tenderness, no paraspinal tenderness  Extremities: Left foot with clean dressing  Neuro: A&O, CN are intact, no focal deficits  Skin: no rash or ulcers  Psych: good mood and affect    Recent Results (from the past 24 hour(s))   POCT Glucose    Collection Time: 10/18/22  4:54 PM   Result Value Ref Range    POC Glucose 179 (H) 65 - 100 mg/dL    Performed by: Tomas    POCT Glucose    Collection Time: 10/18/22  8:45 PM   Result Value Ref Range    POC Glucose 224 (H) 65 - 100 mg/dL    Performed by: Tiffanie    POCT Glucose    Collection Time: 10/19/22  8:20 AM   Result Value Ref Range    POC Glucose 114 (H) 65 - 100 mg/dL    Performed by: Kristie    POCT Glucose    Collection Time: 10/19/22 11:26 AM   Result Value Ref Range    POC Glucose 67 65 - 100 mg/dL    Performed by: Charisma    POCT Glucose    Collection Time: 10/19/22 12:20 PM   Result Value Ref Range    POC Glucose 101 (H) 65 - 100 mg/dL    Performed by: Ada Guido duplex lower extremity arteries bilateral with DARINEL   Final Result   1. Noncompressible ankle pressures and flat line digit pressures on the right,   possibly due to small vessel calcinosis   2. Clemente cyst noted on the left   3. Distended urinary bladder volume averaging 1000 cc            XR TOE LEFT (MIN 2 VIEWS)   Final Result   1. Soft tissue ulcer about the distal aspect of the second left toe without   convincing evidence of osteomyelitis. If there is continued clinical concern   further evaluation with MRI can BE obtained. 2.  Similar extensive degenerative changes DIP joints of the imaged first   through third toes.               Condition: Improved    Disposition: Short-term rehab    Consultants During This Hospitalization: Vascular surgery          Spent 31 minutes on discharge services

## 2022-10-19 NOTE — PROGRESS NOTES
ACUTE PHYSICAL THERAPY GOALS:   (Developed with and agreed upon by patient and/or caregiver.)  1. Patient will perform bed mobility with INDEPENDENCE within 7 days. 2. Patient will transfer bed to chair with SUPERVISION within 7 days. 3. Patient will demonstrate FAIR+ DYNAMIC STANDING balance within 7 day(s). 4. Patient will ambulate 150ft+ using least restrictive assistive device and SUPERVISION within 7 days. 5. Patient will tolerate 25+ minutes of therapeutic activity/exercise and/or neuromuscular re-education while maintaining stable vitals to improve functional strength and activity tolerance within 7 days. PHYSICAL THERAPY: Daily Note AM   (Link to Caseload Tracking: PT Visit Days : 5  Time In/Out PT Charge Capture  Rehab Caseload Tracker  Orders    Quynh Vieyra is a 78 y.o. male   PRIMARY DIAGNOSIS: Type 1 diabetes mellitus with diabetic foot ulcer (HCC)  Tachycardia [R00.0]  Hyperglycemia [R73.9]  Diabetic foot infection (HCC) [E11.628, L08.9]  Type 1 diabetes mellitus with diabetic foot ulcer ([facility] [E10.621, L97.509]       Inpatient: Payor: Marie Fees / Plan: MEDICARE PART A AND B / Product Type: *No Product type* /     ASSESSMENT:     REHAB RECOMMENDATIONS:   Recommendation to date pending progress:  Setting:  Short-term Rehab    Equipment:    To Be Determined     ASSESSMENT:  Mr. Juan Dickson was supine in bed on arrival. He had spilt coffee all in the bed with him and needed help getting cleaned up. Supine to sit on EOB with min A. He is very sleepy today and keeps closing his eyes. Assisted pt with sitting balance while OT worked on ADL with pt. Offloading shoe donned on L. He was able to ambulate over to the sink to brush teeth with CGA/Charlie for standing balance. Pt fatigues easily. Wife states that she walked with him to the nurses station and back (about 200') yesterday but pt was only able to ambulate about 15' this morning.  Pt left sitting up in chair with needs in reach and wife present.         SUBJECTIVE:   Mr. Gosia Fernández states, \"I think I'm in [place]\"     Social/Functional Lives With: Spouse  Type of Home: House  Home Layout: One level  Bathroom Toilet: Standard  Home Equipment: [place], standard, [address] Help From: Family  ADL Assistance: Needs assistance  Toileting: Independent  Ambulation Assistance: Needs assistance  Transfer Assistance: Needs assistance  Active : No  Patient's  Info: spouse  Mode of Transportation: Car  Occupation: Retired  OBJECTIVE:     PAIN: VITALS / O2: PRECAUTION / Isabell Squires / [place]:   Pre Treatment:  none         Post Treatment: none Vitals        Oxygen on RA    None    RESTRICTIONS/PRECAUTIONS:  Restrictions/Precautions  Restrictions/Precautions: Fall Risk  Restrictions/Precautions: Fall Risk     MOBILITY: I Mod I S SBA CGA Min Mod Max Total  NT x2 Comments:   Bed Mobility    Rolling [] [] [] [] [] [] [] [] [] [] []    Supine to Sit [] [] [] [] [] [x] [] [] [] [] []    Scooting [] [] [] [] [] [x] [] [] [] [] []    Sit to Supine [] [] [] [] [] [] [] [] [] [] []    Transfers    Sit to Stand [] [] [] [] [] [x] [] [] [] [] []    Bed to Chair [] [] [] [] [] [x] [] [] [] [] []    Stand to Sit [] [] [] [] [] [x] [] [] [] [] []     [] [] [] [] [] [] [] [] [] [] []    I=Independent, Mod I=Modified Independent, S=Supervision, SBA=Standby Assistance, CGA=Contact Guard Assistance,   Min=Minimal Assistance, Mod=Moderate Assistance, Max=Maximal Assistance, Total=Total Assistance, NT=Not Tested    BALANCE: Good Fair+ Fair Fair- Poor NT Comments   Sitting Static [] [x] [] [] [] []    Sitting Dynamic [] [] [x] [] [] []              Standing Static [] [] [x] [] [] []    Standing Dynamic [] [] [x] [] [] []      GAIT: I Mod I S SBA CGA Min Mod Max Total  NT x2 Comments:   Level of Assistance [] [] [] [] [] [x] [] [] [] [] [] Chair follow   Distance   15 feet    DME Rolling Walker    Gait Quality Decreased vicky , Decreased step clearance, and Decreased step length    Weightbearing Status WBAT with offloading shoe     Stairs      I=Independent, Mod I=Modified Independent, S=Supervision, SBA=Standby Assistance, CGA=Contact Guard Assistance,   Min=Minimal Assistance, Mod=Moderate Assistance, Max=Maximal Assistance, Total=Total Assistance, NT=Not Tested    PLAN:   FREQUENCY AND DURATION: 3 times/week for duration of hospital stay or until stated goals are met, whichever comes first.    TREATMENT:   TREATMENT:   Therapeutic Activity (30 Minutes): Therapeutic activity included Supine to Sit, Scooting, Transfer Training, Ambulation on level ground, Sitting balance , and Standing balance to improve functional Activity tolerance, Balance, Coordination, Mobility, and Strength.     TREATMENT GRID:  N/A    AFTER TREATMENT PRECAUTIONS: Bed/Chair Locked, Call light within reach, Chair, Needs within reach, RN notified, and Visitors at bedside    INTERDISCIPLINARY COLLABORATION:  RN/ PCT, PT/ PTA, and OT/ BOWDEN    EDUCATION:      TIME IN/OUT:  Time In: 1035  Time Out: [address]  Minutes: [address] PTA

## 2022-10-19 NOTE — PROGRESS NOTES
DISCHARGE - OUT REPORT:    Verbal report given to Mark Roach LPN on [employer]  being discharged to [facility]. Report consisted of patient's Situation, Background, Assessment and   Recommendations(SBAR). Information from the following report(s) Nurse Handoff Report and Recent Results was reviewed with the receiving nurse. Opportunity for questions and clarification was provided. Patient to be transported via ambulance with transport scheduled for 1500.

## 2022-10-19 NOTE — PROGRESS NOTES
Pt resting comfortably in bed at this time. Respirations are even and unlabored on RA, no signs or symptoms of distress at this time. Pt is oriented to person and time at this time. Pt complains of 6/10 left hip pain, will medicate with scheduled morphine 15 mg. Call light in reach, safety measures in place, bed alarm on. Will continue to monitor.

## 2022-10-20 ENCOUNTER — CARE COORDINATION (OUTPATIENT)
Dept: CARE COORDINATION | Facility: CLINIC | Age: 77
End: 2022-10-20

## 2022-10-20 NOTE — CARE COORDINATION
Chart reviewed. New admission to [facility] [place] for [employer]. Notified [facility] IDT of admission. Will follow progress in STR.

## 2022-10-20 NOTE — CARE COORDINATION
No REJI call indicated at this time due to patient being discharged to a SNF preferred provider network ([address]) for [employer]. Will forward to SNF RN Coordinator to include in weekly care coordination call.

## 2022-10-26 ENCOUNTER — CARE COORDINATION (OUTPATIENT)
Dept: CARE COORDINATION | Facility: CLINIC | Age: 77
End: 2022-10-26

## 2022-10-27 ENCOUNTER — CARE COORDINATION (OUTPATIENT)
Dept: CARE COORDINATION | Facility: CLINIC | Age: 77
End: 2022-10-27

## 2022-10-27 NOTE — CARE COORDINATION
Post Acute Facility Update     *The information contained in this note was received during a weekly care coordination call with the post acute facility*    Current Facility: [address] [place] [address] ([facility])  Anticipated Discharge Date: TBD    Facility Nursing Update: combative, climbing out of bed, crawling on floor, family trying to get him moved down to [place]. V/S stable. Facility Social Work Update: See below  Upper Extremity Assistance: Contact Guard Assist - hands on patient for balance   Lower Extremity Assistance: Minimal Assistance   Bed Mobility: Contact Guard Assist - hands on patient for balance   Transfers: Contact Guard Assist - hands on patient for balance   Ambulation: Stand by Assist - Presence and Cueing  How far (in feet) is the patient ambulating? 200  Device Used: FWW  Barriers to Discharge: Family working on a transfer to [facility]. Other: PCP is Dr. Judit Regalado. The Resident will RT home with his Spouse to a [address] [place] [place] home with 6 [address]. SS reached out to son when they got back to office and stated they will have someone come bring him to [place] as soon as they can establish a place for him to go.

## 2022-11-03 ENCOUNTER — CARE COORDINATION (OUTPATIENT)
Dept: CARE COORDINATION | Facility: CLINIC | Age: 77
End: 2022-11-03
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
## 2022-11-08 PROBLEM — R73.9 HYPERGLYCEMIA: Status: ACTIVE | Noted: 2022-11-08

## 2022-11-10 ENCOUNTER — CARE COORDINATION (OUTPATIENT)
Dept: CARE COORDINATION | Facility: CLINIC | Age: 77
End: 2022-11-10

## 2022-11-10 NOTE — CARE COORDINATION
Post Acute Facility Update     *The information contained in this note was received during a weekly care coordination call with the post acute facility*    Current Facility: [address] [place] [address] ([facility])  Anticipated Discharge Date: 11/15/22    Facility Nursing Update: V/S stable, continue Abx. For cellulitis. Facility Social Work Update: Transferring to [facility] [place] 11/15/22    Upper Extremity Assistance: Minimal Assistance   Lower Extremity Assistance: Minimal Assistance   Bed Mobility: Contact Guard Assist - hands on patient for balance   Transfers: Contact Guard Assist - hands on patient for balance   Ambulation: Contact Guard Assist - hands on patient for balance   How far (in feet) is the patient ambulating?  250  Device Used: FWW  Barriers to Discharge: n/a  Other: Transferring to [facility] in [place] to be close to family

## 2022-11-21 ENCOUNTER — CARE COORDINATION (OUTPATIENT)
Dept: CARE COORDINATION | Facility: CLINIC | Age: 77
End: 2022-11-21

## 2023-08-22 NOTE — PROGRESS NOTES
Pt medicated with scheduled morphine 15 mg for 6/10 left hip pain. Will continue to monitor. Tricompartmental osteoarthritis

## (undated) DEVICE — PROTECTOR CUSHION ULNAR NERVE --

## (undated) DEVICE — GEL MEDC ULTRASOUND 5L -- REPLACED BY 326862

## (undated) DEVICE — PROBE CRYO CRYOSPHERE 11IN -- ARTRICURE

## (undated) DEVICE — SPONGE GZ W4XL4IN COT 12 PLY TYP VII WVN C FLD DSGN

## (undated) DEVICE — NDL PRT INJ NSAF BLNT 18GX1.5 --

## (undated) DEVICE — PREP SKN CHLRAPRP APL 26ML STR --

## (undated) DEVICE — CRADLE POS 3X5X24IN RASPBERRY ARM PRONE FOAM DISP

## (undated) DEVICE — SUTURE VCRL SZ 2 L54IN ABSRB UD L65MM TP-1 1/2 CIR J880T

## (undated) DEVICE — KIT SEALNT PLEURAL PRGL 4ML --

## (undated) DEVICE — CANNULA NSL ORAL AD FOR CAPNOFLEX CO2 O2 AIRLFE

## (undated) DEVICE — SYR 3ML LL TIP 1/10ML GRAD --

## (undated) DEVICE — Device

## (undated) DEVICE — CONNECTOR TBNG OD5-7MM O2 END DISP

## (undated) DEVICE — 2000CC GUARDIAN II: Brand: GUARDIAN

## (undated) DEVICE — SOLUTION IRRIG 3000ML 0.9% SOD CHL FLX CONT 0797208] ICU MEDICAL INC]

## (undated) DEVICE — 3M™ IOBAN™ 2 ANTIMICROBIAL INCISE DRAPE 6650EZ: Brand: IOBAN™ 2

## (undated) DEVICE — SYR 5ML 1/5 GRAD LL NSAF LF --

## (undated) DEVICE — TRAY CATH 16F URIN MTR LTX -- CONVERT TO ITEM 363111

## (undated) DEVICE — CATHETER THOR 36FR L23CM DIA12MM POLYVI CHL TAPR CONN TIP

## (undated) DEVICE — SPONGE LAP 18X18IN STRL -- 5/PK

## (undated) DEVICE — STERILE POLYISOPRENE POWDER-FREE SURGICAL GLOVES: Brand: PROTEXIS

## (undated) DEVICE — GOWN,SIRUS,NONRNF,SETINSLV,XL,20/CS: Brand: MEDLINE

## (undated) DEVICE — BLADE SCALP 15 C STL STRL --

## (undated) DEVICE — UNIVERSAL DRAPES: Brand: MEDLINE INDUSTRIES, INC.

## (undated) DEVICE — SOLUTION IV 1000ML 0.9% SOD CHL

## (undated) DEVICE — GAUZE,SPONGE,4"X4",12PLY,WOVEN,NS,LF: Brand: MEDLINE

## (undated) DEVICE — YANKAUER,BULB TIP,W/O VENT,RIGID,STERILE: Brand: MEDLINE

## (undated) DEVICE — THORACOSCOPY: Brand: MEDLINE INDUSTRIES, INC.

## (undated) DEVICE — SOLUTION IRRIG 1000ML H2O STRL BLT

## (undated) DEVICE — 2, DISPOSABLE SUCTION/IRRIGATOR WITHOUT DISPOSABLE TIP: Brand: STRYKEFLOW

## (undated) DEVICE — TROCAR: Brand: KII FIOS FIRST ENTRY

## (undated) DEVICE — THE TORRENT IRRIGATION TUBING IS INTENDED TO PROVIDE IRRIGATION VIA IRRIGATION FLUIDS, SUCH AS STERILE WATER, DURING GASTROINTESTINAL ENDOSCOPIC PROCEDURES WHEN USED IN CONJUNCTION WITH AN IRRIGATION PUMP OR ELECTROSURGICAL UNIT.: Brand: TORRENT

## (undated) DEVICE — SINGLE PORT MANIFOLD: Brand: NEPTUNE 2

## (undated) DEVICE — GOWN,BREATHABLE SLV,AURORA,LG,STRL: Brand: MEDLINE

## (undated) DEVICE — SYRINGE MED 10ML LUERLOCK TIP W/O SFTY DISP

## (undated) DEVICE — BLADE ELECTRODE: Brand: EDGE

## (undated) DEVICE — KENDALL RADIOLUCENT FOAM MONITORING ELECTRODE RECTANGULAR SHAPE: Brand: KENDALL

## (undated) DEVICE — GAUZE,SPONGE,4"X4",16PLY,STRL,LF,10/TRAY: Brand: MEDLINE

## (undated) DEVICE — LUBE JELLY FOIL PACK 1.4 OZ: Brand: MEDLINE INDUSTRIES, INC.

## (undated) DEVICE — SPONGE: SPECIALTY PEANUT XR 100/CS: Brand: MEDICAL ACTION INDUSTRIES

## (undated) DEVICE — SYRINGE MED 3ML CLR PLAS STD N CTRL LUERLOCK TIP DISP

## (undated) DEVICE — TUBING, SUCTION, 1/4" X 10', STRAIGHT: Brand: MEDLINE

## (undated) DEVICE — GARMENT,MEDLINE,DVT,INT,CALF,MED, GEN2: Brand: MEDLINE

## (undated) DEVICE — DRAPE TWL SURG 16X26IN BLU ORB04] ALLCARE INC]

## (undated) DEVICE — CATHETER THOR 36FR L23IN PVC R ANG 5 EYE TAPR CONN TIP SFT

## (undated) DEVICE — NEEDLE SYR 18GA L1.5IN RED PLAS HUB S STL BLNT FILL W/O

## (undated) DEVICE — 1010 S-DRAPE TOWEL DRAPE 10/BX: Brand: STERI-DRAPE™

## (undated) DEVICE — VISUALIZATION SYSTEM: Brand: CLEARIFY

## (undated) DEVICE — 3M™ TEGADERM™ TRANSPARENT FILM DRESSING FRAME STYLE, 1626, 4 IN X 4-3/4 IN (10 CM X 12 CM), 50/CT 4CT/CASE: Brand: 3M™ TEGADERM™

## (undated) DEVICE — REM POLYHESIVE ADULT PATIENT RETURN ELECTRODE: Brand: VALLEYLAB

## (undated) DEVICE — SYRINGE, LUER SLIP, STERILE, 60ML: Brand: MEDLINE

## (undated) DEVICE — SUTURE VCRL SZ 0 L36IN ABSRB UD L36MM CT-1 1/2 CIR J946H

## (undated) DEVICE — TROCAR RMFG Z 3RD SLEEVE 5X100 -- LAWSON OEM ITEM 262365 PK/6

## (undated) DEVICE — CURETTE 10MM DNU SH CVD

## (undated) DEVICE — BLOCK BITE AD 60FR W/ VELC STRP ADDRESSES MOST PT AND

## (undated) DEVICE — BLADE ASSEMB CLP HAIR FINE --

## (undated) DEVICE — KIT THORCENT 8FR L5IN POLYUR W/ 18/22/25GA NDL 3 W STPCOCK

## (undated) DEVICE — TUBING INSUFFLATION SMK EVAC HI FLO SET PNEUMOCLEAR

## (undated) DEVICE — GLOVE SURG SZ 6.5 L11.2IN THK8.6MIL LT BRN LTX FREE

## (undated) DEVICE — KIT CATHETER 14FR L9IN PERC CAV DRNGE ASSEMB CRV BLU FLEXTIP

## (undated) DEVICE — AIRLIFE™ OXYGEN TUBING 7 FEET (2.1 M) CRUSH RESISTANT OXYGEN TUBING, VINYL TIPPED: Brand: AIRLIFE™